# Patient Record
Sex: FEMALE | Race: WHITE | NOT HISPANIC OR LATINO | Employment: OTHER | URBAN - METROPOLITAN AREA
[De-identification: names, ages, dates, MRNs, and addresses within clinical notes are randomized per-mention and may not be internally consistent; named-entity substitution may affect disease eponyms.]

---

## 2017-01-12 DIAGNOSIS — N95.2 VAGINAL ATROPHY: ICD-10-CM

## 2017-01-12 RX ORDER — ESTRADIOL 10 UG/1
INSERT VAGINAL
Qty: 30 TABLET | Refills: 0 | Status: SHIPPED | OUTPATIENT
Start: 2017-01-12 | End: 2019-04-02 | Stop reason: SDUPTHER

## 2017-01-12 RX ORDER — ATORVASTATIN CALCIUM 10 MG/1
TABLET, FILM COATED ORAL
Qty: 90 TABLET | Refills: 0 | Status: SHIPPED | OUTPATIENT
Start: 2017-01-12 | End: 2017-05-12 | Stop reason: SDUPTHER

## 2017-01-16 RX ORDER — LEVOTHYROXINE SODIUM 50 UG/1
50 TABLET ORAL DAILY
Qty: 90 TABLET | Refills: 0 | Status: SHIPPED | OUTPATIENT
Start: 2017-01-16 | End: 2017-05-10 | Stop reason: SDUPTHER

## 2017-01-16 NOTE — TELEPHONE ENCOUNTER
----- Message from Rosalva Zee sent at 1/16/2017  2:32 PM CST -----  Contact: Josi Cervantes at [phone]  RX request - refill or new RX.  Is this a refill or new RX:  refill  RX name and strength: levothyroxine (SYNTHROID) 50 MCG tablet  Directions: TAKE 1 TABLET BY MOUTH EVERY DAY  Is this a 30 day or 90 day RX:  90 day  Pharmacy name and phone #: [facility]  [phone] (Phone)  [phone] (Fax)  Comments:

## 2017-01-17 ENCOUNTER — OFFICE VISIT (OUTPATIENT)
Dept: INTERNAL MEDICINE | Facility: CLINIC | Age: 72
End: 2017-01-17
Payer: MEDICARE

## 2017-01-17 VITALS
RESPIRATION RATE: 18 BRPM | BODY MASS INDEX: 30.52 KG/M2 | SYSTOLIC BLOOD PRESSURE: 138 MMHG | HEART RATE: 80 BPM | TEMPERATURE: 98 F | DIASTOLIC BLOOD PRESSURE: 80 MMHG | WEIGHT: 183.19 LBS | HEIGHT: 65 IN

## 2017-01-17 DIAGNOSIS — Z11.59 NEED FOR HEPATITIS C SCREENING TEST: ICD-10-CM

## 2017-01-17 DIAGNOSIS — G47.62 NOCTURNAL LEG CRAMPS: ICD-10-CM

## 2017-01-17 DIAGNOSIS — I70.0 ATHEROSCLEROSIS OF AORTA: ICD-10-CM

## 2017-01-17 DIAGNOSIS — K59.1 FUNCTIONAL DIARRHEA: ICD-10-CM

## 2017-01-17 DIAGNOSIS — Z23 NEED FOR 23-POLYVALENT PNEUMOCOCCAL POLYSACCHARIDE VACCINE: ICD-10-CM

## 2017-01-17 DIAGNOSIS — E03.9 UNSPECIFIED HYPOTHYROIDISM: ICD-10-CM

## 2017-01-17 DIAGNOSIS — D75.839 THROMBOCYTOSIS: ICD-10-CM

## 2017-01-17 DIAGNOSIS — Z12.11 COLON CANCER SCREENING: ICD-10-CM

## 2017-01-17 DIAGNOSIS — E78.5 HYPERLIPIDEMIA, UNSPECIFIED HYPERLIPIDEMIA TYPE: Primary | Chronic | ICD-10-CM

## 2017-01-17 DIAGNOSIS — M85.80 OSTEOPENIA: ICD-10-CM

## 2017-01-17 DIAGNOSIS — I73.9 PAD (PERIPHERAL ARTERY DISEASE): Chronic | ICD-10-CM

## 2017-01-17 PROCEDURE — 90732 PPSV23 VACC 2 YRS+ SUBQ/IM: CPT | Mod: PBBFAC,PO | Performed by: INTERNAL MEDICINE

## 2017-01-17 PROCEDURE — 99213 OFFICE O/P EST LOW 20 MIN: CPT | Mod: PBBFAC,PO | Performed by: INTERNAL MEDICINE

## 2017-01-17 PROCEDURE — 99999 PR PBB SHADOW E&M-EST. PATIENT-LVL III: CPT | Mod: PBBFAC,,, | Performed by: INTERNAL MEDICINE

## 2017-01-17 PROCEDURE — 99215 OFFICE O/P EST HI 40 MIN: CPT | Mod: S$PBB,,, | Performed by: INTERNAL MEDICINE

## 2017-01-17 RX ORDER — ASPIRIN 81 MG/1
81 TABLET ORAL DAILY
COMMUNITY
End: 2019-04-02 | Stop reason: ALTCHOICE

## 2017-01-17 NOTE — PROGRESS NOTES
71 y.o. femalepresents in f/u to hypothyroidism, PAD, dyslipidemia, thrombocytosis, and  Post inflammatory fibrosis  Review of risks and complications associated w/ her long term chronic problems, and Health Maintenance    Dyslipidemia tx w/ atorvastatin  Denied unusual muscle pain or chest pain  ++ night cramps,very painful, if doesn't catch early will end up walking the floor x 1 hr  LDL==>pending    Hypothyroidism, tx w/ levothyroxine 50mcg  Denied fatigue or cold intolerance  TSH==>pending  Free T4==>pending    PAD, tx w/ atorvastatin,   ++ claudication if she has a brisk walk, happens less often  w/o discoloration of LE    Thrombocytosis, recurrent, tx ASA and Fish oil   Platelet ct==> pending  Denied stroke like sx, confusion , or focal deficits    Post inflammatory fibrosis  Pt was in study w/ yearly   D/c'd tobacco > 15 yrs  Denied cough or wheezing or SOB      ROS:  No fever, or chills  No blurry vision or visual disturbance  No dysphagia or early satiety  No palpitations or tachycardia  No angina or chest pain  No cough or wheezing  No GERD or abdominal pain  No blood in stool or urine  No dysuria or hematuria  No numbness or focal deficits  No cold intolerance; + hot flashes  No increased thirst or urination  ADL's: 100% independent  Memory: Good  Mental Health:Good  AD's: + living , living will, and POA  Nutrition:Good  Gait: Normal  Safety:Intact  Urinary incontinence: N/a  Remainder of review negative exgcept as previously noted      SCREENING TESTS:   Cholesterol/lab, pending.   Colonoscopy 05/2009 with Dr. Carlin.   Recommended 7 years.   Mammogram UTD  Pap smear with GYN, Dr. Cason.   Bone density 2014    Osteopenia of the total hip and lumbar spine. There is no signficant change in BMD at the total hip or lumbar spine when compared to previous study.   FRAX calculation does not support treatment for osteoporosis     EYE exam UTD  Dental UTD.    VACCINATIONS:  Tdap, 7/2014  Pneumovax 9/08, update  1/2017  Prevnar, 2015  Zostavax 2010  FLU, yearly    MEDS: Reviewed.     PHYSICAL EXAM:  VS: As noted  GENERAL: WDWN, A&O, NAD, conversant and co-operative; pleasant as always  EYES: Conj/lids unremarkable, sclera anicteric, PERRL,  rims pink  ENT: Hearing intact. Canals w/o significantcerumen, TM's unremarkable  Nasal mucosa/turbinates/septum w/o inflammation or exudate  Oropharynx w/o lesions or exudate, no stridor, frenulum pink;   Mucus membranes moist; Sinus nontender  NECK: Supple w/o lymphadenopathy or thyromegaly  RESPIRATORY: Efforts are unlabored. Lungs CTAP  CARDIOVASCULAR: Heart RRR. No carotid bruits noted.  Diminished pedal pulses. No LE edema  GASTROINTESTINAL: BS+, soft , NT/ND, - HSM noted  MUSCULOSKELETAL: Gait normal. No CCE  NEUROLOGIC: BARROW. No tremor noted  SKIN: Warm and dry    IMPRESSION:  Hypothyroidism, stable  HLD, stable  PAD, stable  Aortic atherosclerosis, asx  Thrombocytosis, asx  Night cramps, LE recurrent  Osteopenia, on supplements  Post inflammatory fibrosis, asx  Diarrhea, chronic, no exacerbating or remitting factors noted   extensive w/u when she was young- all negative    PLAN:  Fasting lab-  Pneumovax  C-scope due  Continue present meds  Vigorous hydration  ASA 81 daily  Calcium nightly  Vit D  Exercise as tolerated  Call prn  RTC 6 mos

## 2017-01-19 ENCOUNTER — LAB VISIT (OUTPATIENT)
Dept: LAB | Facility: HOSPITAL | Age: 72
End: 2017-01-19
Attending: INTERNAL MEDICINE
Payer: MEDICARE

## 2017-01-19 DIAGNOSIS — E03.9 UNSPECIFIED HYPOTHYROIDISM: ICD-10-CM

## 2017-01-19 DIAGNOSIS — E78.5 HYPERLIPIDEMIA, UNSPECIFIED HYPERLIPIDEMIA TYPE: Chronic | ICD-10-CM

## 2017-01-19 DIAGNOSIS — D75.839 THROMBOCYTOSIS: ICD-10-CM

## 2017-01-19 DIAGNOSIS — Z11.59 NEED FOR HEPATITIS C SCREENING TEST: ICD-10-CM

## 2017-01-19 LAB
ALBUMIN SERPL BCP-MCNC: 3.9 G/DL
ALP SERPL-CCNC: 63 U/L
ALT SERPL W/O P-5'-P-CCNC: 22 U/L
ANION GAP SERPL CALC-SCNC: 7 MMOL/L
AST SERPL-CCNC: 23 U/L
BASOPHILS # BLD AUTO: 0.05 K/UL
BASOPHILS NFR BLD: 0.8 %
BILIRUB SERPL-MCNC: 0.4 MG/DL
BUN SERPL-MCNC: 18 MG/DL
CALCIUM SERPL-MCNC: 9.6 MG/DL
CHLORIDE SERPL-SCNC: 106 MMOL/L
CHOLEST/HDLC SERPL: 2.1 {RATIO}
CO2 SERPL-SCNC: 29 MMOL/L
CREAT SERPL-MCNC: 0.8 MG/DL
DIFFERENTIAL METHOD: ABNORMAL
EOSINOPHIL # BLD AUTO: 0.3 K/UL
EOSINOPHIL NFR BLD: 3.9 %
ERYTHROCYTE [DISTWIDTH] IN BLOOD BY AUTOMATED COUNT: 14.2 %
EST. GFR  (AFRICAN AMERICAN): >60 ML/MIN/1.73 M^2
EST. GFR  (NON AFRICAN AMERICAN): >60 ML/MIN/1.73 M^2
GLUCOSE SERPL-MCNC: 103 MG/DL
HCT VFR BLD AUTO: 45.1 %
HDL/CHOLESTEROL RATIO: 46.9 %
HDLC SERPL-MCNC: 162 MG/DL
HDLC SERPL-MCNC: 76 MG/DL
HGB BLD-MCNC: 14.8 G/DL
LDLC SERPL CALC-MCNC: 71.2 MG/DL
LYMPHOCYTES # BLD AUTO: 1.7 K/UL
LYMPHOCYTES NFR BLD: 26.1 %
MCH RBC QN AUTO: 31.4 PG
MCHC RBC AUTO-ENTMCNC: 32.8 %
MCV RBC AUTO: 96 FL
MONOCYTES # BLD AUTO: 0.7 K/UL
MONOCYTES NFR BLD: 10.9 %
NEUTROPHILS # BLD AUTO: 3.7 K/UL
NEUTROPHILS NFR BLD: 57.8 %
NONHDLC SERPL-MCNC: 86 MG/DL
PLATELET # BLD AUTO: 287 K/UL
PMV BLD AUTO: 10.8 FL
POTASSIUM SERPL-SCNC: 4.5 MMOL/L
PROT SERPL-MCNC: 7 G/DL
RBC # BLD AUTO: 4.72 M/UL
SODIUM SERPL-SCNC: 142 MMOL/L
T4 FREE SERPL-MCNC: 0.91 NG/DL
TRIGL SERPL-MCNC: 74 MG/DL
TSH SERPL DL<=0.005 MIU/L-ACNC: 7.31 UIU/ML
WBC # BLD AUTO: 6.43 K/UL

## 2017-01-19 PROCEDURE — 86803 HEPATITIS C AB TEST: CPT

## 2017-01-19 PROCEDURE — 84443 ASSAY THYROID STIM HORMONE: CPT

## 2017-01-19 PROCEDURE — 36415 COLL VENOUS BLD VENIPUNCTURE: CPT | Mod: PO

## 2017-01-19 PROCEDURE — 85025 COMPLETE CBC W/AUTO DIFF WBC: CPT

## 2017-01-19 PROCEDURE — 84439 ASSAY OF FREE THYROXINE: CPT

## 2017-01-19 PROCEDURE — 80053 COMPREHEN METABOLIC PANEL: CPT

## 2017-01-19 PROCEDURE — 80061 LIPID PANEL: CPT

## 2017-01-20 LAB — HCV AB SERPL QL IA: NEGATIVE

## 2017-01-23 ENCOUNTER — TELEPHONE (OUTPATIENT)
Dept: INTERNAL MEDICINE | Facility: CLINIC | Age: 72
End: 2017-01-23

## 2017-01-23 NOTE — TELEPHONE ENCOUNTER
----- Message from Suha Salguero MD sent at 1/20/2017  2:00 PM CST -----  Please note your urine had extra WBC's ( white blood cells),they may be indicative of a bladder infection or just an excessive amount due to providing a urine when you were fasting   please advise if you are having any symptoms of a bladder infection.  Suha Clifton

## 2017-01-23 NOTE — TELEPHONE ENCOUNTER
----- Message from Suha Salguero MD sent at 1/20/2017  1:56 PM CST -----  Please note that your hepatitis C was negative  Thyroid was stable, when reviewing the TSH and Free T4  Cholesterol was 162, and the good (HDL) cholesterol was higher than the LDL ( bad) cholesterol  Your chemistries were unremarkable  And you are not anemic  Suha Clifton

## 2017-05-10 RX ORDER — LEVOTHYROXINE SODIUM 50 UG/1
TABLET ORAL
Qty: 90 TABLET | Refills: 2 | Status: SHIPPED | OUTPATIENT
Start: 2017-05-10 | End: 2018-01-29 | Stop reason: SDUPTHER

## 2017-05-12 RX ORDER — ATORVASTATIN CALCIUM 10 MG/1
TABLET, FILM COATED ORAL
Qty: 90 TABLET | Refills: 0 | Status: SHIPPED | OUTPATIENT
Start: 2017-05-12 | End: 2017-08-07 | Stop reason: SDUPTHER

## 2017-06-28 DIAGNOSIS — N95.2 VAGINAL ATROPHY: ICD-10-CM

## 2017-06-29 RX ORDER — ESTRADIOL 10 UG/1
INSERT VAGINAL
Qty: 30 TABLET | Refills: 0 | Status: SHIPPED | OUTPATIENT
Start: 2017-06-29 | End: 2017-12-21

## 2017-08-07 RX ORDER — ATORVASTATIN CALCIUM 10 MG/1
TABLET, FILM COATED ORAL
Qty: 90 TABLET | Refills: 2 | Status: SHIPPED | OUTPATIENT
Start: 2017-08-07 | End: 2017-12-21 | Stop reason: SDUPTHER

## 2017-10-11 ENCOUNTER — PATIENT MESSAGE (OUTPATIENT)
Dept: OBSTETRICS AND GYNECOLOGY | Facility: CLINIC | Age: 72
End: 2017-10-11

## 2017-10-11 DIAGNOSIS — Z12.31 SCREENING MAMMOGRAM, ENCOUNTER FOR: Primary | ICD-10-CM

## 2017-10-16 ENCOUNTER — PATIENT MESSAGE (OUTPATIENT)
Dept: CARDIOLOGY | Facility: CLINIC | Age: 72
End: 2017-10-16

## 2017-10-25 ENCOUNTER — HOSPITAL ENCOUNTER (OUTPATIENT)
Dept: RADIOLOGY | Facility: HOSPITAL | Age: 72
Discharge: HOME | End: 2017-10-25
Attending: OBSTETRICS & GYNECOLOGY
Payer: MEDICARE

## 2017-10-25 VITALS — WEIGHT: 183 LBS | HEIGHT: 65 IN | BODY MASS INDEX: 30.49 KG/M2

## 2017-10-25 DIAGNOSIS — Z12.31 SCREENING MAMMOGRAM, ENCOUNTER FOR: ICD-10-CM

## 2017-10-25 PROCEDURE — 77067 SCR MAMMO BI INCL CAD: CPT | Mod: TC

## 2017-10-25 PROCEDURE — 77063 BREAST TOMOSYNTHESIS BI: CPT | Mod: 26,,, | Performed by: RADIOLOGY

## 2017-10-25 PROCEDURE — 77067 SCR MAMMO BI INCL CAD: CPT | Mod: 26,,, | Performed by: RADIOLOGY

## 2017-12-12 ENCOUNTER — OFFICE VISIT (OUTPATIENT)
Dept: URGENT CARE | Facility: CLINIC | Age: 72
End: 2017-12-12
Payer: MEDICARE

## 2017-12-12 VITALS
BODY MASS INDEX: 28.68 KG/M2 | DIASTOLIC BLOOD PRESSURE: 67 MMHG | RESPIRATION RATE: 16 BRPM | SYSTOLIC BLOOD PRESSURE: 136 MMHG | HEIGHT: 64 IN | HEART RATE: 83 BPM | WEIGHT: 168 LBS | OXYGEN SATURATION: 98 % | TEMPERATURE: 99 F

## 2017-12-12 DIAGNOSIS — J20.9 ACUTE PURULENT BRONCHITIS: ICD-10-CM

## 2017-12-12 DIAGNOSIS — J02.8 ACUTE PHARYNGITIS DUE TO OTHER SPECIFIED ORGANISMS: Primary | ICD-10-CM

## 2017-12-12 PROCEDURE — 99213 OFFICE O/P EST LOW 20 MIN: CPT | Mod: 25,S$GLB,, | Performed by: INTERNAL MEDICINE

## 2017-12-12 PROCEDURE — 96372 THER/PROPH/DIAG INJ SC/IM: CPT | Mod: S$GLB,,, | Performed by: INTERNAL MEDICINE

## 2017-12-12 RX ORDER — BETAMETHASONE SODIUM PHOSPHATE AND BETAMETHASONE ACETATE 3; 3 MG/ML; MG/ML
9 INJECTION, SUSPENSION INTRA-ARTICULAR; INTRALESIONAL; INTRAMUSCULAR; SOFT TISSUE ONCE
Status: COMPLETED | OUTPATIENT
Start: 2017-12-12 | End: 2017-12-12

## 2017-12-12 RX ORDER — AZITHROMYCIN 250 MG/1
TABLET, FILM COATED ORAL
Qty: 6 TABLET | Refills: 0 | Status: SHIPPED | OUTPATIENT
Start: 2017-12-12 | End: 2017-12-21 | Stop reason: ALTCHOICE

## 2017-12-12 RX ADMIN — BETAMETHASONE SODIUM PHOSPHATE AND BETAMETHASONE ACETATE 9 MG: 3; 3 INJECTION, SUSPENSION INTRA-ARTICULAR; INTRALESIONAL; INTRAMUSCULAR; SOFT TISSUE at 03:12

## 2017-12-12 NOTE — PROGRESS NOTES
"Subjective:       Patient ID: Eugenio Aldridge is a 72 y.o. female.    Vitals:  height is 5' 4" (1.626 m) and weight is 76.2 kg (168 lb). Her temperature is 98.7 °F (37.1 °C). Her blood pressure is 136/67 and her pulse is 83. Her respiration is 16 and oxygen saturation is 98%.     Chief Complaint: URI (pt said she has been sick for 5 days)    Pt said she her nose was bleeding for an hour before she came in.      URI    This is a new problem. The current episode started in the past 7 days. The problem has been unchanged. There has been no fever. Associated symptoms include congestion, coughing and wheezing. Pertinent negatives include no abdominal pain, chest pain, ear pain, headaches, nausea or sore throat. She has tried NSAIDs for the symptoms. The treatment provided mild relief.     Review of Systems   Constitution: Negative for chills, fever and malaise/fatigue.   HENT: Positive for congestion and hoarse voice. Negative for ear pain and sore throat.    Eyes: Negative for discharge and redness.   Cardiovascular: Negative for chest pain, dyspnea on exertion and leg swelling.   Respiratory: Positive for cough, sputum production and wheezing. Negative for shortness of breath.    Musculoskeletal: Negative for myalgias.   Gastrointestinal: Negative for abdominal pain and nausea.   Neurological: Negative for headaches.       Objective:      Physical Exam   Constitutional: She appears well-developed and well-nourished.   HENT:   Head: Normocephalic and atraumatic.   Mouth/Throat: Posterior oropharyngeal edema and posterior oropharyngeal erythema present.   Eyes: Conjunctivae and EOM are normal. Pupils are equal, round, and reactive to light.   Neck: Normal range of motion. Neck supple.   Cardiovascular: Normal rate.    Pulmonary/Chest: Effort normal.   Upper airway congestion with end exp wheeze   Lymphadenopathy:     She has cervical adenopathy.   Vitals reviewed.      Assessment:       1. Acute pharyngitis due to other " specified organisms    2. Acute purulent bronchitis        Plan:         Acute pharyngitis due to other specified organisms  -     betamethasone acetate-betamethasone sodium phosphate injection 9 mg; Inject 1.5 mLs (9 mg total) into the muscle once.  -     azithromycin (ZITHROMAX Z-CORDELL) 250 MG tablet; Take 2 tablets (500 mg) on  Day 1,  followed by 1 tablet (250 mg) once daily on Days 2 through 5.  Dispense: 6 tablet; Refill: 0    Acute purulent bronchitis  -     betamethasone acetate-betamethasone sodium phosphate injection 9 mg; Inject 1.5 mLs (9 mg total) into the muscle once.  -     azithromycin (ZITHROMAX Z-CORDELL) 250 MG tablet; Take 2 tablets (500 mg) on  Day 1,  followed by 1 tablet (250 mg) once daily on Days 2 through 5.  Dispense: 6 tablet; Refill: 0

## 2017-12-13 ENCOUNTER — LAB VISIT (OUTPATIENT)
Dept: LAB | Facility: HOSPITAL | Age: 72
End: 2017-12-13
Attending: INTERNAL MEDICINE
Payer: MEDICARE

## 2017-12-13 DIAGNOSIS — I73.9 PAD (PERIPHERAL ARTERY DISEASE): Chronic | ICD-10-CM

## 2017-12-13 DIAGNOSIS — E78.00 HYPERCHOLESTEROLEMIA: ICD-10-CM

## 2017-12-13 LAB
ALT SERPL W/O P-5'-P-CCNC: 19 U/L
ANION GAP SERPL CALC-SCNC: 8 MMOL/L
AST SERPL-CCNC: 21 U/L
BUN SERPL-MCNC: 14 MG/DL
CALCIUM SERPL-MCNC: 10.1 MG/DL
CHLORIDE SERPL-SCNC: 105 MMOL/L
CHOLEST SERPL-MCNC: 159 MG/DL
CHOLEST/HDLC SERPL: 2.3 {RATIO}
CO2 SERPL-SCNC: 30 MMOL/L
CREAT SERPL-MCNC: 0.8 MG/DL
EST. GFR  (AFRICAN AMERICAN): >60 ML/MIN/1.73 M^2
EST. GFR  (NON AFRICAN AMERICAN): >60 ML/MIN/1.73 M^2
GLUCOSE SERPL-MCNC: 143 MG/DL
HDLC SERPL-MCNC: 69 MG/DL
HDLC SERPL: 43.4 %
LDLC SERPL CALC-MCNC: 77.6 MG/DL
NONHDLC SERPL-MCNC: 90 MG/DL
POTASSIUM SERPL-SCNC: 5 MMOL/L
SODIUM SERPL-SCNC: 143 MMOL/L
TRIGL SERPL-MCNC: 62 MG/DL

## 2017-12-13 PROCEDURE — 80048 BASIC METABOLIC PNL TOTAL CA: CPT

## 2017-12-13 PROCEDURE — 80061 LIPID PANEL: CPT

## 2017-12-13 PROCEDURE — 84460 ALANINE AMINO (ALT) (SGPT): CPT

## 2017-12-13 PROCEDURE — 36415 COLL VENOUS BLD VENIPUNCTURE: CPT | Mod: PO

## 2017-12-13 PROCEDURE — 84450 TRANSFERASE (AST) (SGOT): CPT

## 2017-12-21 ENCOUNTER — OFFICE VISIT (OUTPATIENT)
Dept: CARDIOLOGY | Facility: CLINIC | Age: 72
End: 2017-12-21
Payer: MEDICARE

## 2017-12-21 VITALS
BODY MASS INDEX: 29 KG/M2 | DIASTOLIC BLOOD PRESSURE: 70 MMHG | HEART RATE: 72 BPM | SYSTOLIC BLOOD PRESSURE: 124 MMHG | HEIGHT: 65 IN | WEIGHT: 174.06 LBS

## 2017-12-21 DIAGNOSIS — I73.9 PAD (PERIPHERAL ARTERY DISEASE): Primary | ICD-10-CM

## 2017-12-21 DIAGNOSIS — E78.00 PURE HYPERCHOLESTEROLEMIA: ICD-10-CM

## 2017-12-21 PROCEDURE — 93005 ELECTROCARDIOGRAM TRACING: CPT | Mod: PBBFAC,PO | Performed by: INTERNAL MEDICINE

## 2017-12-21 PROCEDURE — 99213 OFFICE O/P EST LOW 20 MIN: CPT | Mod: S$PBB,,, | Performed by: INTERNAL MEDICINE

## 2017-12-21 PROCEDURE — 99213 OFFICE O/P EST LOW 20 MIN: CPT | Mod: PBBFAC,25,PO | Performed by: INTERNAL MEDICINE

## 2017-12-21 PROCEDURE — 99999 PR PBB SHADOW E&M-EST. PATIENT-LVL III: CPT | Mod: PBBFAC,,, | Performed by: INTERNAL MEDICINE

## 2017-12-21 PROCEDURE — 93010 ELECTROCARDIOGRAM REPORT: CPT | Mod: ,,, | Performed by: INTERNAL MEDICINE

## 2017-12-21 RX ORDER — ATORVASTATIN CALCIUM 20 MG/1
20 TABLET, FILM COATED ORAL DAILY
Qty: 90 TABLET | Refills: 3 | Status: SHIPPED | OUTPATIENT
Start: 2017-12-21 | End: 2019-03-04 | Stop reason: SDUPTHER

## 2017-12-21 NOTE — PATIENT INSTRUCTIONS
Stop fish oil and Co Q 10.  Increase Lipitor (atorvastatin) to 20 mg a day.  You can take Co Q 10 if you have muscle aches/cramping related to Lipitor.    =======================    LDL - bad type - improves with diet and medications: typically statins; most other medications that lower LDL have not been proven to prevent heart attacks.  May not improve significantly with exercise alone.  Ideally less than 100 mg/dl. If you have coronary artery disease, this should be well below 70 mg/dl.    HDL - good type - improves with exercise.  Ideally greater than 50 mg/dl.    TGs (triglycerides) - also bad - can change very quickly and considerably with certain foods. Improve with diet and exercise  In some cases a low carbohydrate diet will lower TGs better than a low fat diet.  Ideal range  mg/dl.    Sugar, fat and cholesterol in food:     A sensible diet that limits the intake of sugars, saturated (bad) fats and trans fats while increasing the intake of unsaturated (good)  fats and plant proteins is the basis of the current dietary recommendations.      We now recommend drastically reducing the intake of sugar. There is less emphasis on excluding fat.     Cholesterol in our food is no longer a significant concern, mainly because it is generally present in small amounts. However please do not confuse this with the role of cholesterol in our blood and arteries. Make no mistake, it is cholesterol that clogs up our arteries whether it comes from our food or is manufactured by our bodies.       Most foods that are high in cholesterol are also high in saturated fat. But there is way more saturated fat than cholesterol in these foods. So its the saturated fat content that matters more than cholesterol. On the other hand there are a handful of foods that are high in cholesterol but do not contain much saturated fat: eggs, shrimp, crab legs and crawfish are OK to eat.       Saturated fat is the bad fat - you should limit  your intake of this. Deep fried foods, meats and other animal fats are high in saturated fat. Cookies, donuts and most dessert and cakes are usually high in both saturated fat and sugar.       Unsaturated fat is the good fat. It contains the same number of calories as saturated fat but these fats do not get deposited in our arteries. The Mediterranean style diet encourages the intake of unsaturated fat - olive oil, avocado and unsalted nuts. So instead of baking a piece of fish, it may be better to pan-reyes it using olive oil.     You should eat a few servings of vegetables (and fruit as long as you are not diabetic) everyday. Substitute some plant proteins in place of meat: beans, lentils, quinoa and oatmeal. They are lean proteins.     Do not use stick butter or stick margarine. Butter that comes in a tub is soft butter. It consists of 1/2 butter and 1/2 vegetable oil., either canola or olive oil It is fine to use that.       Trans fats should definitely be avoided. Most foods that are labelled as containing 0 gms of trans fat can still contain several hundred milligrams of trans fat: creamer, margarine, refrigerator dough, deep fried foods, ready made frosting, potato, corn and torilla chips, cakes, cookies, pie crusts and crackers containing shortening made with hydrogenated vegetable oil.

## 2017-12-21 NOTE — PROGRESS NOTES
Subjective:   Patient ID:  Eugenio Aldridge is a 72 y.o. female who presents for follow-up of Hyperlipidemia      Problem List:  Hypercholesterolemia  PAD  Tobacco use - 1-2 ppd for 22 years, quit 2001    HPI:   Eugenio Aldridge feels generally well. She does not report angina or shortness of breath with exertion. She has been less physically active recently.   She has discomfort in the left calf on ambulation. It does not occur at rest. She does not have back pain. She had abnormal exercise ABIs in 2004, improved in 2007. CTA in 2008 revealed no significant disease except for plaque within the distal aorta and iliac arteries. She has excellent HDL levels and her LDL was not significantly elevated but I placed her on low dose atorvastatin. LDL has been in the 70s. She also takes fish oil, Co Q 10 and low dose aspirin.       BP Readings from Last 3 Encounters:   12/21/17 124/70   12/12/17 136/67   01/17/17 138/80       Review of Systems   Constitution: Negative for weakness, malaise/fatigue, weight gain and weight loss.   Cardiovascular: Positive for dyspnea on exertion. Negative for chest pain, claudication, irregular heartbeat, leg swelling, near-syncope, palpitations and syncope.   Respiratory: Negative for cough, hemoptysis, sputum production and wheezing.    Hematologic/Lymphatic: Does not bruise/bleed easily.   Musculoskeletal: Negative for back pain, joint pain, joint swelling, muscle cramps and muscle weakness.   Gastrointestinal: Negative for abdominal pain, change in bowel habit, heartburn and melena.   Genitourinary: Negative for frequency, hematuria and nocturia.   Neurological: Negative for dizziness, headaches, light-headedness, loss of balance, numbness and paresthesias.   Psychiatric/Behavioral: Negative for depression. The patient is not nervous/anxious.        Current Outpatient Prescriptions   Medication Sig    aspirin (ECOTRIN) 81 MG EC tablet Take 81 mg by mouth once daily.    atorvastatin (LIPITOR) 10 MG  "tablet TAKE 1 TABLET BY MOUTH EVERY DAY    azithromycin (ZITHROMAX Z-CORDELL) 250 MG tablet Take 2 tablets (500 mg) on  Day 1,  followed by 1 tablet (250 mg) once daily on Days 2 through 5.    calcium carbonate (CALTRATE 600) 600 mg (1,500 mg) Tab Take 600 mg by mouth once daily.     coenzyme Q10 (CO Q-10) 200 mg capsule Take 200 mg by mouth once daily.    levothyroxine (SYNTHROID) 50 MCG tablet TAKE 1 TABLET(50 MCG) BY MOUTH EVERY DAY    multivitamin (THERAGRAN) per tablet Take by mouth. 1 Tablet Oral Every day    omega-3 fatty acids (FISH OIL) 300 mg Cap Take by mouth. 1 Capsule Oral Every day    VAGIFEM 10 mcg Tab INSERT ONE TABLET IN THE VAGINA TWICE WEEKLY AS DIRECTED    VAGIFEM 10 mcg Tab INSERT ONE TABLET IN THE VAGINA TWICE WEEKLY AS DIRECTED         Social History   Substance Use Topics    Smoking status: Former Smoker     Packs/day: 1.50     Years: 22.00     Types: Cigarettes     Quit date: 9/11/2001    Smokeless tobacco: Not on file    Alcohol use 3.5 oz/week     7 Standard drinks or equivalent per week      Comment: once daily         Objective:     Physical Exam   Constitutional: She is oriented to person, place, and time. She appears well-developed and well-nourished.   /70   Pulse 72   Ht 5' 5" (1.651 m)   Wt 78.9 kg (174 lb 0.9 oz)   BMI 28.96 kg/m²      HENT:   Head: Normocephalic.   Neck: No JVD present. Carotid bruit is not present.   Cardiovascular: Normal rate, regular rhythm, S1 normal and S2 normal.  Exam reveals no gallop.    No murmur heard.  Pulses:       Dorsalis pedis pulses are 2+ on the right side, and 0 on the left side.        Posterior tibial pulses are 2+ on the right side, and 1+ on the left side.   Pulmonary/Chest: Effort normal. She has no wheezes. She has no rales. Chest wall is not dull to percussion.   Abdominal: Soft. She exhibits no abdominal bruit. There is no splenomegaly or hepatomegaly. There is no tenderness.   Musculoskeletal:        Right lower leg: " She exhibits no edema.        Left lower leg: She exhibits no edema.   Neurological: She is alert and oriented to person, place, and time. Gait normal.   Skin: Skin is warm and dry. No bruising and no rash noted. No cyanosis. Nails show no clubbing.   Psychiatric: She has a normal mood and affect. Her speech is normal and behavior is normal. Judgment normal. Cognition and memory are normal.           Lab Results   Component Value Date    CHOL 159 12/13/2017    HDL 69 12/13/2017    LDLCALC 77.6 12/13/2017    TRIG 62 12/13/2017    CHOLHDL 43.4 12/13/2017     Lab Results   Component Value Date     (H) 12/13/2017    CREATININE 0.8 12/13/2017    BUN 14 12/13/2017     12/13/2017    K 5.0 12/13/2017     12/13/2017    CO2 30 (H) 12/13/2017     Lab Results   Component Value Date    ALT 19 12/13/2017    AST 21 12/13/2017    ALKPHOS 63 01/19/2017    BILITOT 0.4 01/19/2017           Assessment and Plan:     1. PAD (peripheral artery disease)    2. Pure hypercholesterolemia             PAD (peripheral artery disease)  -     IN OFFICE EKG 12-LEAD (to [place]); Future    Pure hypercholesterolemia  -     IN OFFICE EKG 12-LEAD (to [place]); Future  -     Lipid panel; Future; Expected date: 02/21/2018  -     ALT (SGPT); Future; Expected date: 02/21/2018  -     AST (SGOT); Future; Expected date: 02/21/2018    Other orders  -     atorvastatin (LIPITOR) 20 MG tablet; Take 1 tablet (20 mg total) by mouth once daily.  Dispense: 90 tablet; Refill: 3       Recommend increasing atorvastatin to 20 mg.  Can D/C fish oil and Co Q 10.   RTC in 18 months

## 2018-01-29 RX ORDER — LEVOTHYROXINE SODIUM 50 UG/1
TABLET ORAL
Qty: 90 TABLET | Refills: 0 | Status: SHIPPED | OUTPATIENT
Start: 2018-01-29 | End: 2018-04-27 | Stop reason: SDUPTHER

## 2018-02-21 ENCOUNTER — LAB VISIT (OUTPATIENT)
Dept: LAB | Facility: HOSPITAL | Age: 73
End: 2018-02-21
Attending: INTERNAL MEDICINE
Payer: MEDICARE

## 2018-02-21 DIAGNOSIS — E78.00 PURE HYPERCHOLESTEROLEMIA: ICD-10-CM

## 2018-02-21 LAB
ALT SERPL W/O P-5'-P-CCNC: 27 U/L
AST SERPL-CCNC: 26 U/L
CHOLEST SERPL-MCNC: 145 MG/DL
CHOLEST/HDLC SERPL: 2.2 {RATIO}
HDLC SERPL-MCNC: 66 MG/DL
HDLC SERPL: 45.5 %
LDLC SERPL CALC-MCNC: 68.8 MG/DL
NONHDLC SERPL-MCNC: 79 MG/DL
TRIGL SERPL-MCNC: 51 MG/DL

## 2018-02-21 PROCEDURE — 84450 TRANSFERASE (AST) (SGOT): CPT

## 2018-02-21 PROCEDURE — 84460 ALANINE AMINO (ALT) (SGPT): CPT

## 2018-02-21 PROCEDURE — 80061 LIPID PANEL: CPT

## 2018-02-21 PROCEDURE — 36415 COLL VENOUS BLD VENIPUNCTURE: CPT | Mod: PO

## 2018-03-08 ENCOUNTER — PATIENT MESSAGE (OUTPATIENT)
Dept: INTERNAL MEDICINE | Facility: CLINIC | Age: 73
End: 2018-03-08

## 2018-03-08 DIAGNOSIS — E03.9 HYPOTHYROIDISM, ADULT: Primary | ICD-10-CM

## 2018-03-08 DIAGNOSIS — E78.00 PURE HYPERCHOLESTEROLEMIA: ICD-10-CM

## 2018-03-12 ENCOUNTER — LAB VISIT (OUTPATIENT)
Dept: LAB | Facility: HOSPITAL | Age: 73
End: 2018-03-12
Attending: INTERNAL MEDICINE
Payer: MEDICARE

## 2018-03-12 DIAGNOSIS — E78.00 PURE HYPERCHOLESTEROLEMIA: ICD-10-CM

## 2018-03-12 DIAGNOSIS — E03.9 HYPOTHYROIDISM, ADULT: ICD-10-CM

## 2018-03-12 LAB
ALBUMIN SERPL BCP-MCNC: 3.8 G/DL
ALP SERPL-CCNC: 63 U/L
ALT SERPL W/O P-5'-P-CCNC: 24 U/L
ANION GAP SERPL CALC-SCNC: 13 MMOL/L
AST SERPL-CCNC: 28 U/L
BASOPHILS # BLD AUTO: 0.07 K/UL
BASOPHILS NFR BLD: 1.3 %
BILIRUB SERPL-MCNC: 0.6 MG/DL
BUN SERPL-MCNC: 15 MG/DL
CALCIUM SERPL-MCNC: 9.6 MG/DL
CHLORIDE SERPL-SCNC: 108 MMOL/L
CHOLEST SERPL-MCNC: 127 MG/DL
CHOLEST/HDLC SERPL: 1.9 {RATIO}
CO2 SERPL-SCNC: 24 MMOL/L
CREAT SERPL-MCNC: 0.7 MG/DL
DIFFERENTIAL METHOD: ABNORMAL
EOSINOPHIL # BLD AUTO: 0.3 K/UL
EOSINOPHIL NFR BLD: 5.1 %
ERYTHROCYTE [DISTWIDTH] IN BLOOD BY AUTOMATED COUNT: 14.8 %
EST. GFR  (AFRICAN AMERICAN): >60 ML/MIN/1.73 M^2
EST. GFR  (NON AFRICAN AMERICAN): >60 ML/MIN/1.73 M^2
GLUCOSE SERPL-MCNC: 95 MG/DL
HCT VFR BLD AUTO: 42.2 %
HDLC SERPL-MCNC: 66 MG/DL
HDLC SERPL: 52 %
HGB BLD-MCNC: 13.3 G/DL
IMM GRANULOCYTES # BLD AUTO: 0.01 K/UL
IMM GRANULOCYTES NFR BLD AUTO: 0.2 %
LDLC SERPL CALC-MCNC: 49.8 MG/DL
LYMPHOCYTES # BLD AUTO: 1.4 K/UL
LYMPHOCYTES NFR BLD: 24.6 %
MCH RBC QN AUTO: 30.4 PG
MCHC RBC AUTO-ENTMCNC: 31.5 G/DL
MCV RBC AUTO: 97 FL
MONOCYTES # BLD AUTO: 0.6 K/UL
MONOCYTES NFR BLD: 10.9 %
NEUTROPHILS # BLD AUTO: 3.2 K/UL
NEUTROPHILS NFR BLD: 57.9 %
NONHDLC SERPL-MCNC: 61 MG/DL
NRBC BLD-RTO: 0 /100 WBC
PLATELET # BLD AUTO: 293 K/UL
PMV BLD AUTO: 10.3 FL
POTASSIUM SERPL-SCNC: 4.2 MMOL/L
PROT SERPL-MCNC: 6.4 G/DL
RBC # BLD AUTO: 4.37 M/UL
SODIUM SERPL-SCNC: 145 MMOL/L
T4 FREE SERPL-MCNC: 0.9 NG/DL
TRIGL SERPL-MCNC: 56 MG/DL
TSH SERPL DL<=0.005 MIU/L-ACNC: 4.32 UIU/ML
WBC # BLD AUTO: 5.49 K/UL

## 2018-03-12 PROCEDURE — 80061 LIPID PANEL: CPT

## 2018-03-12 PROCEDURE — 36415 COLL VENOUS BLD VENIPUNCTURE: CPT | Mod: PO

## 2018-03-12 PROCEDURE — 84443 ASSAY THYROID STIM HORMONE: CPT

## 2018-03-12 PROCEDURE — 85025 COMPLETE CBC W/AUTO DIFF WBC: CPT

## 2018-03-12 PROCEDURE — 84439 ASSAY OF FREE THYROXINE: CPT

## 2018-03-12 PROCEDURE — 83036 HEMOGLOBIN GLYCOSYLATED A1C: CPT

## 2018-03-12 PROCEDURE — 80053 COMPREHEN METABOLIC PANEL: CPT

## 2018-03-12 NOTE — PROGRESS NOTES
72 y.o. femalepresents in f/u to hypothyroidism, PAD, dyslipidemia, thrombocytosis, and  Post inflammatory fibrosis  Review of risks and complications associated w/ her long term chronic problems, and Health Maintenance    Dyslipidemia tx w/ atorvastatin 20mg  Denied unusual muscle pain or chest pain  ++ night cramps,very painful, occasional if doesn't catch early will end up walking the floor x 10-30'  LDL==>49.8    Hypothyroidism, tx w/ levothyroxine 50mcg  Denied fatigue or cold intolerance  Chronically to the warm side  TSH==> 4.317  Free T4==>0.90    PAD, tx w/ atorvastatin,   no claudication if she has a brisk walk,   happens less often w/o discoloration of LE    Thrombocytosis, recurrent, tx ASA and Fish oil   Platelet ct==> 293  Denied stroke like sx, confusion , or focal deficits  Donate  Platelets several times yearly 4-5  Last donation one month    Post inflammatory fibrosis  Pt was in study w/ yearly CT- completed  D/c'd tobacco > 15 yrs  Denied cough or wheezing   Occ SOB, with activity,   but no chest pain, heart palpitations or lightheadedness    Right shoulder pain, recurrent  Had in 2011, s/p inj w/ resolution  Trauma: none  Tx: avoids pressure and lifting  Most often  Bothers bothers when rolls over in bed  Sharp pain, that resolves when repositions  occ won't move w/o lifting w/ left side      ROS:  No fever, or chills  No blurry vision or visual disturbance  No dysphagia or early satiety  No palpitations or tachycardia  No angina or chest pain  No cough or wheezing  No GERD or abdominal pain  No blood in stool or urine  No dysuria or hematuria  No numbness or focal deficits  No cold intolerance; + hot flashes  No increased thirst or urination  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% independent, CPA  Memory: Good, delayed recall  Mental Health:Good  AD's: + living , living will, and POA  Nutrition: Good- on Weight Watchers , lost 20#, goal 30#  Gait: Normal  Safety: Intact  Urinary incontinence: N/a  Answers for HPI/ROS  submitted by the patient on 3/10/2018   activity change: No  unexpected weight change: No  neck pain: No  hearing loss: No  rhinorrhea: No  trouble swallowing: No  eye discharge: No  visual disturbance: Yes----MD  chest tightness: No  wheezing: No  chest pain: No  palpitations: No  blood in stool: No  constipation: No  vomiting: No  diarrhea: No  polydipsia: No  polyuria: No  difficulty urinating: No  hematuria: No  menstrual problem: No  dysuria: No  joint swelling: No  arthralgias: Yes----Right shoulder pain  headaches: No  weakness: No  confusion: No  dysphoric mood: No    Remainder of review negative exgcept as previously noted      SCREENING TESTS:   Cholesterol/lab, pending.   Colonoscopy 05/2009 with Dr. Carlin.   Mammogram UTD  Pap smear with GYN, Dr. Cason.   Bone density 2014    Osteopenia of the total hip and lumbar spine. There is no signficant change in BMD at the total hip or lumbar spine when compared to previous study.   FRAX calculation does not support treatment for osteoporosis     Eye exam, pending- + slight MD on [place]bi  Dental UTD.    VACCINATIONS:  Tdap, 7/2014  Pneumovax 9/08, update 1/2017  Prevnar, 2015  Zostavax 2010  FLU, yearly    MEDS: Reviewed.     PHYSICAL EXAM:  VS: As noted  GENERAL: WDWN, A&O, NAD, conversant and co-operative; pleasant as always  EYES: Conj/lids unremarkable, sclera anicteric, PERRL,  rims pink  ENT: Hearing intact. Canals w/o significantcerumen, TM's unremarkable  Nasal mucosa/turbinates/septum w/o inflammation or exudate  Oropharynx w/o lesions or exudate, no stridor, frenulum pink;   Mucus membranes moist; Sinus nontender  NECK: Supple w/o lymphadenopathy or thyromegaly  RESPIRATORY: Efforts are unlabored. Lungs CTAP  CARDIOVASCULAR: Heart RRR. No carotid bruits noted.  Diminished pedal pulses. No LE edema  GASTROINTESTINAL: BS+, soft , NT/ND, - HSM noted  MUSCULOSKELETAL: Gait normal. No CCE  Right shoulder decreased rom, tender to palp  NEUROLOGIC: BARROW. No  tremor noted  SKIN: Warm and dry    IMPRESSION:  Hypothyroidism, stable  HLD, stable  PAD, stable  Aortic atherosclerosis, asx  Thrombocytosis, improved  Osteopenia, on supplements  Post inflammatory fibrosis, asx  Pre-diabetes  Right shoulder pain    PLAN:  [id number]  Continue present meds  Vigorous hydration  Exercise as tolerated  Consult Ortho  Call prn  RTC 6 mos

## 2018-03-13 ENCOUNTER — TELEPHONE (OUTPATIENT)
Dept: INTERNAL MEDICINE | Facility: CLINIC | Age: 73
End: 2018-03-13

## 2018-03-13 NOTE — TELEPHONE ENCOUNTER
----- Message from Suha Salguero MD sent at 3/12/2018  9:24 PM CDT -----  Please review your lab work and we will discuss at your pending office visit.  Suha Clifton

## 2018-03-14 ENCOUNTER — OFFICE VISIT (OUTPATIENT)
Dept: INTERNAL MEDICINE | Facility: CLINIC | Age: 73
End: 2018-03-14
Payer: MEDICARE

## 2018-03-14 VITALS
TEMPERATURE: 98 F | BODY MASS INDEX: 26.77 KG/M2 | HEART RATE: 73 BPM | DIASTOLIC BLOOD PRESSURE: 76 MMHG | WEIGHT: 160.69 LBS | HEIGHT: 65 IN | RESPIRATION RATE: 14 BRPM | SYSTOLIC BLOOD PRESSURE: 132 MMHG

## 2018-03-14 DIAGNOSIS — M25.511 CHRONIC RIGHT SHOULDER PAIN: ICD-10-CM

## 2018-03-14 DIAGNOSIS — I70.0 ATHEROSCLEROSIS OF AORTA: ICD-10-CM

## 2018-03-14 DIAGNOSIS — E03.8 OTHER SPECIFIED HYPOTHYROIDISM: Primary | ICD-10-CM

## 2018-03-14 DIAGNOSIS — E78.00 PURE HYPERCHOLESTEROLEMIA: ICD-10-CM

## 2018-03-14 DIAGNOSIS — N95.2 VAGINAL ATROPHY: ICD-10-CM

## 2018-03-14 DIAGNOSIS — G89.29 CHRONIC RIGHT SHOULDER PAIN: ICD-10-CM

## 2018-03-14 DIAGNOSIS — R73.03 PRE-DIABETES: ICD-10-CM

## 2018-03-14 DIAGNOSIS — J84.10 POSTINFLAMMATORY PULMONARY FIBROSIS: ICD-10-CM

## 2018-03-14 DIAGNOSIS — D75.839 THROMBOCYTOSIS: ICD-10-CM

## 2018-03-14 DIAGNOSIS — Z78.0 POSTMENOPAUSAL ESTROGEN DEFICIENCY: ICD-10-CM

## 2018-03-14 DIAGNOSIS — I73.9 PAD (PERIPHERAL ARTERY DISEASE): Chronic | ICD-10-CM

## 2018-03-14 LAB
ESTIMATED AVG GLUCOSE: 97 MG/DL
HBA1C MFR BLD HPLC: 5 %

## 2018-03-14 PROCEDURE — 99999 PR PBB SHADOW E&M-EST. PATIENT-LVL III: CPT | Mod: PBBFAC,,, | Performed by: INTERNAL MEDICINE

## 2018-03-14 PROCEDURE — 99213 OFFICE O/P EST LOW 20 MIN: CPT | Mod: PBBFAC,PO | Performed by: INTERNAL MEDICINE

## 2018-03-14 PROCEDURE — 99215 OFFICE O/P EST HI 40 MIN: CPT | Mod: S$PBB,,, | Performed by: INTERNAL MEDICINE

## 2018-03-15 RX ORDER — ESTRADIOL 10 UG/1
INSERT VAGINAL
Qty: 30 TABLET | Refills: 0 | Status: SHIPPED | OUTPATIENT
Start: 2018-03-15 | End: 2019-04-02

## 2018-03-16 ENCOUNTER — TELEPHONE (OUTPATIENT)
Dept: INTERNAL MEDICINE | Facility: CLINIC | Age: 73
End: 2018-03-16

## 2018-03-16 NOTE — TELEPHONE ENCOUNTER
----- Message from Suha Salguero MD sent at 3/15/2018  5:06 PM CDT -----  Good news, your 3 month average blood sugar is in the normal range  Suha Clifton

## 2018-04-27 RX ORDER — ATORVASTATIN CALCIUM 10 MG/1
TABLET, FILM COATED ORAL
Qty: 90 TABLET | Refills: 0 | OUTPATIENT
Start: 2018-04-27

## 2018-04-27 RX ORDER — LEVOTHYROXINE SODIUM 50 UG/1
TABLET ORAL
Qty: 90 TABLET | Refills: 0 | Status: SHIPPED | OUTPATIENT
Start: 2018-04-27 | End: 2018-12-18 | Stop reason: SDUPTHER

## 2018-05-01 ENCOUNTER — OFFICE VISIT (OUTPATIENT)
Dept: OPTOMETRY | Facility: CLINIC | Age: 73
End: 2018-05-01
Payer: MEDICARE

## 2018-05-01 DIAGNOSIS — H52.7 REFRACTIVE ERROR: ICD-10-CM

## 2018-05-01 DIAGNOSIS — H25.13 NUCLEAR SCLEROSIS OF BOTH EYES: Primary | ICD-10-CM

## 2018-05-01 PROCEDURE — 92004 COMPRE OPH EXAM NEW PT 1/>: CPT | Mod: S$PBB,,, | Performed by: OPTOMETRIST

## 2018-05-01 PROCEDURE — 99999 PR PBB SHADOW E&M-EST. PATIENT-LVL II: CPT | Mod: PBBFAC,,, | Performed by: OPTOMETRIST

## 2018-05-01 PROCEDURE — 99212 OFFICE O/P EST SF 10 MIN: CPT | Mod: PBBFAC,PO | Performed by: OPTOMETRIST

## 2018-05-01 PROCEDURE — 92015 DETERMINE REFRACTIVE STATE: CPT | Mod: ,,, | Performed by: OPTOMETRIST

## 2018-05-01 RX ORDER — ATORVASTATIN CALCIUM 10 MG/1
TABLET, FILM COATED ORAL
Refills: 2 | COMMUNITY
Start: 2018-01-30 | End: 2019-04-02

## 2018-05-01 NOTE — PROGRESS NOTES
HPI     GRAEME 2017 elsewhere. Glasses about 1 yr. Old, hasn't noticed any vision   changes.  Feels she needs stronger glasses for reading.  Was told she has   macular degeneration at last exam.  Blur ou at near x mos, no assoc pain or red, constant, no relief over   time.    Last edited by Lux Benitez, OD on 5/1/2018  9:20 AM. (History)            Assessment /Plan     For exam results, see Encounter Report.    Nuclear sclerosis of both eyes    Refractive error      1. Educated pt on presence of cataracts and effects on vision. No surgery at this time. Recheck in one year.  2. Spec Rx given. Different lens options discussed with patient. RTC 1 year full exam.

## 2018-06-12 ENCOUNTER — HOSPITAL ENCOUNTER (OUTPATIENT)
Dept: RADIOLOGY | Facility: CLINIC | Age: 73
Discharge: HOME | End: 2018-06-12
Attending: INTERNAL MEDICINE
Payer: MEDICARE

## 2018-06-12 DIAGNOSIS — Z78.0 POSTMENOPAUSAL ESTROGEN DEFICIENCY: ICD-10-CM

## 2018-06-12 PROCEDURE — 77080 DXA BONE DENSITY AXIAL: CPT | Mod: 26,,, | Performed by: INTERNAL MEDICINE

## 2018-06-12 PROCEDURE — 77080 DXA BONE DENSITY AXIAL: CPT | Mod: TC

## 2018-06-15 ENCOUNTER — TELEPHONE (OUTPATIENT)
Dept: INTERNAL MEDICINE | Facility: CLINIC | Age: 73
End: 2018-06-15

## 2018-06-15 ENCOUNTER — PATIENT MESSAGE (OUTPATIENT)
Dept: INTERNAL MEDICINE | Facility: CLINIC | Age: 73
End: 2018-06-15

## 2018-06-15 NOTE — TELEPHONE ENCOUNTER
----- Message from Suha Salguero MD sent at 6/15/2018  2:36 PM CDT -----  Please note that your bone density revealed osteoporosis with recommendation for prescriptive medication.  The most common treatment is a generic Fosamax product ( alendronate)that would be taken once weekly  1st morning with 8-10 oz water only and instructions to remain upright for 60'  Please advise if you are agreeable to trying the medication and an order will be sent to your pharmacy.  Suha Clifton

## 2018-06-18 RX ORDER — ALENDRONATE SODIUM 70 MG/1
70 TABLET ORAL
Qty: 12 TABLET | Refills: 3 | Status: SHIPPED | OUTPATIENT
Start: 2018-06-18 | End: 2019-08-17 | Stop reason: SDUPTHER

## 2018-07-14 ENCOUNTER — PATIENT MESSAGE (OUTPATIENT)
Dept: INTERNAL MEDICINE | Facility: CLINIC | Age: 73
End: 2018-07-14

## 2018-07-14 DIAGNOSIS — R20.0 NUMBNESS OF RIGHT FOOT: ICD-10-CM

## 2018-07-14 DIAGNOSIS — G57.93 NEUROPATHY INVOLVING BOTH LOWER EXTREMITIES: Primary | ICD-10-CM

## 2018-07-17 NOTE — TELEPHONE ENCOUNTER
Consult order placed  Please have Dea call and check options  May have other Neurologist available sooner  She is a movement specialist- Parkinson's etc

## 2018-07-22 DIAGNOSIS — N95.2 VAGINAL ATROPHY: ICD-10-CM

## 2018-07-22 RX ORDER — LEVOTHYROXINE SODIUM 50 UG/1
TABLET ORAL
Qty: 90 TABLET | Refills: 0 | OUTPATIENT
Start: 2018-07-22

## 2018-07-22 RX ORDER — ESTRADIOL 10 UG/1
TABLET VAGINAL
Qty: 30 TABLET | Refills: 0 | Status: SHIPPED | OUTPATIENT
Start: 2018-07-22 | End: 2019-04-02 | Stop reason: SDUPTHER

## 2018-11-13 DIAGNOSIS — M25.511 RIGHT SHOULDER PAIN, UNSPECIFIED CHRONICITY: Primary | ICD-10-CM

## 2018-11-15 ENCOUNTER — OFFICE VISIT (OUTPATIENT)
Dept: ORTHOPEDICS | Facility: CLINIC | Age: 73
End: 2018-11-15
Payer: MEDICARE

## 2018-11-15 ENCOUNTER — HOSPITAL ENCOUNTER (OUTPATIENT)
Dept: RADIOLOGY | Facility: HOSPITAL | Age: 73
Discharge: HOME | End: 2018-11-15
Attending: NEUROMUSCULOSKELETAL MEDICINE & OMM
Payer: MEDICARE

## 2018-11-15 ENCOUNTER — PATIENT MESSAGE (OUTPATIENT)
Dept: INTERNAL MEDICINE | Facility: CLINIC | Age: 73
End: 2018-11-15

## 2018-11-15 VITALS
DIASTOLIC BLOOD PRESSURE: 70 MMHG | SYSTOLIC BLOOD PRESSURE: 120 MMHG | BODY MASS INDEX: 26.93 KG/M2 | WEIGHT: 161.63 LBS | HEIGHT: 65 IN

## 2018-11-15 DIAGNOSIS — M25.511 RIGHT SHOULDER PAIN, UNSPECIFIED CHRONICITY: ICD-10-CM

## 2018-11-15 DIAGNOSIS — M25.511 CHRONIC RIGHT SHOULDER PAIN: ICD-10-CM

## 2018-11-15 DIAGNOSIS — Z12.31 VISIT FOR SCREENING MAMMOGRAM: Primary | ICD-10-CM

## 2018-11-15 DIAGNOSIS — M75.21 BICEPS TENDONITIS ON RIGHT: ICD-10-CM

## 2018-11-15 DIAGNOSIS — M25.511 CHRONIC RIGHT SHOULDER PAIN: Primary | ICD-10-CM

## 2018-11-15 DIAGNOSIS — M67.911 ROTATOR CUFF DYSFUNCTION, RIGHT: ICD-10-CM

## 2018-11-15 DIAGNOSIS — M75.41 IMPINGEMENT SYNDROME OF RIGHT SHOULDER: ICD-10-CM

## 2018-11-15 DIAGNOSIS — G89.29 CHRONIC RIGHT SHOULDER PAIN: Primary | ICD-10-CM

## 2018-11-15 DIAGNOSIS — G89.29 CHRONIC RIGHT SHOULDER PAIN: ICD-10-CM

## 2018-11-15 PROCEDURE — 73030 X-RAY EXAM OF SHOULDER: CPT | Mod: TC,PO,RT

## 2018-11-15 PROCEDURE — 99213 OFFICE O/P EST LOW 20 MIN: CPT | Mod: PBBFAC,25,PO | Performed by: NEUROMUSCULOSKELETAL MEDICINE & OMM

## 2018-11-15 PROCEDURE — 73030 X-RAY EXAM OF SHOULDER: CPT | Mod: 26,RT,, | Performed by: RADIOLOGY

## 2018-11-15 PROCEDURE — 99204 OFFICE O/P NEW MOD 45 MIN: CPT | Mod: S$PBB,,, | Performed by: NEUROMUSCULOSKELETAL MEDICINE & OMM

## 2018-11-15 PROCEDURE — 99999 PR PBB SHADOW E&M-EST. PATIENT-LVL III: CPT | Mod: PBBFAC,,, | Performed by: NEUROMUSCULOSKELETAL MEDICINE & OMM

## 2018-11-15 RX ORDER — MELOXICAM 7.5 MG/1
TABLET ORAL
Qty: 90 TABLET | Refills: 0 | Status: SHIPPED | OUTPATIENT
Start: 2018-11-15 | End: 2018-12-15

## 2018-11-15 RX ORDER — MELOXICAM 7.5 MG/1
7.5 TABLET ORAL DAILY
Qty: 30 TABLET | Refills: 0 | Status: SHIPPED | OUTPATIENT
Start: 2018-11-15 | End: 2018-11-15 | Stop reason: SDUPTHER

## 2018-11-15 NOTE — PROGRESS NOTES
Subjective:     Becker B Nix     Chief Complaint   Patient presents with    Right Shoulder - Pain       HPI    Eugenio is a 73 y.o. right hand dominant female coming in today for right shoulder pain that began 1 1/2 year(s) ago, referred by self. Pt. describes the pain as a 6/10 (no pain at rest) achy and at timessharp pain that does not radiate. There was not a fall/injury/ or trauma associated with the onset of symptoms. The pain is better with aspirin, rest and worse with pushing, lifting. Pt. Denies any other musculoskeletal complaints at this time. She just completed PT for her right foot.     Joint instability? no  Mechanical locking/clicking? no  Affecting ADL's? Yes- can't  her purse, cleaning the litterbox is extremely painful. Pain is increased at work, especially during tax season   Affecting sleep? Yes    Occupation: CPA    Review of Systems   Constitutional: Negative for chills and fever.   HENT: Negative for hearing loss and tinnitus.    Eyes: Negative for blurred vision and photophobia.   Respiratory: Negative for cough and shortness of breath.    Cardiovascular: Negative for chest pain and leg swelling.   Gastrointestinal: Negative for abdominal pain, heartburn, nausea and vomiting.   Genitourinary: Negative for dysuria and hematuria.   Musculoskeletal: Positive for joint pain. Negative for back pain, falls, myalgias and neck pain.   Skin: Negative for rash.   Neurological: Negative for dizziness, tingling, focal weakness, weakness and headaches.   Endo/Heme/Allergies: Negative for environmental allergies. Does not bruise/bleed easily.   Psychiatric/Behavioral: Negative for depression. The patient is not nervous/anxious.        PAST MEDICAL HISTORY:   Past Medical History:   Diagnosis Date    Actinic keratosis     Aortic valve disorders     Atherosclerosis of aorta     Atherosclerosis of native arteries of the extremities with intermittent claudication     Carcinoma in situ, vulva     Left  bundle branch hemiblock     Other and unspecified hyperlipidemia     Peripheral vascular disease, unspecified     Postinflammatory pulmonary fibrosis     Senile nuclear sclerosis     Unspecified hypothyroidism      PAST SURGICAL HISTORY:   Past Surgical History:   Procedure Laterality Date    BTL      INJECTION-JOINT Left 2015    Performed [facility] at [facility] PA[facility] MGT    RHYTIDECTOMY      TONSILLECTOMY, ADENOIDECTOMY      VULVA SURGERY       FAMILY HISTORY:   Family History   Problem Relation Age of Onset    Cancer Father         bladder d. 69    Alzheimer's disease Mother 96        d. 97    Bladder Cancer Paternal Uncle     Lung cancer Paternal Uncle     Breast cancer Paternal Grandmother     Pancreatic cancer Paternal Aunt     Other Brother         d. 45 o/d    Pneumonia Brother         d. 58, smoker, obese    No Known Problems Sister     No Known Problems Sister     No Known Problems Sister     Amblyopia Neg Hx     Blindness Neg Hx     Cataracts Neg Hx     Diabetes Neg Hx     Glaucoma Neg Hx     Hypertension Neg Hx     Macular degeneration Neg Hx     Retinal detachment Neg Hx     Strabismus Neg Hx     Stroke Neg Hx     Thyroid disease Neg Hx      SOCIAL HISTORY:   Social History     Socioeconomic History    Marital status:      Spouse name: Not on file    Number of children: Not on file    Years of education: Not on file    Highest education level: Not on file   Social Needs    Financial resource strain: Not on file    Food insecurity - worry: Not on file    Food insecurity - inability: Not on file    Transportation needs - medical: Not on file    Transportation needs - non-medical: Not on file   Occupational History    Occupation: CPA   Tobacco Use    Smoking status: Former Smoker     Packs/day: 1.50     Years: 22.00     Pack years: 33.00     Types: Cigarettes     Last attempt to quit: 2001     Years since quittin.1   Substance and Sexual  "Activity    Alcohol use: Yes     Alcohol/week: 3.5 oz     Types: 7 Standard drinks or equivalent per week     Comment: once daily    Drug use: No    Sexual activity: Yes   Other Topics Concern    Are you pregnant or think you may be? Not Asked    Breast-feeding Not Asked   Social History Narrative    Significant other s/p colon cancer, now w/ bladder cancer;f/u imaging clear    + [place], 3-4 /wk    + working CPA       MEDICATIONS:   Current Outpatient Medications:     alendronate (FOSAMAX) 70 MG tablet, Take 1 tablet (70 mg total) by mouth every 7 days., Disp: 12 tablet, Rfl: 3    aspirin (ECOTRIN) 81 MG EC tablet, Take 81 mg by mouth once daily., Disp: , Rfl:     atorvastatin (LIPITOR) 10 MG tablet, TK 1 T PO QD, Disp: , Rfl: 2    atorvastatin (LIPITOR) 20 MG tablet, Take 1 tablet (20 mg total) by mouth once daily., Disp: 90 tablet, Rfl: 3    calcium carbonate (CALTRATE 600) 600 mg (1,500 mg) Tab, Take 600 mg by mouth once daily. , Disp: , Rfl:     estradiol 10 mcg Tab, INSERT ONE TABLET IN THE VAGINA TWICE WEEKLY AS DIRECTED, Disp: 30 tablet, Rfl: 0    levothyroxine (SYNTHROID) 50 MCG tablet, TAKE 1 TABLET(50 MCG) BY MOUTH EVERY DAY, Disp: 90 tablet, Rfl: 0    meloxicam (MOBIC) 7.5 MG tablet, TAKE 1 TABLET BY MOUTH EVERY DAY WITH FOOD, Disp: 90 tablet, Rfl: 0    multivitamin (THERAGRAN) per tablet, Take by mouth. 1 Tablet Oral Every day, Disp: , Rfl:     VAGIFEM 10 mcg Tab, INSERT ONE TABLET IN THE VAGINA TWICE WEEKLY AS DIRECTED, Disp: 30 tablet, Rfl: 0    VIT A/VIT C/VIT E/ZINC/COPPER (PRESERVISION AREDS ORAL), Take 1 tablet by mouth 2 (two) times daily., Disp: , Rfl:     YUVAFEM 10 mcg Tab, INSERT ONE TABLET IN THE VAGINA TWICE WEEKLY AS DIRECTED, Disp: 30 tablet, Rfl: 0  ALLERGIES:   Review of patient's allergies indicates:   Allergen Reactions    Codeine      Other reaction(s): Vomiting       Objective:     VITAL SIGNS: /70   Ht 5' 5" (1.651 m)   Wt 73.3 kg (161 lb 9.6 oz)  "  BMI 26.89 kg/m²    General    Nursing note and vitals reviewed.  Constitutional: She is oriented to person, place, and time. She appears well-developed and well-nourished.   HENT:   Head: Normocephalic and atraumatic.   no nasal discharge, no external ear redness or discharge   Eyes:   EOM is full and smooth  No eye redness or discharge   Neck: Neck supple. No tracheal deviation present.   Cardiovascular: Normal rate.    2+ Radial pulse bilaterally  2+ Dorsalis Pedis pulse bilaterally  No LE edema appreciated   Pulmonary/Chest: Effort normal. No respiratory distress.   Abdominal: She exhibits no distension.   No rigidity   Neurological: She is alert and oriented to person, place, and time. She exhibits normal muscle tone. Coordination normal.   See details below   Psychiatric: She has a normal mood and affect. Her behavior is normal.               MUSCULOSKELETAL EXAM  Shoulder: right SHOULDER  The affected shoulder is compared to the contralateral shoulder.    Observation:    CERVICAL SPINE  Normal head carriage. Normal thoracic kyphosis.      SHOULDER  No ecchymosis, edema, or erythema throughout the shoulder girdle.  No sternal, clavicular, or acromial deformities bilaterally.  No atrophy of the pectorals, deltoids, supraspinatus, infraspinatus, or biceps bilaterally.  No asymmetry of shoulders bilaterally.    ROM (* = with pain):  CERVICAL SPINE  Full AROM in flexion, extension, sidebending, and rotation.    SHOULDER  Active flexion to 180° on left and 180° on right.* (pain when lowering arm)  Active abduction to 180° on left and 180° on right. * (pain when lowering arm)  Active internal rotation to T8 on left and T11 on right.    Active external rotation to T3 on left and T2 on right.    No scapular dyskinesia or winging.    Tenderness:  No tenderness at the SC or AC joint  No tenderness over the clavicle   No tenderness over biceps tendon or bicipital groove  No tenderness over subacromial space    Strength  Testing (* = with pain):  Deltoid - 5/5 on left and 5/5 on right  Biceps - 5/5 on left and 5/5 on right*  Triceps - 5/5 on left and 5/5 on right  Wrist extension - 5/5 on left and 5/5 on right  Wrist flexion - 5/5 on left and 5/5 on right   - 5/5 on left and 5/5 on right  Finger extension - 5/5 on left and 5/5 on right  Finger abduction - 5/5 on left and 5/5 on right    Special Tests:  Empty can test - positive for pain and partital weakness  Full can test - positive for pain and partital weakness  Bear hug test - negative  Belly press test - negative  Resisted internal rotation - negative  Resisted external rotation - positive for pain and partital weakness    Neer's test - positive   Hawkin's-Haroon test - negative  Cross-arm test- negative    ORavinders test - negative    Sulcus sign - none  AP load and shift laxity - none  Anterior apprehension test - negative    Speed's test - positive for pain, 5/5 strength  Yergason's test - negative    Neurovascular Exam:  Spurlings test - negative bialterally  Lhermittes test - negative  2+ radial pulses bilaterally  Sensation intact to light touch in the distal median, radial, and ulnar nerve distributions bilaterally.  2+/4 reflexes at triceps, biceps, and brachioradialis  Capillary refill intact <2 seconds in all digits bilaterally      IMAGIN. X-ray ordered due to right shoulder pain. (AP, scapular Y, and axillary views) taken today.   2. X-ray images were reviewed personally by me and then directly with patient.  3. FINDINGS: X-ray images obtained demonstrates subacromial spurring. AC and GH joint space preserved. Bones of the shoulder are intact and no soft tissue calcification is noted.  4. IMPRESSION: No acute pathology or irregularities appreciated. Subacromial spurring present      Assessment:      Encounter Diagnoses   Name Primary?    Chronic right shoulder pain Yes    Rotator cuff dysfunction, right     Impingement syndrome of right shoulder      Biceps tendonitis on right           Plan:     1. Right biceps tendonitis and impingement with supraspinatus and infraspinatus RTC pain and partial weakness likely from partial degenerative RTC tear.   - Outpatient PT referral to [facility] made x 6 weeks to work on postural training, scapular positioning, and RTC strengthening  - Rx for Mobic 7.5 mg po qday prn for pain control as well as Ice up to 20 minutes at a time  -  X-ray images of right shoulder taken today (AP, scapular Y, and axillary views) showed no acute pathology or irregularities, subacromial spurring present. Images were personally reviewed with patient.    2. Follow-up as needed if pain persists following PT or deteriorates.     3. Patient agreeable to today's plan and all questions were answered

## 2018-11-29 ENCOUNTER — HOSPITAL ENCOUNTER (OUTPATIENT)
Dept: RADIOLOGY | Facility: HOSPITAL | Age: 73
Discharge: HOME | End: 2018-11-29
Attending: INTERNAL MEDICINE
Payer: MEDICARE

## 2018-11-29 DIAGNOSIS — Z12.31 VISIT FOR SCREENING MAMMOGRAM: ICD-10-CM

## 2018-11-29 PROCEDURE — 77063 BREAST TOMOSYNTHESIS BI: CPT | Mod: 26,,, | Performed by: RADIOLOGY

## 2018-11-29 PROCEDURE — 77067 SCR MAMMO BI INCL CAD: CPT | Mod: 26,,, | Performed by: RADIOLOGY

## 2018-11-29 PROCEDURE — 77067 SCR MAMMO BI INCL CAD: CPT | Mod: TC,PO

## 2018-11-29 PROCEDURE — 77063 BREAST TOMOSYNTHESIS BI: CPT | Mod: TC,PO

## 2018-12-18 RX ORDER — LEVOTHYROXINE SODIUM 50 UG/1
50 TABLET ORAL
Qty: 90 TABLET | Refills: 3 | Status: SHIPPED | OUTPATIENT
Start: 2018-12-18 | End: 2019-12-06 | Stop reason: SDUPTHER

## 2019-01-22 ENCOUNTER — OFFICE VISIT (OUTPATIENT)
Dept: ORTHOPEDICS | Facility: CLINIC | Age: 74
End: 2019-01-22
Payer: MEDICARE

## 2019-01-22 VITALS — WEIGHT: 164 LBS | HEIGHT: 65 IN | BODY MASS INDEX: 27.32 KG/M2

## 2019-01-22 DIAGNOSIS — G89.29 CHRONIC RIGHT SHOULDER PAIN: ICD-10-CM

## 2019-01-22 DIAGNOSIS — M67.911 ROTATOR CUFF DYSFUNCTION, RIGHT: Primary | ICD-10-CM

## 2019-01-22 DIAGNOSIS — M25.511 CHRONIC RIGHT SHOULDER PAIN: ICD-10-CM

## 2019-01-22 PROCEDURE — 99213 OFFICE O/P EST LOW 20 MIN: CPT | Mod: PBBFAC | Performed by: PHYSICIAN ASSISTANT

## 2019-01-22 PROCEDURE — 99999 PR PBB SHADOW E&M-EST. PATIENT-LVL III: CPT | Mod: PBBFAC,,, | Performed by: PHYSICIAN ASSISTANT

## 2019-01-22 PROCEDURE — 99999 PR PBB SHADOW E&M-EST. PATIENT-LVL III: ICD-10-PCS | Mod: PBBFAC,,, | Performed by: PHYSICIAN ASSISTANT

## 2019-01-22 PROCEDURE — 99212 OFFICE O/P EST SF 10 MIN: CPT | Mod: S$PBB,,, | Performed by: PHYSICIAN ASSISTANT

## 2019-01-22 PROCEDURE — 99212 PR OFFICE/OUTPT VISIT, EST, LEVL II, 10-19 MIN: ICD-10-PCS | Mod: S$PBB,,, | Performed by: PHYSICIAN ASSISTANT

## 2019-01-22 NOTE — PROGRESS NOTES
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 Pt was seeing Dr. Brittany Fontanez. She has pain with overhead motion and when lifting objects when shoulder is abducted. There was not a fall/injury/ or trauma associated with the onset of symptoms. The pain is better with aspirin, rest and worse with pushing, lifting. She did not take Mobic as prescribed because she does not like taking medication. She did complete course of PT at [facility]. She feels stronger but continues to have pain. She denies instability, mechanical symptoms. She does have night pain. Denies neck pain, numbness/tingling, clumsiness.     Review of Systems   Constitutional: Negative for chills and fever.   HENT: Negative for hearing loss and tinnitus.    Eyes: Negative for blurred vision and photophobia.   Respiratory: Negative for cough and shortness of breath.    Cardiovascular: Negative for chest pain and leg swelling.   Gastrointestinal: Negative for abdominal pain, heartburn, nausea and vomiting.   Genitourinary: Negative for dysuria and hematuria.   Musculoskeletal: Positive for joint pain. Negative for back pain, falls, myalgias and neck pain.   Skin: Negative for rash.   Neurological: Negative for dizziness, tingling, focal weakness, weakness and headaches.   Endo/Heme/Allergies: Negative for environmental allergies. Does not bruise/bleed easily.   Psychiatric/Behavioral: Negative for depression. The patient is not nervous/anxious.      Past Medical History:   Diagnosis Date    Actinic keratosis     Aortic valve disorders     Atherosclerosis of aorta     Atherosclerosis of native arteries of the extremities with intermittent claudication     Carcinoma in situ, vulva     Left bundle branch hemiblock     Other and unspecified hyperlipidemia     Peripheral vascular disease, unspecified     Postinflammatory pulmonary fibrosis  "    Senile nuclear sclerosis     Unspecified hypothyroidism      Objective:     Ht 5' 5" (1.651 m)   Wt 74.4 kg (164 lb 0.4 oz)   BMI 27.29 kg/m²     General    Nursing note and vitals reviewed.  Constitutional: She is oriented to person, place, and time. She appears well-developed and well-nourished.   Psychiatric: She has a normal mood and affect. Her behavior is normal.     MUSCULOSKELETAL EXAM  Shoulder: right SHOULDER    Observation:    CERVICAL SPINE  Normal head carriage. Normal thoracic kyphosis.    SHOULDER  No ecchymosis, edema, or erythema throughout the shoulder girdle.  No sternal, clavicular, or acromial deformities bilaterally.  No atrophy of the pectorals, deltoids, supraspinatus, infraspinatus, or biceps bilaterally.  No asymmetry of shoulders bilaterally.    ROM (* = with pain):  CERVICAL SPINE  Full AROM in flexion, extension, sidebending, and rotation.    SHOULDER  Active flexion to 180° on left and 180° on right.* (pain when lowering arm)  Active abduction to 180° on left and 180° on right. * (pain when lowering arm)  Active internal rotation to T8 on left and T11 on right.    Active external rotation to T3 on left and T2 on right.    No scapular dyskinesia or winging.    Tenderness:  No tenderness at the SC or AC joint  No tenderness over the clavicle   No tenderness over biceps tendon or bicipital groove  No tenderness over subacromial space    Strength Testing (* = with pain):  Deltoid - 5/5 on left and 5/5 on right  Biceps - 5/5 on left and 5/5 on right*  Triceps - 5/5 on left and 5/5 on right    Special Tests:  Empty can test - positive for pain and partital weakness  Full can test - positive for pain and partital weakness    Neer's test - positive   Hawkin's-Haroon test - negative  Cross-arm test- negative    IMAGING:  Right shoulder xrays reviewed revealing subacromial spurring. Bones of the shoulder are intact and no soft tissue calcification is noted.    Assessment:    Right " rotator cuff dysfunction      Plan:   Pt has had pain for nearly two years. Symptoms are affecting ADL. She has tried activity modification, physical therapy. Discussed options including steroid injection, MRI. MRI ordered and scheduled. I will call her with results and plan.

## 2019-01-23 ENCOUNTER — HOSPITAL ENCOUNTER (OUTPATIENT)
Dept: RADIOLOGY | Facility: HOSPITAL | Age: 74
Discharge: HOME | End: 2019-01-23
Attending: PHYSICIAN ASSISTANT
Payer: MEDICARE

## 2019-01-23 DIAGNOSIS — G89.29 CHRONIC RIGHT SHOULDER PAIN: ICD-10-CM

## 2019-01-23 DIAGNOSIS — M25.511 CHRONIC RIGHT SHOULDER PAIN: ICD-10-CM

## 2019-01-23 PROCEDURE — 73221 MRI SHOULDER WITHOUT CONTRAST RIGHT: ICD-10-PCS | Mod: 26,RT,, | Performed by: RADIOLOGY

## 2019-01-23 PROCEDURE — 73221 MRI JOINT UPR EXTREM W/O DYE: CPT | Mod: 26,RT,, | Performed by: RADIOLOGY

## 2019-01-23 PROCEDURE — 73221 MRI JOINT UPR EXTREM W/O DYE: CPT | Mod: TC,RT

## 2019-01-25 ENCOUNTER — TELEPHONE (OUTPATIENT)
Dept: ORTHOPEDICS | Facility: CLINIC | Age: 74
End: 2019-01-25

## 2019-01-25 NOTE — TELEPHONE ENCOUNTER
Spoke to patient about MRI results. She has done PT and continues to have pain. She would like appt with surgeon. Will schedule.

## 2019-01-25 NOTE — TELEPHONE ENCOUNTER
----- Message from Nicole Borja MA sent at 1/25/2019 11:09 AM CST -----  Contact: Self      ----- Message -----  From: Toshia Mcwilliams  Sent: 1/25/2019  10:54 AM  To: Kiera Head Staff    Patient Returning Call from [facility]    Who Left Message for Patient: Adilene Qureshi    Communication Preference: [phone]    Additional Information: Regarding MRI results

## 2019-01-25 NOTE — TELEPHONE ENCOUNTER
----- Message from Adilene Qureshi PA-C sent at 1/25/2019  1:33 PM CST -----  Contact: Self  Can you call her and  schedule her with Dr. Ochoa or Dr. Lyles for her shoulder? She may need rotator cuff surgery.     ----- Message -----  From: Nicole Borja MA  Sent: 1/25/2019  11:09 AM  To: Adilene Qureshi PA-C        ----- Message -----  From: Toshia Mcwilliams  Sent: 1/25/2019  10:54 AM  To: Kiera Head Staff    Patient Returning Call from [facility]    Who Left Message for Patient: Adilene Qureshi    Communication Preference: [phone]    Additional Information: Regarding MRI results       Appointment booked with Dr Walter lyles for Tuesday Jan 29 @ 1:30 pm as requested by Ms Kiera brown

## 2019-01-29 ENCOUNTER — TELEPHONE (OUTPATIENT)
Dept: INTERNAL MEDICINE | Facility: CLINIC | Age: 74
End: 2019-01-29

## 2019-01-29 ENCOUNTER — OFFICE VISIT (OUTPATIENT)
Dept: SPORTS MEDICINE | Facility: CLINIC | Age: 74
End: 2019-01-29
Payer: MEDICARE

## 2019-01-29 VITALS
HEIGHT: 65 IN | SYSTOLIC BLOOD PRESSURE: 150 MMHG | BODY MASS INDEX: 27.32 KG/M2 | WEIGHT: 164 LBS | DIASTOLIC BLOOD PRESSURE: 70 MMHG | HEART RATE: 81 BPM

## 2019-01-29 DIAGNOSIS — M75.121 COMPLETE TEAR OF RIGHT ROTATOR CUFF: Primary | ICD-10-CM

## 2019-01-29 PROCEDURE — 20610 LARGE JOINT ASPIRATION/INJECTION: R SUBACROMIAL BURSA: ICD-10-PCS | Mod: S$PBB,RT,, | Performed by: ORTHOPAEDIC SURGERY

## 2019-01-29 PROCEDURE — 99999 PR PBB SHADOW E&M-EST. PATIENT-LVL III: ICD-10-PCS | Mod: PBBFAC,,, | Performed by: ORTHOPAEDIC SURGERY

## 2019-01-29 PROCEDURE — 99214 PR OFFICE/OUTPT VISIT, EST, LEVL IV, 30-39 MIN: ICD-10-PCS | Mod: 25,S$PBB,, | Performed by: ORTHOPAEDIC SURGERY

## 2019-01-29 PROCEDURE — 99214 OFFICE O/P EST MOD 30 MIN: CPT | Mod: 25,S$PBB,, | Performed by: ORTHOPAEDIC SURGERY

## 2019-01-29 PROCEDURE — 20610 DRAIN/INJ JOINT/BURSA W/O US: CPT | Mod: PBBFAC,PO | Performed by: ORTHOPAEDIC SURGERY

## 2019-01-29 PROCEDURE — 99999 PR PBB SHADOW E&M-EST. PATIENT-LVL III: CPT | Mod: PBBFAC,,, | Performed by: ORTHOPAEDIC SURGERY

## 2019-01-29 PROCEDURE — 99213 OFFICE O/P EST LOW 20 MIN: CPT | Mod: PBBFAC,PO | Performed by: ORTHOPAEDIC SURGERY

## 2019-01-29 RX ORDER — TRIAMCINOLONE ACETONIDE 40 MG/ML
40 INJECTION, SUSPENSION INTRA-ARTICULAR; INTRAMUSCULAR
Status: DISCONTINUED | OUTPATIENT
Start: 2019-01-29 | End: 2019-01-29 | Stop reason: HOSPADM

## 2019-01-29 RX ADMIN — TRIAMCINOLONE ACETONIDE 40 MG: 40 INJECTION, SUSPENSION INTRA-ARTICULAR; INTRAMUSCULAR at 02:01

## 2019-01-29 NOTE — PROGRESS NOTES
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 She points to the vinicius-lateral shoulder as the location of her pain. She denies any traumatic injury. She has been in PT for the past 2 months and had no improvement in symptoms.  She states that the pain is severe and not responding to any conservative care.  She endorses some mild shoulder weakness.     She reports that the pain and weakness is worse with overhead activity. It also bothers her at night.    Is affecting ADLs.       Past Medical History:   Diagnosis Date    Actinic keratosis     Aortic valve disorders     Atherosclerosis of aorta     Atherosclerosis of native arteries of the extremities with intermittent claudication     Carcinoma in situ, vulva     Left bundle branch hemiblock     Other and unspecified hyperlipidemia     Peripheral vascular disease, unspecified     Postinflammatory pulmonary fibrosis     Senile nuclear sclerosis     Unspecified hypothyroidism        Past Surgical History:   Procedure Laterality Date    BTL      INJECTION-JOINT Left 7/16/2015    Performed by Valeriano Hayward MD at [facility] PAIN MGT    RHYTIDECTOMY      TONSILLECTOMY, ADENOIDECTOMY      VULVA SURGERY         Family History   Problem Relation Age of Onset    Cancer Father         bladder d. 69    Alzheimer's disease Mother 96        d. 97    Bladder Cancer Paternal Uncle     Lung cancer Paternal Uncle     Breast cancer Paternal Grandmother     Pancreatic cancer Paternal Aunt     Other Brother         d. 45 o/d    Pneumonia Brother         d. 58, smoker, obese    No Known Problems Sister     No Known Problems Sister     No Known Problems Sister     Amblyopia Neg Hx     Blindness Neg Hx     Cataracts Neg Hx     Diabetes Neg Hx     Glaucoma Neg Hx     Hypertension Neg Hx     Macular degeneration Neg Hx     Retinal detachment Neg Hx     Strabismus Neg Hx     Stroke Neg Hx      Thyroid disease Neg Hx          Current Outpatient Medications:     alendronate (FOSAMAX) 70 MG tablet, Take 1 tablet (70 mg total) by mouth every 7 days., Disp: 12 tablet, Rfl: 3    aspirin (ECOTRIN) 81 MG EC tablet, Take 81 mg by mouth once daily., Disp: , Rfl:     atorvastatin (LIPITOR) 10 MG tablet, TK 1 T PO QD, Disp: , Rfl: 2    atorvastatin (LIPITOR) 20 MG tablet, Take 1 tablet (20 mg total) by mouth once daily., Disp: 90 tablet, Rfl: 3    calcium carbonate (CALTRATE 600) 600 mg (1,500 mg) Tab, Take 600 mg by mouth once daily. , Disp: , Rfl:     estradiol 10 mcg Tab, INSERT ONE TABLET IN THE VAGINA TWICE WEEKLY AS DIRECTED, Disp: 30 tablet, Rfl: 0    levothyroxine (SYNTHROID) 50 MCG tablet, Take 1 tablet (50 mcg total) by mouth before breakfast., Disp: 90 tablet, Rfl: 3    multivitamin (THERAGRAN) per tablet, Take by mouth. 1 Tablet Oral Every day, Disp: , Rfl:     VAGIFEM 10 mcg Tab, INSERT ONE TABLET IN THE VAGINA TWICE WEEKLY AS DIRECTED, Disp: 30 tablet, Rfl: 0    VIT A/VIT C/VIT E/ZINC/COPPER (PRESERVISION AREDS ORAL), Take 1 tablet by mouth 2 (two) times daily., Disp: , Rfl:     YUVAFEM 10 mcg Tab, INSERT ONE TABLET IN THE VAGINA TWICE WEEKLY AS DIRECTED, Disp: 30 tablet, Rfl: 0    Review of patient's allergies indicates:   Allergen Reactions    Codeine      Other reaction(s): Vomiting          REVIEW OF SYSTEMS:  Constitution: Negative. Negative for chills, fever and night sweats.   HENT: Negative for congestion and headaches.    Eyes: Negative for blurred vision, left vision loss and right vision loss.   Cardiovascular: Negative for chest pain and syncope.   Respiratory: Negative for cough and shortness of breath.    Endocrine: Negative for polydipsia, polyphagia and polyuria.   Hematologic/Lymphatic: Negative for bleeding problem. Does not bruise/bleed easily.   Skin: Negative for dry skin, itching and rash.   Musculoskeletal: Negative for falls.  Positive for right shoulder pain and  "muscle weakness.   Gastrointestinal: Negative for abdominal pain and bowel incontinence.   Genitourinary: Negative for bladder incontinence and nocturia.   Neurological: Negative for disturbances in coordination, loss of balance and seizures.   Psychiatric/Behavioral: Negative for depression. The patient does not have insomnia.    Allergic/Immunologic: Negative for hives and persistent infections.      PHYSICAL EXAMINATION:  Vitals:  BP (!) 150/70   Pulse 81   Ht 5' 5" (1.651 m)   Wt 74.4 kg (164 lb)   BMI 27.29 kg/m²    General: The patient is alert and oriented x 3.  Mood is pleasant.  Observation of ears, eyes and nose reveal no gross abnormalities.  No labored breathing observed.  Gait is coordinated. Patient can toe walk and heel walk without difficulty.      RIGHT Shoulder / Upper Extremity Exam    OBSERVATION:     Swelling  none  Deformity  none   Discoloration  none   Scapular winging none   Scars   none  Atrophy  none    TENDERNESS / CREPITUS (T/C):          T/C      T/C   Clavicle   -/-  SUPRAspinatus    -/-     AC Jt.    +/-  INFRAspinatus  -/-    SC Jt.    -/-  Deltoid    -/-      G. Tuberosity  -/-  LH BICEP groove  +/-   Acromion:  -/-  Midline Neck   -/-     Scapular Spine -/-  Trapezium   -/-   SMA Scapula  -/-  GH jt. line - post  -/-     Scapulothoracic  -/-         ROM: (* = with pain)  Left shoulder   Right shoulder        AROM (PROM)   AROM (PROM)   FE    170° (175°)     170° (175°)      ER at 0°    60°  (65°)    60°  (65°)    ER at 90° ABD  90°  (90°)    90°  (90°)    IR at 90°  ABD   NA  (40°)     NA  (40°)      IR (spine level)   T10     T10     STRENGTH: (* = with pain) Left shoulder   Right shoulder   SCAPTION   5/5    4+/5    IR    5/5    5/5   ER    5/5    4/5   BICEPS   5/5    5/5   Deltoid    5/5    5/5     SIGNS:  Painful side       NEER   +    OLISHAS  neg    BLACK   +    SPEEDS  +     DROP ARM   -   BELLY PRESS neg   Superior escape none    LIFT-OFF  neg   X-Body ADD   "  neg    MOVING VALGUS neg        STABILITY TESTING    Left shoulder   Right shoulder    Translation     Anterior  up face     up face    Posterior  up face    up face    Sulcus   < 10mm    < 10 mm     Signs   Apprehension   neg      neg       Relocation   no change     no change      Jerk test  neg     neg    EXTREMITY NEURO-VASCULAR EXAM:    Sensation grossly intact to light touch all dermatomal regions.    DTR 2+ Biceps, Triceps, BR and Negative Glendas sign   Grossly intact motor function at Elbow, Wrist and Hand   Distal pulses radial and ulnar 2+, brisk cap refill, symmetric.      NECK:  Painless FROM and spinous processes non-tender. Negative Spurlings sign.      OTHER FINDINGS:  + scapular dyskinesia    XRAYS:  Xrays including AP, Outlet and Axillary Lateral of Left shoulder are ordered / images reviewed by me:   No fracture dislocation or other pathology   Proximal migration of humeral head: None   GH arthritis: None   AC joint arthritis   Subacromial spurs    MRI right shoulder:   Full-thickness full width tear of supraspinatus tendon with involvement of anterior infraspinatus tendon retraction to the glenoid.  Partial tear of subscapularis tendon.  Severe atrophy of teres minor muscle and mild to moderate atrophy of supraspinatus and infraspinatus muscles.    Biceps tendinosis and interstitial tear.    Degenerative labral fraying.    Acromioclavicular arthrosis.    Subacromial subdeltoid bursitis.        ASSESSMENT:   Right shoulder pain:  1. Large rotator cuff tear   2.  AC joint arthritis  3. Biceps tendinitis  4. RTC impingement    PLAN:    Steroid injection given today in clinic    Treatment options were discussed with the patient about shoulder.  I reviewed the MRI images with his and what this means for her shoulder.    We discussed both non-operative and operative options for her shoulder and the risks and benefits of each. Time was given for questions to be asked and all concerns were  answered.    She would like to go forward with surgery for her shoulder which I think is reasonable.  All specific risks and benefits were reviewed.    These risks include but are not limited to: bleeding, infection, scarring, re-tear of repair, irreparability of the tear, damage to neurovascular structures, damage to cartilage, stiffness, blood clots, pulmonary embolism, swelling, compartment syndrome, need for further surgery, and the risks of anesthesia.      She verbalized her understanding of these risks and wished to proceed with surgery.    Time was spent face-to-face with the patient during this encounter on counseling about treatment options including surgery and coordination of her care for preoperative visits, surgery and post-operative rehab.     The operative plan will be:    right   1. Arthroscopic rotator cuff repair  2. Arthroscopic subacromial decompression  3. Arthroscopic distal clavicle excision  4. Possible open biceps subpectoral tenodesis    Patient will  need medical clearance prior to the pre-operative appointment.    All questions were answered, patient will contact us for questions or concerns in the interim.    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

## 2019-01-29 NOTE — PROCEDURES
Large Joint Aspiration/Injection: R subacromial bursa  Date/Time: 1/29/2019 2:28 PM  Performed by: Walter Hernandez MD  Authorized by: Walter Hernandez MD     Consent Done?:  Yes (Verbal)  Indications:  Pain  Procedure site marked: Yes    Timeout: Prior to procedure the correct patient, procedure, and site was verified      Location:  Shoulder  Site:  R subacromial bursa  Prep: Patient was prepped and draped in usual sterile fashion    Needle size:  22 G  Approach:  Posterior  Medications:  40 mg triamcinolone acetonide 40 mg/mL  Patient tolerance:  Patient tolerated the procedure well with no immediate complications

## 2019-01-29 NOTE — TELEPHONE ENCOUNTER
Preop scheduled for 4/2/19 at 10:40 AM with Dr. Clifton.   Pt called. No answer. Left message advising of appointment.

## 2019-01-29 NOTE — TELEPHONE ENCOUNTER
----- Message from Suha Salguero MD sent at 1/29/2019  2:28 PM CST -----  Pt needs pre-op  ----- Message -----  From: Eun Callejas MA  Sent: 1/29/2019   2:21 PM  To: Suha Salguero MD, #    Dr. Jose M,    Dr. Hernandez is recommending surgery for your mutual patient.  Prior to surgery they will need to have the following completed through your office:    -EKG  -Blood work  -Letter stating medically cleared for surgery    Pre op schedule for 04/23/19  Surgery schedule for 04/24/19      Please feel free to contact myself or Dr. Hernandez with questions.    Thank you,    Eun Callejas   Medical Assistant to Dr. Walter Hernandez

## 2019-01-30 ENCOUNTER — TELEPHONE (OUTPATIENT)
Dept: SPORTS MEDICINE | Facility: CLINIC | Age: 74
End: 2019-01-30

## 2019-01-30 DIAGNOSIS — M25.511 CHRONIC RIGHT SHOULDER PAIN: Primary | ICD-10-CM

## 2019-01-30 DIAGNOSIS — M75.21 BICEPS TENDINITIS OF RIGHT UPPER EXTREMITY: ICD-10-CM

## 2019-01-30 DIAGNOSIS — M19.011 ARTHRITIS OF RIGHT ACROMIOCLAVICULAR JOINT: ICD-10-CM

## 2019-01-30 DIAGNOSIS — G89.29 CHRONIC RIGHT SHOULDER PAIN: Primary | ICD-10-CM

## 2019-01-30 DIAGNOSIS — M75.101 NONTRAUMATIC TEAR OF RIGHT ROTATOR CUFF: Primary | ICD-10-CM

## 2019-01-30 NOTE — TELEPHONE ENCOUNTER
Right shoulder 4/29/19    ----- Message from Eun Callejas MA sent at 1/30/2019  9:28 AM CST -----  Patient will do PT at     [facility]  [address]  [place] [address]    [phone] office  [phone] fax    Pre op - 04/23/19    Date of surgery - 04/24/19

## 2019-01-31 ENCOUNTER — EXTERNAL CHRONIC CARE MANAGEMENT (OUTPATIENT)
Dept: PRIMARY CARE CLINIC | Facility: CLINIC | Age: 74
End: 2019-01-31
Payer: MEDICARE

## 2019-01-31 PROCEDURE — 99490 PR CHRONIC CARE MGMT, 1ST 20 MIN: ICD-10-PCS | Mod: S$PBB,,, | Performed by: INTERNAL MEDICINE

## 2019-01-31 PROCEDURE — 99490 CHRNC CARE MGMT STAFF 1ST 20: CPT | Mod: PBBFAC,PO | Performed by: INTERNAL MEDICINE

## 2019-01-31 PROCEDURE — 99490 CHRNC CARE MGMT STAFF 1ST 20: CPT | Mod: S$PBB,,, | Performed by: INTERNAL MEDICINE

## 2019-02-20 DIAGNOSIS — N95.2 VAGINAL ATROPHY: ICD-10-CM

## 2019-02-20 RX ORDER — ESTRADIOL 10 UG/1
INSERT VAGINAL
Qty: 30 TABLET | Refills: 0 | Status: SHIPPED | OUTPATIENT
Start: 2019-02-20 | End: 2019-07-23 | Stop reason: SDUPTHER

## 2019-02-28 ENCOUNTER — EXTERNAL CHRONIC CARE MANAGEMENT (OUTPATIENT)
Dept: PRIMARY CARE CLINIC | Facility: CLINIC | Age: 74
End: 2019-02-28
Payer: MEDICARE

## 2019-02-28 PROCEDURE — 99490 PR CHRONIC CARE MGMT, 1ST 20 MIN: ICD-10-PCS | Mod: S$GLB,,, | Performed by: INTERNAL MEDICINE

## 2019-02-28 PROCEDURE — 99490 CHRNC CARE MGMT STAFF 1ST 20: CPT | Mod: S$GLB,,, | Performed by: INTERNAL MEDICINE

## 2019-03-06 RX ORDER — ATORVASTATIN CALCIUM 20 MG/1
TABLET, FILM COATED ORAL
Qty: 90 TABLET | Refills: 3 | Status: SHIPPED | OUTPATIENT
Start: 2019-03-06 | End: 2020-03-03

## 2019-03-31 ENCOUNTER — EXTERNAL CHRONIC CARE MANAGEMENT (OUTPATIENT)
Dept: PRIMARY CARE CLINIC | Facility: CLINIC | Age: 74
End: 2019-03-31
Payer: MEDICARE

## 2019-03-31 ENCOUNTER — PATIENT MESSAGE (OUTPATIENT)
Dept: SPORTS MEDICINE | Facility: CLINIC | Age: 74
End: 2019-03-31

## 2019-03-31 PROCEDURE — 99490 PR CHRONIC CARE MGMT, 1ST 20 MIN: ICD-10-PCS | Mod: S$PBB,,, | Performed by: INTERNAL MEDICINE

## 2019-03-31 PROCEDURE — 99490 CHRNC CARE MGMT STAFF 1ST 20: CPT | Mod: S$PBB,,, | Performed by: INTERNAL MEDICINE

## 2019-03-31 PROCEDURE — 99490 CHRNC CARE MGMT STAFF 1ST 20: CPT | Mod: PBBFAC,PO | Performed by: INTERNAL MEDICINE

## 2019-04-01 NOTE — PROGRESS NOTES
73 y.o. female  W/ to hypothyroidism, PAD, dyslipidemia, thrombocytosis, and  Post inflammatory fibrosis presents for preop  Surgery:rotator cuff repair  Surgeon: Dr. Hernandez  Date: 4/24/2019    Dyslipidemia tx w/ atorvastatin 20mg  Denied unusual muscle pain or chest pain  Climbs 2 flights of stairs w/o difficulty  LDL==>pending     Hypothyroidism, tx w/ levothyroxine 50mcg  Denied fatigue or cold intolerance  Chronically to the warm side  TSH==>4.317  Free T4==>0.90     PAD, tx w/ atorvastatin,   no claudication if she has a brisk walk,  No discoloration LE     Thrombocytosis, normalized, tx ASA and Fish oil   Platelet ct==>293   Denied stroke like sx, confusion , or focal deficits  Donate  Platelets several times yearly 4-5     Post inflammatory fibrosis  Pt was in study w/ yearly CT- completed  D/c'd tobacco > 15 yrs  Denied cough or wheezing   Denied SOB, chest pain, heart palpitations or lightheadedness       ROS:  No fever, chills , or night sweats  No visual disturbance or d/c  No ear or sinus pressure or pain  No sore throat or dysphagia  No cough or wheezing  No chest pain or palpitations  No GERD or abdominal pain  No diarrhea or blood in stool  No dysuria or hematuria  No cold or heat intolerance  No increased thirst or urination  No HA or focal deficits  No skin rashes or lesions  No unusual bruising or bleeding or clotting  No problem w/ anesthesia in the past  Remainder of review negative except as previously noted    PMHX: Reviewed  PSHX:Reviewed  SHX:Reviewed  FHX: Reviewed    PE:  VSS:  GEN:WDWN, A&O, NAD, conversant and co-operative  EYES: Conj/lids unremarkable, sclera anicteric, pupils reactive  ENT: Canals free of cerumen, TM's unremarkable; nasal mucosa/turbinates w/o exudate or edema; o/p w/o exudate or erythema; sinus nontender  NECK: Supple w/o lymphadenopathy or thyromegaly  RESPIRATORY: Efforts unlabored; lungs CTA  CARDIOVASCULAR: Heart RRR, no carotid bruits noted, 1+ pedal pulses,  no edema  GASTROINTESTINAL: BS+, soft, NT/ND, - HSM noted  MUSCULOSKELETAL: Gait normal, no CCE  NEUROLOGIC:BARROW. No tremor noted  SKIN: Warm and dry    IMPRESSION:  Chronic right shoulder pain  Postinflammatory pulmonary fibrosis, stable  PAD, asx @ present   HLD, asx  Thrombocytosis, CBC pending  Hypothyroidism, stable      PLAN:  EKG- NSR @ 79 BPM  CXR - no acute findings  Fasting lab -stable  Hold ASA,NSAIDS, Fish oil, Vit E and Ginkgo x 7 days  Dr. Hernandez office will be advised that the optimization has been completed and is available in EPIC  No contraindication to anesthesia and surgery noted @ this time

## 2019-04-02 ENCOUNTER — HOSPITAL ENCOUNTER (OUTPATIENT)
Dept: RADIOLOGY | Facility: HOSPITAL | Age: 74
Discharge: HOME | End: 2019-04-02
Attending: INTERNAL MEDICINE
Payer: MEDICARE

## 2019-04-02 ENCOUNTER — CLINICAL SUPPORT (OUTPATIENT)
Dept: INTERNAL MEDICINE | Facility: CLINIC | Age: 74
End: 2019-04-02
Payer: MEDICARE

## 2019-04-02 ENCOUNTER — OFFICE VISIT (OUTPATIENT)
Dept: INTERNAL MEDICINE | Facility: CLINIC | Age: 74
End: 2019-04-02
Payer: MEDICARE

## 2019-04-02 VITALS
RESPIRATION RATE: 18 BRPM | WEIGHT: 166.88 LBS | DIASTOLIC BLOOD PRESSURE: 82 MMHG | TEMPERATURE: 99 F | HEIGHT: 64 IN | OXYGEN SATURATION: 97 % | SYSTOLIC BLOOD PRESSURE: 136 MMHG | BODY MASS INDEX: 28.49 KG/M2 | HEART RATE: 80 BPM

## 2019-04-02 DIAGNOSIS — J84.10 POSTINFLAMMATORY PULMONARY FIBROSIS: ICD-10-CM

## 2019-04-02 DIAGNOSIS — M25.511 CHRONIC RIGHT SHOULDER PAIN: Primary | ICD-10-CM

## 2019-04-02 DIAGNOSIS — D75.839 THROMBOCYTOSIS: ICD-10-CM

## 2019-04-02 DIAGNOSIS — I73.9 PAD (PERIPHERAL ARTERY DISEASE): Chronic | ICD-10-CM

## 2019-04-02 DIAGNOSIS — E78.00 PURE HYPERCHOLESTEROLEMIA: ICD-10-CM

## 2019-04-02 DIAGNOSIS — E03.8 OTHER SPECIFIED HYPOTHYROIDISM: ICD-10-CM

## 2019-04-02 DIAGNOSIS — G89.29 CHRONIC RIGHT SHOULDER PAIN: Primary | ICD-10-CM

## 2019-04-02 PROCEDURE — 71046 X-RAY EXAM CHEST 2 VIEWS: CPT | Mod: 26,,, | Performed by: RADIOLOGY

## 2019-04-02 PROCEDURE — 99214 PR OFFICE/OUTPT VISIT, EST, LEVL IV, 30-39 MIN: ICD-10-PCS | Mod: S$PBB,,, | Performed by: INTERNAL MEDICINE

## 2019-04-02 PROCEDURE — 99214 OFFICE O/P EST MOD 30 MIN: CPT | Mod: S$PBB,,, | Performed by: INTERNAL MEDICINE

## 2019-04-02 PROCEDURE — 71046 X-RAY EXAM CHEST 2 VIEWS: CPT | Mod: TC,PO

## 2019-04-02 PROCEDURE — 99214 OFFICE O/P EST MOD 30 MIN: CPT | Mod: PBBFAC,25,PO | Performed by: INTERNAL MEDICINE

## 2019-04-02 PROCEDURE — 93005 ELECTROCARDIOGRAM TRACING: CPT | Mod: PBBFAC,PO | Performed by: INTERNAL MEDICINE

## 2019-04-02 PROCEDURE — 99999 PR PBB SHADOW E&M-EST. PATIENT-LVL IV: ICD-10-PCS | Mod: PBBFAC,,, | Performed by: INTERNAL MEDICINE

## 2019-04-02 PROCEDURE — 93010 EKG 12-LEAD: ICD-10-PCS | Mod: S$PBB,,, | Performed by: INTERNAL MEDICINE

## 2019-04-02 PROCEDURE — 93010 ELECTROCARDIOGRAM REPORT: CPT | Mod: S$PBB,,, | Performed by: INTERNAL MEDICINE

## 2019-04-02 PROCEDURE — 99999 PR PBB SHADOW E&M-EST. PATIENT-LVL IV: CPT | Mod: PBBFAC,,, | Performed by: INTERNAL MEDICINE

## 2019-04-02 PROCEDURE — 71046 XR CHEST PA AND LATERAL: ICD-10-PCS | Mod: 26,,, | Performed by: RADIOLOGY

## 2019-04-03 ENCOUNTER — LAB VISIT (OUTPATIENT)
Dept: LAB | Facility: HOSPITAL | Age: 74
End: 2019-04-03
Attending: INTERNAL MEDICINE
Payer: MEDICARE

## 2019-04-03 ENCOUNTER — TELEPHONE (OUTPATIENT)
Dept: INTERNAL MEDICINE | Facility: CLINIC | Age: 74
End: 2019-04-03

## 2019-04-03 DIAGNOSIS — E78.00 PURE HYPERCHOLESTEROLEMIA: ICD-10-CM

## 2019-04-03 DIAGNOSIS — E03.8 OTHER SPECIFIED HYPOTHYROIDISM: ICD-10-CM

## 2019-04-03 DIAGNOSIS — D75.839 THROMBOCYTOSIS: ICD-10-CM

## 2019-04-03 LAB
ALBUMIN SERPL BCP-MCNC: 3.9 G/DL (ref 3.5–5.2)
ALP SERPL-CCNC: 47 U/L (ref 55–135)
ALT SERPL W/O P-5'-P-CCNC: 28 U/L (ref 10–44)
ANION GAP SERPL CALC-SCNC: 7 MMOL/L (ref 8–16)
AST SERPL-CCNC: 27 U/L (ref 10–40)
BASOPHILS # BLD AUTO: 0.07 K/UL (ref 0–0.2)
BASOPHILS NFR BLD: 1.3 % (ref 0–1.9)
BILIRUB SERPL-MCNC: 0.5 MG/DL (ref 0.1–1)
BUN SERPL-MCNC: 20 MG/DL (ref 8–23)
CALCIUM SERPL-MCNC: 9.9 MG/DL (ref 8.7–10.5)
CHLORIDE SERPL-SCNC: 104 MMOL/L (ref 95–110)
CHOLEST SERPL-MCNC: 184 MG/DL (ref 120–199)
CHOLEST/HDLC SERPL: 1.8 {RATIO} (ref 2–5)
CO2 SERPL-SCNC: 30 MMOL/L (ref 23–29)
CREAT SERPL-MCNC: 0.8 MG/DL (ref 0.5–1.4)
DIFFERENTIAL METHOD: ABNORMAL
EOSINOPHIL # BLD AUTO: 0.1 K/UL (ref 0–0.5)
EOSINOPHIL NFR BLD: 2.6 % (ref 0–8)
ERYTHROCYTE [DISTWIDTH] IN BLOOD BY AUTOMATED COUNT: 14.2 % (ref 11.5–14.5)
EST. GFR  (AFRICAN AMERICAN): >60 ML/MIN/1.73 M^2
EST. GFR  (NON AFRICAN AMERICAN): >60 ML/MIN/1.73 M^2
GLUCOSE SERPL-MCNC: 103 MG/DL (ref 70–110)
HCT VFR BLD AUTO: 46.7 % (ref 37–48.5)
HDLC SERPL-MCNC: 102 MG/DL (ref 40–75)
HDLC SERPL: 55.4 % (ref 20–50)
HGB BLD-MCNC: 15.3 G/DL (ref 12–16)
IMM GRANULOCYTES # BLD AUTO: 0.02 K/UL (ref 0–0.04)
IMM GRANULOCYTES NFR BLD AUTO: 0.4 % (ref 0–0.5)
LDLC SERPL CALC-MCNC: 71.2 MG/DL (ref 63–159)
LYMPHOCYTES # BLD AUTO: 1.6 K/UL (ref 1–4.8)
LYMPHOCYTES NFR BLD: 30.1 % (ref 18–48)
MCH RBC QN AUTO: 31.7 PG (ref 27–31)
MCHC RBC AUTO-ENTMCNC: 32.8 G/DL (ref 32–36)
MCV RBC AUTO: 97 FL (ref 82–98)
MONOCYTES # BLD AUTO: 0.6 K/UL (ref 0.3–1)
MONOCYTES NFR BLD: 11.5 % (ref 4–15)
NEUTROPHILS # BLD AUTO: 2.9 K/UL (ref 1.8–7.7)
NEUTROPHILS NFR BLD: 54.1 % (ref 38–73)
NONHDLC SERPL-MCNC: 82 MG/DL
NRBC BLD-RTO: 0 /100 WBC
PLATELET # BLD AUTO: 276 K/UL (ref 150–350)
PMV BLD AUTO: 9.7 FL (ref 9.2–12.9)
POTASSIUM SERPL-SCNC: 4.7 MMOL/L (ref 3.5–5.1)
PROT SERPL-MCNC: 6.8 G/DL (ref 6–8.4)
RBC # BLD AUTO: 4.82 M/UL (ref 4–5.4)
SODIUM SERPL-SCNC: 141 MMOL/L (ref 136–145)
TRIGL SERPL-MCNC: 54 MG/DL (ref 30–150)
TSH SERPL DL<=0.005 MIU/L-ACNC: 2.98 UIU/ML (ref 0.4–4)
WBC # BLD AUTO: 5.29 K/UL (ref 3.9–12.7)

## 2019-04-03 PROCEDURE — 80061 LIPID PANEL: CPT

## 2019-04-03 PROCEDURE — 85025 COMPLETE CBC W/AUTO DIFF WBC: CPT

## 2019-04-03 PROCEDURE — 80053 COMPREHEN METABOLIC PANEL: CPT

## 2019-04-03 PROCEDURE — 36415 COLL VENOUS BLD VENIPUNCTURE: CPT | Mod: PO

## 2019-04-03 PROCEDURE — 84443 ASSAY THYROID STIM HORMONE: CPT

## 2019-04-03 NOTE — TELEPHONE ENCOUNTER
----- Message from Suha Salguero MD sent at 4/2/2019  7:45 PM CDT -----  Please note that your chest xray is unremarkable  Suha Clifton

## 2019-04-03 NOTE — TELEPHONE ENCOUNTER
----- Message from Suha Salguero MD sent at 4/3/2019  8:41 AM CDT -----  Please note that your EKG was normal  Suha Clifton

## 2019-04-10 ENCOUNTER — PATIENT MESSAGE (OUTPATIENT)
Dept: INTERNAL MEDICINE | Facility: CLINIC | Age: 74
End: 2019-04-10

## 2019-04-10 ENCOUNTER — TELEPHONE (OUTPATIENT)
Dept: SPORTS MEDICINE | Facility: CLINIC | Age: 74
End: 2019-04-10

## 2019-04-16 ENCOUNTER — HOSPITAL ENCOUNTER (OUTPATIENT)
Dept: PREADMISSION TESTING | Facility: OTHER | Age: 74
Discharge: HOME | End: 2019-04-16
Attending: ORTHOPAEDIC SURGERY
Payer: MEDICARE

## 2019-04-16 ENCOUNTER — ANESTHESIA EVENT (OUTPATIENT)
Dept: SURGERY | Facility: OTHER | Age: 74
End: 2019-04-16
Payer: MEDICARE

## 2019-04-16 ENCOUNTER — OFFICE VISIT (OUTPATIENT)
Dept: SPORTS MEDICINE | Facility: CLINIC | Age: 74
End: 2019-04-16
Payer: MEDICARE

## 2019-04-16 VITALS
OXYGEN SATURATION: 97 % | BODY MASS INDEX: 28.34 KG/M2 | SYSTOLIC BLOOD PRESSURE: 143 MMHG | HEIGHT: 64 IN | HEART RATE: 71 BPM | DIASTOLIC BLOOD PRESSURE: 67 MMHG | TEMPERATURE: 98 F | WEIGHT: 166 LBS

## 2019-04-16 VITALS
BODY MASS INDEX: 28.34 KG/M2 | HEIGHT: 64 IN | HEART RATE: 71 BPM | SYSTOLIC BLOOD PRESSURE: 142 MMHG | DIASTOLIC BLOOD PRESSURE: 77 MMHG | WEIGHT: 166 LBS

## 2019-04-16 DIAGNOSIS — M75.21 BICEPS TENDINITIS OF RIGHT UPPER EXTREMITY: ICD-10-CM

## 2019-04-16 DIAGNOSIS — M19.011 ARTHRITIS OF RIGHT ACROMIOCLAVICULAR JOINT: ICD-10-CM

## 2019-04-16 DIAGNOSIS — M75.101 NONTRAUMATIC TEAR OF RIGHT ROTATOR CUFF: Primary | ICD-10-CM

## 2019-04-16 PROCEDURE — 99999 PR PBB SHADOW E&M-EST. PATIENT-LVL III: CPT | Mod: PBBFAC,,, | Performed by: PHYSICIAN ASSISTANT

## 2019-04-16 PROCEDURE — 99499 UNLISTED E&M SERVICE: CPT | Mod: S$PBB,,, | Performed by: PHYSICIAN ASSISTANT

## 2019-04-16 PROCEDURE — 99499 NO LOS: ICD-10-PCS | Mod: S$PBB,,, | Performed by: PHYSICIAN ASSISTANT

## 2019-04-16 PROCEDURE — 99999 PR PBB SHADOW E&M-EST. PATIENT-LVL III: ICD-10-PCS | Mod: PBBFAC,,, | Performed by: PHYSICIAN ASSISTANT

## 2019-04-16 PROCEDURE — 99213 OFFICE O/P EST LOW 20 MIN: CPT | Mod: PBBFAC,PO | Performed by: PHYSICIAN ASSISTANT

## 2019-04-16 RX ORDER — LIDOCAINE HYDROCHLORIDE 10 MG/ML
0.5 INJECTION, SOLUTION EPIDURAL; INFILTRATION; INTRACAUDAL; PERINEURAL ONCE
Status: CANCELLED | OUTPATIENT
Start: 2019-04-16 | End: 2019-04-16

## 2019-04-16 RX ORDER — ACETAMINOPHEN 500 MG
1000 TABLET ORAL
Status: CANCELLED | OUTPATIENT
Start: 2019-04-16 | End: 2019-04-16

## 2019-04-16 RX ORDER — FAMOTIDINE 20 MG/1
20 TABLET, FILM COATED ORAL
Status: CANCELLED | OUTPATIENT
Start: 2019-04-16 | End: 2019-04-16

## 2019-04-16 RX ORDER — SODIUM CHLORIDE, SODIUM LACTATE, POTASSIUM CHLORIDE, CALCIUM CHLORIDE 600; 310; 30; 20 MG/100ML; MG/100ML; MG/100ML; MG/100ML
INJECTION, SOLUTION INTRAVENOUS CONTINUOUS
Status: CANCELLED | OUTPATIENT
Start: 2019-04-16

## 2019-04-16 NOTE — DISCHARGE INSTRUCTIONS
PRE-ADMIT TESTING -  [phone]    [address]  [place] [facility]          Your surgery has been scheduled at [facility]. We are pleased to have the opportunity to serve you. For Further Information please call [phone].    On the day of surgery please report to the Information Desk on the 1st floor.    · CONTACT YOUR PHYSICIAN'S OFFICE THE DAY PRIOR TO YOUR SURGERY TO OBTAIN YOUR ARRIVAL TIME.     · The evening before surgery do not eat anything after 9 p.m. ( this includes hard candy, chewing gum and mints).  You may only have GATORADE, POWERADE AND WATER  from 9 p.m. until you leave your home.   DO NOT DRINK ANY LIQUIDS ON THE WAY TO THE HOSPITAL.      SPECIAL MEDICATION INSTRUCTIONS: TAKE medications checked off by the Anesthesiologist on your Medication List.    Angiogram Patients: Take medications as instructed by your physician, including aspirin.     Surgery Patients:    If you take ASPIRIN - Your PHYSICIAN/SURGEON will need to inform you IF/OR when you need to stop taking aspirin prior to your surgery.     Do Not take any medications containing IBUPROFEN.  Do Not Wear any make-up or dark nail polish   (especially eye make-up) to surgery. If you come to surgery with makeup on you will be required to remove the makeup or nail polish.    Do not shave your surgical area at least 5 days prior to your surgery. The surgical prep will be performed at the hospital according to Infection Control regulations.    Leave all valuables at home.   Do Not wear any jewelry or watches, including any metal in body piercings. Jewelry must be removed prior to coming to the hospital.  There is a possibility that rings that are unable to be removed may be cut off if they are on the surgical extremity.    Contact Lens must be removed before surgery. Either do not wear the contact lens or bring a case and solution for storage.  Please bring a container for eyeglasses or dentures as required.  Bring  any paperwork your physician has provided, such as consent forms,  history and physicals, doctor's orders, etc.   Bring comfortable clothes that are loose fitting to wear upon discharge. Take into consideration the type of surgery being performed.  Maintain your diet as advised per your physician the day prior to surgery.      Adequate rest the night before surgery is advised.   Park in the Parking lot behind the hospital or in the [facility] across the street from the parking lot. Parking is complimentary.  If you will be discharged the same day as your procedure, please arrange for a responsible adult to drive you home or to accompany you if traveling by taxi.   YOU WILL NOT BE PERMITTED TO DRIVE OR TO LEAVE THE HOSPITAL ALONE AFTER SURGERY.   It is strongly recommended that you arrange for someone to remain with you for the first 24 hrs following your surgery.       Thank you for your cooperation.  The Staff of [facility].                Bathing Instructions with Hibiclens     Shower the evening before and morning of your procedure with Hibiclens:   Wash your face with water and your regular face wash/soap   Apply Hibiclens directly on your skin or on a wet washcloth and wash gently. When showering: Move away from the shower stream when applying Hibiclens to avoid rinsing off too soon.   Rinse thoroughly with warm water   Do not dilute Hibiclens         Dry off as usual, do not use any deodorant, powder, body lotions, perfume, after shave or cologne.

## 2019-04-16 NOTE — ANESTHESIA PREPROCEDURE EVALUATION
04/16/2019  Eugenio Aldridge is a 73 y.o., female.    Anesthesia Evaluation    I have reviewed the Patient Summary Reports.    I have reviewed the Nursing Notes.   I have reviewed the Medications.     Review of Systems  Anesthesia Hx:  History of prior surgery of interest to airway management or planning: (childhood T&A) Denies Family Hx of Anesthesia complications.   Denies Personal Hx of Anesthesia complications.   Social:  Non-Smoker    Hematology/Oncology:  Hematology Normal   Oncology Normal     EENT/Dental:EENT/Dental Normal   Cardiovascular:   Exercise tolerance: good Denies Dysrhythmias.  PVD (hx claudication, no tx, no sx's currently) hyperlipidemia ECG has been reviewed.    Pulmonary:   Denies Shortness of breath. Hx pulm fibrosis, no sx's   Renal/:  Renal/ Normal     Hepatic/GI:  Hepatic/GI Normal    Musculoskeletal:  Musculoskeletal Normal    Neurological:  Neurology Normal    Endocrine:   Hypothyroidism    Dermatological:  Skin Normal    Psych:  Psychiatric Normal           Physical Exam  General:  Well nourished    Airway/Jaw/Neck:  Airway Findings: Mouth Opening: Normal Mallampati: II  TM Distance: Normal, at least 6 cm  Jaw/Neck Findings:  Neck ROM: Normal ROM      Dental:  Dental Findings: In tact        Mental Status:  Mental Status Findings:  Cooperative, Alert and Oriented         Anesthesia Plan  Type of Anesthesia, risks & benefits discussed:  Anesthesia Type:  general  Patient's Preference:   Intra-op Monitoring Plan: standard ASA monitors  Intra-op Monitoring Plan Comments:   Post Op Pain Control Plan: multimodal analgesia and peripheral nerve block  Post Op Pain Control Plan Comments:   Induction:   IV  Beta Blocker:         Informed Consent: Patient understands risks and agrees with Anesthesia plan.  Questions answered. Anesthesia consent signed with patient.  ASA Score: 2     Day of  Surgery Review of History & Physical:    H&P update referred to the surgeon.     Anesthesia Plan Notes: Recent EKG/labs in epic reviewed, unremarkable        Ready For Surgery From Anesthesia Perspective.

## 2019-04-17 ENCOUNTER — PATIENT MESSAGE (OUTPATIENT)
Dept: SPORTS MEDICINE | Facility: CLINIC | Age: 74
End: 2019-04-17

## 2019-04-17 RX ORDER — MUPIROCIN 20 MG/G
OINTMENT TOPICAL
Status: CANCELLED | OUTPATIENT
Start: 2019-04-17

## 2019-04-17 RX ORDER — OXYCODONE AND ACETAMINOPHEN 10; 325 MG/1; MG/1
1 TABLET ORAL EVERY 6 HOURS PRN
Qty: 28 TABLET | Refills: 0 | Status: SHIPPED | OUTPATIENT
Start: 2019-04-17 | End: 2019-07-23

## 2019-04-17 RX ORDER — TRAMADOL HYDROCHLORIDE 50 MG/1
50 TABLET ORAL EVERY 6 HOURS PRN
Qty: 40 TABLET | Refills: 0 | Status: SHIPPED | OUTPATIENT
Start: 2019-04-17 | End: 2019-07-23

## 2019-04-17 RX ORDER — ASPIRIN 325 MG
325 TABLET, DELAYED RELEASE (ENTERIC COATED) ORAL 2 TIMES DAILY
Qty: 42 TABLET | Refills: 0 | Status: SHIPPED | OUTPATIENT
Start: 2019-04-17 | End: 2019-07-23

## 2019-04-17 RX ORDER — PROMETHAZINE HYDROCHLORIDE 25 MG/1
25 TABLET ORAL EVERY 6 HOURS PRN
Qty: 40 TABLET | Refills: 0 | Status: SHIPPED | OUTPATIENT
Start: 2019-04-17 | End: 2019-07-23

## 2019-04-17 NOTE — H&P (VIEW-ONLY)
"CC:  right shoulder pain     HPI:    PLAN OF ACTION: Eugenio Aldridge  is here for a completion of her perioperative paperwork. she Is scheduled to undergo right   1. Arthroscopic rotator cuff repair  2. Arthroscopic subacromial decompression  3. Arthroscopic distal clavicle excision  4. Possible open biceps subpectoral tenodesis    on 4/24/19. She does need clearance for this procedure. Per Dr. Salguero, "Dr. Hernandez office will be advised that the optimization has been completed and is available in Epic. No contraindication to anesthesia and surgery noted @ this time."    Risks, indications and benefits of the surgical procedure were discussed with the patient. All questions with regard to surgery, rehab, expected return to functional activities, activities of daily living and recreational endeavors were answered to her satisfaction.    Review of patient's allergies indicates:   Allergen Reactions    Codeine      Other reaction(s): Vomiting       Past Medical History:   Diagnosis Date    Actinic keratosis     Aortic valve disorders     Atherosclerosis of aorta     Atherosclerosis of native arteries of the extremities with intermittent claudication     Carcinoma in situ, vulva     Left bundle branch hemiblock     Other and unspecified hyperlipidemia     Peripheral vascular disease, unspecified     Postinflammatory pulmonary fibrosis     Senile nuclear sclerosis     Unspecified hypothyroidism        Past Surgical History:   Procedure Laterality Date    BTL      INJECTION-JOINT Left 7/16/2015    Performed by Valer[facility]ward MD at [facility] PA[facility]GT    RHYTIDECTOMY      TONSILLECTOMY, ADENOIDECTOMY      VULVA SURGERY         Social History     Socioeconomic History    Marital status: Significant Other     Spouse name: Not on file    Number of children: Not on file    Years of education: Not on file    Highest education level: Not on file   Occupational History    Occupation: CPA   Social Needs    " Financial resource strain: Not on file    Food insecurity:     Worry: Not on file     Inability: Not on file    Transportation needs:     Medical: Not on file     Non-medical: Not on file   Tobacco Use    Smoking status: Former Smoker     Packs/day: 1.50     Years: 22.00     Pack years: 33.00     Types: Cigarettes     Last attempt to quit: 2001     Years since quittin.6   Substance and Sexual Activity    Alcohol use: Yes     Alcohol/week: 3.5 oz     Types: 7 Standard drinks or equivalent per week     Comment: once daily    Drug use: No    Sexual activity: Yes   Lifestyle    Physical activity:     Days per week: Not on file     Minutes per session: Not on file    Stress: Not on file   Relationships    Social connections:     Talks on phone: Not on file     Gets together: Not on file     Attends Scientologist service: Not on file     Active member of club or organization: Not on file     Attends meetings of clubs or organizations: Not on file     Relationship status: Not on file   Other Topics Concern    Are you pregnant or think you may be? Not Asked    Breast-feeding Not Asked   Social History Narrative    Significant other s/p colon cancer, now w/ bladder cancer;f/u imaging clear    [place] 3-4 /wk    + working CPA       Family History   Problem Relation Age of Onset    Cancer Father         bladder d. 69    Alzheimer's disease Mother 96        d. 97    Bladder Cancer Paternal Uncle     Lung cancer Paternal Uncle     Breast cancer Paternal Grandmother     Pancreatic cancer Paternal Aunt     Other Brother         d. 45 o/d    Pneumonia Brother         d. 58, smoker, obese    No Known Problems Sister     No Known Problems Sister     No Known Problems Sister     Amblyopia Neg Hx     Blindness Neg Hx     Cataracts Neg Hx     Diabetes Neg Hx     Glaucoma Neg Hx     Hypertension Neg Hx     Macular degeneration Neg Hx     Retinal detachment Neg Hx     Strabismus Neg Hx      Stroke Neg Hx     Thyroid disease Neg Hx          Current Outpatient Medications:     alendronate (FOSAMAX) 70 MG tablet, Take 1 tablet (70 mg total) by mouth every 7 days., Disp: 12 tablet, Rfl: 3    atorvastatin (LIPITOR) 20 MG tablet, TAKE 1 TABLET(20 MG) BY MOUTH EVERY DAY, Disp: 90 tablet, Rfl: 3    calcium carbonate (CALTRATE 600) 600 mg (1,500 mg) Tab, Take 600 mg by mouth once daily. , Disp: , Rfl:     estradiol (VAGIFEM) 10 mcg Tab, INSERT ONE TABLET IN THE VAGINA TWICE WEEKLY AS DIRECTED, Disp: 30 tablet, Rfl: 0    levothyroxine (SYNTHROID) 50 MCG tablet, Take 1 tablet (50 mcg total) by mouth before breakfast., Disp: 90 tablet, Rfl: 3    multivitamin (THERAGRAN) per tablet, Take by mouth. 1 Tablet Oral Every day, Disp: , Rfl:     diphenhydramine-acetaminophen (TYLENOL PM)  mg Tab, Take 1 tablet by mouth., Disp: , Rfl:     Please see patient's chart for applicable emotional/behavioral/social status.    REVIEW OF SYSTEMS:  Constitution: Negative. Negative for chills, fever and night sweats.   HENT: Negative for congestion and headaches.    Eyes: Negative for blurred vision, left vision loss and right vision loss.   Cardiovascular: Negative for chest pain and syncope.   Respiratory: Negative for cough and shortness of breath.    Endocrine: Negative for polydipsia, polyphagia and polyuria.   Hematologic/Lymphatic: Negative for bleeding problem. Does not bruise/bleed easily.   Skin: Negative for dry skin, itching and rash.   Musculoskeletal: Negative for falls. Positive for right shoulder pain and muscle weakness.   Gastrointestinal: Negative for abdominal pain and bowel incontinence.   Genitourinary: Negative for bladder incontinence and nocturia.   Neurological: Negative for disturbances in coordination, loss of balance and seizures.   Psychiatric/Behavioral: Negative for depression. The patient does not have insomnia.    Allergic/Immunologic: Negative for hives and persistent infections.      Once no other questions were asked, a brief history and physical exam was then performed.    PHYSICAL EXAM:  GEN: A&Ox3, WD WN NAD  HEENT: WNL  CHEST: CTAB, no W/R/R  HEART: RRR, no M/R/G  ABD: Soft, NT ND, BS x4 QUADS  MS; See Epic  NEURO: CN II-XII intact     The surgical consent was then reviewed with the patient, who agreed with all the contents of the consent form and it was signed. she was then given the [facility] surgery packet to bring with her to [facility] for the anesthesia portion of her perioperative paperwork.     PHYSICAL THERAPY:  She was also instructed regarding physical therapy and will begin on  POD#3. She was given a copy of the original prescription to schedule. Another copy of this prescription was also faxed to [facility].    POST OP CARE:instructions were reviewed including care of the wound and dressing after surgery and when she can shower.     PAIN MANAGEMENT: Eugenio Aldridge was also given her pain management regime, which includes the TENS unit given to her by Cameron Rhodes along with the education required for its use. She was also instructed regarding the Polar ice unit that will be in place after surgery and her postoperative pain medications.     MEDICATION:  Percocet 10/325mg 1 po q 4-6 hours prn pain  Ultram 50 mg one p.o. q.4-6 hours p.r.n. breakthrough pain,   Phenergan 25 mg one p.o. q.4-6 hours p.r.n. nausea and vomiting.  Colace 100mg BID PRN constipation  EC ASA 325mg BID x 3 weeks  Prilosec OTC    Patient was educated on the signs and symptoms of DVTs as well as the risk of their occurrence.     IMPRESSION: surgical candidate for right   1. Arthroscopic rotator cuff repair  2. Arthroscopic subacromial decompression  3. Arthroscopic distal clavicle excision  4. Possible open biceps subpectoral tenodesis    CONCLUSION: Pre-operative information reviewed with patient and they have voiced their understanding and signed consent. Proceed with surgery as planned.    As there were no other  questions to be asked, she was given my business card along with Walter Hernandez MD business card if she has any questions or concerns prior to surgery or in the postop period.

## 2019-04-17 NOTE — H&P
"CC:  right shoulder pain     HPI:    PLAN OF ACTION: Eugenio Aldridge  is here for a completion of her perioperative paperwork. she Is scheduled to undergo right   1. Arthroscopic rotator cuff repair  2. Arthroscopic subacromial decompression  3. Arthroscopic distal clavicle excision  4. Possible open biceps subpectoral tenodesis    on 4/24/19. She does need clearance for this procedure. Per Dr. Salguero, "Dr. Hernandez office will be advised that the optimization has been completed and is available in Epic. No contraindication to anesthesia and surgery noted @ this time."    Risks, indications and benefits of the surgical procedure were discussed with the patient. All questions with regard to surgery, rehab, expected return to functional activities, activities of daily living and recreational endeavors were answered to her satisfaction.    Review of patient's allergies indicates:   Allergen Reactions    Codeine      Other reaction(s): Vomiting       Past Medical History:   Diagnosis Date    Actinic keratosis     Aortic valve disorders     Atherosclerosis of aorta     Atherosclerosis of native arteries of the extremities with intermittent claudication     Carcinoma in situ, vulva     Left bundle branch hemiblock     Other and unspecified hyperlipidemia     Peripheral vascular disease, unspecified     Postinflammatory pulmonary fibrosis     Senile nuclear sclerosis     Unspecified hypothyroidism        Past Surgical History:   Procedure Laterality Date    BTL      INJECTION-JOINT Left 7/16/2015    Performed by [facility] at [facility] PAIN MGT    RHYTIDECTOMY      TONSILLECTOMY, ADENOIDECTOMY      VULVA SURGERY         Social History     Socioeconomic History    Marital status: Significant Other     Spouse name: Not on file    Number of children: Not on file    Years of education: Not on file    Highest education level: Not on file   Occupational History    Occupation: CPA   Social Needs    " Financial resource strain: Not on file    Food insecurity:     Worry: Not on file     Inability: Not on file    Transportation needs:     Medical: Not on file     Non-medical: Not on file   Tobacco Use    Smoking status: Former Smoker     Packs/day: 1.50     Years: 22.00     Pack years: 33.00     Types: Cigarettes     Last attempt to quit: 2001     Years since quittin.6   Substance and Sexual Activity    Alcohol use: Yes     Alcohol/week: 3.5 oz     Types: 7 Standard drinks or equivalent per week     Comment: once daily    Drug use: No    Sexual activity: Yes   Lifestyle    Physical activity:     Days per week: Not on file     Minutes per session: Not on file    Stress: Not on file   Relationships    Social connections:     Talks on phone: Not on file     Gets together: Not on file     Attends Christian service: Not on file     Active member of club or organization: Not on file     Attends meetings of clubs or organizations: Not on file     Relationship status: Not on file   Other Topics Concern    Are you pregnant or think you may be? Not Asked    Breast-feeding Not Asked   Social History Narrative    Significant other s/p colon cancer, now w/ bladder cancer;f/u imaging clear    [place]wk    + working CPA       Family History   Problem Relation Age of Onset    Cancer Father         bladder d. 69    Alzheimer's disease Mother 96        d. 97    Bladder Cancer Paternal Uncle     Lung cancer Paternal Uncle     Breast cancer Paternal Grandmother     Pancreatic cancer Paternal Aunt     Other Brother         d. 45 o/d    Pneumonia Brother         d. 58, smoker, obese    No Known Problems Sister     No Known Problems Sister     No Known Problems Sister     Amblyopia Neg Hx     Blindness Neg Hx     Cataracts Neg Hx     Diabetes Neg Hx     Glaucoma Neg Hx     Hypertension Neg Hx     Macular degeneration Neg Hx     Retinal detachment Neg Hx     Strabismus Neg Hx      Stroke Neg Hx     Thyroid disease Neg Hx          Current Outpatient Medications:     alendronate (FOSAMAX) 70 MG tablet, Take 1 tablet (70 mg total) by mouth every 7 days., Disp: 12 tablet, Rfl: 3    atorvastatin (LIPITOR) 20 MG tablet, TAKE 1 TABLET(20 MG) BY MOUTH EVERY DAY, Disp: 90 tablet, Rfl: 3    calcium carbonate (CALTRATE 600) 600 mg (1,500 mg) Tab, Take 600 mg by mouth once daily. , Disp: , Rfl:     estradiol (VAGIFEM) 10 mcg Tab, INSERT ONE TABLET IN THE VAGINA TWICE WEEKLY AS DIRECTED, Disp: 30 tablet, Rfl: 0    levothyroxine (SYNTHROID) 50 MCG tablet, Take 1 tablet (50 mcg total) by mouth before breakfast., Disp: 90 tablet, Rfl: 3    multivitamin (THERAGRAN) per tablet, Take by mouth. 1 Tablet Oral Every day, Disp: , Rfl:     diphenhydramine-acetaminophen (TYLENOL PM)  mg Tab, Take 1 tablet by mouth., Disp: , Rfl:     Please see patient's chart for applicable emotional/behavioral/social status.    REVIEW OF SYSTEMS:  Constitution: Negative. Negative for chills, fever and night sweats.   HENT: Negative for congestion and headaches.    Eyes: Negative for blurred vision, left vision loss and right vision loss.   Cardiovascular: Negative for chest pain and syncope.   Respiratory: Negative for cough and shortness of breath.    Endocrine: Negative for polydipsia, polyphagia and polyuria.   Hematologic/Lymphatic: Negative for bleeding problem. Does not bruise/bleed easily.   Skin: Negative for dry skin, itching and rash.   Musculoskeletal: Negative for falls. Positive for right shoulder pain and muscle weakness.   Gastrointestinal: Negative for abdominal pain and bowel incontinence.   Genitourinary: Negative for bladder incontinence and nocturia.   Neurological: Negative for disturbances in coordination, loss of balance and seizures.   Psychiatric/Behavioral: Negative for depression. The patient does not have insomnia.    Allergic/Immunologic: Negative for hives and persistent infections.      Once no other questions were asked, a brief history and physical exam was then performed.    PHYSICAL EXAM:  GEN: A&Ox3, WD WN NAD  HEENT: WNL  CHEST: CTAB, no W/R/R  HEART: RRR, no M/R/G  ABD: Soft, NT ND, BS x4 QUADS  MS; See Epic  NEURO: CN II-XII intact     The surgical consent was then reviewed with the patient, who agreed with all the contents of the consent form and it was signed. she was then given the [facility] surgery packet to bring with her to [facility] for the anesthesia portion of her perioperative paperwork.     PHYSICAL THERAPY:  She was also instructed regarding physical therapy and will begin on  POD#3. She was given a copy of the original prescription to schedule. Another copy of this prescription was also faxed to [facility].    POST OP CARE:instructions were reviewed including care of the wound and dressing after surgery and when she can shower.     PAIN MANAGEMENT: Eugenio Aldridge was also given her pain management regime, which includes the TENS unit given to her by Cameron Rhodes along with the education required for its use. She was also instructed regarding the Polar ice unit that will be in place after surgery and her postoperative pain medications.     MEDICATION:  Percocet 10/325mg 1 po q 4-6 hours prn pain  Ultram 50 mg one p.o. q.4-6 hours p.r.n. breakthrough pain,   Phenergan 25 mg one p.o. q.4-6 hours p.r.n. nausea and vomiting.  Colace 100mg BID PRN constipation  EC ASA 325mg BID x 3 weeks  Prilosec OTC    Patient was educated on the signs and symptoms of DVTs as well as the risk of their occurrence.     IMPRESSION: surgical candidate for right   1. Arthroscopic rotator cuff repair  2. Arthroscopic subacromial decompression  3. Arthroscopic distal clavicle excision  4. Possible open biceps subpectoral tenodesis    CONCLUSION: Pre-operative information reviewed with patient and they have voiced their understanding and signed consent. Proceed with surgery as planned.    As there were no other  questions to be asked, she was given my business card along with Walter Hernandez MD business card if she has any questions or concerns prior to surgery or in the postop period.

## 2019-04-22 ENCOUNTER — PATIENT MESSAGE (OUTPATIENT)
Dept: SPORTS MEDICINE | Facility: CLINIC | Age: 74
End: 2019-04-22

## 2019-04-23 ENCOUNTER — TELEPHONE (OUTPATIENT)
Dept: SPORTS MEDICINE | Facility: CLINIC | Age: 74
End: 2019-04-23

## 2019-04-24 ENCOUNTER — ANESTHESIA (OUTPATIENT)
Dept: SURGERY | Facility: OTHER | Age: 74
End: 2019-04-24
Payer: MEDICARE

## 2019-04-24 ENCOUNTER — HOSPITAL ENCOUNTER (OUTPATIENT)
Facility: OTHER | Age: 74
Discharge: HOME | End: 2019-04-25
Attending: ORTHOPAEDIC SURGERY | Admitting: ORTHOPAEDIC SURGERY
Payer: MEDICARE

## 2019-04-24 DIAGNOSIS — M75.21 BICEPS TENDINITIS OF RIGHT UPPER EXTREMITY: ICD-10-CM

## 2019-04-24 DIAGNOSIS — M75.101 NONTRAUMATIC TEAR OF RIGHT ROTATOR CUFF: ICD-10-CM

## 2019-04-24 DIAGNOSIS — M19.011 ARTHRITIS OF RIGHT ACROMIOCLAVICULAR JOINT: ICD-10-CM

## 2019-04-24 PROCEDURE — 25000003 PHARM REV CODE 250: Performed by: PHYSICIAN ASSISTANT

## 2019-04-24 PROCEDURE — 64415 NJX AA&/STRD BRCH PLXS IMG: CPT | Performed by: ANESTHESIOLOGY

## 2019-04-24 PROCEDURE — 29824 PR SHLDR ARTHROSCOP,SURG,DIS CLAVICULECTOMY: ICD-10-PCS | Mod: 51,RT,GC, | Performed by: ORTHOPAEDIC SURGERY

## 2019-04-24 PROCEDURE — 94799 UNLISTED PULMONARY SVC/PX: CPT

## 2019-04-24 PROCEDURE — C1769 GUIDE WIRE: HCPCS | Performed by: ORTHOPAEDIC SURGERY

## 2019-04-24 PROCEDURE — 25000003 PHARM REV CODE 250: Performed by: NURSE ANESTHETIST, CERTIFIED REGISTERED

## 2019-04-24 PROCEDURE — 37000009 HC ANESTHESIA EA ADD 15 MINS: Performed by: ORTHOPAEDIC SURGERY

## 2019-04-24 PROCEDURE — 63600175 PHARM REV CODE 636 W HCPCS: Performed by: NURSE ANESTHETIST, CERTIFIED REGISTERED

## 2019-04-24 PROCEDURE — 29824 SHO ARTHRS SRG DSTL CLAVICLC: CPT | Mod: 51,RT,GC, | Performed by: ORTHOPAEDIC SURGERY

## 2019-04-24 PROCEDURE — 25000003 PHARM REV CODE 250: Performed by: ANESTHESIOLOGY

## 2019-04-24 PROCEDURE — 94761 N-INVAS EAR/PLS OXIMETRY MLT: CPT

## 2019-04-24 PROCEDURE — 29826 SHO ARTHRS SRG DECOMPRESSION: CPT | Mod: RT,GC,, | Performed by: ORTHOPAEDIC SURGERY

## 2019-04-24 PROCEDURE — 63600175 PHARM REV CODE 636 W HCPCS: Performed by: PHYSICIAN ASSISTANT

## 2019-04-24 PROCEDURE — C1713 ANCHOR/SCREW BN/BN,TIS/BN: HCPCS | Performed by: ORTHOPAEDIC SURGERY

## 2019-04-24 PROCEDURE — 63600175 PHARM REV CODE 636 W HCPCS: Performed by: ANESTHESIOLOGY

## 2019-04-24 PROCEDURE — 71000015 HC POSTOP RECOV 1ST HR: Performed by: ORTHOPAEDIC SURGERY

## 2019-04-24 PROCEDURE — 25000003 PHARM REV CODE 250: Performed by: STUDENT IN AN ORGANIZED HEALTH CARE EDUCATION/TRAINING PROGRAM

## 2019-04-24 PROCEDURE — 76942 ECHO GUIDE FOR BIOPSY: CPT | Performed by: ANESTHESIOLOGY

## 2019-04-24 PROCEDURE — 29827 SHO ARTHRS SRG RT8TR CUF RPR: CPT | Mod: 22,RT,GC, | Performed by: ORTHOPAEDIC SURGERY

## 2019-04-24 PROCEDURE — C1889 IMPLANT/INSERT DEVICE, NOC: HCPCS | Performed by: ORTHOPAEDIC SURGERY

## 2019-04-24 PROCEDURE — 37000008 HC ANESTHESIA 1ST 15 MINUTES: Performed by: ORTHOPAEDIC SURGERY

## 2019-04-24 PROCEDURE — 63600175 PHARM REV CODE 636 W HCPCS: Performed by: ORTHOPAEDIC SURGERY

## 2019-04-24 PROCEDURE — 29827 PR SHLDR ARTHROSCOP,SURG,W/ROTAT CUFF REPR: ICD-10-PCS | Mod: 22,RT,GC, | Performed by: ORTHOPAEDIC SURGERY

## 2019-04-24 PROCEDURE — 27000221 HC OXYGEN, UP TO 24 HOURS

## 2019-04-24 PROCEDURE — 71000033 HC RECOVERY, INTIAL HOUR: Performed by: ORTHOPAEDIC SURGERY

## 2019-04-24 PROCEDURE — 27201423 OPTIME MED/SURG SUP & DEVICES STERILE SUPPLY: Performed by: ORTHOPAEDIC SURGERY

## 2019-04-24 PROCEDURE — 36000711: Performed by: ORTHOPAEDIC SURGERY

## 2019-04-24 PROCEDURE — 29826 PR SHLDR ARTHROSCOP,PART ACROMIOPLAS: ICD-10-PCS | Mod: RT,GC,, | Performed by: ORTHOPAEDIC SURGERY

## 2019-04-24 PROCEDURE — 99900035 HC TECH TIME PER 15 MIN (STAT)

## 2019-04-24 PROCEDURE — 36000710: Performed by: ORTHOPAEDIC SURGERY

## 2019-04-24 DEVICE — ANCHOR BONE ARTHSCP DEL SYS: Type: IMPLANTABLE DEVICE | Site: SHOULDER | Status: FUNCTIONAL

## 2019-04-24 DEVICE — IMPLANT ARTHSCP BIOINDUCTV LG: Type: IMPLANTABLE DEVICE | Site: SHOULDER | Status: FUNCTIONAL

## 2019-04-24 DEVICE — ANCHOR TENDON 8: Type: IMPLANTABLE DEVICE | Site: SHOULDER | Status: FUNCTIONAL

## 2019-04-24 RX ORDER — BISACODYL 10 MG
10 SUPPOSITORY, RECTAL RECTAL DAILY PRN
Status: DISCONTINUED | OUTPATIENT
Start: 2019-04-24 | End: 2019-04-25 | Stop reason: HOSPADM

## 2019-04-24 RX ORDER — ONDANSETRON 2 MG/ML
4 INJECTION INTRAMUSCULAR; INTRAVENOUS DAILY PRN
Status: DISCONTINUED | OUTPATIENT
Start: 2019-04-24 | End: 2019-04-24

## 2019-04-24 RX ORDER — MEPERIDINE HYDROCHLORIDE 25 MG/ML
12.5 INJECTION INTRAMUSCULAR; INTRAVENOUS; SUBCUTANEOUS ONCE AS NEEDED
Status: DISCONTINUED | OUTPATIENT
Start: 2019-04-24 | End: 2019-04-24

## 2019-04-24 RX ORDER — NEOSTIGMINE METHYLSULFATE 1 MG/ML
INJECTION, SOLUTION INTRAVENOUS
Status: DISCONTINUED | OUTPATIENT
Start: 2019-04-24 | End: 2019-04-24

## 2019-04-24 RX ORDER — LIDOCAINE HYDROCHLORIDE 10 MG/ML
0.5 INJECTION, SOLUTION EPIDURAL; INFILTRATION; INTRACAUDAL; PERINEURAL ONCE
Status: DISCONTINUED | OUTPATIENT
Start: 2019-04-24 | End: 2019-04-24

## 2019-04-24 RX ORDER — DEXAMETHASONE SODIUM PHOSPHATE 4 MG/ML
INJECTION, SOLUTION INTRA-ARTICULAR; INTRALESIONAL; INTRAMUSCULAR; INTRAVENOUS; SOFT TISSUE
Status: DISCONTINUED | OUTPATIENT
Start: 2019-04-24 | End: 2019-04-24

## 2019-04-24 RX ORDER — MUPIROCIN 20 MG/G
OINTMENT TOPICAL 2 TIMES DAILY
Status: DISCONTINUED | OUTPATIENT
Start: 2019-04-24 | End: 2019-04-25 | Stop reason: HOSPADM

## 2019-04-24 RX ORDER — ROPIVACAINE HYDROCHLORIDE 5 MG/ML
INJECTION, SOLUTION EPIDURAL; INFILTRATION; PERINEURAL
Status: COMPLETED | OUTPATIENT
Start: 2019-04-24 | End: 2019-04-24

## 2019-04-24 RX ORDER — SODIUM CHLORIDE, SODIUM LACTATE, POTASSIUM CHLORIDE, CALCIUM CHLORIDE 600; 310; 30; 20 MG/100ML; MG/100ML; MG/100ML; MG/100ML
INJECTION, SOLUTION INTRAVENOUS CONTINUOUS
Status: DISCONTINUED | OUTPATIENT
Start: 2019-04-24 | End: 2019-04-24

## 2019-04-24 RX ORDER — LEVOTHYROXINE SODIUM 25 UG/1
50 TABLET ORAL
Status: DISCONTINUED | OUTPATIENT
Start: 2019-04-25 | End: 2019-04-25 | Stop reason: HOSPADM

## 2019-04-24 RX ORDER — FENTANYL CITRATE 50 UG/ML
INJECTION, SOLUTION INTRAMUSCULAR; INTRAVENOUS
Status: DISCONTINUED | OUTPATIENT
Start: 2019-04-24 | End: 2019-04-24

## 2019-04-24 RX ORDER — ATROPINE SULFATE 0.4 MG/ML
INJECTION, SOLUTION ENDOTRACHEAL; INTRAMEDULLARY; INTRAMUSCULAR; INTRAVENOUS; SUBCUTANEOUS
Status: DISCONTINUED | OUTPATIENT
Start: 2019-04-24 | End: 2019-04-24

## 2019-04-24 RX ORDER — ONDANSETRON HYDROCHLORIDE 2 MG/ML
INJECTION, SOLUTION INTRAMUSCULAR; INTRAVENOUS
Status: DISCONTINUED | OUTPATIENT
Start: 2019-04-24 | End: 2019-04-24

## 2019-04-24 RX ORDER — CEFAZOLIN SODIUM 1 G/3ML
2 INJECTION, POWDER, FOR SOLUTION INTRAMUSCULAR; INTRAVENOUS
Status: DISCONTINUED | OUTPATIENT
Start: 2019-04-24 | End: 2019-04-24

## 2019-04-24 RX ORDER — OXYCODONE HYDROCHLORIDE 5 MG/1
5 TABLET ORAL
Status: DISCONTINUED | OUTPATIENT
Start: 2019-04-24 | End: 2019-04-24

## 2019-04-24 RX ORDER — CELECOXIB 200 MG/1
400 CAPSULE ORAL ONCE
Status: COMPLETED | OUTPATIENT
Start: 2019-04-24 | End: 2019-04-24

## 2019-04-24 RX ORDER — ONDANSETRON 2 MG/ML
INJECTION INTRAMUSCULAR; INTRAVENOUS
Status: DISCONTINUED | OUTPATIENT
Start: 2019-04-24 | End: 2019-04-24

## 2019-04-24 RX ORDER — MIDAZOLAM HYDROCHLORIDE 1 MG/ML
INJECTION INTRAMUSCULAR; INTRAVENOUS
Status: DISCONTINUED | OUTPATIENT
Start: 2019-04-24 | End: 2019-04-24

## 2019-04-24 RX ORDER — SODIUM CHLORIDE 0.9 % (FLUSH) 0.9 %
3 SYRINGE (ML) INJECTION
Status: DISCONTINUED | OUTPATIENT
Start: 2019-04-24 | End: 2019-04-25 | Stop reason: HOSPADM

## 2019-04-24 RX ORDER — LIDOCAINE HYDROCHLORIDE 10 MG/ML
1 INJECTION, SOLUTION EPIDURAL; INFILTRATION; INTRACAUDAL; PERINEURAL ONCE
Status: DISCONTINUED | OUTPATIENT
Start: 2019-04-24 | End: 2019-04-25 | Stop reason: HOSPADM

## 2019-04-24 RX ORDER — DIPHENHYDRAMINE HYDROCHLORIDE 50 MG/ML
25 INJECTION INTRAMUSCULAR; INTRAVENOUS EVERY 4 HOURS PRN
Status: DISCONTINUED | OUTPATIENT
Start: 2019-04-24 | End: 2019-04-25 | Stop reason: HOSPADM

## 2019-04-24 RX ORDER — ACETAMINOPHEN 500 MG
1000 TABLET ORAL
Status: COMPLETED | OUTPATIENT
Start: 2019-04-24 | End: 2019-04-24

## 2019-04-24 RX ORDER — PHENYLEPHRINE HYDROCHLORIDE 10 MG/ML
INJECTION INTRAVENOUS
Status: DISCONTINUED | OUTPATIENT
Start: 2019-04-24 | End: 2019-04-24

## 2019-04-24 RX ORDER — OXYCODONE HYDROCHLORIDE 5 MG/1
10 TABLET ORAL
Status: DISCONTINUED | OUTPATIENT
Start: 2019-04-24 | End: 2019-04-25 | Stop reason: HOSPADM

## 2019-04-24 RX ORDER — POLYETHYLENE GLYCOL 3350 17 G/17G
17 POWDER, FOR SOLUTION ORAL DAILY
Status: DISCONTINUED | OUTPATIENT
Start: 2019-04-24 | End: 2019-04-25 | Stop reason: HOSPADM

## 2019-04-24 RX ORDER — PROPOFOL 10 MG/ML
VIAL (ML) INTRAVENOUS
Status: DISCONTINUED | OUTPATIENT
Start: 2019-04-24 | End: 2019-04-24

## 2019-04-24 RX ORDER — OXYCODONE HYDROCHLORIDE 5 MG/1
5 TABLET ORAL
Status: DISCONTINUED | OUTPATIENT
Start: 2019-04-24 | End: 2019-04-25 | Stop reason: HOSPADM

## 2019-04-24 RX ORDER — ACETAMINOPHEN 500 MG
1000 TABLET ORAL EVERY 8 HOURS
Status: DISCONTINUED | OUTPATIENT
Start: 2019-04-24 | End: 2019-04-25 | Stop reason: HOSPADM

## 2019-04-24 RX ORDER — ALBUTEROL SULFATE 90 UG/1
2 AEROSOL, METERED RESPIRATORY (INHALATION) EVERY 8 HOURS
Status: DISCONTINUED | OUTPATIENT
Start: 2019-04-24 | End: 2019-04-24

## 2019-04-24 RX ORDER — RAMELTEON 8 MG/1
8 TABLET ORAL NIGHTLY PRN
Status: DISCONTINUED | OUTPATIENT
Start: 2019-04-24 | End: 2019-04-25 | Stop reason: HOSPADM

## 2019-04-24 RX ORDER — FAMOTIDINE 20 MG/1
20 TABLET, FILM COATED ORAL
Status: COMPLETED | OUTPATIENT
Start: 2019-04-24 | End: 2019-04-24

## 2019-04-24 RX ORDER — ALBUTEROL SULFATE 90 UG/1
2 AEROSOL, METERED RESPIRATORY (INHALATION) EVERY 4 HOURS PRN
Status: DISCONTINUED | OUTPATIENT
Start: 2019-04-24 | End: 2019-04-25 | Stop reason: HOSPADM

## 2019-04-24 RX ORDER — ROCURONIUM BROMIDE 10 MG/ML
INJECTION, SOLUTION INTRAVENOUS
Status: DISCONTINUED | OUTPATIENT
Start: 2019-04-24 | End: 2019-04-24

## 2019-04-24 RX ORDER — LIDOCAINE HCL/PF 100 MG/5ML
SYRINGE (ML) INTRAVENOUS
Status: DISCONTINUED | OUTPATIENT
Start: 2019-04-24 | End: 2019-04-24

## 2019-04-24 RX ORDER — PREGABALIN 75 MG/1
150 CAPSULE ORAL NIGHTLY
Status: DISCONTINUED | OUTPATIENT
Start: 2019-04-24 | End: 2019-04-25 | Stop reason: HOSPADM

## 2019-04-24 RX ORDER — HYDROMORPHONE HYDROCHLORIDE 2 MG/ML
0.2 INJECTION, SOLUTION INTRAMUSCULAR; INTRAVENOUS; SUBCUTANEOUS
Status: DISCONTINUED | OUTPATIENT
Start: 2019-04-24 | End: 2019-04-25 | Stop reason: HOSPADM

## 2019-04-24 RX ORDER — EPINEPHRINE 1 MG/ML
INJECTION, SOLUTION INTRACARDIAC; INTRAMUSCULAR; INTRAVENOUS; SUBCUTANEOUS
Status: DISCONTINUED | OUTPATIENT
Start: 2019-04-24 | End: 2019-04-24 | Stop reason: HOSPADM

## 2019-04-24 RX ORDER — SODIUM CHLORIDE 0.9 % (FLUSH) 0.9 %
10 SYRINGE (ML) INJECTION
Status: DISCONTINUED | OUTPATIENT
Start: 2019-04-24 | End: 2019-04-25 | Stop reason: HOSPADM

## 2019-04-24 RX ORDER — ATORVASTATIN CALCIUM 20 MG/1
20 TABLET, FILM COATED ORAL DAILY
Status: DISCONTINUED | OUTPATIENT
Start: 2019-04-25 | End: 2019-04-25 | Stop reason: HOSPADM

## 2019-04-24 RX ORDER — FENTANYL CITRATE 50 UG/ML
100 INJECTION, SOLUTION INTRAMUSCULAR; INTRAVENOUS EVERY 5 MIN PRN
Status: DISCONTINUED | OUTPATIENT
Start: 2019-04-24 | End: 2019-04-24

## 2019-04-24 RX ORDER — OXYCODONE HYDROCHLORIDE 5 MG/1
15 TABLET ORAL
Status: DISCONTINUED | OUTPATIENT
Start: 2019-04-24 | End: 2019-04-25 | Stop reason: HOSPADM

## 2019-04-24 RX ORDER — ONDANSETRON 2 MG/ML
4 INJECTION INTRAMUSCULAR; INTRAVENOUS EVERY 12 HOURS PRN
Status: DISCONTINUED | OUTPATIENT
Start: 2019-04-24 | End: 2019-04-25 | Stop reason: HOSPADM

## 2019-04-24 RX ORDER — HYDROMORPHONE HYDROCHLORIDE 2 MG/ML
0.4 INJECTION, SOLUTION INTRAMUSCULAR; INTRAVENOUS; SUBCUTANEOUS EVERY 5 MIN PRN
Status: DISCONTINUED | OUTPATIENT
Start: 2019-04-24 | End: 2019-04-24

## 2019-04-24 RX ORDER — ASPIRIN 81 MG/1
81 TABLET ORAL 2 TIMES DAILY
Status: DISCONTINUED | OUTPATIENT
Start: 2019-04-24 | End: 2019-04-25 | Stop reason: HOSPADM

## 2019-04-24 RX ORDER — MUPIROCIN 20 MG/G
OINTMENT TOPICAL
Status: DISCONTINUED | OUTPATIENT
Start: 2019-04-24 | End: 2019-04-24

## 2019-04-24 RX ORDER — DOCUSATE SODIUM 100 MG/1
100 CAPSULE, LIQUID FILLED ORAL 3 TIMES DAILY
Status: DISCONTINUED | OUTPATIENT
Start: 2019-04-24 | End: 2019-04-25 | Stop reason: HOSPADM

## 2019-04-24 RX ORDER — MIDAZOLAM HYDROCHLORIDE 1 MG/ML
5 INJECTION INTRAMUSCULAR; INTRAVENOUS ONCE
Status: DISCONTINUED | OUTPATIENT
Start: 2019-04-24 | End: 2019-04-24

## 2019-04-24 RX ORDER — GLYCOPYRROLATE 0.2 MG/ML
INJECTION INTRAMUSCULAR; INTRAVENOUS
Status: DISCONTINUED | OUTPATIENT
Start: 2019-04-24 | End: 2019-04-24

## 2019-04-24 RX ADMIN — ACETAMINOPHEN 1000 MG: 500 TABLET ORAL at 09:04

## 2019-04-24 RX ADMIN — VASOPRESSIN 1 UNITS: 20 INJECTION, SOLUTION INTRAMUSCULAR; SUBCUTANEOUS at 10:04

## 2019-04-24 RX ADMIN — GLYCOPYRROLATE 0.8 MG: 0.2 INJECTION, SOLUTION INTRAMUSCULAR; INTRAVENOUS at 11:04

## 2019-04-24 RX ADMIN — VASOPRESSIN 2 UNITS: 20 INJECTION, SOLUTION INTRAMUSCULAR; SUBCUTANEOUS at 08:04

## 2019-04-24 RX ADMIN — PHENYLEPHRINE HYDROCHLORIDE 200 MCG: 10 INJECTION INTRAVENOUS at 08:04

## 2019-04-24 RX ADMIN — ROPIVACAINE HYDROCHLORIDE 20 ML: 5 INJECTION, SOLUTION EPIDURAL; INFILTRATION; PERINEURAL at 07:04

## 2019-04-24 RX ADMIN — FENTANYL CITRATE 100 MCG: 50 INJECTION, SOLUTION INTRAMUSCULAR; INTRAVENOUS at 08:04

## 2019-04-24 RX ADMIN — MUPIROCIN: 20 OINTMENT TOPICAL at 09:04

## 2019-04-24 RX ADMIN — OXYCODONE HYDROCHLORIDE 10 MG: 5 TABLET ORAL at 05:04

## 2019-04-24 RX ADMIN — PREGABALIN 150 MG: 75 CAPSULE ORAL at 09:04

## 2019-04-24 RX ADMIN — DOCUSATE SODIUM 100 MG: 100 CAPSULE, LIQUID FILLED ORAL at 09:04

## 2019-04-24 RX ADMIN — OXYCODONE HYDROCHLORIDE 5 MG: 5 TABLET ORAL at 01:04

## 2019-04-24 RX ADMIN — CARBOXYMETHYLCELLULOSE SODIUM 2 DROP: 2.5 SOLUTION/ DROPS OPHTHALMIC at 08:04

## 2019-04-24 RX ADMIN — PROPOFOL 150 MG: 10 INJECTION, EMULSION INTRAVENOUS at 08:04

## 2019-04-24 RX ADMIN — VASOPRESSIN 2 UNITS: 20 INJECTION, SOLUTION INTRAMUSCULAR; SUBCUTANEOUS at 09:04

## 2019-04-24 RX ADMIN — DOCUSATE SODIUM 100 MG: 100 CAPSULE, LIQUID FILLED ORAL at 05:04

## 2019-04-24 RX ADMIN — SODIUM CHLORIDE, SODIUM LACTATE, POTASSIUM CHLORIDE, AND CALCIUM CHLORIDE: 600; 310; 30; 20 INJECTION, SOLUTION INTRAVENOUS at 11:04

## 2019-04-24 RX ADMIN — ONDANSETRON 4 MG: 2 INJECTION, SOLUTION INTRAMUSCULAR; INTRAVENOUS at 10:04

## 2019-04-24 RX ADMIN — DEXAMETHASONE SODIUM PHOSPHATE 8 MG: 4 INJECTION, SOLUTION INTRAMUSCULAR; INTRAVENOUS at 08:04

## 2019-04-24 RX ADMIN — CELECOXIB 400 MG: 200 CAPSULE ORAL at 05:04

## 2019-04-24 RX ADMIN — MIDAZOLAM HYDROCHLORIDE 2 MG: 1 INJECTION, SOLUTION INTRAMUSCULAR; INTRAVENOUS at 07:04

## 2019-04-24 RX ADMIN — FAMOTIDINE 20 MG: 20 TABLET, FILM COATED ORAL at 05:04

## 2019-04-24 RX ADMIN — ATROPINE SULFATE 0.5 MG: 0.4 INJECTION, SOLUTION INTRAMUSCULAR; INTRAVENOUS; SUBCUTANEOUS at 08:04

## 2019-04-24 RX ADMIN — ACETAMINOPHEN 1000 MG: 500 TABLET, FILM COATED ORAL at 05:04

## 2019-04-24 RX ADMIN — FENTANYL CITRATE 100 MCG: 50 INJECTION, SOLUTION INTRAMUSCULAR; INTRAVENOUS at 07:04

## 2019-04-24 RX ADMIN — CALCIUM CHLORIDE 500 MG: 100 INJECTION, SOLUTION INTRAVENOUS at 08:04

## 2019-04-24 RX ADMIN — GLYCOPYRROLATE 0.2 MG: 0.2 INJECTION, SOLUTION INTRAMUSCULAR; INTRAVENOUS at 08:04

## 2019-04-24 RX ADMIN — MUPIROCIN: 20 OINTMENT TOPICAL at 05:04

## 2019-04-24 RX ADMIN — PROPOFOL 60 MG: 10 INJECTION, EMULSION INTRAVENOUS at 11:04

## 2019-04-24 RX ADMIN — ASPIRIN 81 MG: 81 TABLET, COATED ORAL at 09:04

## 2019-04-24 RX ADMIN — PROPOFOL 40 MG: 10 INJECTION, EMULSION INTRAVENOUS at 11:04

## 2019-04-24 RX ADMIN — SODIUM CHLORIDE, SODIUM LACTATE, POTASSIUM CHLORIDE, AND CALCIUM CHLORIDE: 600; 310; 30; 20 INJECTION, SOLUTION INTRAVENOUS at 07:04

## 2019-04-24 RX ADMIN — PHENYLEPHRINE HYDROCHLORIDE 0.2 MCG: 10 INJECTION INTRAVENOUS at 08:04

## 2019-04-24 RX ADMIN — LIDOCAINE HYDROCHLORIDE 50 MG: 20 INJECTION, SOLUTION INTRAVENOUS at 08:04

## 2019-04-24 RX ADMIN — ROCURONIUM BROMIDE 50 MG: 10 INJECTION, SOLUTION INTRAVENOUS at 08:04

## 2019-04-24 RX ADMIN — CEFAZOLIN 2 G: 330 INJECTION, POWDER, FOR SOLUTION INTRAMUSCULAR; INTRAVENOUS at 07:04

## 2019-04-24 RX ADMIN — NEOSTIGMINE METHYLSULFATE 5 MG: 1 INJECTION INTRAVENOUS at 11:04

## 2019-04-24 NOTE — OR NURSING
Upon arrival to Room 4623 from PACU, noted two IV sites that appeared to be infiltrated. Prior IV site to L hand wrapped in Coban; hand noted to be purple/cyanotic and swollen. States no pain or numbness/tingling to that L hand. Also noted IVF's infusing to remaining IV site appears to be infusing in surround tissue (area swollen and blanched). Immediately D/C'd IV access; tip intact, tolerated well. Applied gauze and Coban lightly to sites. Dr. Marroquin called in here to evaluate patient. States to apply warm compresses and elevate the LUE. Applied warm towels and pillows under LUE. Also informed us to consult Hand Specialist. Noemy Mckinney, SANDOR placed order for Dr. Krishnamurthy to come evaluate patient. O2 sats dropped down to high 80's; applied O2 @ 2 L per NC.

## 2019-04-24 NOTE — OR NURSING
Dr. Krishnamurthy here to evaluate LUE. States to keep applying warm compresses and continue to elevate LUE. Marked LUE area to inner forearm to monitor swelling and blanched area of forearm at infiltration site.

## 2019-04-24 NOTE — BRIEF OP NOTE
Operative Note       Surgery Date: 4/24/2019     Surgeon(s) and Role:     * Walter Hernandez MD - Primary     * Segundo Calhoun MD - resident    Pre-op Diagnosis:  Nontraumatic tear of right rotator cuff [M75.101]  Biceps tendinitis of right upper extremity [M75.21]  Arthritis of right acromioclavicular joint [M19.011]    Post-op Diagnosis:  As above    Procedure(s) (LRB):  REPAIR, ROTATOR CUFF, ARTHROSCOPIC (Right)  FIXATION, TENDON (Right)    Anesthesia: General    Findings/Key Components:  Right shoulder arthroscopy with subacromial decompression, rotator cuff repair, distal clavicle excision and rotation medical patch placement. Please see Dr. Hernandez's op note for full details.    Core Measure Documentation:  Were antibiotics extended? No  Was the patient administered a VTE Prophylaxis? No. Short procedure; low risk  Estimated Blood Loss: minimal           Specimens (From admission, onward)    None        Implants:   Implant Name Type Inv. Item Serial No.  Lot No. LRB No. Used   Healix advance BR 3 suture anchor w/Dynacord     [id number] Right 1       Complications: none           Disposition: PACU - hemodynamically stable.           Condition: Stable

## 2019-04-24 NOTE — TRANSFER OF CARE
"Anesthesia Transfer of Care Note    Patient: Becker B Nix    Procedure(s) Performed: Procedure(s) (LRB):  REPAIR, ROTATOR CUFF, ARTHROSCOPIC WITH REGENERATIN IMPLANT (Right)  DECOMPRESSION, SUBACROMIAL SPACE WITH DISTAL CLAVICLE EXCISION (DCE) (Right)  ARTHROSCOPY, SHOULDER WITH LABRAL DEBRIDEMENT (Right)    Patient location: PACU    Anesthesia Type: general    Transport from OR: Transported from OR on 2-3 L/min O2 by NC with adequate spontaneous ventilation    Post pain: adequate analgesia    Post assessment: no apparent anesthetic complications    Post vital signs: stable    Level of consciousness: awake    Nausea/Vomiting: no nausea/vomiting    Complications: none    Transfer of care protocol was followed      Last vitals:   Visit Vitals  BP (!) 146/69 (BP Location: Left arm, Patient Position: Lying)   Pulse 70   Temp 36.4 °C (97.6 °F) (Oral)   Resp 16   Ht 5' 4" (1.626 m)   Wt 75.3 kg (166 lb)   SpO2 95%   Breastfeeding? No   BMI 28.49 kg/m²     "

## 2019-04-24 NOTE — OR NURSING
Spoke with Dr. Hernandez about patient's LUE and IV infiltrations. States to speak with Resident Lincoln.

## 2019-04-24 NOTE — DISCHARGE INSTRUCTIONS
[address], [place]                                    [phone]             Postoperative Instructions for Shoulder Surgery               Your Surgery Included:   Open              Arthroscopic [] Instability Repair     [] Diagnostic   [] Rotator Cuff Repair     [] Lysis of Adhesions / Manipulation [] Distal Clavicle Resection    [] Debridement [] Biceps Tenodesis       [] Labrum  [] Rotator Cuff   [] Cartilage   [] Contracture Release    [] SLAP Repair   [] Fracture Fixation    [] Instability Repair  [] AC Joint Reconstruction      [x] Rotator Cuff Repair [] Joint Replacement     [x] Subacromial Decompression / Bursectomy   [] Hemiarthroplasty  [] Total Shoulder    [x] Rotation medical patch    [] Reverse Total Shoulder       [x] Distal Clavicle Resection          [] Amniox Arthrocentesis    [] Contracture Release                 Call our office ([phone]) immediately if you experience any of the following:      Excessive bleeding or pus like drainage at the incision site      Uncontrollable pain not relieved by pain medication      Excessive swelling or redness at the incision site      Fever above 101.5 degrees not controlled with Tylenol or Motrin      Shortness of Breath      Any foul odor or blistering from the surgery site   FOR EMERGENCIES: If any unusual problems or difficulties occur, call our office at [phone], or page the  at [phone] who will direct your call appropriately   1.   Pain Management: A cold therapy cuff, pain medications, local injections, and in some cases, regional anesthesia injections are used to manage your post-operative pain. The decision to use each of these options is based on their risks and benefits.    Medications: You were given one or more of the following medication prescriptions before leaving the hospital. Have the prescriptions filled at a pharmacy on your way home and follow the instructions on the  bottles. If you need a refill, please call your pharmacy.     Narcotic Medication (usually Vicodin ES, Lortab, Percocet or Nucynta): Begin taking the medication before your shoulder starts to hurt. Some patients do not like to take any medication, but if you wait until your pain is severe before taking, you will be very uncomfortable for several hours waiting for the narcotic to work. Always take with food.    Nausea / Vomiting: For this issue, we prescribe Phenergan, use this medication as directed.    Cold Therapy: You may have been sent home with a [email or web address] cold therapy unit and wrap for your shoulder. Fill with ice and water to the indicated fill line and use throughout the day for the first two days and then as needed to help relieve pain and control swelling.     Regional Anesthesia Injections (Blocks): You may have been given a regional nerve block either before or after surgery. This may make your entire shoulder numb for 24-36 hours.                    2.   Diet: Eat a bland diet for the first day after surgery. Progress your diet as tolerated. Constipation may occur with Narcotic usage, contact our office if you are experiencing constipation.   3.   Activity: After you arrive at home, spend most of the first 24 hours resting in bed, on the couch, or in a reclining chair. After the first 24 hours at home, slowly increase your activity level based on your symptoms.   4.   Dressing Change: Remove the dressing on the 3rd day. It is normal for some blood to be seen on the dressings. It is also normal for you to see apparent bruising on the skin around your shoulder when you remove the dressing. If present, leave the steri-strip tape across the incisions. If you are concerned by the drainage or the appearance of your shoulder, please call one of the numbers listed below.   5.   Showering: You may shower on the 3rd day after surgery with waterproof bandages over small incisions. If you have an incision with  Prineo (clear mesh), it does not need to be covered. Do not submerge in any water until after your postoperative appointment in clinic.   6.   Shoulder Sling (with/without Pillow attachment): You may have been sent home with a sling / pillow attachment holding your arm away from your body. You may remove the sling when changing clothes or bathing. Make sure to wear the sling while sleeping unless instructed otherwise. You may remove at rest or for exercises.           [x] You need to wear the sling with pillow for 24 hours a day for 6 weeks.   7.  Shoulder Exercises: Begin these exercises the first day after surgery in order to help you regain your motion and strength. You may do the following marked exercises for 2-5 mins five times a day:   [x] Shoulder shrugs - Shrug your shoulders up and down.   [] Pendulums - Bend forward allowing your arm to hang down in front of you. Gently swing your arm side-to-side and front to back.                                                                                                                               [] Passive abduction - Have a family member gently lift your arm away from your body bringing your elbow up to the level of your shoulder.                                                                                                                   [] Shoulder rotation - With your arm at your side, have a family member gently rotate your arm internally and externally.                                                                                                                  [x] Scapular retractions - (Squeeze shoulder blades together): Squeeze shoulder blades together while slightly pulling them down (do not shrug your shoulders upward); You can perform 10-15 reps, several times throughout the day, when seated at your desk, driving in the car, etc.                                                                                                                      [] Pulley exercises - Put a towel or long sleeve shirt over the top of a door. Stand facing the door. Use your good arm to gently pull your operative arm up in front of you.   [x] Elbow motion - Straighten and bend your elbow.                                                                                                               [x] Ball squeezes - use ball attached to sling/pillow or soft (nerf) ball for  strengthening                                                                                                                 8.  Physical Therapy: Physical therapy is an essential component to your recovery from surgery. Your physical therapy will start in 3 days.   FIRST POSTOPERATIVE VISIT: As scheduled.                   Anesthesia: After Your Surgery  Youve just had surgery. During surgery, you received medication called anesthesia to keep you comfortable and pain-free. After surgery, you may experience some pain or nausea. This is common. Here are some tips for feeling better and recovering after surgery.    Going home  Your doctor or nurse will show you how to take care of yourself when you go home. He or she will also answer your questions. Have an adult family member or friend drive you home. For the first 24 hours after your surgery:    · Do not drive or use heavy equipment.  · Do not make important decisions or sign legal documents.  · Avoid alcohol.  · Have someone stay with you, if needed. He or she can watch for problems and help keep you safe.    Be sure to keep all follow-up appointments with your doctor. And rest after your procedure for as long as your doctor tells you to.    Coping with pain  If you have pain after surgery, pain medication will help you feel better. Take it as directed, before pain becomes severe. Also, ask your doctor or pharmacist about other ways to control pain, such as with heat, ice, and relaxation. And follow any other instructions your surgeon or nurse gives  you.    URINARY RETENTION  Should you experience a decrease in your urine output or are unable to urinate following surgery, this can be due to the medications given during surgery.  We recommend you going to the nearest Emergency Department.    Tips for taking pain medication  To get the best relief possible, remember these points:    · Pain medications can upset your stomach. Taking them with a little food may help.  · Most pain relievers taken by mouth need at least 20 to 30 minutes to take effect.  · Taking medication on a schedule can help you remember to take it. Try to time your medication so that you can take it before beginning an activity, such as dressing, walking, or sitting down for dinner.  · Constipation is a common side effect of pain medications. Contact your doctor before taking any medications like laxatives or stool softeners to help relieve constipation. Also ask about any dietary restrictions, because drinking lots of fluids and eating foods like fruits and vegetables that are high in fiber can also help. Remember, dont take laxatives unless your surgeon has prescribed them.  · Mixing alcohol and pain medication can cause dizziness and slow your breathing. It can even be fatal. Dont drink alcohol while taking pain medication.  · Pain medication can slow your reflexes. Dont drive or operate machinery while taking pain medication.    If your health care provider tells you to take acetaminophen to help relieve your pain, ask him or her how much you are supposed to take each day. (Acetaminophen is the generic name for Tylenol and other brand-name pain relievers.) Acetaminophen or other pain relievers may interact with your prescription medicines or other over-the-counter (OTC) drugs. Some prescription medications contain acetaminophen along with other active ingredients. Using both prescription and OTC acetaminophen for pain can cause you to overdose. The FDA recommends that you read the labels  on your OTC medications carefully. This will help you to clearly understand the list of active ingredients, dosing instructions, and any warnings. It may also help you avoid taking too much acetaminophen. If you have questions or don't understand the information, ask your pharmacist or health care provider to explain it to you before you take the OTC medication.    Managing nausea  Some people have an upset stomach after surgery. This is often due to anesthesia, pain, pain medications, or the stress of surgery. The following tips will help you manage nausea and get good nutrition as you recover. If you were on a special diet before surgery, ask your doctor if you should follow it during recovery. These tips may help:    · Dont push yourself to eat. Your body will tell you when to eat and how much.  · Start off with clear liquids and soup. They are easier to digest.  · Progress to semi-solid foods (mashed potatoes, applesauce, and gelatin) as you feel ready.  · Slowly move to solid foods. Dont eat fatty, rich, or spicy foods at first.  · Dont force yourself to have three large meals a day. Instead, eat smaller amounts more often.  · Take pain medications with a small amount of solid food, such as crackers or toast to avoid nausea.      Call your surgeon if    · You feel too sleepy, dizzy, or groggy (medication may be too strong).  · You have side effects like nausea, vomiting, or skin changes (rash, itching, or hives).     © 2134-3267 [employer]. [address] [place] [address], [place] [address]. All rights reserved. This information is not intended as a substitute for professional medical care. Always follow your healthcare professional's instructions.    PLEASE FOLLOW ANY OTHER INSTRUCTIONS PROVIDED TO YOU BY DR. SIERRA!    Select on Hyperlink below to print updated instructions from [email or web address]  [email or web address]      Select on Hyperlink below to print updated instructions from [email or web address]  Shoulder Sling  Shot 2 Breg Brace

## 2019-04-24 NOTE — ANESTHESIA POSTPROCEDURE EVALUATION
Anesthesia Post Evaluation    Patient: Becker B Nix    Procedure(s) Performed: Procedure(s) (LRB):  REPAIR, ROTATOR CUFF, ARTHROSCOPIC WITH REGENERATIN IMPLANT (Right)  DECOMPRESSION, SUBACROMIAL SPACE WITH DISTAL CLAVICLE EXCISION (DCE) (Right)  ARTHROSCOPY, SHOULDER WITH LABRAL DEBRIDEMENT (Right)    Final Anesthesia Type: general  Patient location during evaluation: PACU  Patient participation: Yes- Able to Participate  Level of consciousness: awake and alert  Post-procedure vital signs: reviewed and stable  Pain management: adequate  Airway patency: patent  PONV status at discharge: No PONV  Anesthetic complications: yes  Perioperative Events: unplanned hospital admission    Cardiovascular status: blood pressure returned to baseline  Respiratory status: unassisted and room air  Hydration status: euvolemic  Follow-up not needed.  Comments: RN on phase 2 recovery noted IV infiltration in L UE.  Obvious infiltration of L forearm.  Large area 15 X 5 cm of blanching without capillary refill.  Cool to touch.  IV removed.  Hand service consulted.          Vitals Value Taken Time   /56 4/24/2019 12:32 PM   Temp 36.4 °C (97.5 °F) 4/24/2019 12:29 PM   Pulse 92 4/24/2019 12:33 PM   Resp 20 4/24/2019 12:33 PM   SpO2 91 % 4/24/2019 12:33 PM   Vitals shown include unvalidated device data.      Event Time     Out of Recovery 12:34:00          Pain/Octaviano Score: Pain Rating Prior to Med Admin: 0 (4/24/2019  5:51 AM)  Octaviano Score: 10 (4/24/2019 12:31 PM)

## 2019-04-24 NOTE — ANESTHESIA PROCEDURE NOTES
Peripheral Block    Patient location during procedure: holding area   Block not for primary anesthetic.  Reason for block: at surgeon's request and post-op pain management   Post-op Pain Location: shoulder pain  Timeout: 4/24/2019 7:34 AM   End time: 4/24/2019 7:39 AM  Staffing  Anesthesiologist: Daniel Sheets MD  Preanesthetic Checklist  Completed: patient identified, site marked, surgical consent, pre-op evaluation, timeout performed, IV checked, risks and benefits discussed and monitors and equipment checked  Peripheral Block  Patient position: left lateral decubitus  Prep: ChloraPrep  Patient monitoring: heart rate, cardiac monitor, continuous pulse ox and frequent blood pressure checks  Block type: interscalene  Laterality: right  Injection technique: single shot  Needle  Needle type: Stimuplex   Needle gauge: 22 G  Needle length: 3.5 in  Needle localization: nerve stimulator and ultrasound guidance   -ultrasound image captured on disc.  Assessment  Injection assessment: local visualized surrounding nerve, negative parasthesia and negative aspiration  Paresthesia pain: none  Heart rate change: no  Slow fractionated injection: yes

## 2019-04-24 NOTE — INTERVAL H&P NOTE
The patient has been examined and the H&P has been reviewed:    I concur with the findings and no changes have occurred since H&P was written.    Anesthesia/Surgery risks, benefits and alternative options discussed and understood by patient/family.          Active Hospital Problems    Diagnosis  POA    Nontraumatic tear of right rotator cuff [M75.101]  Yes      Resolved Hospital Problems   No resolved problems to display.

## 2019-04-24 NOTE — OR NURSING
Reviewed 's shoulder arthroscopy discharge instructions and demonstrated postoperative equipment (polar ice and BREG arm sling with pillow), with verbalized understanding per patient and spouse.                                  .

## 2019-04-24 NOTE — PLAN OF CARE
Problem: Adult Inpatient Plan of Care  Goal: Plan of Care Review  Outcome: Ongoing (interventions implemented as appropriate)  Pt on 2L NC. Sats 98%. No distress noted. IS done. PRN tx not needed. Will continue to monitor.

## 2019-04-25 VITALS
SYSTOLIC BLOOD PRESSURE: 125 MMHG | HEART RATE: 72 BPM | DIASTOLIC BLOOD PRESSURE: 60 MMHG | RESPIRATION RATE: 16 BRPM | OXYGEN SATURATION: 94 % | TEMPERATURE: 98 F | WEIGHT: 166 LBS | HEIGHT: 64 IN | BODY MASS INDEX: 28.34 KG/M2

## 2019-04-25 PROCEDURE — 97166 OT EVAL MOD COMPLEX 45 MIN: CPT

## 2019-04-25 PROCEDURE — 97535 SELF CARE MNGMENT TRAINING: CPT

## 2019-04-25 PROCEDURE — 25000003 PHARM REV CODE 250: Performed by: STUDENT IN AN ORGANIZED HEALTH CARE EDUCATION/TRAINING PROGRAM

## 2019-04-25 RX ADMIN — ACETAMINOPHEN 1000 MG: 500 TABLET ORAL at 05:04

## 2019-04-25 RX ADMIN — ATORVASTATIN CALCIUM 20 MG: 20 TABLET, FILM COATED ORAL at 09:04

## 2019-04-25 RX ADMIN — DOCUSATE SODIUM 100 MG: 100 CAPSULE, LIQUID FILLED ORAL at 09:04

## 2019-04-25 RX ADMIN — OXYCODONE HYDROCHLORIDE 10 MG: 5 TABLET ORAL at 02:04

## 2019-04-25 RX ADMIN — POLYETHYLENE GLYCOL 3350 17 G: 17 POWDER, FOR SOLUTION ORAL at 09:04

## 2019-04-25 RX ADMIN — OXYCODONE HYDROCHLORIDE 10 MG: 5 TABLET ORAL at 09:04

## 2019-04-25 RX ADMIN — LEVOTHYROXINE SODIUM 50 MCG: 25 TABLET ORAL at 05:04

## 2019-04-25 RX ADMIN — ASPIRIN 81 MG: 81 TABLET, COATED ORAL at 09:04

## 2019-04-25 NOTE — PLAN OF CARE
04/25/19 1043   Final Note   Assessment Type Final Discharge Note   Anticipated Discharge Disposition Home  (outpatient PT/OT)   What phone number can be called within the next 1-3 days to see how you are doing after discharge? [phone]   Discharge plans and expectations educations in teach back method with documentation complete? Yes   Right Care Referral Info   Post Acute Recommendation No Care

## 2019-04-25 NOTE — OP NOTE
[facility]  Surgery Department  Operative Note    SUMMARY     Date of Procedure: 4/24/2019     Procedure: Procedure(s) (LRB):  REPAIR, ROTATOR CUFF, ARTHROSCOPIC WITH REGENERATIN IMPLANT (Right)  DECOMPRESSION, SUBACROMIAL SPACE WITH DISTAL CLAVICLE EXCISION (DCE) (Right)  ARTHROSCOPY, SHOULDER WITH LABRAL DEBRIDEMENT (Right)     Surgeon(s) and Role:     * Walter Hernandez MD - Primary    Assisting Surgeon:   MD Venessa Laughlin PA-C    Pre-Operative Diagnosis: Nontraumatic tear of right rotator cuff [M75.101]  Biceps tendinitis of right upper extremity [M75.21]  Arthritis of right acromioclavicular joint [M19.011]    Post-Operative Diagnosis: Post-Op Diagnosis Codes:     * Nontraumatic tear of right rotator cuff [M75.101]     * Biceps tendinitis of right upper extremity [M75.21]     * Arthritis of right acromioclavicular joint [M19.011]    Anesthesia: General    Technical Procedures Used:     DATE OF SURGERY:   4/24/19       PREOPERATIVE DIAGNOSES:   1. Right shoulder rotator cuff tear (massive / complex) - supraspinatus / infraspinatus  2. Right shoulder labral tear / biceps tendinopathy  3. Right shoulder AC joint arthritis     POSTOPERATIVE DIAGNOSES:   1. Right shoulder rotator cuff tear (massive / complex) - supraspinatus / infraspinatus  2. Right shoulder labral tear / biceps rupture  3. Right shoulder AC joint arthritis     PROCEDURE:   1. Right shoulder arthroscopic rotator cuff repair (massive / complex) - supraspinatus / infraspinatus.  2. Placement of a right shoulder collagen scaffold allograft augmentation on posterosuperior rotator cuff repair  3. Right shoulder arthroscopic debridement labrum  / biceps stump  4. Right shoulder arthroscopic subacromial decompression.   5. Right shoulder arthroscopic distal clavicle excision     SURGEON: Walter Hernandez M.D.      **Of note, because of the complex nature of the rotator cuff repair, including repair of the  supra/infraspinatus tears, in addition to use of a collagen scaffold, additional time and resources were used for the case.     COMPLICATIONS: None.      POSITION: Beach chair.      ANESTHESIA: General endotracheal plus right upper extremity interscalene block.      EXAMINATION UNDER ANESTHESIA: Right shoulder forward flexion 180 degrees,   abduction 120 degrees, full internal and external rotation   1+ anterior drawer, 1+ sulcus sign, 1+ posterior drawer.      ARTHROSCOPIC FINDINGS:   1. Full thickness, posterior U-shaped massive size supraspinatus / upper infraspinatus rotator cuff tear.   2. Small anterior acromial spur.   3. Arthritic distal clavicle  4. Intact glenohumeral cartilage surface  5. Biceps: rupture with residual stump  6. Subscapularis: intact  7. Labrum:  degenerative SLAP1     GRAFT USED:  Rotation Medical Bovine Achilles allograft, Large with fixation      INDICATIONS FOR PROCEDURE: The patient is a 73 y.o. female  who has pain in her right shoulder. MRI confirms a tear of her rotator cuff including his supra/infraspinatus.  After risks and benefits of surgery were discussed, she elects to proceed with operative  intervention. All risks and benefits have been discussed including the risks of stiffness for recurrent instability, irreparability of the tear, damage to neurovascular structures, damage to cartilage and the risk of anesthesia including stroke and heart attack. The patient seemed to understand and wished to proceed.      DESCRIPTION OF PROCEDURE: The patient was brought in the room. After undergoing general endotracheal anesthesia and right upper extremity interscalene block, she was placed in a well-padded modified beachchair position. Perioperative antibiotics were given.  Her right upper extremity was prepped and draped in usual sterile fashion including the use of a sterile Spider arm sanchez.      After time-out was performed, the standard posterior portal and anterior superior  portal were created. Diagnostic arthroscopy performed. The glenohumeral joint was entered. There was no chondromalacia noted in the glenoid or humeral head. There was mild fraying at the superior labrum. The anterior inferior labrum and posterior labrum were intact without evidence of tearing. The subscapularis was intact.       There was a massive size full-thickness posterior U-shaped tear of the supraspinatus tendon.      DEBRIDEMENT: Oscillating shaver, straight and curved biters were used to debride a small flap of tissue off the superior labrum. The biceps had ruptured and the residual stump was debrided down.     SUBACROMIAL DECOMPRESSION: The scope was taken out and redirected in the subacromial space. After excellent visualization was achieved, a lateral portal was created. The bursal tissue was cleaned off. The full-thickness posterior U-shaped massive size rotator cuff tear was identified. The area was cleaned off for later repair. The undersurface of the acromion was cleaned off of soft tissues including releasing the CA ligament. A 5-mm miriam was introduced through the posterior portal and decompression was completed using cutting block technique down to a type 1 configuration.      DISTAL CLAVICLE EXCISION:  The soft tissues were cleaned off of the undersurface of the AC joint with Mitek VAPR device. The arthritic aspect of the distal clavicle was visualized and excellent hemostasis was achieved.  A 5-mm miriam was used to resect between 8-9 mm of bone from the distal aspect of the clavicle.  Careful attention was paid to resect adequate bone from the posterior and superior aspect of the AC joint and not to disrupt the superior aspect of the AC joint capsule.    ROTATOR CUFF REPAIR: The footprint of rotator cuff was cleaned off with Mitek VAPR device and oscillating shaver / miriam. This was judged to be amenable for repair with convergence sutures and suture anchors.       **Margin convergence sutures were  placed starting posterior-medially and finishing vinicius-laterally.  A total of 4 margin convergence sutures were placed which converged our cuff tissue well and balanced our repair from posterior to anterior.  Our rotator cuff tissue covered the humeral head and was brought to cover the footprint.  The tissue was mobile enough for suture anchor repair on the to cover the supraspinatus and infraspinatus footprint.      One 5.5 mm anchor was placed at the central aspect of the footprint of the supraspinatus. All 6 suture limbs from each anchor were brought through in a large horizontal mattress convergence type sutures configuration using a suture penetrator and Esspressew. These were tied arthroscopically down from lateral to medial. Excellent footprint coverage was achieved after all arthroscopic knots were tied down. Internal and external rotation confirmed excellent footprint compression with no evidence of gap formation.      PLACEMENT OF COLLAGEN SCAFFOLD ALLOGRAFT:  The Large Rotation Medical Collagen scaffold was prepared in the usual fashion.  The graft was inserted through the lateral portal and laid down directly onto the superior surface of the U-shaped portion of the cuff.  The graft covered the repair site well and was tacked down with 5 tendon staples along the graft medially, anterior and posteriorly. Two bone staples were placed without difficulty laterally.  Excellent coverage was achieved which rotated with the cuff repair following final implantation.    Portal sites were closed with 4-0 Monocryl, covered with Mastisol, Steri-Strips, Xeroform, 4 x 4 fluffs, ABD pads and tape. The patient was placed in a sling and pillow for protection, was extubated in the room, transferred to recovery room in stable condition accompanied by his physician.     There were no complications in the case. I was present, scrubbed and did perform all critical portions of the case.      Postop plan for the patient is to  follow the massive size rotator cuff repair protocol.            Walter Hernandez M.D.       Description of the Findings of the Procedure: above    Significant Surgical Tasks Conducted by the Assistant(s), if Applicable: none    Complications: No    Estimated Blood Loss (EBL): * No values recorded between 4/24/2019  9:24 AM and 4/24/2019 11:44 AM *           Implants:   Implant Name Type Inv. Item Serial No.  Lot No. LRB No. Used   HEALIX ADVANCE BR3 SUTURE ANCOR W/DYNACORD      [id number] Right 1   IMPLANT ARTHSCP BIOINDUCTV LG - [id number]  IMPLANT ARTHSCP BIOINDUCTV LG  [employer] [id number] Right 1   ANCHOR TENDON 8 - [id number]  ANCHOR TENDON 8  [employer] [id number] Right 1   ANCHOR BONE ARTHSCP DEL SYS - [id number]  ANCHOR BONE ARTHSCP DEL SYS  [employer]  Right 1       Specimens:   Specimen (12h ago, onward)    None                  Condition: Good    Disposition: PACU - hemodynamically stable.    Attestation: I was present and scrubbed for the entire procedure.    Discharge Note    SUMMARY     Admit Date: 4/24/2019    Discharge Date and Time: 4/25/2019 11:26 AM    Hospital Course (synopsis of major diagnoses, care, treatment, and services provided during the course of the hospital stay): outpatient surgery     Final Diagnosis: Post-Op Diagnosis Codes:     * Nontraumatic tear of right rotator cuff [M75.101]     * Biceps tendinitis of right upper extremity [M75.21]     * Arthritis of right acromioclavicular joint [M19.011]    Disposition: Home or Self Care    Follow Up/Patient Instructions:     Medications:  Reconciled Home Medications:      Medication List      CONTINUE taking these medications    alendronate 70 MG tablet  Commonly known as:  FOSAMAX  Take 1 tablet (70 mg total) by mouth every 7 days.     aspirin 325 MG EC tablet  Commonly known as:  ECOTRIN  Take 1 tablet (325 mg total) by mouth 2 (two) times daily. Take an antacid with medication. for 42 doses      atorvastatin 20 MG tablet  Commonly known as:  LIPITOR  TAKE 1 TABLET(20 MG) BY MOUTH EVERY DAY     CALTRATE 600 600 mg calcium (1,500 mg) Tab  Generic drug:  calcium carbonate  Take 600 mg by mouth once daily.     estradiol 10 mcg Tab  Commonly known as:  VAGIFEM  INSERT ONE TABLET IN THE VAGINA TWICE WEEKLY AS DIRECTED     levothyroxine 50 MCG tablet  Commonly known as:  SYNTHROID  Take 1 tablet (50 mcg total) by mouth before breakfast.     multivitamin per tablet  Commonly known as:  THERAGRAN  Take by mouth. 1 Tablet Oral Every day     oxyCODONE-acetaminophen  mg per tablet  Commonly known as:  PERCOCET  Take 1 tablet by mouth every 6 (six) hours as needed. Take stool softener with this medication     promethazine 25 MG tablet  Commonly known as:  PHENERGAN  Take 1 tablet (25 mg total) by mouth every 6 (six) hours as needed for Nausea.     traMADol 50 mg tablet  Commonly known as:  ULTRAM  Take 1 tablet (50 mg total) by mouth every 6 (six) hours as needed for Pain.        STOP taking these medications    diphenhydramine-acetaminophen  mg Tab  Commonly known as:  TYLENOL PM          Discharge Procedure Orders   Lifting restrictions     Keep surgical extremity elevated     Ice to affected area     Notify your health care provider if you experience any of the following:  temperature >100.4     Notify your health care provider if you experience any of the following:  persistent nausea and vomiting or diarrhea     Notify your health care provider if you experience any of the following:  severe uncontrolled pain     Notify your health care provider if you experience any of the following:  redness, tenderness, or signs of infection (pain, swelling, redness, odor or green/yellow discharge around incision site)      Remove dressing in 72 hours     Weight bearing restrictions (specify):     Shower on day dressing removed (No bath)

## 2019-04-25 NOTE — PLAN OF CARE
Problem: Occupational Therapy Goal  Goal: Occupational Therapy Goal  Goals to be met by:  5/2/2019    Patient will increase functional independence with ADLs by performing:    UE Dressing with Moderate Assistance.  Upper extremity exercise program independently.     Outcome: Ongoing (interventions implemented as appropriate)  Initial OT eval/treat complete.  Has shower stool, foot stool with attached handrail, and grab bars all placed in prep for s/p RCR.  Recommend post acute OP therapy.  Spouse will be present at home to assist as needed.  To benefit from continued acute care OT services to increase independence in self-care/functional transfers.  OT to follow.

## 2019-04-25 NOTE — PLAN OF CARE
SW met with pt at bedside to complete discharge assessment, verified PCP and uses [facility] on [address].  Pt's significant other present and will provide transportation home.  No needs identified at this time.     04/25/19 1016   Discharge Assessment   Assessment Type Discharge Planning Assessment   Confirmed/corrected address and phone number on facesheet? Yes   Assessment information obtained from? Patient   Communicated expected length of stay with patient/caregiver no   Prior to hospitilization cognitive status: Alert/Oriented   Prior to hospitalization functional status: Independent   Current cognitive status: Alert/Oriented   Current Functional Status: Needs Assistance   Lives With significant other   Able to Return to Prior Arrangements yes   Is patient able to care for self after discharge? Unable to determine at this time (comments)   Who are your caregiver(s) and their phone number(s)?   (Orris, significant other)   Readmission Within the Last 30 Days no previous admission in last 30 days   Patient currently being followed by outpatient case management? No   Patient currently receives any other outside agency services? No   Equipment Currently Used at Home none   Do you have any problems affording any of your prescribed medications? No   Is the patient taking medications as prescribed? yes   Does the patient have transportation home? Yes   Transportation Anticipated family or friend will provide   Does the patient receive services at the [facility]? No   Discharge Plan A Home   DME Needed Upon Discharge  none   Patient/Family in Agreement with Plan yes

## 2019-04-25 NOTE — PT/OT/SLP DISCHARGE
Occupational Therapy Discharge Summary    Eugenio Aldridge  MRN: [id number]   Principal Problem: <principal problem not specified>      Patient Discharged from acute Occupational Therapy on 4/25/19.  Please refer to prior OT note dated 4/25/19 for functional status.    Assessment:      Patient appropriate for care in another setting.    Objective:     GOALS:   Multidisciplinary Problems     Occupational Therapy Goals        Problem: Occupational Therapy Goal    Goal Priority Disciplines Outcome Interventions   Occupational Therapy Goal     OT, PT/OT Ongoing (interventions implemented as appropriate)    Description:  Goals to be met by:  5/2/2019    Patient will increase functional independence with ADLs by performing:    UE Dressing with Moderate Assistance.  Upper extremity exercise program independently.                       Reasons for Discontinuation of Therapy Services  Transfer to alternate level of care.      Plan:     Patient Discharged to: Outpatient Therapy Services    Mery Macias OT  4/25/2019

## 2019-04-25 NOTE — DISCHARGE SUMMARY
[facility]  Orthopedics  Discharge Summary      Patient Name: Eugenio Aldridge  MRN: [id number]  Admission Date: 4/24/2019  Hospital Length of Stay: 0 days  Discharge Date and Time: 4/25/2019 11:26 AM  Attending Physician: Walter Hernandez MD  Discharging Provider: Segundo Lincoln MD  Primary Care Provider: Bertha Salguero MD    HPI: Presented for elective surgery.    Procedure(s) (LRB):  REPAIR, ROTATOR CUFF, ARTHROSCOPIC WITH REGENERATIN IMPLANT (Right)  DECOMPRESSION, SUBACROMIAL SPACE WITH DISTAL CLAVICLE EXCISION (DCE) (Right)  ARTHROSCOPY, SHOULDER WITH LABRAL DEBRIDEMENT (Right)      Hospital Course: Complicated by IV infiltration x3 in OR.  Kept overnight for evaluation.  Infiltration sites improved.  DC home POD1.  ASA for DVT PPx.  NWB RUE.  F/u 2 weeks for wound check.          Significant Diagnostic Studies: No pertinent studies.    Pending Diagnostic Studies:     None        Final Active Diagnoses:    Diagnosis Date Noted POA    Nontraumatic tear of right rotator cuff [M75.101] 04/24/2019 Yes      Problems Resolved During this Admission:      Discharged Condition: stable    Disposition: Home or Self Care    Follow Up:    Patient Instructions:      Lifting restrictions     Keep surgical extremity elevated     Ice to affected area     Notify your health care provider if you experience any of the following:  temperature >100.4     Notify your health care provider if you experience any of the following:  persistent nausea and vomiting or diarrhea     Notify your health care provider if you experience any of the following:  severe uncontrolled pain     Notify your health care provider if you experience any of the following:  redness, tenderness, or signs of infection (pain, swelling, redness, odor or green/yellow discharge around incision site)      Remove dressing in 72 hours     Weight bearing restrictions (specify):     Shower on day dressing removed (No bath)      Medications:  Reconciled Home Medications:      Medication List      CONTINUE taking these medications    alendronate 70 MG tablet  Commonly known as:  FOSAMAX  Take 1 tablet (70 mg total) by mouth every 7 days.     aspirin 325 MG EC tablet  Commonly known as:  ECOTRIN  Take 1 tablet (325 mg total) by mouth 2 (two) times daily. Take an antacid with medication. for 42 doses     atorvastatin 20 MG tablet  Commonly known as:  LIPITOR  TAKE 1 TABLET(20 MG) BY MOUTH EVERY DAY     CALTRATE 600 600 mg calcium (1,500 mg) Tab  Generic drug:  calcium carbonate  Take 600 mg by mouth once daily.     estradiol 10 mcg Tab  Commonly known as:  VAGIFEM  INSERT ONE TABLET IN THE VAGINA TWICE WEEKLY AS DIRECTED     levothyroxine 50 MCG tablet  Commonly known as:  SYNTHROID  Take 1 tablet (50 mcg total) by mouth before breakfast.     multivitamin per tablet  Commonly known as:  THERAGRAN  Take by mouth. 1 Tablet Oral Every day     oxyCODONE-acetaminophen  mg per tablet  Commonly known as:  PERCOCET  Take 1 tablet by mouth every 6 (six) hours as needed. Take stool softener with this medication     promethazine 25 MG tablet  Commonly known as:  PHENERGAN  Take 1 tablet (25 mg total) by mouth every 6 (six) hours as needed for Nausea.     traMADol 50 mg tablet  Commonly known as:  ULTRAM  Take 1 tablet (50 mg total) by mouth every 6 (six) hours as needed for Pain.        STOP taking these medications    diphenhydramine-acetaminophen  mg Tab  Commonly known as:  TYLENOL PM            Segundo Lincoln MD  Orthopedics  [facility]

## 2019-04-25 NOTE — PLAN OF CARE
Problem: Adult Inpatient Plan of Care  Goal: Plan of Care Review  Outcome: Ongoing (interventions implemented as appropriate)  Patient is s/p Right Shoulder Arthroplasty POD#1. AAOx4, on RA, VSS, afebrile. Pain managed effectively with prn OxyIR 10mg PO x 1, continuous Polar care cooling application, and scheduled Tylenol 1g PO. Patient ambulates to the bathroom with 1-person assistance. Swelling and bruising in left forearm is slowly improving.

## 2019-04-25 NOTE — NURSING
Seen by ortho and cleared for discharge to home. LLE with resolving edema and discoloration, seen by wound care this AM. OT called to bedside for discharge teaching.

## 2019-04-25 NOTE — PLAN OF CARE
Problem: Adult Inpatient Plan of Care  Goal: Plan of Care Review  Outcome: Ongoing (interventions implemented as appropriate)  Patient on 2 lpm nasal cannula, SpO2 98%. IS done. No respiratory distress noted, prn tx not required.

## 2019-04-25 NOTE — PT/OT/SLP EVAL
Occupational Therapy   Evaluation    Name: Eugenio Aldridge  MRN: [id number]  Admitting Diagnosis:  <principal problem not specified> 1 Day Post-Op    Recommendations:     Discharge Recommendations: outpatient OT  Discharge Equipment Recommendations:  other (see comments)(No needs anticipated)  Barriers to discharge:  None    Assessment:   Initial OT eval/treat complete.  Pt. Supine>sit with HOB elevated with MOD I.  Required total A for donning/doffing shoulder abduction brace and MAX A for donning/doffing button front shirt twice as option for draping over operative arm with brace and threading operative arm in were practiced this day.  Spouse/Pt. With good follow through of steps for sequencing UB dress task.  Spouse/Pt. Educated on donning/doffing abduction brace with verbal instruction as well as demos; Pt./spouse able to teach back with Pt. Giving spouse some instruction for placement and therapist also giving instruction and verbal cues for abduction pillow placement, waist/shoulder strap placement, and thumb strap and elbow strap placement.  Also advised spouse to take pictures of how ABD brace should be placed in which he followed through.  Pt. Able to stand with supervision and also able to don shoes independently seated EOB.  Has shower stool, foot stool with attached handrail for stepping up into high bed, and grab bars all placed in prep for s/p RCR; no DME needs anticipated.  Recommend post acute OP therapy.  Spouse will be present at home to assist as needed.  To benefit from continued acute care OT services to increase independence in self-care/functional transfers.  OT to follow.     Eugenio Aldridge is a 73 y.o. female with a medical diagnosis of <principal problem not specified>.  She presents with below deficits decreasing independence in self-care/functional transfers. Performance deficits affecting function: weakness, impaired self care skills, decreased upper extremity function, pain, decreased ROM,  "orthopedic precautions, impaired skin.      Rehab Prognosis: Good; patient would benefit from acute skilled OT services to address these deficits and reach maximum level of function.       Plan:     Patient to be seen daily to address the above listed problems via self-care/home management, therapeutic activities, therapeutic exercises  · Plan of Care Expires: 05/02/19  · Plan of Care Reviewed with: patient, spouse    Subjective     Chief Complaint: No c/o pain initially though after dressing task reports 2/10 pain; states, "I can handle this pain; pain like what I had last night was unbearable."  Patient/Family Comments/goals:  Pt. States, "I read all about what to do after a shoulder surgery, but when it was time to get dressed I didn't know where to start.  We would have never figured this thing out."  Spouse states, "I think we got it covered.  I don't have anymore questions."    Occupational Profile:  Lives with spouse in [facility] with 2 ISIAH and no handrails; walk-in shower with grab bars and access to shower stool.  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  Enjoys playing with her cats.  Equipment Used at Home:  none, other (see comments)(has shower chair, grab bars, foot stool with attached grab bar not in use PTA, but put in place in preparation for s/p R-shoulder RCR)  Assistance upon Discharge: Spouse will assist.     Pain/Comfort:  · Pain Rating 1: 2/10  · Location - Side 1: Right  · Location - Orientation 1: generalized  · Location 1: shoulder  · Pain Addressed 1: Distraction, Reposition  · Pain Rating Post-Intervention 1: 2/10(after ADL activity)    Patients cultural, spiritual, [employer] conflicts given the current situation: no    Objective:     Communicated with: Nursing prior to session.  Patient found HOB elevated with (shoulder abduction brace) upon OT entry to room.    General Precautions: Standard, fall   Orthopedic " Precautions:RUE non weight bearing   Braces: Shoulder abduction brace(can be removed for ADL tasks )     Occupational Performance:    Bed Mobility:    · Patient completed Supine to Sit with modified independence and with HOB elevated     Functional Mobility/Transfers:  · Patient completed Sit <> Stand Transfer with supervision  with  no assistive device   · Functional Mobility: Observed taking a few steps to and from bed without AD with supervision.     Activities of Daily Living:  · Upper Body Dressing: maximal assistance for both methods donning button front shirt by draping over surgery and then also by threading surgical side into sleeve; required assist with draping and threading at RUE, draping across back, and with buttons; able to thread LUE   · Lower Body Dressing:  Pt. Self reporting that she donned her elastic waist pants this day; observed donning slip on shoes independently seated EOB    Cognitive/Visual Perceptual:  Cognitive/Psychosocial Skills:  -       Oriented to: Person, Place, Time and Situation   -       Follows Commands/attention:Follows one-step commands and Follows two-step commands  -       Communication: clear/fluent  -       Memory: No Deficits noted  -       Safety awareness/insight to disability: intact   -       Mood/Affect/Coping skills/emotional control: Appropriate to situation  Visual/Perceptual:  -grossly intact    Physical Exam:  Postural examination/scapula alignment: -       No postural abnormalities identified  Skin integrity: dressing to RUE shoulder  Edema:  mild to moderate edema to LUE forearm  Sensation: -       Intact  light/touch and denies paraesthesias to BUE  Dominant hand: -       Right  Upper Extremity Range of Motion:  -       Right Upper Extremity: finger/thumb opposition, wrist flex/ext, and elbow flex/ext WFL  -       Left Upper Extremity: WFL  Upper Extremity Strength: -       Right Upper Extremity: at least 3/5 strength noted wrist/elbow; no shoulder movement  tested at this time and no resistance added during MMT   -       Left Upper Extremity: WFL   Strength: -       Right Upper Extremity: WFL  -       Left Upper Extremity: WFL  Fine Motor Coordination:    -       Intact  Left hand thumb/finger opposition skills and Right hand thumb/finger opposition skills    AMPAC 6 Click ADL:  AMPAC Total Score: 21    Treatment & Education:  Educated on bed mobility safety, UB dressing ADL tasks, discussed washing axillary region, donning/doffing abduction brace, NWB to RUE, movement restrictions, and there-ex including ball squeezes, finger/thumb opposition, and elbow flex/ext.  Education:    Patient left seated EOB  with all lines intact, call button in reach, nursing notified and spouse present    GOALS:   Multidisciplinary Problems     Occupational Therapy Goals        Problem: Occupational Therapy Goal    Goal Priority Disciplines Outcome Interventions   Occupational Therapy Goal     OT, PT/OT Ongoing (interventions implemented as appropriate)    Description:  Goals to be met by:  5/2/2019    Patient will increase functional independence with ADLs by performing:    UE Dressing with Moderate Assistance.  Upper extremity exercise program independently.                       History:     Past Medical History:   Diagnosis Date    Actinic keratosis     Aortic valve disorders     Atherosclerosis of aorta     Atherosclerosis of native arteries of the extremities with intermittent claudication     Carcinoma in situ, vulva     Left bundle branch hemiblock     Other and unspecified hyperlipidemia     Peripheral vascular disease, unspecified     Postinflammatory pulmonary fibrosis     Senile nuclear sclerosis     Unspecified hypothyroidism        Past Surgical History:   Procedure Laterality Date    ARTHROSCOPY, SHOULDER WITH LABRAL DEBRIDEMENT Right 4/24/2019    [facility]    [facility]      DECOMPRESSION, SUBACROMIAL SPACE WITH DISTAL  CLAVICLE EXCISION (DCE) Right 4/24/2019    Per[facility] by [facility] at [facility] OR    INJECTION-JOINT Left 7/16/2015    Performed by Valeriano Hayward MD at [facility] PAIN MGT    REPAIR, ROTATOR CUFF, ARTHROSCOPIC WITH REGENERATIN IMPLANT Right 4/24/2019    Performed by [facility] at [facility] OR    RHYTIDECTOMY      TONSILLECTOMY, ADENOIDECTOMY      VULVA SURGERY         Time Tracking:     OT Date of Treatment: 04/25/19  OT Start Time: 1018  OT Stop Time: 1059  OT Total Time (min): 41 min    Billable Minutes:Evaluation 10  Self Care/Home Management 31    Mery Macias, OT  4/25/2019

## 2019-04-26 ENCOUNTER — NURSE TRIAGE (OUTPATIENT)
Dept: ADMINISTRATIVE | Facility: CLINIC | Age: 74
End: 2019-04-26

## 2019-04-26 DIAGNOSIS — Z98.890 S/P RIGHT ROTATOR CUFF REPAIR: Primary | ICD-10-CM

## 2019-04-26 RX ORDER — ONDANSETRON 8 MG/1
8 TABLET, ORALLY DISINTEGRATING ORAL EVERY 6 HOURS PRN
Qty: 30 TABLET | Refills: 0 | Status: SHIPPED | OUTPATIENT
Start: 2019-04-26 | End: 2019-07-23

## 2019-04-26 NOTE — TELEPHONE ENCOUNTER
Reason for Disposition   [1] Caller requesting NON-URGENT health information AND [2] PCP's office is the best resource    Protocols used: INFORMATION ONLY CALL-A-    Pt states she had shoulder surgery Wednesday, discharged from hospital Thursday. States since she has been home she cannot eat anything. Stats she gags when she puts anything in her mouth. Pt is able to drink fluids with no problem. Did take zofran at 6 am but is still not able to tolerate crackers. Please call to advise.

## 2019-04-27 ENCOUNTER — NURSE TRIAGE (OUTPATIENT)
Dept: ADMINISTRATIVE | Facility: CLINIC | Age: 74
End: 2019-04-27

## 2019-04-27 NOTE — TELEPHONE ENCOUNTER
"    Answer Assessment - Initial Assessment Questions  1. MECHANISM: "How did the injury happen?"      iv  2. ONSET: "When did the injury happen?" (Minutes or hours ago)       2 days ago  3. LOCATION: "Where is the injury located?"       Left arm  4. APPEARANCE of INJURY: "What does the injury look like?"       Dark purple   5. SEVERITY: "Can you use the arm normally?"       yes  6. SWELLING or BRUISING: "is there any swelling or bruising?" If so, ask: "How large is it? (e.g., inches, centimeters)       10 inches   7. PAIN: "Is there pain?" If so, ask: "How bad is the pain?"    (Scale 1-10; or mild, moderate, severe)      0  8. TETANUS: For any breaks in the skin, ask: "When was the last tetanus booster?"      na  9. OTHER SYMPTOMS: "Do you have any other symptoms?"  (e.g., numbness in hand)      no  10. PREGNANCY: "Is there any chance you are pregnant?" "When was your last menstrual period?"        no    Protocols used:  ARM INJURY-A-    Pt had procedure done on right arm 2 days ago, she had IV blow in her left arm and the kept her overnight to observe her, they discharged her yesterday and hand was fine, she awoke this morning and noticed dark purple on left hand and forearm, denies pain, swelling, or any other symptoms in that arm, can move it normally. She also complains of constipation of 3 days, advised her that it is normal to have altered bowl movements as she is taking 2 pain pills at this, advised her to keep taking stool softeners, advised her to see after hours care if she feels the need to have hand and arm assessed, caller agrees, advised to call back with any worsening symptoms or any concerns, callers agrees.    "

## 2019-04-30 ENCOUNTER — EXTERNAL CHRONIC CARE MANAGEMENT (OUTPATIENT)
Dept: PRIMARY CARE CLINIC | Facility: CLINIC | Age: 74
End: 2019-04-30
Payer: MEDICARE

## 2019-04-30 PROCEDURE — 99490 CHRNC CARE MGMT STAFF 1ST 20: CPT | Mod: PBBFAC,PO | Performed by: INTERNAL MEDICINE

## 2019-04-30 PROCEDURE — 99490 PR CHRONIC CARE MGMT, 1ST 20 MIN: ICD-10-PCS | Mod: S$PBB,,, | Performed by: INTERNAL MEDICINE

## 2019-04-30 PROCEDURE — 99490 CHRNC CARE MGMT STAFF 1ST 20: CPT | Mod: S$PBB,,, | Performed by: INTERNAL MEDICINE

## 2019-05-09 ENCOUNTER — OFFICE VISIT (OUTPATIENT)
Dept: SPORTS MEDICINE | Facility: CLINIC | Age: 74
End: 2019-05-09
Payer: MEDICARE

## 2019-05-09 VITALS
DIASTOLIC BLOOD PRESSURE: 67 MMHG | HEART RATE: 78 BPM | WEIGHT: 166 LBS | SYSTOLIC BLOOD PRESSURE: 126 MMHG | HEIGHT: 64 IN | BODY MASS INDEX: 28.34 KG/M2

## 2019-05-09 DIAGNOSIS — Z98.890 S/P RIGHT ROTATOR CUFF REPAIR: Primary | ICD-10-CM

## 2019-05-09 PROCEDURE — 99999 PR PBB SHADOW E&M-EST. PATIENT-LVL III: CPT | Mod: PBBFAC,,, | Performed by: PHYSICIAN ASSISTANT

## 2019-05-09 PROCEDURE — 99213 OFFICE O/P EST LOW 20 MIN: CPT | Mod: PBBFAC,PO | Performed by: PHYSICIAN ASSISTANT

## 2019-05-09 PROCEDURE — 99999 PR PBB SHADOW E&M-EST. PATIENT-LVL III: ICD-10-PCS | Mod: PBBFAC,,, | Performed by: PHYSICIAN ASSISTANT

## 2019-05-09 PROCEDURE — 99024 POSTOP FOLLOW-UP VISIT: CPT | Mod: POP,,, | Performed by: PHYSICIAN ASSISTANT

## 2019-05-09 PROCEDURE — 99024 PR POST-OP FOLLOW-UP VISIT: ICD-10-PCS | Mod: POP,,, | Performed by: PHYSICIAN ASSISTANT

## 2019-05-14 ENCOUNTER — PATIENT MESSAGE (OUTPATIENT)
Dept: SPORTS MEDICINE | Facility: CLINIC | Age: 74
End: 2019-05-14

## 2019-05-31 ENCOUNTER — EXTERNAL CHRONIC CARE MANAGEMENT (OUTPATIENT)
Dept: PRIMARY CARE CLINIC | Facility: CLINIC | Age: 74
End: 2019-05-31
Payer: MEDICARE

## 2019-05-31 PROCEDURE — 99490 PR CHRONIC CARE MGMT, 1ST 20 MIN: ICD-10-PCS | Mod: S$PBB,,, | Performed by: INTERNAL MEDICINE

## 2019-05-31 PROCEDURE — 99490 CHRNC CARE MGMT STAFF 1ST 20: CPT | Mod: S$PBB,,, | Performed by: INTERNAL MEDICINE

## 2019-05-31 PROCEDURE — 99490 CHRNC CARE MGMT STAFF 1ST 20: CPT | Mod: PBBFAC,PO | Performed by: INTERNAL MEDICINE

## 2019-06-06 ENCOUNTER — OFFICE VISIT (OUTPATIENT)
Dept: SPORTS MEDICINE | Facility: CLINIC | Age: 74
End: 2019-06-06
Payer: MEDICARE

## 2019-06-06 VITALS
DIASTOLIC BLOOD PRESSURE: 79 MMHG | HEART RATE: 84 BPM | SYSTOLIC BLOOD PRESSURE: 138 MMHG | WEIGHT: 166 LBS | BODY MASS INDEX: 28.34 KG/M2 | HEIGHT: 64 IN

## 2019-06-06 DIAGNOSIS — Z98.890 POST-OPERATIVE STATE: Primary | ICD-10-CM

## 2019-06-06 DIAGNOSIS — Z98.890 S/P ROTATOR CUFF REPAIR: ICD-10-CM

## 2019-06-06 PROCEDURE — 99213 OFFICE O/P EST LOW 20 MIN: CPT | Mod: PBBFAC,PO | Performed by: ORTHOPAEDIC SURGERY

## 2019-06-06 PROCEDURE — 99024 POSTOP FOLLOW-UP VISIT: CPT | Mod: POP,,, | Performed by: ORTHOPAEDIC SURGERY

## 2019-06-06 PROCEDURE — 99999 PR PBB SHADOW E&M-EST. PATIENT-LVL III: ICD-10-PCS | Mod: PBBFAC,,, | Performed by: ORTHOPAEDIC SURGERY

## 2019-06-06 PROCEDURE — 99999 PR PBB SHADOW E&M-EST. PATIENT-LVL III: CPT | Mod: PBBFAC,,, | Performed by: ORTHOPAEDIC SURGERY

## 2019-06-06 PROCEDURE — 99024 PR POST-OP FOLLOW-UP VISIT: ICD-10-PCS | Mod: POP,,, | Performed by: ORTHOPAEDIC SURGERY

## 2019-06-06 NOTE — PROGRESS NOTES
"CC: Right shoulder post op 6 weeks    DATE OF SURGERY:   4/24/19       PREOPERATIVE DIAGNOSES:   1. Right shoulder rotator cuff tear (massive / complex) - supraspinatus / infraspinatus  2. Right shoulder labral tear / biceps tendinopathy  3. Right shoulder AC joint arthritis     POSTOPERATIVE DIAGNOSES:   1. Right shoulder rotator cuff tear (massive / complex) - supraspinatus / infraspinatus  2. Right shoulder labral tear / biceps rupture  3. Right shoulder AC joint arthritis     PROCEDURE:   1. Right shoulder arthroscopic rotator cuff repair (massive / complex) - supraspinatus / infraspinatus.  2. Placement of a right shoulder collagen scaffold allograft augmentation on posterosuperior rotator cuff repair  3. Right shoulder arthroscopic debridement labrum  / biceps stump  4. Right shoulder arthroscopic subacromial decompression.   5. Right shoulder arthroscopic distal clavicle excision     SURGEON: Walter Hernandez M.D.     Pain well tolerated on pain medication  Sling in place  No issues reported  Physical therapy at [facility] 3 x week    Review of Systems   Constitution: Negative. Negative for chills, fever and night sweats.    Cardiovascular: Negative for chest pain and syncope.   Respiratory: Negative for cough and shortness of breath.   Gastrointestinal: Negative for abdominal pain and bowel incontinence.     PE:  Vitals:    06/06/19 0945   Weight: 75.3 kg (166 lb)   Height: 5' 4" (1.626 m)   PainSc:   3     Right shoulder  Incision healed  No sign of infection  Swelling resolved  Compartments soft  Neurovascular status intact in extremity    PROM  Forward elevation 90 degrees  External rotation 60 degrees    Assessment:  Appropriate rotator cuff surgery recovery at 6 weeks    Plan:  1. Continue PT.  Provided patient copy of post operative protocol to bring to therapist.  Recommend to shift down to 2 x week.  2. DC pillow today. Wean out of sling in 2 weeks  5. Return to clinic in 6 weeks for 3 months post op " follow up

## 2019-06-07 ENCOUNTER — TELEPHONE (OUTPATIENT)
Dept: INTERNAL MEDICINE | Facility: CLINIC | Age: 74
End: 2019-06-07

## 2019-06-07 NOTE — TELEPHONE ENCOUNTER
Appointment For: Eugenio Aldridge ([id number])   Visit Type: MYCHART ANNUAL CHECKUP/PHYS (2385)      7/23/2019   11:20 AM  40 mins.  Suha Salguero MD [facility] INTERNAL MEDICINE      Patient Comments:   Routine checkup

## 2019-06-10 DIAGNOSIS — N95.2 VAGINAL ATROPHY: ICD-10-CM

## 2019-06-10 RX ORDER — ESTRADIOL 10 UG/1
INSERT VAGINAL
Qty: 30 TABLET | Refills: 0 | Status: SHIPPED | OUTPATIENT
Start: 2019-06-10 | End: 2020-01-15

## 2019-06-11 ENCOUNTER — OFFICE VISIT (OUTPATIENT)
Dept: OPTOMETRY | Facility: CLINIC | Age: 74
End: 2019-06-11
Payer: MEDICARE

## 2019-06-11 DIAGNOSIS — H35.363 DEGENERATIVE RETINAL DRUSEN OF BOTH EYES: ICD-10-CM

## 2019-06-11 DIAGNOSIS — H25.13 NUCLEAR SCLEROSIS OF BOTH EYES: Primary | ICD-10-CM

## 2019-06-11 DIAGNOSIS — H52.7 REFRACTIVE ERROR: ICD-10-CM

## 2019-06-11 PROCEDURE — 92015 PR REFRACTION: ICD-10-PCS | Mod: ,,, | Performed by: OPTOMETRIST

## 2019-06-11 PROCEDURE — 92015 DETERMINE REFRACTIVE STATE: CPT | Mod: ,,, | Performed by: OPTOMETRIST

## 2019-06-11 PROCEDURE — 99999 PR PBB SHADOW E&M-EST. PATIENT-LVL II: CPT | Mod: PBBFAC,,, | Performed by: OPTOMETRIST

## 2019-06-11 PROCEDURE — 99212 OFFICE O/P EST SF 10 MIN: CPT | Mod: PBBFAC,PO | Performed by: OPTOMETRIST

## 2019-06-11 PROCEDURE — 99999 PR PBB SHADOW E&M-EST. PATIENT-LVL II: ICD-10-PCS | Mod: PBBFAC,,, | Performed by: OPTOMETRIST

## 2019-06-11 PROCEDURE — 92014 COMPRE OPH EXAM EST PT 1/>: CPT | Mod: S$PBB,,, | Performed by: OPTOMETRIST

## 2019-06-11 PROCEDURE — 92014 PR EYE EXAM, EST PATIENT,COMPREHESV: ICD-10-PCS | Mod: S$PBB,,, | Performed by: OPTOMETRIST

## 2019-06-11 NOTE — PROGRESS NOTES
HPI     Blur ou at dist, x mos, no assoc pain or red, no relief over time,   constant  No itchy eyes  Pt wants to defer dilated exam    Last edited by Lux Benitez, OD on 6/11/2019  9:02 AM. (History)            Assessment /Plan     For exam results, see Encounter Report.    Nuclear sclerosis of both eyes    Degenerative retinal drusen of both eyes    Refractive error      1. Educated pt on presence of cataracts and effects on vision. No surgery at this time. Recheck at [facility] exam, pt chose to defer.   2. Monitor condition. Patient to report any changes. RTC for [facility], pt chose to defer.   3. New Spec Rx given. Different lens options discussed with patient.

## 2019-06-30 ENCOUNTER — EXTERNAL CHRONIC CARE MANAGEMENT (OUTPATIENT)
Dept: PRIMARY CARE CLINIC | Facility: CLINIC | Age: 74
End: 2019-06-30
Payer: MEDICARE

## 2019-06-30 PROCEDURE — 99490 CHRNC CARE MGMT STAFF 1ST 20: CPT | Mod: S$PBB,,, | Performed by: INTERNAL MEDICINE

## 2019-06-30 PROCEDURE — 99490 PR CHRONIC CARE MGMT, 1ST 20 MIN: ICD-10-PCS | Mod: S$PBB,,, | Performed by: INTERNAL MEDICINE

## 2019-06-30 PROCEDURE — 99490 CHRNC CARE MGMT STAFF 1ST 20: CPT | Mod: PBBFAC,PO | Performed by: INTERNAL MEDICINE

## 2019-07-01 ENCOUNTER — OFFICE VISIT (OUTPATIENT)
Dept: OPTOMETRY | Facility: CLINIC | Age: 74
End: 2019-07-01
Payer: MEDICARE

## 2019-07-01 DIAGNOSIS — H35.363 DEGENERATIVE RETINAL DRUSEN OF BOTH EYES: ICD-10-CM

## 2019-07-01 DIAGNOSIS — H25.13 NUCLEAR SCLEROSIS OF BOTH EYES: Primary | ICD-10-CM

## 2019-07-01 PROCEDURE — 99212 OFFICE O/P EST SF 10 MIN: CPT | Mod: PBBFAC,PO | Performed by: OPTOMETRIST

## 2019-07-01 PROCEDURE — 99499 NO LOS: ICD-10-PCS | Mod: S$PBB,,, | Performed by: OPTOMETRIST

## 2019-07-01 PROCEDURE — 99999 PR PBB SHADOW E&M-EST. PATIENT-LVL II: CPT | Mod: PBBFAC,,, | Performed by: OPTOMETRIST

## 2019-07-01 PROCEDURE — 99499 UNLISTED E&M SERVICE: CPT | Mod: S$PBB,,, | Performed by: OPTOMETRIST

## 2019-07-01 PROCEDURE — 99999 PR PBB SHADOW E&M-EST. PATIENT-LVL II: ICD-10-PCS | Mod: PBBFAC,,, | Performed by: OPTOMETRIST

## 2019-07-01 NOTE — PROGRESS NOTES
HPI     Here for completion of dilation. Not performed at [place] on 06/11/2019. No   changes since [place].     Last edited by Hannah Mcallister, OD on 7/1/2019  2:23 PM. (History)            Assessment /Plan     For exam results, see Encounter Report.    Nuclear sclerosis of both eyes    Degenerative retinal drusen of both eyes      1. Educated pt on presence of cataracts and effects on vision. No surgery at this time. Recheck in one year.  2. Monitor condition. Patient to report any changes. RTC 1 year recheck.

## 2019-07-10 ENCOUNTER — TELEPHONE (OUTPATIENT)
Dept: SPORTS MEDICINE | Facility: CLINIC | Age: 74
End: 2019-07-10

## 2019-07-21 NOTE — PROGRESS NOTES
73 y.o. femalepresents in f/u to hypothyroidism, PAD, dyslipidemia, thrombocytosis, and  Post inflammatory fibrosis  Review of risks and complications associated w/ her long term chronic problems, and Health Maintenance    Dyslipidemia tx w/ atorvastatin  Denied unusual muscle pain or chest pain  ++ night cramps,very painful  LDL==>71.2    Pre-Diabetes, A1C 5.7 ( peak-2014)  FBS==>103    Hypothyroidism, tx w/ levothyroxine 50mcg  Denied fatigue or cold intolerance  TSH==>2.98`    PAD, tx w/ atorvastatin,   ++ claudication if she has a brisk walk, happens less often  w/o discoloration of LE    Thrombocytosis, recsolved, tx ASA and Fish oil   Platelet ct==>normalized @ 276K   Denied stroke like sx, confusion , or focal deficits    Post inflammatory fibrosis  Pt was in study w/ yearly   D/c'd tobacco > 15 yrs  Denied cough or wheezing or SOB    Right shoulder surgery  PT ongoing      ROS:  No fever, or chills  No blurry vision or visual disturbance  No dysphagia or early satiety  No palpitations or tachycardia  No angina or chest pain  No cough or wheezing  No GERD or abdominal pain  No blood in stool or urine  No dysuria or hematuria  No numbness or focal deficits  No cold intolerance; + hot flashes  No increased thirst or urination  Answers for HPI/ROS submitted by the patient on 7/20/2019   activity change: No  unexpected weight change: No  neck pain: No  hearing loss: No  rhinorrhea: No  trouble swallowing: No  eye discharge: No  visual disturbance: No  chest tightness: No  wheezing: No  chest pain: No  palpitations: No  blood in stool: No  constipation: No  vomiting: No  diarrhea: No  polydipsia: No  polyuria: No  difficulty urinating: No  hematuria: No  menstrual problem: No  dysuria: No  joint swelling: No  arthralgias: No- shoulder s/p surgery, still w/ pain, in PT for 8-9 mos  headaches: No  weakness: No  confusion: No  dysphoric mood: No    ADL's: 100% independent, longer times s/p shoulder surgery  Memory:  Good  Mental Health:Good  AD's: + living , living will, and M/POA  Nutrition:Good  Gait: Normal  Safety:Intact  Urinary incontinence: N/a  Remainder of review negative exgcept as previously noted      SCREENING TESTS:   Cholesterol/lab, pending.   Colonoscopy 05/2009 with Dr. Carlin.   Recommended 7 years.   Mammogram UTD  Pap smear with GYN, Dr. Cason.   Bone density 2018-- RX FOSAMAX                  - pt doesn't like having weekly dosing  Impression     OSTEOPOROSIS WITH A SIGNIFICANT DECREASE OF 12.2% IN THE HIP BMD COMPARED WITH THE PRIOR STUDY.  RECOMMENDATIONS of [facility]:    1.  Calcium 1200 mg daily and vitamin D 800-1000 units daily, adequate exercise.    2.  Recommended therapy Consider bisphosphonates        EYE exam UTD  Dental UTD.    VACCINATIONS:  Tdap, 7/2014  Pneumovax 9/08, update 1/2017  Prevnar, 2015  Zostavax 2010  FLU, yearly    MEDS: Reviewed.     PHYSICAL EXAM:  VS: As noted  GENERAL: WDWN, A&O, NAD, conversant and co-operative; pleasant as always  EYES: Conj/lids unremarkable, sclera anicteric, PERRL,  rims pink  ENT: Hearing intact. Canals w/o significantcerumen, TM's unremarkable  Nasal mucosa/turbinates/septum w/o inflammation or exudate  Oropharynx w/o lesions or exudate, no stridor, frenulum pink;   Mucus membranes moist; Sinus nontender  NECK: Supple w/o lymphadenopathy or thyromegaly  RESPIRATORY: Efforts are unlabored. Lungs CTAP  CARDIOVASCULAR: Heart RRR. No carotid bruits noted.  Diminished pedal pulses. No LE edema  GASTROINTESTINAL: BS+, soft , NT/ND, - HSM noted  MUSCULOSKELETAL: Gait normal. No CCE  Right shoulder decreased ROM  NEUROLOGIC: BARROW. No tremor noted  SKIN: Warm and dry    IMPRESSION:  Hypothyroidism, stable  HLD, stable  PAD, stable  Aortic atherosclerosis, asx  ELEVATED BP W/O DX HTN, pt to monitor and advise if remains elevated  Thrombocytosis, resolved  Night cramps, LE recurrent  Osteopenia, on supplements  Post  inflammatory fibrosis, asx  Shoulder pain, s/p surgery 4/2019  Pre-Diabetes    PLAN:  [employer]  LAb- add A1C to Cards lab   Boniva 150 in place of alendronate if cost effective  Continue present meds  Vigorous hydration  ASA 81 daily  Calcium nightly  Vit D  Exercise as tolerated  Call prn  RTC 6 mos   40' o/v > 50% spent in MR rev, recs, and MDM

## 2019-07-23 ENCOUNTER — DOCUMENTATION ONLY (OUTPATIENT)
Dept: INTERNAL MEDICINE | Facility: CLINIC | Age: 74
End: 2019-07-23

## 2019-07-23 ENCOUNTER — OFFICE VISIT (OUTPATIENT)
Dept: INTERNAL MEDICINE | Facility: CLINIC | Age: 74
End: 2019-07-23
Payer: MEDICARE

## 2019-07-23 ENCOUNTER — OFFICE VISIT (OUTPATIENT)
Dept: SPORTS MEDICINE | Facility: CLINIC | Age: 74
End: 2019-07-23
Payer: MEDICARE

## 2019-07-23 VITALS
HEIGHT: 65 IN | TEMPERATURE: 98 F | BODY MASS INDEX: 27.07 KG/M2 | HEART RATE: 78 BPM | SYSTOLIC BLOOD PRESSURE: 144 MMHG | DIASTOLIC BLOOD PRESSURE: 76 MMHG | RESPIRATION RATE: 24 BRPM | WEIGHT: 162.5 LBS

## 2019-07-23 VITALS
BODY MASS INDEX: 26.99 KG/M2 | HEIGHT: 65 IN | HEART RATE: 81 BPM | DIASTOLIC BLOOD PRESSURE: 75 MMHG | WEIGHT: 162 LBS | SYSTOLIC BLOOD PRESSURE: 146 MMHG

## 2019-07-23 DIAGNOSIS — Z98.890 S/P ROTATOR CUFF REPAIR: ICD-10-CM

## 2019-07-23 DIAGNOSIS — E03.8 OTHER SPECIFIED HYPOTHYROIDISM: Primary | ICD-10-CM

## 2019-07-23 DIAGNOSIS — I70.0 ATHEROSCLEROSIS OF AORTA: ICD-10-CM

## 2019-07-23 DIAGNOSIS — I73.9 PAD (PERIPHERAL ARTERY DISEASE): Chronic | ICD-10-CM

## 2019-07-23 DIAGNOSIS — E78.00 PURE HYPERCHOLESTEROLEMIA: ICD-10-CM

## 2019-07-23 DIAGNOSIS — M81.8 OTHER OSTEOPOROSIS WITHOUT CURRENT PATHOLOGICAL FRACTURE: ICD-10-CM

## 2019-07-23 DIAGNOSIS — Z12.11 COLON CANCER SCREENING: ICD-10-CM

## 2019-07-23 DIAGNOSIS — R73.03 PRE-DIABETES: ICD-10-CM

## 2019-07-23 DIAGNOSIS — Z98.890 POST-OPERATIVE STATE: Primary | ICD-10-CM

## 2019-07-23 PROCEDURE — 99999 PR PBB SHADOW E&M-EST. PATIENT-LVL III: ICD-10-PCS | Mod: PBBFAC,,, | Performed by: INTERNAL MEDICINE

## 2019-07-23 PROCEDURE — 99999 PR PBB SHADOW E&M-EST. PATIENT-LVL III: ICD-10-PCS | Mod: PBBFAC,,, | Performed by: ORTHOPAEDIC SURGERY

## 2019-07-23 PROCEDURE — 99215 OFFICE O/P EST HI 40 MIN: CPT | Mod: S$PBB,,, | Performed by: INTERNAL MEDICINE

## 2019-07-23 PROCEDURE — 99213 OFFICE O/P EST LOW 20 MIN: CPT | Mod: PBBFAC,PO | Performed by: INTERNAL MEDICINE

## 2019-07-23 PROCEDURE — 99215 PR OFFICE/OUTPT VISIT, EST, LEVL V, 40-54 MIN: ICD-10-PCS | Mod: S$PBB,,, | Performed by: INTERNAL MEDICINE

## 2019-07-23 PROCEDURE — 99999 PR PBB SHADOW E&M-EST. PATIENT-LVL III: CPT | Mod: PBBFAC,,, | Performed by: INTERNAL MEDICINE

## 2019-07-23 PROCEDURE — 99024 PR POST-OP FOLLOW-UP VISIT: ICD-10-PCS | Mod: POP,,, | Performed by: ORTHOPAEDIC SURGERY

## 2019-07-23 PROCEDURE — 99999 PR PBB SHADOW E&M-EST. PATIENT-LVL III: CPT | Mod: PBBFAC,,, | Performed by: ORTHOPAEDIC SURGERY

## 2019-07-23 PROCEDURE — 99213 OFFICE O/P EST LOW 20 MIN: CPT | Mod: PBBFAC,27,PO | Performed by: ORTHOPAEDIC SURGERY

## 2019-07-23 PROCEDURE — 99024 POSTOP FOLLOW-UP VISIT: CPT | Mod: POP,,, | Performed by: ORTHOPAEDIC SURGERY

## 2019-07-23 RX ORDER — IBANDRONATE SODIUM 150 MG/1
150 TABLET, FILM COATED ORAL
Qty: 12 TABLET | Refills: 0 | Status: SHIPPED | OUTPATIENT
Start: 2019-07-23 | End: 2020-05-01

## 2019-08-18 RX ORDER — ALENDRONATE SODIUM 70 MG/1
TABLET ORAL
Qty: 12 TABLET | Refills: 2 | Status: SHIPPED | OUTPATIENT
Start: 2019-08-18 | End: 2019-09-05 | Stop reason: ALTCHOICE

## 2019-08-19 ENCOUNTER — PATIENT MESSAGE (OUTPATIENT)
Dept: INTERNAL MEDICINE | Facility: CLINIC | Age: 74
End: 2019-08-19

## 2019-08-26 ENCOUNTER — PATIENT OUTREACH (OUTPATIENT)
Dept: ADMINISTRATIVE | Facility: HOSPITAL | Age: 74
End: 2019-08-26

## 2019-08-26 ENCOUNTER — PATIENT MESSAGE (OUTPATIENT)
Dept: ADMINISTRATIVE | Facility: HOSPITAL | Age: 74
End: 2019-08-26

## 2019-09-03 DIAGNOSIS — E78.00 PURE HYPERCHOLESTEROLEMIA: Primary | ICD-10-CM

## 2019-09-04 ENCOUNTER — LAB VISIT (OUTPATIENT)
Dept: LAB | Facility: HOSPITAL | Age: 74
End: 2019-09-04
Attending: INTERNAL MEDICINE
Payer: MEDICARE

## 2019-09-04 DIAGNOSIS — E78.00 PURE HYPERCHOLESTEROLEMIA: ICD-10-CM

## 2019-09-04 LAB
ALT SERPL W/O P-5'-P-CCNC: 23 U/L (ref 10–44)
ANION GAP SERPL CALC-SCNC: 8 MMOL/L (ref 8–16)
AST SERPL-CCNC: 22 U/L (ref 10–40)
BUN SERPL-MCNC: 19 MG/DL (ref 8–23)
CALCIUM SERPL-MCNC: 9.8 MG/DL (ref 8.7–10.5)
CHLORIDE SERPL-SCNC: 107 MMOL/L (ref 95–110)
CHOLEST SERPL-MCNC: 161 MG/DL (ref 120–199)
CHOLEST/HDLC SERPL: 1.9 {RATIO} (ref 2–5)
CO2 SERPL-SCNC: 29 MMOL/L (ref 23–29)
CREAT SERPL-MCNC: 0.8 MG/DL (ref 0.5–1.4)
EST. GFR  (AFRICAN AMERICAN): >60 ML/MIN/1.73 M^2
EST. GFR  (NON AFRICAN AMERICAN): >60 ML/MIN/1.73 M^2
GLUCOSE SERPL-MCNC: 93 MG/DL (ref 70–110)
HDLC SERPL-MCNC: 83 MG/DL (ref 40–75)
HDLC SERPL: 51.6 % (ref 20–50)
LDLC SERPL CALC-MCNC: 66.2 MG/DL (ref 63–159)
NONHDLC SERPL-MCNC: 78 MG/DL
POTASSIUM SERPL-SCNC: 4.3 MMOL/L (ref 3.5–5.1)
SODIUM SERPL-SCNC: 144 MMOL/L (ref 136–145)
TRIGL SERPL-MCNC: 59 MG/DL (ref 30–150)

## 2019-09-04 PROCEDURE — 80061 LIPID PANEL: CPT

## 2019-09-04 PROCEDURE — 84460 ALANINE AMINO (ALT) (SGPT): CPT

## 2019-09-04 PROCEDURE — 36415 COLL VENOUS BLD VENIPUNCTURE: CPT | Mod: PO

## 2019-09-04 PROCEDURE — 84450 TRANSFERASE (AST) (SGOT): CPT

## 2019-09-04 PROCEDURE — 80048 BASIC METABOLIC PNL TOTAL CA: CPT

## 2019-09-05 ENCOUNTER — OFFICE VISIT (OUTPATIENT)
Dept: CARDIOLOGY | Facility: CLINIC | Age: 74
End: 2019-09-05
Payer: MEDICARE

## 2019-09-05 ENCOUNTER — TELEPHONE (OUTPATIENT)
Dept: INTERNAL MEDICINE | Facility: CLINIC | Age: 74
End: 2019-09-05

## 2019-09-05 VITALS
BODY MASS INDEX: 27.4 KG/M2 | WEIGHT: 164.44 LBS | DIASTOLIC BLOOD PRESSURE: 84 MMHG | SYSTOLIC BLOOD PRESSURE: 128 MMHG | HEART RATE: 80 BPM | HEIGHT: 65 IN

## 2019-09-05 DIAGNOSIS — E66.3 OVERWEIGHT (BMI 25.0-29.9): ICD-10-CM

## 2019-09-05 DIAGNOSIS — I70.0 ATHEROSCLEROSIS OF AORTA: ICD-10-CM

## 2019-09-05 DIAGNOSIS — I73.9 PAD (PERIPHERAL ARTERY DISEASE): Primary | Chronic | ICD-10-CM

## 2019-09-05 DIAGNOSIS — E78.00 PURE HYPERCHOLESTEROLEMIA: ICD-10-CM

## 2019-09-05 PROCEDURE — 99999 PR PBB SHADOW E&M-EST. PATIENT-LVL III: CPT | Mod: PBBFAC,,, | Performed by: NURSE PRACTITIONER

## 2019-09-05 PROCEDURE — 99214 PR OFFICE/OUTPT VISIT, EST, LEVL IV, 30-39 MIN: ICD-10-PCS | Mod: S$PBB,,, | Performed by: NURSE PRACTITIONER

## 2019-09-05 PROCEDURE — 99213 OFFICE O/P EST LOW 20 MIN: CPT | Mod: PBBFAC,PO | Performed by: NURSE PRACTITIONER

## 2019-09-05 PROCEDURE — 99214 OFFICE O/P EST MOD 30 MIN: CPT | Mod: S$PBB,,, | Performed by: NURSE PRACTITIONER

## 2019-09-05 PROCEDURE — 99999 PR PBB SHADOW E&M-EST. PATIENT-LVL III: ICD-10-PCS | Mod: PBBFAC,,, | Performed by: NURSE PRACTITIONER

## 2019-09-05 RX ORDER — ASPIRIN 81 MG/1
81 TABLET ORAL DAILY
Start: 2019-09-05 | End: 2021-10-05

## 2019-09-05 NOTE — TELEPHONE ENCOUNTER
----- Message from Suha Salguero MD sent at 9/5/2019  2:23 PM CDT -----  Good news, your stool was negative for blood.  Please do not hesitate to call/email if you have any questions or concerns  Suha Clifton

## 2019-09-05 NOTE — PROGRESS NOTES
Ms. Aldridge is a patient of Dr. Hammer and was last seen in [facility] Visit 12/21/17.      Subjective:   Patient ID:  Eugenio Aldridge is a 73 y.o. female who presents for follow-up of Hyperlipidemia    Problem List:  Hypercholesterolemia  PAD  Hypothyroidism  Tobacco use - 1-2 ppd for 22 years, quit 2001    HPI:   Eugenio Aldridge is in clinic today for routine follow up.  Patient denies chest pain with exertion or at rest, palpitations, SOB, VOGEL, dizziness, syncope, edema, orthopnea, PND, or claudication.  She is doing physical therapy twice a week and doing the exercises at home 2 other days. She is treated with moderate intensity statin.  Patient is taking atorvastatin 20 mg and LDL is 66.      Review of Systems   Constitution: Negative for decreased appetite, diaphoresis, malaise/fatigue, weight gain and weight loss.   Eyes: Negative for visual disturbance.   Cardiovascular: Negative for chest pain, claudication, dyspnea on exertion, irregular heartbeat, leg swelling, near-syncope, orthopnea, palpitations, paroxysmal nocturnal dyspnea and syncope.        Denies chest pressure   Respiratory: Negative for cough, hemoptysis, shortness of breath, sleep disturbances due to breathing and snoring.    Endocrine: Negative for cold intolerance and heat intolerance.   Hematologic/Lymphatic: Negative for bleeding problem. Does not bruise/bleed easily.   Musculoskeletal: Negative for myalgias.   Gastrointestinal: Negative for bloating, abdominal pain, anorexia, change in bowel habit, constipation, diarrhea, nausea and vomiting.   Neurological: Negative for difficulty with concentration, disturbances in coordination, excessive daytime sleepiness, dizziness, headaches, light-headedness, loss of balance, numbness and weakness.   Psychiatric/Behavioral: The patient does not have insomnia.        Allergies and current medications updated and reviewed:  Review of patient's allergies indicates:   Allergen Reactions    Codeine      Other  "reaction(s): Vomiting     Current Outpatient Medications   Medication Sig    atorvastatin (LIPITOR) 20 MG tablet TAKE 1 TABLET(20 MG) BY MOUTH EVERY DAY    calcium carbonate (CALTRATE 600) 600 mg (1,500 mg) Tab Take 600 mg by mouth once daily.     cyanocobalamin, vitamin B-12, (VITAMIN B-12 ORAL) Take 1 tablet by mouth once daily.    estradiol (VAGIFEM) 10 mcg Tab INSERT ONE TABLET IN THE VAGINA TWICE WEEKLY AS DIRECTED    ibandronate (BONIVA) 150 mg tablet Take 1 tablet (150 mg total) by mouth every 30 days.    levothyroxine (SYNTHROID) 50 MCG tablet Take 1 tablet (50 mcg total) by mouth before breakfast.    multivitamin (THERAGRAN) per tablet Take by mouth. 1 Tablet Oral Every day     No current facility-administered medications for this visit.        Objective:        /84   Pulse 80   Ht 5' 5" (1.651 m)   Wt 74.6 kg (164 lb 7.4 oz)   BMI 27.37 kg/m²       Physical Exam   Constitutional: She is oriented to person, place, and time. Vital signs are normal. She appears well-developed and well-nourished. She is active. No distress.   HENT:   Head: Normocephalic and atraumatic.   Eyes: Conjunctivae and lids are normal. No scleral icterus.   Neck: Neck supple. Normal carotid pulses, no hepatojugular reflux and no JVD present. Carotid bruit is not present.   Cardiovascular: Normal rate, regular rhythm, S1 normal, S2 normal and intact distal pulses. PMI is not displaced. Exam reveals no gallop and no friction rub.   No murmur heard.  Pulses:       Carotid pulses are 2+ on the right side, and 2+ on the left side.       Radial pulses are 2+ on the right side, and 2+ on the left side.        Dorsalis pedis pulses are 2+ on the right side, and 2+ on the left side.        Posterior tibial pulses are 1+ on the right side, and 1+ on the left side.   Pulmonary/Chest: Effort normal and breath sounds normal. No respiratory distress. She has no decreased breath sounds. She has no wheezes. She has no rhonchi. She has " no rales. She exhibits no tenderness.   Abdominal: Soft. Normal appearance and bowel sounds are normal. She exhibits no distension, no fluid wave, no abdominal bruit, no ascites and no pulsatile midline mass. There is no hepatosplenomegaly. There is no tenderness.   Musculoskeletal: She exhibits no edema.   Neurological: She is alert and oriented to person, place, and time. Gait normal.   Skin: Skin is warm, dry and intact. No rash noted. She is not diaphoretic. Nails show no clubbing.   Psychiatric: She has a normal mood and affect. Her speech is normal and behavior is normal. Judgment and thought content normal. Cognition and memory are normal.   Nursing note and vitals reviewed.      Chemistry        Component Value Date/Time     09/04/2019 0752    K 4.3 09/04/2019 0752     09/04/2019 0752    CO2 29 09/04/2019 0752    BUN 19 09/04/2019 0752    CREATININE 0.8 09/04/2019 0752    GLU 93 09/04/2019 0752        Component Value Date/Time    CALCIUM 9.8 09/04/2019 0752    ALKPHOS 47 (L) 04/03/2019 0718    AST 22 09/04/2019 0752    ALT 23 09/04/2019 0752    BILITOT 0.5 04/03/2019 0718    ESTGFRAFRICA >60.0 09/04/2019 0752    EGFRNONAA >60.0 09/04/2019 0752        Lab Results   Component Value Date    HGBA1C 5.0 03/12/2018       Recent Labs   Lab 04/03/19  0718 09/04/19  0752   WBC 5.29  --    Hemoglobin 15.3  --    Hematocrit 46.7  --    Mean Corpuscular Volume 97  --    Platelets 276  --    TSH 2.981  --    Cholesterol 184 161    H 83 H   LDL Cholesterol 71.2 66.2   Triglycerides 54 59   Hdl/Cholesterol Ratio 55.4 H 51.6 H              Test(s) Reviewed  I have reviewed the following in detail:  [] Stress test   [] Angiography   [x] Echocardiogram   [x] Labs   [] Other:         Assessment/Plan:   1. PAD (peripheral artery disease)  Asymptomatic.  ASA stopped sometime after she saw Dr. Hammer at the end of 2017.  Suspect it was stopped for her rotator cuff surgery in April.  Instructed to re-start ASA  and discussed the role of ASA in atherosclerosis.     - aspirin (ECOTRIN) 81 MG EC tablet; Take 1 tablet (81 mg total) by mouth once daily.    2. Atherosclerosis of aorta    - aspirin (ECOTRIN) 81 MG EC tablet; Take 1 tablet (81 mg total) by mouth once daily.    3. Pure hypercholesterolemia  LDL at goal <70. No changes      4. Overweight (BMI 25.0-29.9)  BMI 27.4 Encouraged increased CV exercise to 30 minutes a day for 5 days a week.       Patient was discussed with but not examined by Dr. Hammer    Follow up in about 18 months (around 3/5/2021).

## 2019-09-16 ENCOUNTER — TELEPHONE (OUTPATIENT)
Dept: SPORTS MEDICINE | Facility: CLINIC | Age: 74
End: 2019-09-16

## 2019-09-16 NOTE — TELEPHONE ENCOUNTER
Returning call to patient's physical therapist, Jo Ann at [facility].  She reports:     Patient unable to do ER unable to do against gravity.  Issues with fine motor skills (buttoning a button).  Having changes changes in sensations.  She reports patient complained of nerve type pain couple months ago, but that has since resolved.  Physical therapist would like her to follow up in clinic for evaluation.  Possible EMG/NCS discussed vs neck involvement.

## 2019-09-16 NOTE — TELEPHONE ENCOUNTER
----- Message from Joanie Berry sent at 9/16/2019  3:07 PM CDT -----  Contact: Jo Ann from [employer]   Jo Ann called in regards to Patient is having issues recovering from surgery , she has weakens in hands and a nerve , would like to request the patient to be seen . Would like a call back concerning patient conditions           Jo Ann Physical Therapist - [phone]

## 2019-09-18 ENCOUNTER — TELEPHONE (OUTPATIENT)
Dept: SPORTS MEDICINE | Facility: CLINIC | Age: 74
End: 2019-09-18

## 2019-09-18 ENCOUNTER — PATIENT MESSAGE (OUTPATIENT)
Dept: SPORTS MEDICINE | Facility: CLINIC | Age: 74
End: 2019-09-18

## 2019-09-18 NOTE — TELEPHONE ENCOUNTER
----- Message from Abby Cortes MA sent at 9/18/2019  8:06 AM CDT -----  Contact: Self/[phone]  PT states she would like to know why she is Scheduled for 2 appts.  She is wanting to know which does she need to come to.

## 2019-09-18 NOTE — TELEPHONE ENCOUNTER
NICOLEM informing the patient that her physical therapist called and would like for her to come in a see Dr. Hernandez.

## 2019-09-19 ENCOUNTER — OFFICE VISIT (OUTPATIENT)
Dept: SPORTS MEDICINE | Facility: CLINIC | Age: 74
End: 2019-09-19
Payer: MEDICARE

## 2019-09-19 VITALS
HEIGHT: 65 IN | WEIGHT: 164 LBS | HEART RATE: 80 BPM | BODY MASS INDEX: 27.32 KG/M2 | SYSTOLIC BLOOD PRESSURE: 136 MMHG | DIASTOLIC BLOOD PRESSURE: 71 MMHG

## 2019-09-19 DIAGNOSIS — Z98.890 S/P ROTATOR CUFF REPAIR: Primary | ICD-10-CM

## 2019-09-19 PROCEDURE — 99999 PR PBB SHADOW E&M-EST. PATIENT-LVL III: ICD-10-PCS | Mod: PBBFAC,,, | Performed by: ORTHOPAEDIC SURGERY

## 2019-09-19 PROCEDURE — 99214 OFFICE O/P EST MOD 30 MIN: CPT | Mod: S$PBB,,, | Performed by: ORTHOPAEDIC SURGERY

## 2019-09-19 PROCEDURE — 99999 PR PBB SHADOW E&M-EST. PATIENT-LVL III: CPT | Mod: PBBFAC,,, | Performed by: ORTHOPAEDIC SURGERY

## 2019-09-19 PROCEDURE — 99214 PR OFFICE/OUTPT VISIT, EST, LEVL IV, 30-39 MIN: ICD-10-PCS | Mod: S$PBB,,, | Performed by: ORTHOPAEDIC SURGERY

## 2019-09-19 PROCEDURE — 99213 OFFICE O/P EST LOW 20 MIN: CPT | Mod: PBBFAC,PO | Performed by: ORTHOPAEDIC SURGERY

## 2019-09-19 NOTE — PROGRESS NOTES
"CC: Right shoulder post op 5  months    Eugenio Aldridge returns to clinic for follow up evaluation.  She continues physical therapy 2 x week at [facility].  No issues to report.  Progressing but slowly.  Spirits good.    DATE OF SURGERY:   4/24/19       PREOPERATIVE DIAGNOSES:   1. Right shoulder rotator cuff tear (massive / complex) - supraspinatus / infraspinatus  2. Right shoulder labral tear / biceps tendinopathy  3. Right shoulder AC joint arthritis     POSTOPERATIVE DIAGNOSES:   1. Right shoulder rotator cuff tear (massive / complex) - supraspinatus / infraspinatus  2. Right shoulder labral tear / biceps rupture  3. Right shoulder AC joint arthritis     PROCEDURE:   1. Right shoulder arthroscopic rotator cuff repair (massive / complex) - supraspinatus / infraspinatus.  2. Placement of a right shoulder collagen scaffold allograft augmentation on posterosuperior rotator cuff repair  3. Right shoulder arthroscopic debridement labrum  / biceps stump  4. Right shoulder arthroscopic subacromial decompression.   5. Right shoulder arthroscopic distal clavicle excision     SURGEON: Walter Hernandez M.D.     Review of Systems   Constitution: Negative. Negative for chills, fever and night sweats.    Cardiovascular: Negative for chest pain and syncope.   Respiratory: Negative for cough and shortness of breath.   Gastrointestinal: Negative for abdominal pain and bowel incontinence.     PE:  Vitals:    09/19/19 0954   BP: 136/71   Pulse: 80   Weight: 74.4 kg (164 lb)   Height: 5' 5" (1.651 m)   PainSc: 0-No pain     Right shoulder  Incision healed  No sign of infection  Swelling resolved  Compartments soft  Neurovascular status intact in extremity    AROM (PROM)  Forward elevation 100 degrees  External rotation -10 degrees  IR L4    Assessment:  Appropriate rotator cuff surgery recovery at 5 months    Plan:  1. Continue PT   2. Follow up 3 months      "

## 2019-09-21 ENCOUNTER — TELEPHONE (OUTPATIENT)
Dept: SPORTS MEDICINE | Facility: CLINIC | Age: 74
End: 2019-09-21

## 2019-09-21 ENCOUNTER — PATIENT MESSAGE (OUTPATIENT)
Dept: INTERNAL MEDICINE | Facility: CLINIC | Age: 74
End: 2019-09-21

## 2019-09-21 ENCOUNTER — PATIENT MESSAGE (OUTPATIENT)
Dept: SPORTS MEDICINE | Facility: CLINIC | Age: 74
End: 2019-09-21

## 2019-09-21 DIAGNOSIS — R20.0 NUMBNESS AND TINGLING IN LEFT ARM: ICD-10-CM

## 2019-09-21 DIAGNOSIS — R20.2 NUMBNESS AND TINGLING IN LEFT ARM: ICD-10-CM

## 2019-09-21 DIAGNOSIS — R20.0 NUMBNESS AND TINGLING OF RIGHT ARM: Primary | ICD-10-CM

## 2019-09-21 DIAGNOSIS — R20.2 NUMBNESS AND TINGLING OF RIGHT ARM: Primary | ICD-10-CM

## 2019-09-24 ENCOUNTER — PROCEDURE VISIT (OUTPATIENT)
Dept: NEUROLOGY | Facility: CLINIC | Age: 74
End: 2019-09-24
Payer: MEDICARE

## 2019-09-24 DIAGNOSIS — R20.0 NUMBNESS AND TINGLING IN LEFT ARM: ICD-10-CM

## 2019-09-24 DIAGNOSIS — R20.0 NUMBNESS AND TINGLING OF RIGHT ARM: ICD-10-CM

## 2019-09-24 DIAGNOSIS — R20.2 NUMBNESS AND TINGLING OF RIGHT ARM: ICD-10-CM

## 2019-09-24 DIAGNOSIS — R20.2 NUMBNESS AND TINGLING IN LEFT ARM: ICD-10-CM

## 2019-09-24 PROCEDURE — 95913 NRV CNDJ TEST 13/> STUDIES: CPT | Mod: 26,S$PBB,, | Performed by: NEUROMUSCULOSKELETAL MEDICINE & OMM

## 2019-09-24 PROCEDURE — 95886 PR EMG COMPLETE, W/ NERVE CONDUCTION STUDIES, 5+ MUSCLES: ICD-10-PCS | Mod: 26,S$PBB,, | Performed by: NEUROMUSCULOSKELETAL MEDICINE & OMM

## 2019-09-24 PROCEDURE — 95913 PR NERVE CONDUCTION STUDY; 13 OR MORE STUDIES: ICD-10-PCS | Mod: 26,S$PBB,, | Performed by: NEUROMUSCULOSKELETAL MEDICINE & OMM

## 2019-09-24 PROCEDURE — 95886 MUSC TEST DONE W/N TEST COMP: CPT | Mod: 26,S$PBB,, | Performed by: NEUROMUSCULOSKELETAL MEDICINE & OMM

## 2019-09-24 PROCEDURE — 95913 NRV CNDJ TEST 13/> STUDIES: CPT | Mod: PBBFAC,PO | Performed by: NEUROMUSCULOSKELETAL MEDICINE & OMM

## 2019-09-24 PROCEDURE — 95886 MUSC TEST DONE W/N TEST COMP: CPT | Mod: PBBFAC,PO | Performed by: NEUROMUSCULOSKELETAL MEDICINE & OMM

## 2019-10-02 ENCOUNTER — PATIENT MESSAGE (OUTPATIENT)
Dept: SPORTS MEDICINE | Facility: CLINIC | Age: 74
End: 2019-10-02

## 2019-10-30 ENCOUNTER — PATIENT MESSAGE (OUTPATIENT)
Dept: SPORTS MEDICINE | Facility: CLINIC | Age: 74
End: 2019-10-30

## 2019-10-31 ENCOUNTER — OFFICE VISIT (OUTPATIENT)
Dept: SPORTS MEDICINE | Facility: CLINIC | Age: 74
End: 2019-10-31
Payer: MEDICARE

## 2019-10-31 VITALS
WEIGHT: 164 LBS | DIASTOLIC BLOOD PRESSURE: 71 MMHG | HEIGHT: 65 IN | BODY MASS INDEX: 27.32 KG/M2 | HEART RATE: 88 BPM | SYSTOLIC BLOOD PRESSURE: 129 MMHG

## 2019-10-31 DIAGNOSIS — M25.511 CHRONIC RIGHT SHOULDER PAIN: ICD-10-CM

## 2019-10-31 DIAGNOSIS — Z98.890 S/P ROTATOR CUFF REPAIR: Primary | ICD-10-CM

## 2019-10-31 DIAGNOSIS — G89.29 CHRONIC RIGHT SHOULDER PAIN: ICD-10-CM

## 2019-10-31 PROCEDURE — 99999 PR PBB SHADOW E&M-EST. PATIENT-LVL III: CPT | Mod: PBBFAC,,, | Performed by: ORTHOPAEDIC SURGERY

## 2019-10-31 PROCEDURE — 99999 PR PBB SHADOW E&M-EST. PATIENT-LVL III: ICD-10-PCS | Mod: PBBFAC,,, | Performed by: ORTHOPAEDIC SURGERY

## 2019-10-31 PROCEDURE — 99214 OFFICE O/P EST MOD 30 MIN: CPT | Mod: S$PBB,,, | Performed by: ORTHOPAEDIC SURGERY

## 2019-10-31 PROCEDURE — 99214 PR OFFICE/OUTPT VISIT, EST, LEVL IV, 30-39 MIN: ICD-10-PCS | Mod: S$PBB,,, | Performed by: ORTHOPAEDIC SURGERY

## 2019-10-31 PROCEDURE — 99213 OFFICE O/P EST LOW 20 MIN: CPT | Mod: PBBFAC | Performed by: ORTHOPAEDIC SURGERY

## 2019-10-31 NOTE — PROGRESS NOTES
"CC: Right shoulder post op 6  months    Eugenio Aldridge returns to clinic for follow up evaluation.  She continues physical therapy 2 x week at [facility].  Her therapist wanted her to follow up with concerns of weakness.    DATE OF SURGERY:   4/24/19       PREOPERATIVE DIAGNOSES:   1. Right shoulder rotator cuff tear (massive / complex) - supraspinatus / infraspinatus  2. Right shoulder labral tear / biceps tendinopathy  3. Right shoulder AC joint arthritis     POSTOPERATIVE DIAGNOSES:   1. Right shoulder rotator cuff tear (massive / complex) - supraspinatus / infraspinatus  2. Right shoulder labral tear / biceps rupture  3. Right shoulder AC joint arthritis     PROCEDURE:   1. Right shoulder arthroscopic rotator cuff repair (massive / complex) - supraspinatus / infraspinatus.  2. Placement of a right shoulder collagen scaffold allograft augmentation on posterosuperior rotator cuff repair  3. Right shoulder arthroscopic debridement labrum  / biceps stump  4. Right shoulder arthroscopic subacromial decompression.   5. Right shoulder arthroscopic distal clavicle excision     SURGEON: Walter Hernandez M.D.     Review of Systems   Constitution: Negative. Negative for chills, fever and night sweats.    Cardiovascular: Negative for chest pain and syncope.   Respiratory: Negative for cough and shortness of breath.   Gastrointestinal: Negative for abdominal pain and bowel incontinence.     PE:  Vitals:    10/31/19 1314   BP: 129/71   Pulse: 88   Weight: 74.4 kg (164 lb)   Height: 5' 5" (1.651 m)   PainSc:   5     Right shoulder  Incision healed  No sign of infection  Swelling resolved  Compartments soft  Neurovascular status intact in extremity    AROM (PROM)  Forward elevation 100 degrees  External rotation -10 degrees  IR L4    Assessment:  Appropriate rotator cuff surgery recovery at 6 months    Plan:  1. Continue physical therapy.  Will call if wanting to switch to [facility]  2. Follow up in December at previously " scheduled visit

## 2019-12-06 RX ORDER — LEVOTHYROXINE SODIUM 50 UG/1
TABLET ORAL
Qty: 90 TABLET | Refills: 3 | Status: SHIPPED | OUTPATIENT
Start: 2019-12-06 | End: 2020-11-05

## 2019-12-19 ENCOUNTER — OFFICE VISIT (OUTPATIENT)
Dept: SPORTS MEDICINE | Facility: CLINIC | Age: 74
End: 2019-12-19
Payer: MEDICARE

## 2019-12-19 VITALS — HEART RATE: 75 BPM | DIASTOLIC BLOOD PRESSURE: 59 MMHG | SYSTOLIC BLOOD PRESSURE: 108 MMHG

## 2019-12-19 DIAGNOSIS — G89.29 CHRONIC RIGHT SHOULDER PAIN: Primary | ICD-10-CM

## 2019-12-19 DIAGNOSIS — M25.511 CHRONIC RIGHT SHOULDER PAIN: Primary | ICD-10-CM

## 2019-12-19 PROCEDURE — 1159F MED LIST DOCD IN RCRD: CPT | Mod: ,,, | Performed by: ORTHOPAEDIC SURGERY

## 2019-12-19 PROCEDURE — 1159F PR MEDICATION LIST DOCUMENTED IN MEDICAL RECORD: ICD-10-PCS | Mod: ,,, | Performed by: ORTHOPAEDIC SURGERY

## 2019-12-19 PROCEDURE — 99999 PR PBB SHADOW E&M-EST. PATIENT-LVL II: ICD-10-PCS | Mod: PBBFAC,,, | Performed by: ORTHOPAEDIC SURGERY

## 2019-12-19 PROCEDURE — 99212 OFFICE O/P EST SF 10 MIN: CPT | Mod: PBBFAC | Performed by: ORTHOPAEDIC SURGERY

## 2019-12-19 PROCEDURE — 99214 OFFICE O/P EST MOD 30 MIN: CPT | Mod: S$PBB,,, | Performed by: ORTHOPAEDIC SURGERY

## 2019-12-19 PROCEDURE — 99214 PR OFFICE/OUTPT VISIT, EST, LEVL IV, 30-39 MIN: ICD-10-PCS | Mod: S$PBB,,, | Performed by: ORTHOPAEDIC SURGERY

## 2019-12-19 PROCEDURE — 99999 PR PBB SHADOW E&M-EST. PATIENT-LVL II: CPT | Mod: PBBFAC,,, | Performed by: ORTHOPAEDIC SURGERY

## 2019-12-19 NOTE — PROGRESS NOTES
CC: Right shoulder post op 6  months    Eugenio Aldridge returns to clinic for follow up evaluation.  She is currently in the Wellness program doing her exercise on a daily basis, because her insurance will no longer provide physical therapy.  She reports that she has improved compared to her prior visit with regards to range of motion and pain. Reports that she is still unable to bring her arm up and behind her head for, which is required to blow drying her hair and she is aggravated because of this.  She also reports that she is unable to bring her arm up is for she would like to behind her back.  No other concerns or complaints.    DATE OF SURGERY:   4/24/19       PREOPERATIVE DIAGNOSES:   1. Right shoulder rotator cuff tear (massive / complex) - supraspinatus / infraspinatus  2. Right shoulder labral tear / biceps tendinopathy  3. Right shoulder AC joint arthritis     POSTOPERATIVE DIAGNOSES:   1. Right shoulder rotator cuff tear (massive / complex) - supraspinatus / infraspinatus  2. Right shoulder labral tear / biceps rupture  3. Right shoulder AC joint arthritis     PROCEDURE:   1. Right shoulder arthroscopic rotator cuff repair (massive / complex) - supraspinatus / infraspinatus.  2. Placement of a right shoulder collagen scaffold allograft augmentation on posterosuperior rotator cuff repair  3. Right shoulder arthroscopic debridement labrum  / biceps stump  4. Right shoulder arthroscopic subacromial decompression.   5. Right shoulder arthroscopic distal clavicle excision     SURGEON: Walter Hernandez M.D.     Review of Systems   Constitution: Negative. Negative for chills, fever and night sweats.    Cardiovascular: Negative for chest pain and syncope.   Respiratory: Negative for cough and shortness of breath.   Gastrointestinal: Negative for abdominal pain and bowel incontinence.     PE:  Vitals:    12/19/19 1303   BP: (!) 108/59   Pulse: 75     Right shoulder  Incision healed    AROM (PROM)  Forward elevation  130 degrees, with scapular dyskenesia   External rotation -10 degrees  IR L4    Assessment:  Appropriate rotator cuff surgery recovery at 8 months    Plan:  1. Continue daily exercises  2. F/U 2 mo for interval exam.

## 2020-01-07 ENCOUNTER — TELEPHONE (OUTPATIENT)
Dept: SPORTS MEDICINE | Facility: CLINIC | Age: 75
End: 2020-01-07

## 2020-01-07 DIAGNOSIS — Z98.890 S/P ROTATOR CUFF REPAIR: Primary | ICD-10-CM

## 2020-01-07 NOTE — TELEPHONE ENCOUNTER
----- Message from Sabrina Gonzales sent at 1/7/2020  1:24 PM CST -----  Contact: Bassam - [facility]  Bassam needs the dr to write a new order for pt. She discontinued her pt half way through last year due to her insurance. So she can continue it from post surgery.

## 2020-01-08 ENCOUNTER — PATIENT MESSAGE (OUTPATIENT)
Dept: INTERNAL MEDICINE | Facility: CLINIC | Age: 75
End: 2020-01-08

## 2020-01-08 DIAGNOSIS — Z12.31 VISIT FOR SCREENING MAMMOGRAM: Primary | ICD-10-CM

## 2020-01-13 ENCOUNTER — HOSPITAL ENCOUNTER (OUTPATIENT)
Dept: RADIOLOGY | Facility: HOSPITAL | Age: 75
Discharge: HOME | End: 2020-01-13
Attending: INTERNAL MEDICINE
Payer: MEDICARE

## 2020-01-13 DIAGNOSIS — Z12.31 VISIT FOR SCREENING MAMMOGRAM: ICD-10-CM

## 2020-01-13 PROCEDURE — 77067 SCR MAMMO BI INCL CAD: CPT | Mod: 26,,, | Performed by: RADIOLOGY

## 2020-01-13 PROCEDURE — 77067 SCR MAMMO BI INCL CAD: CPT | Mod: TC,PO

## 2020-01-13 PROCEDURE — 77067 MAMMO DIGITAL SCREENING BILAT WITH TOMOSYNTHESIS_CAD: ICD-10-PCS | Mod: 26,,, | Performed by: RADIOLOGY

## 2020-01-13 PROCEDURE — 77063 BREAST TOMOSYNTHESIS BI: CPT | Mod: 26,,, | Performed by: RADIOLOGY

## 2020-01-13 PROCEDURE — 77063 MAMMO DIGITAL SCREENING BILAT WITH TOMOSYNTHESIS_CAD: ICD-10-PCS | Mod: 26,,, | Performed by: RADIOLOGY

## 2020-01-15 DIAGNOSIS — N95.2 VAGINAL ATROPHY: ICD-10-CM

## 2020-01-15 RX ORDER — ESTRADIOL 10 UG/1
INSERT VAGINAL
Qty: 30 TABLET | Refills: 0 | Status: SHIPPED | OUTPATIENT
Start: 2020-01-15 | End: 2020-06-29

## 2020-01-20 ENCOUNTER — PATIENT MESSAGE (OUTPATIENT)
Dept: SPORTS MEDICINE | Facility: CLINIC | Age: 75
End: 2020-01-20

## 2020-01-20 ENCOUNTER — TELEPHONE (OUTPATIENT)
Dept: INTERNAL MEDICINE | Facility: CLINIC | Age: 75
End: 2020-01-20

## 2020-01-20 NOTE — TELEPHONE ENCOUNTER
----- Message from Suha Salguero MD sent at 1/19/2020 12:43 PM CST -----  Please note that your mammogram was read as follows  Impression:  There is no mammographic evidence of malignancy.  Suha Clifton

## 2020-03-03 RX ORDER — ATORVASTATIN CALCIUM 20 MG/1
TABLET, FILM COATED ORAL
Qty: 90 TABLET | Refills: 3 | Status: SHIPPED | OUTPATIENT
Start: 2020-03-03 | End: 2020-10-08

## 2020-03-26 ENCOUNTER — OFFICE VISIT (OUTPATIENT)
Dept: URGENT CARE | Facility: CLINIC | Age: 75
End: 2020-03-26
Payer: MEDICARE

## 2020-03-26 VITALS
HEART RATE: 108 BPM | SYSTOLIC BLOOD PRESSURE: 145 MMHG | WEIGHT: 165 LBS | BODY MASS INDEX: 27.49 KG/M2 | DIASTOLIC BLOOD PRESSURE: 74 MMHG | HEIGHT: 65 IN | TEMPERATURE: 98 F

## 2020-03-26 DIAGNOSIS — L03.114 CELLULITIS OF LEFT UPPER ARM: Primary | ICD-10-CM

## 2020-03-26 DIAGNOSIS — S40.022A CONTUSION OF LEFT UPPER ARM, INITIAL ENCOUNTER: ICD-10-CM

## 2020-03-26 PROCEDURE — 99214 OFFICE O/P EST MOD 30 MIN: CPT | Mod: S$GLB,,, | Performed by: INTERNAL MEDICINE

## 2020-03-26 PROCEDURE — 99214 PR OFFICE/OUTPT VISIT, EST, LEVL IV, 30-39 MIN: ICD-10-PCS | Mod: S$GLB,,, | Performed by: INTERNAL MEDICINE

## 2020-03-26 RX ORDER — SULFAMETHOXAZOLE AND TRIMETHOPRIM 800; 160 MG/1; MG/1
1 TABLET ORAL 2 TIMES DAILY
Qty: 14 TABLET | Refills: 0 | Status: SHIPPED | OUTPATIENT
Start: 2020-03-26 | End: 2020-10-08

## 2020-03-26 NOTE — PROGRESS NOTES
"Subjective:       Patient ID: Eugenio Aldridge is a 74 y.o. female.    Vitals:  height is 5' 5" (1.651 m) and weight is 74.8 kg (165 lb). Her oral temperature is 98.4 °F (36.9 °C). Her blood pressure is 145/74 (abnormal) and her pulse is 108.     Chief Complaint: Edema    Patient states she went to give platelets yesterday they could not get her vein so they sent her home. She said that now her arm is black and blue and swelling. It is also painful.    Edema   This is a new problem. The current episode started yesterday. Pertinent negatives include no abdominal pain, anorexia, arthralgias, change in bowel habit, chest pain, chills, congestion, coughing, diaphoresis, fatigue, fever, headaches, joint swelling, myalgias, nausea, neck pain, numbness, rash, sore throat, swollen glands, urinary symptoms, vertigo, visual change, vomiting or weakness.       Constitution: Negative for chills, sweating, fatigue and fever.   HENT: Negative for congestion and sore throat.    Neck: Negative for neck pain and painful lymph nodes.   Cardiovascular: Negative for chest pain and leg swelling.   Eyes: Negative for double vision and blurred vision.   Respiratory: Negative for cough and shortness of breath.    Gastrointestinal: Negative for abdominal pain, nausea, vomiting and diarrhea.   Genitourinary: Negative for dysuria, frequency, urgency and history of kidney stones.   Musculoskeletal: Negative for joint pain, joint swelling, muscle cramps and muscle ache.   Skin: Negative for color change, pale, rash and bruising.   Allergic/Immunologic: Negative for seasonal allergies.   Neurological: Negative for dizziness, history of vertigo, light-headedness, passing out, headaches and numbness.   Hematologic/Lymphatic: Negative for swollen lymph nodes.   Psychiatric/Behavioral: Negative for nervous/anxious, sleep disturbance and depression. The patient is not nervous/anxious.        Objective:      Physical Exam   Constitutional: She appears " well-developed and well-nourished.   Skin:   10 cm area of ecchymosis proximal to phlebotomy site L upper with 6-7 cm area erythema ,warnth and tenderness around this area   Nursing note and vitals reviewed.        Assessment:       1. Cellulitis of left upper arm    2. Contusion of left upper arm, initial encounter        Plan:         Cellulitis of left upper arm  -     sulfamethoxazole-trimethoprim 800-160mg (BACTRIM DS) 800-160 mg Tab; Take 1 tablet by mouth 2 (two) times daily.  Dispense: 14 tablet; Refill: 0    Contusion of left upper arm, initial encounter

## 2020-04-03 ENCOUNTER — PATIENT MESSAGE (OUTPATIENT)
Dept: INTERNAL MEDICINE | Facility: CLINIC | Age: 75
End: 2020-04-03

## 2020-04-03 DIAGNOSIS — E03.8 OTHER SPECIFIED HYPOTHYROIDISM: ICD-10-CM

## 2020-04-03 DIAGNOSIS — E78.00 PURE HYPERCHOLESTEROLEMIA: Primary | ICD-10-CM

## 2020-04-03 DIAGNOSIS — R73.03 PRE-DIABETES: ICD-10-CM

## 2020-04-13 ENCOUNTER — PATIENT MESSAGE (OUTPATIENT)
Dept: SPORTS MEDICINE | Facility: CLINIC | Age: 75
End: 2020-04-13

## 2020-04-16 ENCOUNTER — PATIENT MESSAGE (OUTPATIENT)
Dept: SPORTS MEDICINE | Facility: CLINIC | Age: 75
End: 2020-04-16

## 2020-04-20 ENCOUNTER — OFFICE VISIT (OUTPATIENT)
Dept: SPORTS MEDICINE | Facility: CLINIC | Age: 75
End: 2020-04-20
Payer: MEDICARE

## 2020-04-20 DIAGNOSIS — G89.29 CHRONIC RIGHT SHOULDER PAIN: Primary | ICD-10-CM

## 2020-04-20 DIAGNOSIS — M25.511 CHRONIC RIGHT SHOULDER PAIN: Primary | ICD-10-CM

## 2020-04-20 PROCEDURE — 99211 OFF/OP EST MAY X REQ PHY/QHP: CPT | Mod: 95,,, | Performed by: ORTHOPAEDIC SURGERY

## 2020-04-20 PROCEDURE — 99211 PR OFFICE/OUTPT VISIT, EST, LEVL I: ICD-10-PCS | Mod: 95,,, | Performed by: ORTHOPAEDIC SURGERY

## 2020-04-20 NOTE — PROGRESS NOTES
Telemedicine/Virtual Visit Documentation:     The patient location is: home    The chief complaint leading to consultation is: see HPI    VISIT TYPE X   Virtual visit with synchronous audio and video x   Telephone E/M service      Total time spent with patient: see X chago on chart below.   More than half of the time was spent counseling or coordinating care including prognosis, differential diagnosis, risks and benefits of treatment, instructions, compliance risk reductions     EST MINUTES X   [id number] 5 x   \[id number] 10    14995 15    [id number] 25    62015 40    NEW     27449 10    [id number] 20    [id number] 30    29945 45    24261 60    PHONE      5-10    [id number] 11-20    43749 21-30          CC: Right shoulder post op 12  months    Eugenio Aldridge returns to clinic for follow up evaluation.  She is currently in the Wellness program doing her exercise on a daily basis, because her insurance will no longer provide physical therapy.  She reports that she has improved compared to her prior visit with regards to range of motion and pain. Reports that she is still unable to bring her arm up and behind her head for, which is required to blow drying her hair and she is aggravated because of this.  She also reports that she is unable to bring her arm up is for she would like to behind her back.  No other concerns or complaints.    Interval history 4/20/2020:  Patient presents for follow up today. She continues to have some issues functionally with the arm as described above. No new traumas or falls. Otherwise well. Working with PT.    DATE OF SURGERY:   4/24/19       PREOPERATIVE DIAGNOSES:   1. Right shoulder rotator cuff tear (massive / complex) - supraspinatus / infraspinatus  2. Right shoulder labral tear / biceps tendinopathy  3. Right shoulder AC joint arthritis     POSTOPERATIVE DIAGNOSES:   1. Right shoulder rotator cuff tear (massive / complex) - supraspinatus / infraspinatus  2. Right shoulder labral tear / biceps rupture  3. Right  shoulder AC joint arthritis     PROCEDURE:   1. Right shoulder arthroscopic rotator cuff repair (massive / complex) - supraspinatus / infraspinatus.  2. Placement of a right shoulder collagen scaffold allograft augmentation on posterosuperior rotator cuff repair  3. Right shoulder arthroscopic debridement labrum  / biceps stump  4. Right shoulder arthroscopic subacromial decompression.   5. Right shoulder arthroscopic distal clavicle excision     SURGEON: Walter Hernandez M.D.     Review of Systems   Constitution: Negative. Negative for chills, fever and night sweats.    Cardiovascular: Negative for chest pain and syncope.   Respiratory: Negative for cough and shortness of breath.   Gastrointestinal: Negative for abdominal pain and bowel incontinence.     PE:  There were no vitals filed for this visit.  Right shoulder  Incision healed    AROM (PROM)  Forward elevation 130 degrees, with scapular dyskenesia   External rotation -10 degrees  IR L4    Assessment:   rotator cuff surgery recovery at 12 months    Plan:  1. Continue daily exercises  2. F/U 3 mo for interval exam.  3. PT referral placed. Also discussed potential rTSA should her issues continue and she desire further operative measures.

## 2020-05-01 RX ORDER — ALENDRONATE SODIUM 70 MG/1
TABLET ORAL
Qty: 12 TABLET | Refills: 3 | Status: SHIPPED | OUTPATIENT
Start: 2020-05-01 | End: 2020-08-20 | Stop reason: ALTCHOICE

## 2020-06-02 ENCOUNTER — OFFICE VISIT (OUTPATIENT)
Dept: OPTOMETRY | Facility: CLINIC | Age: 75
End: 2020-06-02
Payer: MEDICARE

## 2020-06-02 DIAGNOSIS — H25.13 NUCLEAR SCLEROSIS OF BOTH EYES: Primary | ICD-10-CM

## 2020-06-02 DIAGNOSIS — H52.7 REFRACTIVE ERROR: ICD-10-CM

## 2020-06-02 PROCEDURE — 92015 PR REFRACTION: ICD-10-PCS | Mod: ICN,,, | Performed by: OPTOMETRIST

## 2020-06-02 PROCEDURE — 99212 OFFICE O/P EST SF 10 MIN: CPT | Mod: PBBFAC,PO | Performed by: OPTOMETRIST

## 2020-06-02 PROCEDURE — 99999 PR PBB SHADOW E&M-EST. PATIENT-LVL II: CPT | Mod: PBBFAC,,, | Performed by: OPTOMETRIST

## 2020-06-02 PROCEDURE — 92012 INTRM OPH EXAM EST PATIENT: CPT | Mod: S$PBB,ICN,, | Performed by: OPTOMETRIST

## 2020-06-02 PROCEDURE — 92012 PR EYE EXAM, EST PATIENT,INTERMED: ICD-10-PCS | Mod: S$PBB,ICN,, | Performed by: OPTOMETRIST

## 2020-06-02 PROCEDURE — 92015 DETERMINE REFRACTIVE STATE: CPT | Mod: ICN,,, | Performed by: OPTOMETRIST

## 2020-06-02 PROCEDURE — 99999 PR PBB SHADOW E&M-EST. PATIENT-LVL II: ICD-10-PCS | Mod: PBBFAC,,, | Performed by: OPTOMETRIST

## 2020-06-02 NOTE — PROGRESS NOTES
HPI     DLS 07/01/2019  Patient here for problems with near vision. Distance vision seems to be   okay.  Blur ou at dist, x mos, no assoc pain or red, no relief over time,   constant  Patient deny HA+  FLASHES-  FLOATERS-  DIPLOPLIA-  PHOTOPHOBIA-    Last edited by Lux Benitez, OD on 6/2/2020  9:11 AM. (History)            Assessment /Plan     For exam results, see Encounter Report.    Nuclear sclerosis of both eyes    Refractive error      1. Educated pt on presence of cataracts and effects on vision. No surgery at this time. Recheck in one year.  2. New Spec Rx given. Different lens options discussed with patient. RTC 1 year full exam.

## 2020-07-17 DIAGNOSIS — Z71.89 COMPLEX CARE COORDINATION: ICD-10-CM

## 2020-08-03 ENCOUNTER — CLINICAL SUPPORT (OUTPATIENT)
Dept: AUDIOLOGY | Facility: CLINIC | Age: 75
End: 2020-08-03
Payer: MEDICARE

## 2020-08-03 DIAGNOSIS — H90.3 SENSORINEURAL HEARING LOSS (SNHL) OF BOTH EARS: Primary | ICD-10-CM

## 2020-08-03 PROCEDURE — 92557 COMPREHENSIVE HEARING TEST: CPT | Mod: PBBFAC | Performed by: AUDIOLOGIST

## 2020-08-03 PROCEDURE — 99211 OFF/OP EST MAY X REQ PHY/QHP: CPT | Mod: PBBFAC,25 | Performed by: PHYSICIAN ASSISTANT

## 2020-08-03 PROCEDURE — 99999 PR PBB SHADOW E&M-EST. PATIENT-LVL I: CPT | Mod: PBBFAC,,, | Performed by: PHYSICIAN ASSISTANT

## 2020-08-03 PROCEDURE — 99999 PR PBB SHADOW E&M-EST. PATIENT-LVL I: ICD-10-PCS | Mod: PBBFAC,,, | Performed by: PHYSICIAN ASSISTANT

## 2020-08-03 PROCEDURE — 92567 TYMPANOMETRY: CPT | Mod: PBBFAC | Performed by: AUDIOLOGIST

## 2020-08-03 NOTE — PROGRESS NOTES
Eugenio Aldridge was seen today in the clinic for an audiologic evaluation.  Patients main complaint was difficulty understanding her .  Mrs. Aldridge reported her  complains she turns the tv volume up too loud.    Tympanometry revealed Type A in the right ear and Type A in the left ear.  Audiogram results revealed a mild to moderate SNHL in the right ear and a moderate SNHL in the left ear.  Significant difference in hearing thresholds at 500 Hz only.  Speech reception thresholds were noted at 35 dB in the right ear and 30 dB in the left ear.  Speech discrimination scores were 100% in the right ear and 100% in the left ear.    Discussed hearing aids briefly with patient after the exam.    Recommendations:  1. Otologic evaluation  2. Annual audiogram  3. Noise protection when in noise  4. Hearing aid consultation upon medical clearance

## 2020-08-05 ENCOUNTER — OFFICE VISIT (OUTPATIENT)
Dept: OTOLARYNGOLOGY | Facility: CLINIC | Age: 75
End: 2020-08-05
Payer: MEDICARE

## 2020-08-05 DIAGNOSIS — H61.23 BILATERAL IMPACTED CERUMEN: ICD-10-CM

## 2020-08-05 DIAGNOSIS — H90.3 SENSORINEURAL HEARING LOSS (SNHL) OF BOTH EARS: Primary | ICD-10-CM

## 2020-08-05 PROCEDURE — 69210 REMOVE IMPACTED EAR WAX UNI: CPT | Mod: S$PBB,ICN,, | Performed by: NURSE PRACTITIONER

## 2020-08-05 PROCEDURE — 69210 REMOVE IMPACTED EAR WAX UNI: CPT | Mod: 50,PBBFAC | Performed by: NURSE PRACTITIONER

## 2020-08-05 PROCEDURE — 99213 PR OFFICE/OUTPT VISIT, EST, LEVL III, 20-29 MIN: ICD-10-PCS | Mod: 25,S$PBB,ICN, | Performed by: NURSE PRACTITIONER

## 2020-08-05 PROCEDURE — 69210 EAR CERUMEN REMOVAL: ICD-10-PCS | Mod: S$PBB,ICN,, | Performed by: NURSE PRACTITIONER

## 2020-08-05 PROCEDURE — 99999 PR PBB SHADOW E&M-EST. PATIENT-LVL II: ICD-10-PCS | Mod: PBBFAC,,, | Performed by: NURSE PRACTITIONER

## 2020-08-05 PROCEDURE — 99999 PR PBB SHADOW E&M-EST. PATIENT-LVL II: CPT | Mod: PBBFAC,,, | Performed by: NURSE PRACTITIONER

## 2020-08-05 PROCEDURE — 99212 OFFICE O/P EST SF 10 MIN: CPT | Mod: PBBFAC,25 | Performed by: NURSE PRACTITIONER

## 2020-08-05 PROCEDURE — 99213 OFFICE O/P EST LOW 20 MIN: CPT | Mod: 25,S$PBB,ICN, | Performed by: NURSE PRACTITIONER

## 2020-08-05 NOTE — PROCEDURES
Ear Cerumen Removal    Date/Time: 8/5/2020 1:00 PM  Performed by: Ravinder Reed NP  Authorized by: Ravinder Reed NP     Consent Done?:  Yes (Verbal)  Location details:  Both ears  Procedure type: curette    Cerumen  Removal Results:  Cerumen completely removed  Patient tolerance:  Patient tolerated the procedure well with no immediate complications     Procedure Note:    The patient was brought to the minor procedure room and placed under the operating microscope of the left ear canal which was cleaned of ceruminous debris. Using a combination of suction, curettes and cup forceps the patient's cerumen impaction was removed. The tympanic membrane was evaluated and was unremarkable. The patient tolerated the procedure well. There were no complications.  Procedure Note:    Patient was brought to the minor procedure room and using the operating microscope of the right ear canal which was cleaned of ceruminous debris. There was a significant cerumen impaction.  Using a combination of suction, curettes and cup forceps the patient's cerumen impaction was removed. Tympanic membrane intact. Pt tolerated well. There were no complications.

## 2020-08-05 NOTE — PROGRESS NOTES
Subjective:      Eugenio Aldridge is a 74 y.o. female who was self-referred for hearing loss.    Ms. Aldridge reports decreased hearing in both ears for the past several years. She states she has to frequency ask her  to repeat what he is saying. She denies tinnitus, ear pain, ear drainage or dizziness.  There is not a family history of hearing loss at a young age.  There is not a prior history of ear surgery.  There is not a prior history of ear infections .  She admits to a history of significant noise exposure, she was an  for many years.  She does not wear hearing aids currently.  She has not had a hearing test recently.        Past Medical History  She has a past medical history of Actinic keratosis, Aortic valve disorders, Atherosclerosis of aorta, Atherosclerosis of native arteries of the extremities with intermittent claudication, Carcinoma in situ, vulva, Left bundle branch hemiblock, Other and unspecified hyperlipidemia, Postinflammatory pulmonary fibrosis, and Senile nuclear sclerosis.    Past Surgical History  She has a past surgical history that includes Vulva surgery; Rhytidectomy; BTL; TONSILLECTOMY, ADENOIDECTOMY; Arthroscopic repair of rotator cuff of shoulder (Right, 4/24/2019); Decompression of subacromial space (Right, 4/24/2019); and Shoulder arthroscopy (Right, 4/24/2019).    Family History  Her family history includes Alzheimer's disease (age of onset: 96) in her mother; Bladder Cancer in her paternal uncle; Breast cancer in her paternal grandmother; Cancer in her father; Lung cancer in her paternal uncle; No Known Problems in her sister, sister, and sister; Other in her brother; Pancreatic cancer in her paternal aunt; Pneumonia in her brother.    Social History  She reports that she quit smoking about 18 years ago. Her smoking use included cigarettes. She has a 33.00 pack-year smoking history. She does not have any smokeless tobacco history on file. She reports current  alcohol use of about 5.8 standard drinks of alcohol per week. She reports that she does not use drugs.    Allergies  She is allergic to codeine.    Medications  She has a current medication list which includes the following prescription(s): alendronate, aspirin, atorvastatin, calcium carbonate, cyanocobalamin (vitamin b-12), estradiol, levothyroxine, multivitamin, and sulfamethoxazole-trimethoprim 800-160mg.    Review of Systems   Constitutional: Negative for chills, fever and unexpected weight change.   HENT: Positive for hearing loss. Negative for congestion, ear discharge, ear pain, facial swelling, postnasal drip, sinus pressure, sore throat, tinnitus and trouble swallowing.    Eyes: Negative for pain and visual disturbance.   Respiratory: Positive for shortness of breath. Negative for apnea.    Cardiovascular: Negative for chest pain and palpitations.   Gastrointestinal: Negative for abdominal pain and nausea.   Endocrine: Negative for cold intolerance and heat intolerance.   Musculoskeletal: Negative for joint swelling and neck stiffness.   Skin: Negative for color change and rash.   Neurological: Negative for dizziness, facial asymmetry and headaches.   Hematological: Negative for adenopathy. Bruises/bleeds easily.   Psychiatric/Behavioral: Negative for agitation. The patient is not nervous/anxious.           Objective:     There were no vitals taken for this visit.     Constitutional:   Vital signs are normal. She appears well-developed and well-nourished.     Head:  Normocephalic and atraumatic.     Ears:    Right Ear: No lacerations. No drainage, swelling or tenderness. No foreign bodies. No mastoid tenderness. Tympanic membrane is not injected, not scarred, not perforated, not erythematous, not retracted and not bulging. Tympanic membrane mobility is normal. No middle ear effusion. No hemotympanum. Decreased hearing is noted.   Left Ear: No lacerations. No drainage, swelling or tenderness. No foreign  bodies. No mastoid tenderness. Tympanic membrane is not injected, not scarred, not perforated, not erythematous, not retracted and not bulging. Tympanic membrane mobility is normal.  No middle ear effusion. No hemotympanum. Decreased hearing is noted.     Nose:  Nose normal including turbinates, nasal mucosa, sinuses and nasal septum.     Neck:  Neck normal without thyromegaly masses, asymmetry, normal tracheal structure, crepitus, and tenderness and no adenopathy.     Psychiatric:   She has a normal mood and affect.       Procedure    None      Data Reviewed    WBC (K/uL)   Date Value   04/03/2019 5.29     Platelets (K/uL)   Date Value   04/03/2019 276      Creatinine (mg/dL)   Date Value   09/04/2019 0.8     TSH (uIU/mL)   Date Value   04/03/2019 2.981     Glucose (mg/dL)   Date Value   09/04/2019 93     Hemoglobin A1C (%)   Date Value   03/12/2018 5.0       I independently reviewed the tracings of the complete audiometric evaluation performed today.  I reviewed the audiogram with the patient as well.  Pertinent findings include bilateral mild to moderate SNHL. Right SRT 35 with 100% discrimination at 65db. Left SRT 30 with 100% discrimination at 65db. Type A tympanogram in both ears..         Assessment:     1. Sensorineural hearing loss (SNHL) of both ears    2. Bilateral impacted cerumen         Plan:     I had a long discussion with the patient regarding her condition and the further workup and management options.  Cerumen impaction removed under microscope.    Audiogram Reviewed: Bilateral SNHL.  Hearing conservation strongly recommended.  Trial of amplification bilaterally also recommended. Meenu Watts's card provided to patient.    Re-check of hearing in 1 year or sooner if subjective change noted.      Follow up in about 1 year (around 8/5/2021).

## 2020-08-20 ENCOUNTER — CLINICAL SUPPORT (OUTPATIENT)
Dept: AUDIOLOGY | Facility: CLINIC | Age: 75
End: 2020-08-20
Payer: MEDICARE

## 2020-08-20 DIAGNOSIS — H90.3 SENSORINEURAL HEARING LOSS (SNHL) OF BOTH EARS: Primary | ICD-10-CM

## 2020-08-20 PROCEDURE — 99499 UNLISTED E&M SERVICE: CPT | Mod: S$PBB,,, | Performed by: AUDIOLOGIST

## 2020-08-20 PROCEDURE — 99499 NO LOS: ICD-10-PCS | Mod: S$PBB,,, | Performed by: AUDIOLOGIST

## 2020-08-20 RX ORDER — IBANDRONATE SODIUM 150 MG/1
TABLET, FILM COATED ORAL
Qty: 12 TABLET | Refills: 3 | Status: SHIPPED | OUTPATIENT
Start: 2020-08-20 | End: 2021-03-29

## 2020-08-20 NOTE — PROGRESS NOTES
Eugenio Aldridge arrived today for a hearing aid consultation. Eugenio Aldridge has a history of a bilateral sensorineural hearing loss, and reported difficulty hearing and understanding conversations. Mrs. Aldridge is interested in a hearing aid that is discrete and will last her several years.     Pricing and styles were discussed and two hearing aids were recommended based on the patient's hearing evaluation and her lifestyle.  [facility]'s hearing aid policy, trial period, and payments were discussed at length and patient agreed on terms.    Patient agreed upon:    : [employer]  Model:  Correx 330 YellowPepper Rechargeable   size: Medium   length: 2 L/R  Color: larson brown    Appointment was made for patient to return on 8/31/2020 to be fit with her new hearing aids.

## 2020-08-28 ENCOUNTER — CLINICAL SUPPORT (OUTPATIENT)
Dept: AUDIOLOGY | Facility: CLINIC | Age: 75
End: 2020-08-28
Payer: MEDICARE

## 2020-08-28 DIAGNOSIS — H90.3 SENSORINEURAL HEARING LOSS (SNHL) OF BOTH EARS: Primary | ICD-10-CM

## 2020-08-28 PROCEDURE — 99499 UNLISTED E&M SERVICE: CPT | Mod: S$GLB,,, | Performed by: AUDIOLOGIST

## 2020-08-28 PROCEDURE — 99499 NO LOS: ICD-10-PCS | Mod: S$GLB,,, | Performed by: AUDIOLOGIST

## 2020-08-28 NOTE — PROGRESS NOTES
HEARING AID EVALUATION  Patient:  Eugenio Aldridge     Patient was seen today for a hearing aid evaluation.  She was fit with the following devices:       Widex Moment mRIC R D Gld [id number] LI Rechargeable   Rt SN [id number]   Lt SN [id number]   : M2, V2   Dome: Large Open Dome  L/D and Repair 9/20/23      SN [id number]   L/D Exp 8/20/20   Repair 9/20/23      Fit good and patient reported good subjective benefit.  Charging, proper insertion and removal was demonstrated and practiced.  Proper care and maintenance was discussed. Hearing aids were paired to patients iPhone and she demonstrated good usage. Purchase agreement was reviewed and signed.  Patient acknowledged understanding of the 30 day trial period, $250 restocking fee, and the fact that we do not file insurance on behalf of the patient. Pt should follow up in two weeks.

## 2020-09-08 ENCOUNTER — PATIENT OUTREACH (OUTPATIENT)
Dept: ADMINISTRATIVE | Facility: HOSPITAL | Age: 75
End: 2020-09-08

## 2020-09-08 DIAGNOSIS — Z12.11 COLON CANCER SCREENING: Primary | ICD-10-CM

## 2020-09-09 ENCOUNTER — DOCUMENTATION ONLY (OUTPATIENT)
Dept: ADMINISTRATIVE | Facility: HOSPITAL | Age: 75
End: 2020-09-09

## 2020-09-10 ENCOUNTER — CLINICAL SUPPORT (OUTPATIENT)
Dept: AUDIOLOGY | Facility: CLINIC | Age: 75
End: 2020-09-10
Payer: MEDICARE

## 2020-09-10 DIAGNOSIS — H90.3 SENSORINEURAL HEARING LOSS (SNHL) OF BOTH EARS: Primary | ICD-10-CM

## 2020-09-10 PROCEDURE — 99499 UNLISTED E&M SERVICE: CPT | Mod: S$PBB,,, | Performed by: AUDIOLOGIST

## 2020-09-10 PROCEDURE — 99499 NO LOS: ICD-10-PCS | Mod: S$PBB,,, | Performed by: AUDIOLOGIST

## 2020-09-10 NOTE — PROGRESS NOTES
HEARING AID FOLLOW-UP:  Mrs. Aldridge arrived for her 2 week follow-up following her initial fit.  Mrs. Aldridge reported that she is doing very well with her hearing aid after initially struggling with insertion.  Patient reported that while she had not noticed a big difference in her hearing her  pointed out that she is not asking him to repeat himself anymore.  Mrs. Aldridge did not need any additional programming adjustments today but was still connected to programming software to reveal data logging information.  Data logging revealed excellent wear time and Mrs. Aldridge was encouraged to keep this up.    Mrs. Aldridge was counseled and educated on wax guards and domes and when to change them.  She was also given a pack of large open domes for backup.  Patient decided that she does not want to return prior to the end of her trial period and is satisfied with her aids that she will keep them.  Patient would like to return in 3 months.    RECOMMENDATIONS:  1. Return in 3 months for a progress check or sooner if concerns arise.  2. Continue to utilize hearing aids for all waking hours  3. Annual audiogram  4. Utilize hearing protection in noise

## 2020-09-16 ENCOUNTER — LAB VISIT (OUTPATIENT)
Dept: LAB | Facility: HOSPITAL | Age: 75
End: 2020-09-16
Attending: INTERNAL MEDICINE
Payer: MEDICARE

## 2020-09-16 DIAGNOSIS — Z12.11 COLON CANCER SCREENING: ICD-10-CM

## 2020-09-16 PROCEDURE — 82274 ASSAY TEST FOR BLOOD FECAL: CPT

## 2020-09-17 ENCOUNTER — TELEPHONE (OUTPATIENT)
Dept: INTERNAL MEDICINE | Facility: CLINIC | Age: 75
End: 2020-09-17

## 2020-09-17 LAB — HEMOCCULT STL QL IA: NEGATIVE

## 2020-09-17 NOTE — TELEPHONE ENCOUNTER
----- Message from Suha Salguero MD sent at 9/17/2020 10:42 AM CDT -----  Please note that your FitKit that tested your stool for blood was negative.  Suha Clifton

## 2020-09-22 ENCOUNTER — LAB VISIT (OUTPATIENT)
Dept: LAB | Facility: HOSPITAL | Age: 75
End: 2020-09-22
Attending: INTERNAL MEDICINE
Payer: MEDICARE

## 2020-09-22 DIAGNOSIS — E03.8 OTHER SPECIFIED HYPOTHYROIDISM: ICD-10-CM

## 2020-09-22 DIAGNOSIS — E78.00 PURE HYPERCHOLESTEROLEMIA: ICD-10-CM

## 2020-09-22 DIAGNOSIS — R73.03 PRE-DIABETES: ICD-10-CM

## 2020-09-22 LAB
ALBUMIN SERPL BCP-MCNC: 3.7 G/DL (ref 3.5–5.2)
ALP SERPL-CCNC: 58 U/L (ref 55–135)
ALT SERPL W/O P-5'-P-CCNC: 22 U/L (ref 10–44)
ANION GAP SERPL CALC-SCNC: 11 MMOL/L (ref 8–16)
AST SERPL-CCNC: 25 U/L (ref 10–40)
BASOPHILS # BLD AUTO: 0.05 K/UL (ref 0–0.2)
BASOPHILS NFR BLD: 1 % (ref 0–1.9)
BILIRUB SERPL-MCNC: 0.4 MG/DL (ref 0.1–1)
BUN SERPL-MCNC: 15 MG/DL (ref 8–23)
CALCIUM SERPL-MCNC: 9.5 MG/DL (ref 8.7–10.5)
CHLORIDE SERPL-SCNC: 108 MMOL/L (ref 95–110)
CHOLEST SERPL-MCNC: 167 MG/DL (ref 120–199)
CHOLEST/HDLC SERPL: 2 {RATIO} (ref 2–5)
CO2 SERPL-SCNC: 27 MMOL/L (ref 23–29)
CREAT SERPL-MCNC: 0.7 MG/DL (ref 0.5–1.4)
DIFFERENTIAL METHOD: ABNORMAL
EOSINOPHIL # BLD AUTO: 0.2 K/UL (ref 0–0.5)
EOSINOPHIL NFR BLD: 3.9 % (ref 0–8)
ERYTHROCYTE [DISTWIDTH] IN BLOOD BY AUTOMATED COUNT: 14.8 % (ref 11.5–14.5)
EST. GFR  (AFRICAN AMERICAN): >60 ML/MIN/1.73 M^2
EST. GFR  (NON AFRICAN AMERICAN): >60 ML/MIN/1.73 M^2
ESTIMATED AVG GLUCOSE: 108 MG/DL (ref 68–131)
GLUCOSE SERPL-MCNC: 88 MG/DL (ref 70–110)
HBA1C MFR BLD HPLC: 5.4 % (ref 4–5.6)
HCT VFR BLD AUTO: 46.7 % (ref 37–48.5)
HDLC SERPL-MCNC: 83 MG/DL (ref 40–75)
HDLC SERPL: 49.7 % (ref 20–50)
HGB BLD-MCNC: 14.2 G/DL (ref 12–16)
IMM GRANULOCYTES # BLD AUTO: 0.02 K/UL (ref 0–0.04)
IMM GRANULOCYTES NFR BLD AUTO: 0.4 % (ref 0–0.5)
LDLC SERPL CALC-MCNC: 73 MG/DL (ref 63–159)
LYMPHOCYTES # BLD AUTO: 1.2 K/UL (ref 1–4.8)
LYMPHOCYTES NFR BLD: 23.9 % (ref 18–48)
MCH RBC QN AUTO: 30.6 PG (ref 27–31)
MCHC RBC AUTO-ENTMCNC: 30.4 G/DL (ref 32–36)
MCV RBC AUTO: 101 FL (ref 82–98)
MONOCYTES # BLD AUTO: 0.5 K/UL (ref 0.3–1)
MONOCYTES NFR BLD: 10.5 % (ref 4–15)
NEUTROPHILS # BLD AUTO: 3 K/UL (ref 1.8–7.7)
NEUTROPHILS NFR BLD: 60.3 % (ref 38–73)
NONHDLC SERPL-MCNC: 84 MG/DL
NRBC BLD-RTO: 0 /100 WBC
PLATELET # BLD AUTO: 277 K/UL (ref 150–350)
PMV BLD AUTO: 10.1 FL (ref 9.2–12.9)
POTASSIUM SERPL-SCNC: 4.2 MMOL/L (ref 3.5–5.1)
PROT SERPL-MCNC: 6.5 G/DL (ref 6–8.4)
RBC # BLD AUTO: 4.64 M/UL (ref 4–5.4)
SODIUM SERPL-SCNC: 146 MMOL/L (ref 136–145)
T4 FREE SERPL-MCNC: 0.99 NG/DL (ref 0.71–1.51)
TRIGL SERPL-MCNC: 55 MG/DL (ref 30–150)
TSH SERPL DL<=0.005 MIU/L-ACNC: 4 UIU/ML (ref 0.4–4)
WBC # BLD AUTO: 4.93 K/UL (ref 3.9–12.7)

## 2020-09-22 PROCEDURE — 36415 COLL VENOUS BLD VENIPUNCTURE: CPT | Mod: PO

## 2020-09-22 PROCEDURE — 84439 ASSAY OF FREE THYROXINE: CPT

## 2020-09-22 PROCEDURE — 84443 ASSAY THYROID STIM HORMONE: CPT

## 2020-09-22 PROCEDURE — 85025 COMPLETE CBC W/AUTO DIFF WBC: CPT

## 2020-09-22 PROCEDURE — 80061 LIPID PANEL: CPT

## 2020-09-22 PROCEDURE — 80053 COMPREHEN METABOLIC PANEL: CPT

## 2020-09-22 PROCEDURE — 83036 HEMOGLOBIN GLYCOSYLATED A1C: CPT

## 2020-09-23 ENCOUNTER — TELEPHONE (OUTPATIENT)
Dept: INTERNAL MEDICINE | Facility: CLINIC | Age: 75
End: 2020-09-23

## 2020-09-23 NOTE — TELEPHONE ENCOUNTER
----- Message from Suha Salguero MD sent at 9/22/2020  5:06 PM CDT -----  Please review your lab work and we will discuss at your pending office visit.  Suha Clifton

## 2020-09-25 ENCOUNTER — TELEPHONE (OUTPATIENT)
Dept: AUDIOLOGY | Facility: CLINIC | Age: 75
End: 2020-09-25

## 2020-09-25 NOTE — TELEPHONE ENCOUNTER
Patient called asking for procedure code for her hearing aid fitting appointment on 8/20/2020.  Patient was told the code is [id number] BTEs.    Patient will call back if she has any additional questions.

## 2020-09-28 NOTE — PROGRESS NOTES
75 y.o. femalepresents in f/u to hypothyroidism, PAD, dyslipidemia, thrombocytosis, and  Post inflammatory fibrosis  Review of risks and complications associated w/ her long term chronic problems, and Health Maintenance    Dyslipidemia tx w/ atorvastatin  Denied unusual muscle pain or chest pain  ++ night cramps,very painful  LDL==>73    Pre-Diabetes, A1C ==>5.4  FBS==>    Hypothyroidism, tx w/ levothyroxine 50mcg  Denied fatigue or cold intolerance  TSH==>4.003  Free T4==> 99    PAD, tx w/ atorvastatin,   No reported claudication, walking [place] daily    Thrombocytosis, recsolved, tx ASA and Fish oil   Platelet ct==>normalized @ 277K   Denied stroke like sx, confusion , or focal deficits    Post inflammatory fibrosis  Pt was in study w/ yearly   D/c'd tobacco > 15 yrs  SOB w/ activities  Asx at rest  Denied angina or syncope associated   Denied heart palpitations    Right shoulder surgery  PT ongoing      ROS:  No fever, or chills  No blurry vision or visual disturbance  No dysphagia or early satiety  No palpitations or tachycardia  No angina or chest pain  No cough or wheezing  No GERD or abdominal pain  No blood in stool or urine  No dysuria or hematuria  No numbness or focal deficits  No cold intolerance; + hot flashes  No increased thirst or urination  Answers for HPI/ROS submitted by the patient on 9/28/2020   activity change: Yes- decreased, but back [place]  unexpected weight change: No  neck pain: No  hearing loss: Yes- new hearing aids  rhinorrhea: Yes- =+ worse in AM, lasts one hour after brushing teeth in morning  trouble swallowing: No  eye discharge: Yes- grainy like sand B/L in AM; no vision disturbance  Recent eye exam no change in glasses , TX eye flush  visual disturbance: No  chest tightness: No  wheezing: No  chest pain: No  palpitations: No  blood in stool: No  constipation: No  vomiting: No  diarrhea: No  polydipsia: No  polyuria: No  difficulty urinating: No  hematuria: No  menstrual  problem: No  dysuria: No  joint swelling: No  arthralgias: No  headaches: No  weakness: No  confusion: No  dysphoric mood: No      ADL's: 100% independent, s/p shoulder surgery unable to lift the arm  And still in PT 1 year, 7 mos  Memory: Good  Mental Health:Good  AD's: + living , living will, and M/POA  Nutrition:Good  Gait: Normal, no falls 2020- careful   Safety:Intact  Urinary incontinence: N/a  Remainder of review negative exgcept as previously noted      SCREENING TESTS:   Cholesterol/lab, pending.   Colonoscopy 05/2009 with Dr. Carlin.   Recommended 7 years.   Mammogram UTD  Pap smear with GYN, Dr. Cason.   Bone density 2018-- RX FOSAMAX                  - pt doesn't like having weekly dosing  Has 2 prescriptions, to call w/ the one she is taking to clarify the frequency, as the Boniva would be monthly that pt would prefer  Impression     OSTEOPOROSIS WITH A SIGNIFICANT DECREASE OF 12.2% IN THE HIP BMD COMPARED WITH THE PRIOR STUDY.  RECOMMENDATIONS of [facility]:    1.  Calcium 1200 mg daily and vitamin D 800-1000 units daily, adequate exercise.    2.  Recommended therapy Consider bisphosphonates        EYE exam UTD  Dental UTD.    VACCINATIONS:  Tdap, 7/2014  Pneumovax 9/08, update 1/2017  Prevnar, 2015  Zostavax 2010  FLU, yearly    MEDS: Reviewed.     PHYSICAL EXAM:  VS: As noted  GENERAL: WDWN, A&O, NAD, conversant and co-operative; pleasant as always  EYES: Conj/lids unremarkable, sclera anicteric, PERRL,  rims pink  NECK: Supple w/o lymphadenopathy or thyromegaly  RESPIRATORY: Efforts are unlabored. Lungs CTAP  CARDIOVASCULAR: Heart RRR. No carotid bruits noted.  Diminished pedal pulses. No LE edema  GASTROINTESTINAL: BS+, soft , NT/ND, - HSM noted  MUSCULOSKELETAL: Gait normal. No CCE  Right shoulder decreased ROM  NEUROLOGIC: BARROW. No tremor noted  SKIN: Warm and dry    IMPRESSION:  Post inflammatory fibrosis, ? SOB related  SOB w/o angina  Hypothyroidism,  stable  HLD, stable  Aortic atherosclerosis, asx  PAD, stable  Pre-DM  Osteoporosis, on supplements    Shoulder pain, s/p surgery 4/2019, still in PT      PLAN:  CXR  EKG  Continue present meds  Vigorous hydration  ASA 81 daily  Calcium nightly  Vit D  Exercise as tolerated  Call prn  RTC 6 mos

## 2020-09-30 ENCOUNTER — HOSPITAL ENCOUNTER (OUTPATIENT)
Dept: RADIOLOGY | Facility: HOSPITAL | Age: 75
Discharge: HOME | End: 2020-09-30
Attending: INTERNAL MEDICINE
Payer: MEDICARE

## 2020-09-30 ENCOUNTER — OFFICE VISIT (OUTPATIENT)
Dept: INTERNAL MEDICINE | Facility: CLINIC | Age: 75
End: 2020-09-30
Payer: MEDICARE

## 2020-09-30 VITALS
OXYGEN SATURATION: 98 % | WEIGHT: 165.81 LBS | RESPIRATION RATE: 16 BRPM | TEMPERATURE: 97 F | DIASTOLIC BLOOD PRESSURE: 72 MMHG | BODY MASS INDEX: 32.55 KG/M2 | HEIGHT: 60 IN | SYSTOLIC BLOOD PRESSURE: 134 MMHG | HEART RATE: 85 BPM

## 2020-09-30 DIAGNOSIS — Z12.11 COLON CANCER SCREENING: ICD-10-CM

## 2020-09-30 DIAGNOSIS — Z78.0 POSTMENOPAUSAL ESTROGEN DEFICIENCY: ICD-10-CM

## 2020-09-30 DIAGNOSIS — R06.02 SOB (SHORTNESS OF BREATH): ICD-10-CM

## 2020-09-30 DIAGNOSIS — E78.00 PURE HYPERCHOLESTEROLEMIA: ICD-10-CM

## 2020-09-30 DIAGNOSIS — I73.9 PAD (PERIPHERAL ARTERY DISEASE): ICD-10-CM

## 2020-09-30 DIAGNOSIS — R73.03 PRE-DIABETES: ICD-10-CM

## 2020-09-30 DIAGNOSIS — E03.8 OTHER SPECIFIED HYPOTHYROIDISM: ICD-10-CM

## 2020-09-30 DIAGNOSIS — J84.10 POSTINFLAMMATORY PULMONARY FIBROSIS: Primary | ICD-10-CM

## 2020-09-30 DIAGNOSIS — I70.0 ATHEROSCLEROSIS OF AORTA: ICD-10-CM

## 2020-09-30 PROCEDURE — 99215 OFFICE O/P EST HI 40 MIN: CPT | Mod: PBBFAC,25,PO | Performed by: INTERNAL MEDICINE

## 2020-09-30 PROCEDURE — 99999 PR PBB SHADOW E&M-EST. PATIENT-LVL V: ICD-10-PCS | Mod: PBBFAC,,, | Performed by: INTERNAL MEDICINE

## 2020-09-30 PROCEDURE — 93010 EKG 12-LEAD: ICD-10-PCS | Mod: S$PBB,,, | Performed by: INTERNAL MEDICINE

## 2020-09-30 PROCEDURE — 93010 ELECTROCARDIOGRAM REPORT: CPT | Mod: S$PBB,,, | Performed by: INTERNAL MEDICINE

## 2020-09-30 PROCEDURE — 99214 PR OFFICE/OUTPT VISIT, EST, LEVL IV, 30-39 MIN: ICD-10-PCS | Mod: S$PBB,,, | Performed by: INTERNAL MEDICINE

## 2020-09-30 PROCEDURE — 93005 ELECTROCARDIOGRAM TRACING: CPT | Mod: PBBFAC,PO | Performed by: INTERNAL MEDICINE

## 2020-09-30 PROCEDURE — 71046 X-RAY EXAM CHEST 2 VIEWS: CPT | Mod: 26,,, | Performed by: RADIOLOGY

## 2020-09-30 PROCEDURE — 99999 PR PBB SHADOW E&M-EST. PATIENT-LVL V: CPT | Mod: PBBFAC,,, | Performed by: INTERNAL MEDICINE

## 2020-09-30 PROCEDURE — 71046 XR CHEST PA AND LATERAL: ICD-10-PCS | Mod: 26,,, | Performed by: RADIOLOGY

## 2020-09-30 PROCEDURE — 71046 X-RAY EXAM CHEST 2 VIEWS: CPT | Mod: TC,PO

## 2020-09-30 PROCEDURE — 99214 OFFICE O/P EST MOD 30 MIN: CPT | Mod: S$PBB,,, | Performed by: INTERNAL MEDICINE

## 2020-09-30 RX ORDER — AZITHROMYCIN 250 MG/1
TABLET, FILM COATED ORAL
COMMUNITY
End: 2020-10-08

## 2020-09-30 RX ORDER — MELOXICAM 7.5 MG/1
TABLET ORAL
COMMUNITY
End: 2020-10-08

## 2020-09-30 RX ORDER — ALENDRONATE SODIUM 70 MG/1
TABLET ORAL
COMMUNITY
End: 2020-12-16

## 2020-09-30 RX ORDER — INFLUENZA A VIRUS A/MICHIGAN/45/2015 X-275 (H1N1) ANTIGEN (FORMALDEHYDE INACTIVATED), INFLUENZA A VIRUS A/SINGAPORE/INFIMH-16-0019/2016 IVR-186 (H3N2) ANTIGEN (FORMALDEHYDE INACTIVATED), INFLUENZA B VIRUS B/PHUKET/3073/2013 ANTIGEN (FORMALDEHYDE INACTIVATED), AND INFLUENZA B VIRUS B/MARYLAND/15/2016 BX-69A ANTIGEN (FORMALDEHYDE INACTIVATED) 60; 60; 60; 60 UG/.7ML; UG/.7ML; UG/.7ML; UG/.7ML
INJECTION, SUSPENSION INTRAMUSCULAR
COMMUNITY
Start: 2020-08-18 | End: 2020-10-08

## 2020-10-01 ENCOUNTER — TELEPHONE (OUTPATIENT)
Dept: INTERNAL MEDICINE | Facility: CLINIC | Age: 75
End: 2020-10-01

## 2020-10-01 DIAGNOSIS — R06.02 SOB (SHORTNESS OF BREATH): Primary | ICD-10-CM

## 2020-10-01 NOTE — TELEPHONE ENCOUNTER
Please advise pt that her CXR and EKG were unremarkable and do not reveal the etiology  Of her shortness of breath  She has seen Cards in the past Dr. Hammer/NP, and re-establishing w/ them would be rec and they can help  Determine if further testing is needed.    Consult order placed

## 2020-10-06 ENCOUNTER — PES CALL (OUTPATIENT)
Dept: ADMINISTRATIVE | Facility: CLINIC | Age: 75
End: 2020-10-06

## 2020-10-08 ENCOUNTER — OFFICE VISIT (OUTPATIENT)
Dept: CARDIOLOGY | Facility: CLINIC | Age: 75
End: 2020-10-08
Payer: MEDICARE

## 2020-10-08 VITALS
WEIGHT: 164.81 LBS | DIASTOLIC BLOOD PRESSURE: 67 MMHG | SYSTOLIC BLOOD PRESSURE: 123 MMHG | HEIGHT: 63 IN | OXYGEN SATURATION: 97 % | BODY MASS INDEX: 29.2 KG/M2 | HEART RATE: 80 BPM

## 2020-10-08 DIAGNOSIS — I73.9 PAD (PERIPHERAL ARTERY DISEASE): ICD-10-CM

## 2020-10-08 DIAGNOSIS — E78.00 PURE HYPERCHOLESTEROLEMIA: ICD-10-CM

## 2020-10-08 DIAGNOSIS — R06.02 SOB (SHORTNESS OF BREATH): Primary | ICD-10-CM

## 2020-10-08 DIAGNOSIS — I70.0 ATHEROSCLEROSIS OF AORTA: ICD-10-CM

## 2020-10-08 PROCEDURE — 99215 OFFICE O/P EST HI 40 MIN: CPT | Mod: PBBFAC,PO | Performed by: INTERNAL MEDICINE

## 2020-10-08 PROCEDURE — 99999 PR PBB SHADOW E&M-EST. PATIENT-LVL V: ICD-10-PCS | Mod: PBBFAC,,, | Performed by: INTERNAL MEDICINE

## 2020-10-08 PROCEDURE — 99214 OFFICE O/P EST MOD 30 MIN: CPT | Mod: S$PBB,,, | Performed by: INTERNAL MEDICINE

## 2020-10-08 PROCEDURE — 99999 PR PBB SHADOW E&M-EST. PATIENT-LVL V: CPT | Mod: PBBFAC,,, | Performed by: INTERNAL MEDICINE

## 2020-10-08 PROCEDURE — 99214 PR OFFICE/OUTPT VISIT, EST, LEVL IV, 30-39 MIN: ICD-10-PCS | Mod: S$PBB,,, | Performed by: INTERNAL MEDICINE

## 2020-10-08 RX ORDER — ATORVASTATIN CALCIUM 40 MG/1
40 TABLET, FILM COATED ORAL DAILY
Qty: 90 TABLET | Refills: 3 | Status: SHIPPED | OUTPATIENT
Start: 2020-10-08 | End: 2021-05-04 | Stop reason: SDUPTHER

## 2020-10-08 NOTE — PATIENT INSTRUCTIONS
Understanding Peripheral Arterial Disease    Peripheral arteries deliver oxygen-rich blood to the tissues outside the heart. As you age, your arteries become stiffer and thicker. In addition, risk factors, such as smoking and high cholesterol, can damage the artery lining. This allows a buildup of fat and other materials (plaque) to form within the artery walls. The buildup of plaque narrows the space inside the artery and sometimes blocks blood flow. Peripheral arterial disease (PAD) happens when blood flow through the arteries is reduced because of plaque buildup. It often happens in the legs and feet, but can also happen elsewhere in the body. If this buildup happens in the a large artery in the neck (carotid artery), it can be a major contributor to stroke.  A healthy artery  An artery is a muscular tube that carries oxgen rich blood and nutrients from the heart to the rest of the body. It has a smooth lining and flexible walls that allow blood to pass freely. When active, muscles need more oxygen. This increases blood flow. Healthy arteries can adapt to meet this need.  A damaged artery    PAD begins when the lining of an artery is damaged. This is often because of risk factors, such as smoking, older age, or diabetes. Plaque then starts to form within the artery wall. At this stage, blood flows normally, so youre not likely to have symptoms.  A narrowed artery    If plaque continues to build up, the space inside the artery narrows. The artery walls become less able to expand. The artery still provides enough blood and oxygen to your muscles during rest. But when youre active, the increased demand for blood cant be met. As a result, your leg may cramp or ache when you walk.  A blocked artery    An artery can become blocked by plaque or by a blood clot lodged in a narrowed section. When this happens, oxygen cant reach the muscle below the blockage. Then you may feel pain when lying down or when you are not  active (rest pain). This type of pain is especially common at night when youre lying flat. In time, the affected tissue can die. This can lead to the loss of a toe or foot.  Date Last Reviewed: 5/1/2016  © 8137-2671 [employer]. [address] [place] [address]. All rights reserved. This information is not intended as a substitute for professional medical care. Always follow your healthcare professional's instructions.

## 2020-10-08 NOTE — LETTER
October 8, 2020      Suha Salguero MD  [address] [facility]  [place] [address]           [place] [facility]  [address] [facility]  [place] [address]  Phone: [phone]          Patient: Eugenio Aldridge   MR Number: [id number]   YOB: 1945   Date of Visit: 10/8/2020       Dear Dr. Suha Salguero:    Thank you for referring Eugenio Aldridge to me for evaluation. Attached you will find relevant portions of my assessment and plan of care.    If you have questions, please do not hesitate to call me. I look forward to following Eugenio Aldridge along with you.    Sincerely,    Chante Hammer MD    Enclosure  CC:  No Recipients    If you would like to receive this communication electronically, please contact [email or web address] or [phone] to request more information on [email or web address] Link access.    For providers and/or their staff who would like to refer a patient to [facility], please contact us through our one-stop-shop provider referral line, [facility] , at [phone].    If you feel you have received this communication in error or would no longer like to receive these types of communications, please e-mail [email or web address]

## 2020-10-08 NOTE — PROGRESS NOTES
Subjective:     Problem List:    Hypercholesterolemia  PAD  Tobacco use - 1-2 ppd for 22 years, quit 2001    HPI:   Eugenio Aldridge is a 75 y.o. female who presents for follow-up of Shortness of Breath  She reports shortness of breath with certain activities: laundry, getting food/snack from the kitchen, upper body exercises in physical therapy for the past few months  She has not noted a significant change in breathing [place]  She does not report exertional chest discomfort.    She has discomfort in the left leg on ambulation.  It occurs in the thigh. CT evaluation a few years ago revealed atherosclerosis in the distal abdominal aorta and iliac arteries.  LDL(c) is in the 70s on 20 mg of atorvastatin. Hepatic transaminases are within normal limits.       Review of Systems   Constitution: Negative for fever, malaise/fatigue, weight gain and weight loss.   HENT: Positive for hearing loss. Negative for nosebleeds.    Eyes: Negative for vision loss in left eye and vision loss in right eye.   Cardiovascular: Positive for dyspnea on exertion and palpitations. Negative for chest pain, claudication, irregular heartbeat, leg swelling and syncope.   Respiratory: Negative for cough, hemoptysis, sputum production and wheezing.    Hematologic/Lymphatic: Negative for bleeding problem. Does not bruise/bleed easily.   Musculoskeletal: Negative for arthritis, back pain, falls, joint pain, muscle cramps, muscle weakness and neck pain.   Gastrointestinal: Negative for abdominal pain, constipation, diarrhea, heartburn, hematochezia and melena.   Genitourinary: Negative for frequency, hematuria and nocturia.   Neurological: Negative for excessive daytime sleepiness, dizziness, focal weakness, headaches, light-headedness, loss of balance, numbness, paresthesias, seizures and weakness.   Psychiatric/Behavioral: Negative for depression. The patient is not nervous/anxious.        Review of patient's allergies  "indicates:   Allergen Reactions    Codeine      Other reaction(s): Vomiting        Current Outpatient Medications   Medication Sig    alendronate (FOSAMAX) 70 MG tablet every 7 days.     aspirin (ECOTRIN) 81 MG EC tablet Take 1 tablet (81 mg total) by mouth once daily.    atorvastatin (LIPITOR) 20 MG tablet TAKE 1 TABLET(20 MG) BY MOUTH EVERY DAY    calcium carbonate (CALTRATE 600) 600 mg (1,500 mg) Tab Take 600 mg by mouth once daily.     cyanocobalamin, vitamin B-12, (VITAMIN B-12 ORAL) Take 1 tablet by mouth once daily.    estradioL (VAGIFEM) 10 mcg Tab INSERT 1 TABLET IN THE VAGINA TWICE WEEKLY AS DIRECTED    ibandronate (BONIVA) 150 mg tablet TAKE 1 TABLET BY MOUTH EVERY 30 DAYS    levothyroxine (SYNTHROID) 50 MCG tablet TAKE 1 TABLET(50 MCG) BY MOUTH BEFORE BREAKFAST    multivitamin (THERAGRAN) per tablet Take by mouth. 1 Tablet Oral Every day     No current facility-administered medications for this visit.        Social history:  Eugenio Aldridge  reports that she smoked 1-2 ppd for 22 years, quit in 2001. She does not have any smokeless tobacco history on file. She reports current alcohol use of about 5.8 standard drinks of alcohol per week. She reports that she does not use drugs.      Objective:   /67   Pulse 80   Ht 5' 3" (1.6 m)   Wt 74.7 kg (164 lb 12.7 oz)   SpO2 97%   BMI 29.19 kg/m²      Physical Exam   Constitutional: She is oriented to person, place, and time. She appears well-developed and well-nourished.        HENT:   Head: Normocephalic.   Neck: No JVD present. Carotid bruit is not present.   Cardiovascular: Normal rate, regular rhythm, S1 normal and S2 normal. Exam reveals no gallop.   No murmur heard.  Pulses:       Radial pulses are 2+ on the right side and 2+ on the left side.        Dorsalis pedis pulses are 2+ on the right side and 1+ on the left side.        Posterior tibial pulses are 2+ on the right side and 1+ on the left side.   Pulmonary/Chest: Effort normal. She has " no wheezes. She has no rales. Chest wall is not dull to percussion.   Abdominal: Soft. She exhibits no abdominal bruit. There is no splenomegaly or hepatomegaly. There is no abdominal tenderness.   Musculoskeletal:      Right lower leg: No edema.      Left lower leg: No edema.   Neurological: She is alert and oriented to person, place, and time. Gait normal.   Skin: Skin is warm and dry. No bruising and no rash noted. No cyanosis. Nails show no clubbing.   Psychiatric: She has a normal mood and affect. Her speech is normal and behavior is normal. Judgment normal. Cognition and memory are normal.           Lab Results   Component Value Date    CHOL 167 09/22/2020    HDL 83 (H) 09/22/2020    LDLCALC 73.0 09/22/2020    TRIG 55 09/22/2020    CHOLHDL 49.7 09/22/2020     Lab Results   Component Value Date    GLU 88 09/22/2020    CREATININE 0.7 09/22/2020    BUN 15 09/22/2020     (H) 09/22/2020    K 4.2 09/22/2020     09/22/2020    CO2 27 09/22/2020     Lab Results   Component Value Date    ALT 22 09/22/2020    AST 25 09/22/2020    ALKPHOS 58 09/22/2020    BILITOT 0.4 09/22/2020           Assessment and Plan:       ICD-10-CM ICD-9-CM   1. SOB (shortness of breath)  R06.02 786.05   2. PAD (peripheral artery disease)  I73.9 443.9   3. Pure hypercholesterolemia  E78.00 272.0   4. Atherosclerosis of aorta  I70.0 440.0        She does not report angina.  However she multiple coronary risk factors and risk equivalents:  Hypercholesterolemia, a prior history of tobacco use and peripheral arterial disease.  Therefore a stress test is indicated.  Increase atorvastatin to 40 mg a day.  Continue aspirin 81 mg a day.    Orders placed during this encounter:     SOB (shortness of breath)  -     Ambulatory referral/consult to Cardiology    PAD (peripheral artery disease)    Pure hypercholesterolemia  -     atorvastatin (LIPITOR) 40 MG tablet; Take 1 tablet (40 mg total) by mouth once daily.  Dispense: 90 tablet; Refill:  3    Atherosclerosis of aorta         No follow-ups on file.

## 2020-10-12 ENCOUNTER — APPOINTMENT (OUTPATIENT)
Dept: RADIOLOGY | Facility: CLINIC | Age: 75
End: 2020-10-12
Attending: INTERNAL MEDICINE
Payer: MEDICARE

## 2020-10-12 DIAGNOSIS — Z78.0 POSTMENOPAUSAL ESTROGEN DEFICIENCY: ICD-10-CM

## 2020-10-12 PROCEDURE — 77080 DEXA BONE DENSITY SPINE HIP: ICD-10-PCS | Mod: 26,,, | Performed by: INTERNAL MEDICINE

## 2020-10-12 PROCEDURE — 77080 DXA BONE DENSITY AXIAL: CPT | Mod: 26,,, | Performed by: INTERNAL MEDICINE

## 2020-10-12 PROCEDURE — 77080 DXA BONE DENSITY AXIAL: CPT | Mod: TC,PO

## 2020-10-15 ENCOUNTER — TELEPHONE (OUTPATIENT)
Dept: INTERNAL MEDICINE | Facility: CLINIC | Age: 75
End: 2020-10-15

## 2020-10-15 NOTE — TELEPHONE ENCOUNTER
I spoke to pt and notified her of the following message from Dr. Clifton Please note that your bone density has remained stable.   Please continue your Boniva.   Please do not hesitate to call/email if you have any questions or concerns       Pt verbalized understanding

## 2020-10-15 NOTE — TELEPHONE ENCOUNTER
----- Message from Suha Salguero MD sent at 10/15/2020  3:31 PM CDT -----  Please note that your bone density has remained stable.  Please continue your Boniva.  Please do not hesitate to call/email if you have any questions or concerns  Suha Clifton

## 2020-10-19 ENCOUNTER — OFFICE VISIT (OUTPATIENT)
Dept: INTERNAL MEDICINE | Facility: CLINIC | Age: 75
End: 2020-10-19
Payer: MEDICARE

## 2020-10-19 VITALS
SYSTOLIC BLOOD PRESSURE: 120 MMHG | HEIGHT: 65 IN | DIASTOLIC BLOOD PRESSURE: 60 MMHG | HEART RATE: 86 BPM | OXYGEN SATURATION: 97 % | BODY MASS INDEX: 26.99 KG/M2 | RESPIRATION RATE: 15 BRPM | WEIGHT: 162 LBS

## 2020-10-19 DIAGNOSIS — H25.10 NUCLEAR SCLEROSIS, UNSPECIFIED LATERALITY: ICD-10-CM

## 2020-10-19 DIAGNOSIS — E66.3 OVERWEIGHT (BMI 25.0-29.9): ICD-10-CM

## 2020-10-19 DIAGNOSIS — H90.3 SENSORINEURAL HEARING LOSS (SNHL) OF BOTH EARS: ICD-10-CM

## 2020-10-19 DIAGNOSIS — J84.10 POSTINFLAMMATORY PULMONARY FIBROSIS: ICD-10-CM

## 2020-10-19 DIAGNOSIS — E78.00 PURE HYPERCHOLESTEROLEMIA: ICD-10-CM

## 2020-10-19 DIAGNOSIS — M25.619 LIMITED RANGE OF MOTION (ROM) OF SHOULDER: ICD-10-CM

## 2020-10-19 DIAGNOSIS — I73.9 PAD (PERIPHERAL ARTERY DISEASE): Chronic | ICD-10-CM

## 2020-10-19 DIAGNOSIS — M81.8 OTHER OSTEOPOROSIS WITHOUT CURRENT PATHOLOGICAL FRACTURE: ICD-10-CM

## 2020-10-19 DIAGNOSIS — M54.50 LEFT-SIDED LOW BACK PAIN WITHOUT SCIATICA, UNSPECIFIED CHRONICITY: ICD-10-CM

## 2020-10-19 DIAGNOSIS — I70.0 ATHEROSCLEROSIS OF AORTA: ICD-10-CM

## 2020-10-19 DIAGNOSIS — M54.12 CERVICAL RADICULOPATHY: ICD-10-CM

## 2020-10-19 DIAGNOSIS — Z00.00 ENCOUNTER FOR PREVENTIVE HEALTH EXAMINATION: ICD-10-CM

## 2020-10-19 DIAGNOSIS — G47.62 NOCTURNAL LEG CRAMPS: ICD-10-CM

## 2020-10-19 DIAGNOSIS — Z01.419 ENCOUNTER FOR WELL WOMAN EXAM WITH ROUTINE GYNECOLOGICAL EXAM: ICD-10-CM

## 2020-10-19 DIAGNOSIS — M75.101 NONTRAUMATIC TEAR OF RIGHT ROTATOR CUFF, UNSPECIFIED TEAR EXTENT: ICD-10-CM

## 2020-10-19 DIAGNOSIS — Z79.890 HORMONE REPLACEMENT THERAPY (HRT): ICD-10-CM

## 2020-10-19 DIAGNOSIS — Z12.83 SCREENING EXAM FOR SKIN CANCER: ICD-10-CM

## 2020-10-19 DIAGNOSIS — K59.1 FUNCTIONAL DIARRHEA: ICD-10-CM

## 2020-10-19 DIAGNOSIS — E03.8 OTHER SPECIFIED HYPOTHYROIDISM: Primary | ICD-10-CM

## 2020-10-19 PROCEDURE — G0439 PR MEDICARE ANNUAL WELLNESS SUBSEQUENT VISIT: ICD-10-PCS | Mod: S$GLB,,, | Performed by: NURSE PRACTITIONER

## 2020-10-19 PROCEDURE — G0439 PPPS, SUBSEQ VISIT: HCPCS | Mod: S$GLB,,, | Performed by: NURSE PRACTITIONER

## 2020-10-19 PROCEDURE — 99999 PR PBB SHADOW E&M-EST. PATIENT-LVL V: CPT | Mod: PBBFAC,,, | Performed by: NURSE PRACTITIONER

## 2020-10-19 PROCEDURE — 99215 OFFICE O/P EST HI 40 MIN: CPT | Mod: PBBFAC,PO | Performed by: NURSE PRACTITIONER

## 2020-10-19 PROCEDURE — 99999 PR PBB SHADOW E&M-EST. PATIENT-LVL V: ICD-10-PCS | Mod: PBBFAC,,, | Performed by: NURSE PRACTITIONER

## 2020-10-19 RX ORDER — ALENDRONATE SODIUM 70 MG/1
70 TABLET ORAL
COMMUNITY
End: 2021-04-26 | Stop reason: SDUPTHER

## 2020-10-19 RX ORDER — FERROUS SULFATE, DRIED 160(50) MG
1 TABLET, EXTENDED RELEASE ORAL DAILY
COMMUNITY

## 2020-10-19 NOTE — PATIENT INSTRUCTIONS
Counseling and Referral of Other Preventative  (Italic type indicates deductible and co-insurance are waived)    Patient Name: Eugenio Aldridge  Today's Date: 10/19/2020    Health Maintenance       Date Due Completion Date    Colorectal Cancer Screening Order & number given  To call   9/16/2020    High Dose Statin 10/19/2021   10/19/2020    Mammogram 01/13/2021 1/13/2020    DEXA SCAN 10/12/2022   10/12/2020    TETANUS VACCINE 07/11/2024 7/11/2014    Lipid Panel    Physical therapy- current    GYN- referral    Derm exam- check skin cancer screening    Dental exam- current     09/22/2021 9/22/2020        Orders Placed This Encounter   Procedures    Ambulatory referral/consult to Gynecology     The following information is provided to all patients.  This information is to help you find resources for any of the problems found today that may be affecting your health:                Living healthy guide: www.[facility].[place].[facility]      Understanding Diabetes: www.diabetes.org      Eating healthy: www.cdc.gov/healthyweight      [facility] home safety checklist: www.cdc.gov/steadi/patient.html      Agency on Aging: www.goea.[place].[facility]      Alcoholics anonymous (AA): www.aa.org      Physical Activity: www.nereida.nih.gov/kd5gncz      Tobacco use: www.quitwithusla.org     Preventing Falls: Making Changes in Your Living Space  Is your living space filled with hazards that could cause you to fall? Changes can make you safer. They could even save your life. Take a careful look around your home. Change what you can on your own. Hire someone or ask friends or family to help with harder tasks.      Be sure to add a nonslip mat to the inside of your shower or bathtub. Always keep a nightlight on. Keep a clear path from your bed to the bathroom. Move items from higher shelves to lower ones.   Remove hazards  · Remove things that can trip you, like throw rugs, boxes, piles of paper, or cords.  · Nail down rugs or carpeting if you don't want  to remove them.  · Don't store items on stairs.  · Keep walkways clear.  · Clean up spills right away.  · Replace glass tables with wooden ones. They're safer if you fall.  Add safety devices  · Add handrails to both sides of stairs.  · Buy a raised toilet seat.  · Add grab bars near the toilet and in the shower.  · Get grabbers to help you reach things and avoid climbing.  Improve lighting  · Add nightlights to halls, bedrooms, and bathrooms.   · Put light switches at the top and bottom of stairs.  · Be sure each room and flight of stairs has proper lighting.  · Use shades or curtains to cut glare from windows.  · Put flashlights in each room. Replace burned-out bulbs.  · Get glowing light switches for room entrances.  Take other precautions  · Use nonskid floor wax.  · Buy a nonslip mat and a liquid soap dispenser for the shower.  · Tack down carpets or use slip-resistant backing.  · Put most-used items within easy reach.  · Add bright paint or tape on the top front edge of steps.  · Save big jobs, such as moving furniture or other heavy objects, for family or friends.  · Get professional help installing grab bars. They can be unsafe if not installed the right way.  Fix riskier rooms first  Don't tackle everything at once. Focus on one room at a time. The bathroom is a common spot for falls, so you may start there. Or start with a room you spend lots of time in, such as your bedroom. Make only a few changes at once. This will give you time to adjust to them.     Outside your home  You might arrange for these changes yourself, or you might need to talk to your  or homeowners' association about them.  · Have loose boards on porches or damaged stairs repaired.  · Have rough edges, holes, or large cracks in sidewalks or driveways repaired.  · Have hazards that could trip you, such as hoses or david, removed.  · Use high-wattage light bulbs (100 or greater) near outside doors and stairs.  · Get  handrails added to outside stairs. Have them extend beyond the bottom step.  · Get help in winter weather with ice or snow removal.   Date Last Reviewed: 6/12/2015  © 6758-2349 [employer]. [address] [place] [address], [place] [address]. All rights reserved. This information is not intended as a substitute for professional medical care. Always follow your healthcare professional's instructions.

## 2020-10-19 NOTE — PROGRESS NOTES
"Eugenio Aldridge presented for a  Medicare AWV and comprehensive Health Risk Assessment today. The following components were reviewed and updated:    · Medical history  · Family History  · Social history  · Allergies and Current Medications  · Health Risk Assessment  · Health Maintenance  · Care Team     ** See Completed Assessments for Annual Wellness Visit within the encounter summary.**       The following assessments were completed:  · Living Situation  · CAGE  · Depression Screening  · Timed Get Up and Go  · Whisper Test  · Cognitive Function Screening  · Nutrition Screening  · ADL Screening  · PAQ Screening    Vitals:    10/19/20 1539   BP: 120/60   Pulse: 86   Resp: 15   SpO2: 97%   Weight: 73.5 kg (162 lb)   Height: 5' 5" (1.651 m)     Body mass index is 26.96 kg/m².  Physical Exam  Constitutional:       Appearance: She is well-developed.      Comments: Younger in appearance than age   HENT:      Head: Normocephalic.      Comments: Wears mask     Right Ear: External ear normal.      Left Ear: External ear normal.      Ears:      Comments: Hear aides     Mouth/Throat:      Pharynx: No oropharyngeal exudate.   Eyes:      General: No scleral icterus.  Cardiovascular:      Rate and Rhythm: Normal rate and regular rhythm.      Heart sounds: Murmur present.   Pulmonary:      Effort: Pulmonary effort is normal. No respiratory distress.      Breath sounds: Normal breath sounds.   Abdominal:      General: There is no distension.   Musculoskeletal: Normal range of motion.   Skin:     General: Skin is warm and dry.      Findings: No rash.   Neurological:      General: No focal deficit present.      Mental Status: She is alert and oriented to person, place, and time.      Cranial Nerves: No cranial nerve deficit.      Motor: No abnormal muscle tone.      Coordination: Coordination normal.      Deep Tendon Reflexes: Reflexes are normal and symmetric. Reflexes normal.   Psychiatric:         Mood and Affect: Mood normal.         " Behavior: Behavior normal.         Thought Content: Thought content normal.         Judgment: Judgment normal.           Lab Results   Component Value Date    HGBA1C 5.4 09/22/2020    CHOL 167 09/22/2020    HDL 83 (H) 09/22/2020    LDLCALC 73.0 09/22/2020    TRIG 55 09/22/2020    CHOLHDL 49.7 09/22/2020      Results for orders placed in visit on 10/12/20   DXA Bone Density Spine And Hip    Narrative EXAMINATION:  DEXA BONE DENSITY SPINE HIP    CLINICAL HISTORY:  Asymptomatic menopausal state.  74 y/o female with no history of fractures.  She had menopausal symptom at 51 y/o.  Pt's Mother had hip fracture.  She is taking Calcium, Vit D, Estrogen and Fosamax.  She exercises daily and does not smoke.    TECHNIQUE:  DXA specification: [employer] [id number]    Bone Mineral Density scanning was performed over the hip and lumbar spine.    Review of the images confirms satisfactory positioning and technique.    COMPARISON:  Comparison study done on 06/12/2018.    Lumbar spine:  BMD 0.873 g/cm2 and T-score -1.6.    Total Hip:        BMD 0.674 g/cm2 and T-score -2.2.    FINDINGS:  Lumbar spine (L1-L4):              BMD is 0.870 g/cm2, T-score is -1.6, and Z-score is 0.7.    Total hip:                               BMD is 0.681 g/cm2, T-score is -2.1, and Z-score is -0.4.    Femoral neck:                         BMD is 0.613 g/cm2, T-score is -2.1, and Z-score is -0.1.    FRAX:    22% risk of a major osteoporotic fracture in the next 10 years.    12% risk of hip fracture in the next 10 years.      Impression 1. Osteoporosis on treatment with Fosamax  2. Compared with previous DXA, BMD at the lumbar spine has remained stable, and the BMD at the total hip has remained stable.    RECOMMENDATIONS:  RECOMMENDATIONS of [facility]:    1. Recommend daily calcium intake 8925-9967 mg, dietary sources preferred; Vitamin D 6264-8135 IU daily.  2. Adequate exercise and fall  precautions.  3. Recommend continued treatment with Fosamax.  If the patient has been treated with Fosamax for a period of at least 5 years and has not fractured, can consider drug holiday.  4. Repeat BMD in 2-3 years    EXPLANATION OF RESULTS:  The T-score compares these results to the average bone density of a 20-29 year-old of the same gender. The Z-score compares this result to the average bone density to people of the same age, gender, and race. The amounts indicate the number of standard deviations above or below the mean.    * Osteoporosis is generally defined as having a T-score between less than or equal to -2.5.    * Osteopenia is generally defined as having a T-score between -1.0 and -2.5.    * The normal range is generally defined as having a T-score greater than or equal to -1.0.    * Calculated FRAX scores for fracture risk prediction may not be accurate in the setting of certain clinical factors such as pharmacologic therapy for osteoporosis, prior fragility fractures, high dose glucocorticoid use.      Electronically signed by: Kerwin Marks  Date:    10/14/2020  Time:    13:20     Results for orders placed during the hospital encounter of 08/15/14   Mammo Digital Screening Bilat with CAD    Narrative The present examination has been compared to prior imaging studies.    BILATERAL MAMMOGRAM FINDINGS:  The breasts are almost entirely fat.    No significant abnormalities are seen.    These images  were processed to produce digital images analyzed for potential  abnormalities.      Impression There is no mammographic evidence of malignancy.    Mammogram in 1 year is recommended.    ACR BI-RADS Category 1: Negative      LOLI LAYTON M.D.  08/15/2014                 Diagnoses and health risks identified today and associated recommendations/orders:    1. Other specified hypothyroidism  Stable- followed by PCP    2. Pure hypercholesterolemia  Stable- followed by PCP, cards    3. Nuclear sclerosis,  unspecified laterality  Stable- followed by PCP, ophth    4. Postinflammatory pulmonary fibrosis  Stable- followed by PCP    5. Cervical radiculopathy  Stable- followed by PCP    6. Atherosclerosis of aorta  Stable- followed by PCP, cards    7. Nocturnal leg cramps  Stable- followed by PCP    8. Other osteoporosis without current pathological fracture  Stable- followed by PCP    9. Left-sided low back pain without sciatica, unspecified chronicity  Stable- followed by PCP    10. Nontraumatic tear of right rotator cuff, unspecified tear extent  Stable- followed by sports med    11. PAD (peripheral artery disease)  Stable- followed by PCP    12. Functional diarrhea  Stable- followed by PCP    13. Sensorineural hearing loss (SNHL) of both ears  Stable- followed by PCP, audiology    14. Encounter for well woman exam with routine gynecological exam  Stable- followed by PCP  - Ambulatory referral/consult to Gynecology; Future    15. Encounter for preventive health examination  Here for Health Risk Assessment/Annual Wellness Visit.  Health maintenance reviewed and updated. Follow up in one year.       16. Screening exam for skin cancer  Stable- followed by PCP  - Ambulatory referral/consult to Dermatology; Future    17. Limited range of motion (ROM) of shoulder-right  Stable- followed by sports med, oupt PT    18. Hormone replacement therapy (HRT)  Stable- followed by PCP    19. Overweight (BMI 25.0-29.9)  Chronic . Followed by PCP.   Centers for Disease Control and Prevention (CDC)  weight recommendations for current BMI & ideal BMI range discussed with patient.  Recommended  Low fat diet, continue regular exercise x 1 hour as tolerated every day.       Provided Becker with a 5-10 year written screening schedule and personal prevention plan. Recommendations were developed using the USPSTF age appropriate recommendations. Education, counseling, and referrals were provided as needed. After Visit Summary printed and given to  patient which includes a list of additional screenings\tests needed. Refer to after visit summary  for individualized health maintainence chart other identifying  risk factor mitigation interventions & specific health maintenance recommendations . Limited use of right shoulder-ROM limited & activity limited w arm. Suggested her to get reevaluated with sports med. She will consider. She will call GI to schedule colonoscopy- number given. Brain health & healthy diet discussed. Fall prevention & covid 19 prevention.She is not sure if boniva or fosamax is her rx she takes once a week. She will check meds at home. Current on dental exams- on HRT- gyn referral placed.     Follow up in about 1 year (around 10/19/2021) for HRA.    Afua Kenyon NP I offered to discuss end of life issues, including information on how to make advance directives that the patient could use to name someone who would make medical decisions on their behalf if they became too ill to make themselves.    ___Patient declined  _X_Patient is interested, I provided paper work and offered to discuss.

## 2020-10-20 ENCOUNTER — HOSPITAL ENCOUNTER (OUTPATIENT)
Dept: CARDIOLOGY | Facility: HOSPITAL | Age: 75
Discharge: HOME | End: 2020-10-20
Attending: INTERNAL MEDICINE
Payer: MEDICARE

## 2020-10-20 VITALS — HEIGHT: 65 IN | BODY MASS INDEX: 27.32 KG/M2 | WEIGHT: 164 LBS

## 2020-10-20 DIAGNOSIS — R06.02 SOB (SHORTNESS OF BREATH): ICD-10-CM

## 2020-10-20 LAB
ASCENDING AORTA: 3.35 CM
AV INDEX (PROSTH): 0.66
AV MEAN GRADIENT: 6 MMHG
AV PEAK GRADIENT: 10 MMHG
AV VALVE AREA: 2.25 CM2
AV VELOCITY RATIO: 0.64
BSA FOR ECHO PROCEDURE: 1.85 M2
CV ECHO LV RWT: 0.35 CM
CV STRESS BASE HR: 81 BPM
DIASTOLIC BLOOD PRESSURE: 84 MMHG
DOP CALC AO PEAK VEL: 1.56 M/S
DOP CALC AO VTI: 36.32 CM
DOP CALC LVOT AREA: 3.4 CM2
DOP CALC LVOT DIAMETER: 2.08 CM
DOP CALC LVOT PEAK VEL: 1 M/S
DOP CALC LVOT STROKE VOLUME: 81.82 CM3
DOP CALCLVOT PEAK VEL VTI: 24.09 CM
E WAVE DECELERATION TIME: 222 MSEC
E/A RATIO: 0.86
E/E' RATIO: 10.93 M/S
ECHO LV POSTERIOR WALL: 0.78 CM (ref 0.6–1.1)
FRACTIONAL SHORTENING: 40 % (ref 28–44)
INTERVENTRICULAR SEPTUM: 0.77 CM (ref 0.6–1.1)
IVRT: 99.9 MSEC
LA MAJOR: 4.71 CM
LA MINOR: 4.7 CM
LA WIDTH: 3.82 CM
LEFT ATRIUM SIZE: 3.19 CM
LEFT ATRIUM VOLUME INDEX: 26.8 ML/M2
LEFT ATRIUM VOLUME: 48.73 CM3
LEFT INTERNAL DIMENSION IN SYSTOLE: 2.72 CM (ref 2.1–4)
LEFT VENTRICLE DIASTOLIC VOLUME INDEX: 51.13 ML/M2
LEFT VENTRICLE DIASTOLIC VOLUME: 92.96 ML
LEFT VENTRICLE MASS INDEX: 60 G/M2
LEFT VENTRICLE SYSTOLIC VOLUME INDEX: 15.2 ML/M2
LEFT VENTRICLE SYSTOLIC VOLUME: 27.57 ML
LEFT VENTRICULAR INTERNAL DIMENSION IN DIASTOLE: 4.51 CM (ref 3.5–6)
LEFT VENTRICULAR MASS: 109.43 G
LV LATERAL E/E' RATIO: 10.25 M/S
LV SEPTAL E/E' RATIO: 11.71 M/S
MV PEAK A VEL: 0.95 M/S
MV PEAK E VEL: 0.82 M/S
MV STENOSIS PRESSURE HALF TIME: 64.38 MS
MV VALVE AREA P 1/2 METHOD: 3.42 CM2
OHS CV CPX 1 MINUTE RECOVERY HEART RATE: 104 BPM
OHS CV CPX 85 PERCENT MAX PREDICTED HEART RATE MALE: 120
OHS CV CPX ESTIMATED METS: 6
OHS CV CPX MAX PREDICTED HEART RATE: 141
OHS CV CPX PATIENT IS FEMALE: 1
OHS CV CPX PATIENT IS MALE: 0
OHS CV CPX PEAK DIASTOLIC BLOOD PRESSURE: 94 MMHG
OHS CV CPX PEAK HEAR RATE: 117 BPM
OHS CV CPX PEAK RATE PRESSURE PRODUCT: NORMAL
OHS CV CPX PEAK SYSTOLIC BLOOD PRESSURE: 210 MMHG
OHS CV CPX PERCENT MAX PREDICTED HEART RATE ACHIEVED: 83
OHS CV CPX RATE PRESSURE PRODUCT PRESENTING: NORMAL
PULM VEIN S/D RATIO: 1.38
PV PEAK D VEL: 0.52 M/S
PV PEAK S VEL: 0.72 M/S
RA MAJOR: 4.96 CM
RA PRESSURE: 3 MMHG
RA WIDTH: 3.02 CM
RIGHT VENTRICULAR END-DIASTOLIC DIMENSION: 3.4 CM
SINUS: 3.43 CM
STJ: 2.92 CM
STRESS ECHO POST EXERCISE DUR MIN: 3 MINUTES
STRESS ECHO POST EXERCISE DUR SEC: 42 SECONDS
SYSTOLIC BLOOD PRESSURE: 176 MMHG
TDI LATERAL: 0.08 M/S
TDI SEPTAL: 0.07 M/S
TDI: 0.08 M/S
TRICUSPID ANNULAR PLANE SYSTOLIC EXCURSION: 2.52 CM

## 2020-10-20 PROCEDURE — 93351 STRESS TTE COMPLETE: CPT

## 2020-10-20 PROCEDURE — 93351 STRESS ECHO (CUPID ONLY): ICD-10-PCS | Mod: 26,,, | Performed by: INTERNAL MEDICINE

## 2020-10-20 PROCEDURE — 93351 STRESS TTE COMPLETE: CPT | Mod: 26,,, | Performed by: INTERNAL MEDICINE

## 2020-11-05 RX ORDER — LEVOTHYROXINE SODIUM 50 UG/1
TABLET ORAL
Qty: 90 TABLET | Refills: 3 | Status: SHIPPED | OUTPATIENT
Start: 2020-11-05 | End: 2021-12-23

## 2020-11-09 ENCOUNTER — OFFICE VISIT (OUTPATIENT)
Dept: DERMATOLOGY | Facility: CLINIC | Age: 75
End: 2020-11-09
Payer: MEDICARE

## 2020-11-09 VITALS — BODY MASS INDEX: 27.29 KG/M2 | WEIGHT: 164 LBS

## 2020-11-09 DIAGNOSIS — Z12.83 SKIN EXAM, SCREENING FOR CANCER: ICD-10-CM

## 2020-11-09 DIAGNOSIS — Z12.83 SCREENING EXAM FOR SKIN CANCER: ICD-10-CM

## 2020-11-09 DIAGNOSIS — L82.1 SEBORRHEIC KERATOSES: ICD-10-CM

## 2020-11-09 DIAGNOSIS — L81.4 LENTIGINES: Primary | ICD-10-CM

## 2020-11-09 PROCEDURE — 99999 PR PBB SHADOW E&M-EST. PATIENT-LVL IV: ICD-10-PCS | Mod: PBBFAC,,, | Performed by: DERMATOLOGY

## 2020-11-09 PROCEDURE — 99202 OFFICE O/P NEW SF 15 MIN: CPT | Mod: S$PBB,,, | Performed by: DERMATOLOGY

## 2020-11-09 PROCEDURE — 99214 OFFICE O/P EST MOD 30 MIN: CPT | Mod: PBBFAC,PO | Performed by: DERMATOLOGY

## 2020-11-09 PROCEDURE — 99202 PR OFFICE/OUTPT VISIT, NEW, LEVL II, 15-29 MIN: ICD-10-PCS | Mod: S$PBB,,, | Performed by: DERMATOLOGY

## 2020-11-09 PROCEDURE — 99999 PR PBB SHADOW E&M-EST. PATIENT-LVL IV: CPT | Mod: PBBFAC,,, | Performed by: DERMATOLOGY

## 2020-11-09 RX ORDER — HYDROQUINONE 40 MG/G
CREAM TOPICAL
Qty: 28.35 G | Refills: 3 | Status: SHIPPED | OUTPATIENT
Start: 2020-11-09 | End: 2021-02-22

## 2020-11-09 NOTE — LETTER
November 9, 2020      Afua Kenyon, FARIHA  [address]  [place] [address]           [facility] [facility] - Derm 5th Fl  [address]  [place] [address]  Phone: [phone]  Fax: [phone]          Patient: Eugenio Aldridge   MR Number: [id number]   YOB: 1945   Date of Visit: 11/9/2020       Dear Afua Kenyon:    Thank you for referring Eugenio Aldridge to me for evaluation. Attached you will find relevant portions of my assessment and plan of care.    If you have questions, please do not hesitate to call me. I look forward to following Eugenio Aldridge along with you.    Sincerely,    Piper Swain MD    Enclosure  CC:  No Recipients    If you would like to receive this communication electronically, please contact [email or web address] or [phone] to request more information on [employer] Link access.    For providers and/or their staff who would like to refer a patient to [facility], please contact us through our one-stop-shop provider referral line, [facility], at [phone].    If you feel you have received this communication in error or would no longer like to receive these types of communications, please e-mail [email or web address]

## 2020-11-09 NOTE — PROGRESS NOTES
Subjective:       Patient ID:  Eugenio Aldridge is a 75 y.o. female who presents for   Chief Complaint   Patient presents with    Skin Check     UBSE     Would like skin check no lesions of concern.       Review of Systems   Constitutional: Negative for fever, chills, weight loss, weight gain, fatigue, night sweats and malaise.   Skin: Negative for daily sunscreen use, activity-related sunscreen use and wears hat.   Hematologic/Lymphatic: Does not bruise/bleed easily.        Objective:    Physical Exam   Constitutional: She appears well-developed and well-nourished. No distress.   Neurological: She is alert and oriented to person, place, and time. She is not disoriented.   Psychiatric: She has a normal mood and affect.   Skin:   Areas Examined (abnormalities noted in diagram):   Head / Face Inspection Performed  Neck Inspection Performed  Chest / Axilla Inspection Performed  Back Inspection Performed  RUE Inspected  LUE Inspection Performed                   Diagram Legend     Erythematous scaling macule/papule c/w actinic keratosis       Vascular papule c/w angioma      Pigmented verrucoid papule/plaque c/w seborrheic keratosis      Yellow umbilicated papule c/w sebaceous hyperplasia      Irregularly shaped tan macule c/w lentigo     1-2 mm smooth white papules consistent with Milia      Movable subcutaneous cyst with punctum c/w epidermal inclusion cyst      Subcutaneous movable cyst c/w pilar cyst      Firm pink to brown papule c/w dermatofibroma      Pedunculated fleshy papule(s) c/w skin tag(s)      Evenly pigmented macule c/w junctional nevus     Mildly variegated pigmented, slightly irregular-bordered macule c/w mildly atypical nevus      Flesh colored to evenly pigmented papule c/w intradermal nevus       Pink pearly papule/plaque c/w basal cell carcinoma      Erythematous hyperkeratotic cursted plaque c/w SCC      Surgical scar with no sign of skin cancer recurrence      Open and closed comedones       Inflammatory papules and pustules      Verrucoid papule consistent consistent with wart     Erythematous eczematous patches and plaques     Dystrophic onycholytic nail with subungual debris c/w onychomycosis     Umbilicated papule    Erythematous-base heme-crusted tan verrucoid plaque consistent with inflamed seborrheic keratosis     Erythematous Silvery Scaling Plaque c/w Psoriasis     See annotation      Assessment / Plan:        Lentigines  -     hydroquinone 4 % Crea; Use hs for dark spots  Dispense: 28.35 g; Refill: 3  Retinol hs face    Screening exam for skin cancer  -     Ambulatory referral/consult to Dermatology    Seborrheic keratoses  reassurance  Brochure provided      Skin exam, screening for cancer    Upper body skin examination performed today including at least 6 points as noted in physical examination. No lesions suspicious for malignancy noted.               Follow up in about 1 year (around 11/9/2021).

## 2020-12-08 ENCOUNTER — PATIENT MESSAGE (OUTPATIENT)
Dept: INTERNAL MEDICINE | Facility: CLINIC | Age: 75
End: 2020-12-08

## 2020-12-08 DIAGNOSIS — I73.9 CLAUDICATION OF BOTH LOWER EXTREMITIES: Primary | ICD-10-CM

## 2020-12-14 ENCOUNTER — HOSPITAL ENCOUNTER (OUTPATIENT)
Dept: CARDIOLOGY | Facility: HOSPITAL | Age: 75
Discharge: HOME | End: 2020-12-14
Attending: INTERNAL MEDICINE
Payer: MEDICARE

## 2020-12-14 DIAGNOSIS — I73.9 CLAUDICATION OF BOTH LOWER EXTREMITIES: ICD-10-CM

## 2020-12-14 LAB
LEFT ABI: 0.8
LEFT ARM BP: 147 MMHG
LEFT DORSALIS PEDIS: 130 MMHG
LEFT POSTERIOR TIBIAL: 126 MMHG
RIGHT ABI: 1.03
RIGHT ARM BP: 162 MMHG
RIGHT DORSALIS PEDIS: 150 MMHG
RIGHT POSTERIOR TIBIAL: 167 MMHG

## 2020-12-14 PROCEDURE — 93922 UPR/L XTREMITY ART 2 LEVELS: CPT | Mod: 26,,, | Performed by: INTERNAL MEDICINE

## 2020-12-14 PROCEDURE — 93922 UPR/L XTREMITY ART 2 LEVELS: CPT

## 2020-12-14 PROCEDURE — 93922 ANKLE BRACHIAL INDICES (ABI): ICD-10-PCS | Mod: 26,,, | Performed by: INTERNAL MEDICINE

## 2020-12-15 ENCOUNTER — TELEPHONE (OUTPATIENT)
Dept: INTERNAL MEDICINE | Facility: CLINIC | Age: 75
End: 2020-12-15

## 2020-12-15 DIAGNOSIS — I73.9 PAD (PERIPHERAL ARTERY DISEASE): Primary | ICD-10-CM

## 2020-12-15 NOTE — TELEPHONE ENCOUNTER
Spoke to pt. Pt informed of results.  Pt ok with proceeding with referral.  Referral sent to referral coordinator for scheduling.

## 2020-12-16 ENCOUNTER — OFFICE VISIT (OUTPATIENT)
Dept: CARDIOLOGY | Facility: CLINIC | Age: 75
End: 2020-12-16
Payer: MEDICARE

## 2020-12-16 VITALS
WEIGHT: 167.13 LBS | DIASTOLIC BLOOD PRESSURE: 90 MMHG | BODY MASS INDEX: 28.53 KG/M2 | HEART RATE: 88 BPM | SYSTOLIC BLOOD PRESSURE: 152 MMHG | HEIGHT: 64 IN

## 2020-12-16 DIAGNOSIS — E66.3 OVERWEIGHT (BMI 25.0-29.9): ICD-10-CM

## 2020-12-16 DIAGNOSIS — E03.8 OTHER SPECIFIED HYPOTHYROIDISM: ICD-10-CM

## 2020-12-16 DIAGNOSIS — I73.9 PERIPHERAL VASCULAR DISEASE, UNSPECIFIED: ICD-10-CM

## 2020-12-16 DIAGNOSIS — H25.13 NUCLEAR SCLEROSIS OF BOTH EYES: ICD-10-CM

## 2020-12-16 DIAGNOSIS — H90.3 SENSORINEURAL HEARING LOSS (SNHL) OF BOTH EARS: ICD-10-CM

## 2020-12-16 DIAGNOSIS — E78.00 PURE HYPERCHOLESTEROLEMIA: ICD-10-CM

## 2020-12-16 DIAGNOSIS — M54.12 CERVICAL RADICULOPATHY: ICD-10-CM

## 2020-12-16 DIAGNOSIS — G47.62 NOCTURNAL LEG CRAMPS: ICD-10-CM

## 2020-12-16 DIAGNOSIS — I70.0 ATHEROSCLEROSIS OF AORTA: ICD-10-CM

## 2020-12-16 DIAGNOSIS — J84.10 POSTINFLAMMATORY PULMONARY FIBROSIS: ICD-10-CM

## 2020-12-16 DIAGNOSIS — M81.8 OTHER OSTEOPOROSIS WITHOUT CURRENT PATHOLOGICAL FRACTURE: ICD-10-CM

## 2020-12-16 DIAGNOSIS — I73.9 CLAUDICATION OF LEFT LOWER EXTREMITY: ICD-10-CM

## 2020-12-16 DIAGNOSIS — I73.9 PAD (PERIPHERAL ARTERY DISEASE): Primary | ICD-10-CM

## 2020-12-16 PROCEDURE — 99205 OFFICE O/P NEW HI 60 MIN: CPT | Mod: S$PBB,,, | Performed by: INTERNAL MEDICINE

## 2020-12-16 PROCEDURE — 99215 OFFICE O/P EST HI 40 MIN: CPT | Mod: PBBFAC,PO | Performed by: INTERNAL MEDICINE

## 2020-12-16 PROCEDURE — 99999 PR PBB SHADOW E&M-EST. PATIENT-LVL V: CPT | Mod: PBBFAC,,, | Performed by: INTERNAL MEDICINE

## 2020-12-16 PROCEDURE — 99999 PR PBB SHADOW E&M-EST. PATIENT-LVL V: ICD-10-PCS | Mod: PBBFAC,,, | Performed by: INTERNAL MEDICINE

## 2020-12-16 PROCEDURE — 99205 PR OFFICE/OUTPT VISIT, NEW, LEVL V, 60-74 MIN: ICD-10-PCS | Mod: S$PBB,,, | Performed by: INTERNAL MEDICINE

## 2020-12-16 RX ORDER — CILOSTAZOL 100 MG/1
100 TABLET ORAL 2 TIMES DAILY
Qty: 180 TABLET | Refills: 3 | Status: SHIPPED | OUTPATIENT
Start: 2020-12-16 | End: 2021-10-05 | Stop reason: SINTOL

## 2020-12-16 NOTE — LETTER
December 16, 2020      Suha Salguero MD  [address]  [place] [address]           [place] [facility] - Vascular 8th Fl  [address].  [place] [address]  Phone: [phone]          Patient: Eugenio Aldridge   MR Number: [id number]   YOB: 1945   Date of Visit: 12/16/2020       Dear Dr. Suha Salguero:    Thank you for referring Eugenio Aldridge to me for evaluation. Attached you will find relevant portions of my assessment and plan of care.    If you have questions, please do not hesitate to call me. I look forward to following Eugenio Aldridge along with you.    Sincerely,    Olivier Myers MD PhD    Enclosure  CC:  No Recipients    If you would like to receive this communication electronically, please contact [email or web address] or [phone] to request more information on [employer] Link access.    For providers and/or their staff who would like to refer a patient to [facility], please contact us through our one-stop-shop provider referral line, [facility], at [phone].    If you feel you have received this communication in error or would no longer like to receive these types of communications, please e-mail [email or web address]

## 2020-12-16 NOTE — PROGRESS NOTES
[facility]         Chief Complaint   Patient presents with    Left leg pain with walking         Subjective   Patient ID: Mrs. Eugenio Aldridge is a 75 years old female with peripheral arterial disease, osteoporosis, and hypothyroidism, who presents for an initial appointment.      - Patient kindly referred to [facility] by Dr. Suha Benoit for peripheral arterial disease.     HPI  Mrs. Aldridge reports having progressive worsening of left thigh pain with walking for the past 2-3 months.  Patient reports claudication symptoms with walking less than a block distance. Pain relieves with rest.  Patient reports no rest pain, tissue loss or leg swelling. She was noted to have abnormal exercise ABIs in 2004 and diagnosed with peripheral arterial disease.  AYDEN's improved in 2007.  CTA in 2008 revealed no significant disease except for plaque within the distal aorta and iliac arteries.  Patient reports smoking history for 20 years, a pack a day, and she quit 30 years ago. Patient has been taking asa 81 mg and atorvastatin for management of PAD. Labs reviewed (09/22/20) chol 167, TGL 55, HDL 83, LDL 73.       Past Medical History:   Diagnosis Date    Actinic keratosis     Aortic valve disorders     Atherosclerosis of aorta     Atherosclerosis of native arteries of the extremities with intermittent claudication     Carcinoma in situ, vulva     Left bundle branch hemiblock     Other and unspecified hyperlipidemia     Postinflammatory pulmonary fibrosis     Senile nuclear sclerosis        Past Surgical History:   Procedure Laterality Date    ARTHROSCOPIC REPAIR OF ROTATOR CUFF OF SHOULDER Right 4/24/2019    Procedure: REPAIR, ROTATOR CUFF, ARTHROSCOPIC WITH REGENERATIN IMPLANT;  Surgeon: Walter Hernandez MD;  Location: [facility];  Service: Orthopedics;  Laterality: Right;  regional w/o catheter     BTL      DECOMPRESSION OF SUBACROMIAL SPACE Right 4/24/2019    Procedure:  DECOMPRESSION, SUBACROMIAL SPACE WITH DISTAL CLAVICLE EXCISION (DCE);  Surgeon: Walter Hernandez MD;  Location: [facility] OR;  Service: Orthopedics;  Laterality: Right;    RHYTIDECTOMY      SHOULDER ARTHROSCOPY Right 2019    Procedure: ARTHROSCOPY, SHOULDER WITH LABRAL DEBRIDEMENT;  Surgeon: Walter Hernandez MD;  Location: [facility];  Service: Orthopedics;  Laterality: Right;    TONSILLECTOMY, ADENOIDECTOMY      VULVA SURGERY         Social History     Tobacco Use    Smoking status: Former Smoker     Packs/day: 1.50     Years: 22.00     Pack years: 33.00     Types: Cigarettes     Quit date: 2001     Years since quittin.2    Smokeless tobacco: Never Used   Substance Use Topics    Alcohol use: Yes     Alcohol/week: 5.8 standard drinks     Types: 7 Standard drinks or equivalent per week     Frequency: 4 or more times a week     Drinks per session: 1 or 2     Binge frequency: Never     Comment: once daily       Family History   Problem Relation Age of Onset    Cancer Father         bladder d. 69    Alzheimer's disease Mother 96        d. 97    Bladder Cancer Paternal Uncle     Lung cancer Paternal Uncle     Breast cancer Paternal Grandmother     Pancreatic cancer Paternal Aunt     Other Brother         d. 45 o/d    Pneumonia Brother         d. 58, smoker, obese    No Known Problems Sister     No Known Problems Sister     No Known Problems Sister        Review of patient's allergies indicates:   Allergen Reactions    Codeine      Other reaction(s): Vomiting       Current Outpatient Medications on File Prior to Visit   Medication Sig Dispense Refill    alendronate (FOSAMAX) 70 MG tablet Take 70 mg by mouth every 7 days.      aspirin (ECOTRIN) 81 MG EC tablet Take 1 tablet (81 mg total) by mouth once daily.      atorvastatin (LIPITOR) 40 MG tablet Take 1 tablet (40 mg total) by mouth once daily. 90 tablet 3    calcium carbonate (CALTRATE 600) 600 mg (1,500 mg) Tab Take 600 mg by mouth  "once daily.       calcium-vitamin D3 (OS-MARYANA 500 + D3) 500 mg(1,250mg) -200 unit per tablet Take 1 tablet by mouth once daily.       cyanocobalamin, vitamin B-12, (VITAMIN B-12 ORAL) Take 1 tablet by mouth once daily.      hydroquinone 4 % Crea Use hs for dark spots 28.35 g 3    ibandronate (BONIVA) 150 mg tablet TAKE 1 TABLET BY MOUTH EVERY 30 DAYS 12 tablet 3    levothyroxine (SYNTHROID) 50 MCG tablet TAKE 1 TABLET(50 MCG) BY MOUTH BEFORE BREAKFAST 90 tablet 3    multivitamin (THERAGRAN) per tablet Take by mouth. 1 Tablet Oral Every day      [DISCONTINUED] alendronate (FOSAMAX) 70 MG tablet every 7 days.       [DISCONTINUED] estradioL (VAGIFEM) 10 mcg Tab INSERT 1 TABLET IN THE VAGINA TWICE WEEKLY AS DIRECTED 30 tablet 0     No current facility-administered medications on file prior to visit.        Review of Systems   Constitution: Negative for chills, fever and malaise/fatigue.   HENT: Negative for congestion and hoarse voice.    Eyes: Negative for visual disturbance.   Cardiovascular: Positive for left thigh pain with walking. Negative for dyspnea on exertion, orthopnea and paroxysmal nocturnal dyspnea.   Respiratory: Negative for cough, shortness of breath and sputum production.    Endocrine: Negative for cold intolerance, heat intolerance, polydipsia and polyuria.   Hematologic/Lymphatic: Negative for adenopathy and bleeding problem. Does not bruise/bleed easily.   Skin: Negative for color change, flushing and itching.   Musculoskeletal: Positive for myalgias (left lower extremity). Negative for back pain, joint pain, joint swelling and muscle cramps.   Gastrointestinal: Negative for abdominal pain, constipation, diarrhea and nausea.   Genitourinary: Negative for pelvic pain.   Neurological: Negative for focal weakness, light-headedness, loss of balance, paresthesias, seizures and weakness      Objective  BP (!) 152/90   Pulse 88   Ht 5' 4" (1.626 m)   Wt 75.8 kg (167 lb 1.7 oz)   BMI 28.68 kg/m² "     Physical Exam  Constitutional: No acute distress, conversant  HEENT: Sclera anicteric, Pupils equal, round and reactive to light, extraocular motions intact, Oropharynx clear  Neck: No JVD, no carotid bruits  Cardiovascular: regular rate and rhythm, no murmur, rubs or gallops, normal S1/S2  Pulmonary: Clear to auscultation bilaterally  Abdominal: Abdomen soft, nontender, nondistended, positive bowel sounds  Extremities:  no significant edema or TTP.  Pulses:  Carotid pulses are 2+ on the right side, and 2+ on the left side.  Radial pulses are 2+ on the right side, and 2+ on the left side.   Femoral pulses are 2+ on the right side, and 2+ on the left side.  Popliteal pulses are 2+ on the right side, and 2+ on the left side.   Dorsalis pedis pulses are 2+ on the right side, and 2+ on the left side.   Posterior tibial pulses are 2+ on the right side, and 2+ on the left side.    Skin: No ecchymosis, erythema, or ulcers  Psych: Alert and oriented x 3, appropriate affect  Neuro: CNII-XII intact, no focal deficits    CMP  Sodium   Date Value Ref Range Status   09/22/2020 146 (H) 136 - 145 mmol/L Final     Potassium   Date Value Ref Range Status   09/22/2020 4.2 3.5 - 5.1 mmol/L Final     Chloride   Date Value Ref Range Status   09/22/2020 108 95 - 110 mmol/L Final     CO2   Date Value Ref Range Status   09/22/2020 27 23 - 29 mmol/L Final     Glucose   Date Value Ref Range Status   09/22/2020 88 70 - 110 mg/dL Final     BUN   Date Value Ref Range Status   09/22/2020 15 8 - 23 mg/dL Final     Creatinine   Date Value Ref Range Status   09/22/2020 0.7 0.5 - 1.4 mg/dL Final     Calcium   Date Value Ref Range Status   09/22/2020 9.5 8.7 - 10.5 mg/dL Final     Total Protein   Date Value Ref Range Status   09/22/2020 6.5 6.0 - 8.4 g/dL Final     Albumin   Date Value Ref Range Status   09/22/2020 3.7 3.5 - 5.2 g/dL Final     Total Bilirubin   Date Value Ref Range Status   09/22/2020 0.4 0.1 - 1.0 mg/dL Final     Comment:      For infants and newborns, interpretation of results should be based  on gestational age, weight and in agreement with clinical  observations.  Premature Infant recommended reference ranges:  Up to 24 hours.............<8.0 mg/dL  Up to 48 hours............<12.0 mg/dL  3-5 days..................<15.0 mg/dL  6-29 days.................<15.0 mg/dL       Alkaline Phosphatase   Date Value Ref Range Status   09/22/2020 58 55 - 135 U/L Final     AST   Date Value Ref Range Status   09/22/2020 25 10 - 40 U/L Final     ALT   Date Value Ref Range Status   09/22/2020 22 10 - 44 U/L Final     Anion Gap   Date Value Ref Range Status   09/22/2020 11 8 - 16 mmol/L Final     eGFR if    Date Value Ref Range Status   09/22/2020 >60.0 >60 mL/min/1.73 m^2 Final     eGFR if non    Date Value Ref Range Status   09/22/2020 >60.0 >60 mL/min/1.73 m^2 Final     Comment:     Calculation used to obtain the estimated glomerular filtration  rate (eGFR) is the CKD-EPI equation.          Lab Results   Component Value Date    WBC 4.93 09/22/2020    HGB 14.2 09/22/2020    HCT 46.7 09/22/2020     (H) 09/22/2020     09/22/2020     Assessment and Plan    1. Peripheral arterial disease - Noman IIb symptoms. Labs reviewed (09/22/20) chol 167, TGL 55, HDL 83, LDL 73.  Check CTA abd/pelvis to evaluate the degree of peripheral arterial disease.  Continue asa 81 mg daily and atorvastatin 40 mg daily, and start cilostazol 100 mg BID for medical management of PAD.  LDL goal < 70 mg/dL.  Start graduated walking program with goal 30 minutes 5 days a week.     2. Overweight - BMI today 28.68 Kg/M2. Encourage dietary modification and moderate/aerobic exercise.     Follow up in 2 months.     Total duration of face to face visit time 45 minutes.  Total time spent counseling greater than fifty percent of total visit time.  Counseling included discussion regarding imaging findings, diagnosis, possibilities, treatment  options, risks and benefits.  The patient had many questions regarding the options and long-term effects.    Patient seen and discussed with Dr. Myers. Further recommendations per the attending addendum.        Jackson Bell MD  Vascular Medicine Fellow PGY IV  Department of Vascular Medicine  [facility]

## 2020-12-16 NOTE — PATIENT INSTRUCTIONS
Peripheral arterial disease    Plan  -- Continue asa 81 mg daily and atorvastatin 40 mg daily   -- Start Cilostazol 100 mg twice a day   -- Start graduated walking program with goal of 30 minutes a day 5 days a week  -- Follow up in 2 months

## 2021-01-05 ENCOUNTER — PATIENT MESSAGE (OUTPATIENT)
Dept: INTERNAL MEDICINE | Facility: CLINIC | Age: 76
End: 2021-01-05

## 2021-01-05 DIAGNOSIS — Z12.31 VISIT FOR SCREENING MAMMOGRAM: Primary | ICD-10-CM

## 2021-01-06 ENCOUNTER — IMMUNIZATION (OUTPATIENT)
Dept: INTERNAL MEDICINE | Facility: CLINIC | Age: 76
End: 2021-01-06
Payer: MEDICARE

## 2021-01-06 DIAGNOSIS — Z23 NEED FOR VACCINATION: ICD-10-CM

## 2021-01-06 PROCEDURE — 91300 COVID-19, MRNA, LNP-S, PF, 30 MCG/0.3 ML DOSE VACCINE: CPT | Mod: PBBFAC | Performed by: FAMILY MEDICINE

## 2021-01-14 ENCOUNTER — HOSPITAL ENCOUNTER (OUTPATIENT)
Dept: RADIOLOGY | Facility: HOSPITAL | Age: 76
Discharge: HOME | End: 2021-01-14
Attending: INTERNAL MEDICINE
Payer: MEDICARE

## 2021-01-14 DIAGNOSIS — Z12.31 VISIT FOR SCREENING MAMMOGRAM: ICD-10-CM

## 2021-01-14 PROCEDURE — 77063 BREAST TOMOSYNTHESIS BI: CPT | Mod: 26,,, | Performed by: RADIOLOGY

## 2021-01-14 PROCEDURE — 77063 MAMMO DIGITAL SCREENING BILAT WITH TOMO: ICD-10-PCS | Mod: 26,,, | Performed by: RADIOLOGY

## 2021-01-14 PROCEDURE — 77067 SCR MAMMO BI INCL CAD: CPT | Mod: TC,PO

## 2021-01-14 PROCEDURE — 77067 MAMMO DIGITAL SCREENING BILAT WITH TOMO: ICD-10-PCS | Mod: 26,,, | Performed by: RADIOLOGY

## 2021-01-14 PROCEDURE — 77067 SCR MAMMO BI INCL CAD: CPT | Mod: 26,,, | Performed by: RADIOLOGY

## 2021-01-27 ENCOUNTER — IMMUNIZATION (OUTPATIENT)
Dept: INTERNAL MEDICINE | Facility: CLINIC | Age: 76
End: 2021-01-27
Payer: MEDICARE

## 2021-01-27 DIAGNOSIS — Z23 NEED FOR VACCINATION: Primary | ICD-10-CM

## 2021-01-27 PROCEDURE — 0002A COVID-19, MRNA, LNP-S, PF, 30 MCG/0.3 ML DOSE VACCINE: CPT | Mod: PBBFAC

## 2021-01-27 PROCEDURE — 91300 COVID-19, MRNA, LNP-S, PF, 30 MCG/0.3 ML DOSE VACCINE: CPT | Mod: PBBFAC

## 2021-02-11 ENCOUNTER — LAB VISIT (OUTPATIENT)
Dept: LAB | Facility: HOSPITAL | Age: 76
End: 2021-02-11
Attending: STUDENT IN AN ORGANIZED HEALTH CARE EDUCATION/TRAINING PROGRAM
Payer: MEDICARE

## 2021-02-11 DIAGNOSIS — I73.9 PAD (PERIPHERAL ARTERY DISEASE): ICD-10-CM

## 2021-02-11 LAB
ALBUMIN SERPL BCP-MCNC: 4 G/DL (ref 3.5–5.2)
ALP SERPL-CCNC: 64 U/L (ref 55–135)
ALT SERPL W/O P-5'-P-CCNC: 18 U/L (ref 10–44)
ANION GAP SERPL CALC-SCNC: 10 MMOL/L (ref 8–16)
AST SERPL-CCNC: 22 U/L (ref 10–40)
BILIRUB SERPL-MCNC: 0.4 MG/DL (ref 0.1–1)
BUN SERPL-MCNC: 15 MG/DL (ref 8–23)
CALCIUM SERPL-MCNC: 9.3 MG/DL (ref 8.7–10.5)
CHLORIDE SERPL-SCNC: 106 MMOL/L (ref 95–110)
CO2 SERPL-SCNC: 26 MMOL/L (ref 23–29)
CREAT SERPL-MCNC: 0.9 MG/DL (ref 0.5–1.4)
EST. GFR  (AFRICAN AMERICAN): >60 ML/MIN/1.73 M^2
EST. GFR  (NON AFRICAN AMERICAN): >60 ML/MIN/1.73 M^2
GLUCOSE SERPL-MCNC: 86 MG/DL (ref 70–110)
POTASSIUM SERPL-SCNC: 4.2 MMOL/L (ref 3.5–5.1)
PROT SERPL-MCNC: 6.8 G/DL (ref 6–8.4)
SODIUM SERPL-SCNC: 142 MMOL/L (ref 136–145)

## 2021-02-11 PROCEDURE — 80053 COMPREHEN METABOLIC PANEL: CPT

## 2021-02-11 PROCEDURE — 36415 COLL VENOUS BLD VENIPUNCTURE: CPT | Mod: PO

## 2021-02-17 ENCOUNTER — HOSPITAL ENCOUNTER (OUTPATIENT)
Dept: RADIOLOGY | Facility: HOSPITAL | Age: 76
Discharge: HOME | End: 2021-02-17
Attending: STUDENT IN AN ORGANIZED HEALTH CARE EDUCATION/TRAINING PROGRAM
Payer: MEDICARE

## 2021-02-17 DIAGNOSIS — I73.9 PERIPHERAL VASCULAR DISEASE, UNSPECIFIED: ICD-10-CM

## 2021-02-17 PROCEDURE — 25500020 PHARM REV CODE 255: Performed by: STUDENT IN AN ORGANIZED HEALTH CARE EDUCATION/TRAINING PROGRAM

## 2021-02-17 PROCEDURE — 75635 CT ANGIO ABDOMINAL ARTERIES: CPT | Mod: 26,,, | Performed by: RADIOLOGY

## 2021-02-17 PROCEDURE — 75635 CT ANGIO ABDOMINAL ARTERIES: CPT | Mod: TC

## 2021-02-17 PROCEDURE — 75635 CTA RUNOFF ABD PEL BILAT LOWER EXT: ICD-10-PCS | Mod: 26,,, | Performed by: RADIOLOGY

## 2021-02-17 RX ADMIN — IOHEXOL 125 ML: 350 INJECTION, SOLUTION INTRAVENOUS at 09:02

## 2021-02-22 ENCOUNTER — OFFICE VISIT (OUTPATIENT)
Dept: CARDIOLOGY | Facility: CLINIC | Age: 76
End: 2021-02-22
Payer: MEDICARE

## 2021-02-22 VITALS
HEIGHT: 65 IN | BODY MASS INDEX: 27.63 KG/M2 | SYSTOLIC BLOOD PRESSURE: 132 MMHG | WEIGHT: 165.81 LBS | OXYGEN SATURATION: 96 % | DIASTOLIC BLOOD PRESSURE: 76 MMHG | HEART RATE: 86 BPM

## 2021-02-22 DIAGNOSIS — M54.50 CHRONIC LEFT-SIDED LOW BACK PAIN WITHOUT SCIATICA: ICD-10-CM

## 2021-02-22 DIAGNOSIS — I70.0 ATHEROSCLEROSIS OF AORTA: ICD-10-CM

## 2021-02-22 DIAGNOSIS — G89.29 CHRONIC LEFT-SIDED LOW BACK PAIN WITHOUT SCIATICA: ICD-10-CM

## 2021-02-22 DIAGNOSIS — I73.9 CLAUDICATION OF LEFT LOWER EXTREMITY: ICD-10-CM

## 2021-02-22 DIAGNOSIS — I73.9 PAD (PERIPHERAL ARTERY DISEASE): Primary | Chronic | ICD-10-CM

## 2021-02-22 DIAGNOSIS — M79.652 LEFT THIGH PAIN: ICD-10-CM

## 2021-02-22 PROCEDURE — 99214 OFFICE O/P EST MOD 30 MIN: CPT | Mod: PBBFAC,PO | Performed by: INTERNAL MEDICINE

## 2021-02-22 PROCEDURE — 99215 OFFICE O/P EST HI 40 MIN: CPT | Mod: S$PBB,,, | Performed by: INTERNAL MEDICINE

## 2021-02-22 PROCEDURE — 99999 PR PBB SHADOW E&M-EST. PATIENT-LVL IV: ICD-10-PCS | Mod: PBBFAC,,, | Performed by: INTERNAL MEDICINE

## 2021-02-22 PROCEDURE — 99215 PR OFFICE/OUTPT VISIT, EST, LEVL V, 40-54 MIN: ICD-10-PCS | Mod: S$PBB,,, | Performed by: INTERNAL MEDICINE

## 2021-02-22 PROCEDURE — 99999 PR PBB SHADOW E&M-EST. PATIENT-LVL IV: CPT | Mod: PBBFAC,,, | Performed by: INTERNAL MEDICINE

## 2021-03-18 ENCOUNTER — PATIENT MESSAGE (OUTPATIENT)
Dept: RESEARCH | Facility: HOSPITAL | Age: 76
End: 2021-03-18

## 2021-03-26 ENCOUNTER — PATIENT MESSAGE (OUTPATIENT)
Dept: RESEARCH | Facility: HOSPITAL | Age: 76
End: 2021-03-26

## 2021-03-29 ENCOUNTER — TELEPHONE (OUTPATIENT)
Dept: INTERNAL MEDICINE | Facility: CLINIC | Age: 76
End: 2021-03-29

## 2021-03-29 ENCOUNTER — HOSPITAL ENCOUNTER (OUTPATIENT)
Dept: CARDIOLOGY | Facility: HOSPITAL | Age: 76
Discharge: HOME | End: 2021-03-29
Attending: INTERNAL MEDICINE
Payer: MEDICARE

## 2021-03-29 ENCOUNTER — OFFICE VISIT (OUTPATIENT)
Dept: INTERNAL MEDICINE | Facility: CLINIC | Age: 76
End: 2021-03-29
Payer: MEDICARE

## 2021-03-29 VITALS
HEIGHT: 65 IN | RESPIRATION RATE: 16 BRPM | HEART RATE: 80 BPM | OXYGEN SATURATION: 96 % | SYSTOLIC BLOOD PRESSURE: 120 MMHG | WEIGHT: 163.56 LBS | BODY MASS INDEX: 27.25 KG/M2 | TEMPERATURE: 97 F | DIASTOLIC BLOOD PRESSURE: 52 MMHG

## 2021-03-29 DIAGNOSIS — R41.3 MEMORY DISTURBANCE: ICD-10-CM

## 2021-03-29 DIAGNOSIS — I73.9 PAD (PERIPHERAL ARTERY DISEASE): ICD-10-CM

## 2021-03-29 DIAGNOSIS — I73.9 CLAUDICATION OF BOTH LOWER EXTREMITIES: ICD-10-CM

## 2021-03-29 DIAGNOSIS — E03.9 HYPOTHYROIDISM, UNSPECIFIED TYPE: Primary | ICD-10-CM

## 2021-03-29 DIAGNOSIS — J84.10 POSTINFLAMMATORY PULMONARY FIBROSIS: ICD-10-CM

## 2021-03-29 DIAGNOSIS — E78.00 PURE HYPERCHOLESTEROLEMIA: ICD-10-CM

## 2021-03-29 DIAGNOSIS — I70.0 ATHEROSCLEROSIS OF AORTA: ICD-10-CM

## 2021-03-29 DIAGNOSIS — R73.03 PRE-DIABETES: ICD-10-CM

## 2021-03-29 LAB
IMMEDIATE ARM BP: 165 MMHG
IMMEDIATE LEFT ABI: 0.5
IMMEDIATE LEFT TIBIAL: 83 MMHG
IMMEDIATE RIGHT ABI: 0.92
IMMEDIATE RIGHT TIBIAL: 152 MMHG
LEFT ABI: 0.84
LEFT ARM BP: 147 MMHG
LEFT CALF BP: 124 MMHG
LEFT DORSALIS PEDIS: 95 MMHG
LEFT LOWER LEG BP: 131 MMHG
LEFT POSTERIOR TIBIAL: 135 MMHG
LEFT TBI: 0.55
LEFT TOE PRESSURE: 88 MMHG
LEFT UPPER LEG BP: 130 MMHG
RIGHT ABI: 1.11
RIGHT ARM BP: 160 MMHG
RIGHT CALF BP: 158 MMHG
RIGHT DORSALIS PEDIS: 148 MMHG
RIGHT LOWER LEG BP: 203 MMHG
RIGHT POSTERIOR TIBIAL: 177 MMHG
RIGHT TBI: 0.71
RIGHT TOE PRESSURE: 114 MMHG
RIGHT UPPER LEG BP: 214 MMHG
TOE RAISES: 100

## 2021-03-29 PROCEDURE — 93924 LWR XTR VASC STDY BILAT: CPT | Mod: 26,,, | Performed by: INTERNAL MEDICINE

## 2021-03-29 PROCEDURE — 99999 PR PBB SHADOW E&M-EST. PATIENT-LVL V: CPT | Mod: PBBFAC,,, | Performed by: INTERNAL MEDICINE

## 2021-03-29 PROCEDURE — 99214 OFFICE O/P EST MOD 30 MIN: CPT | Mod: S$PBB,,, | Performed by: INTERNAL MEDICINE

## 2021-03-29 PROCEDURE — 99215 OFFICE O/P EST HI 40 MIN: CPT | Mod: PBBFAC,PO | Performed by: INTERNAL MEDICINE

## 2021-03-29 PROCEDURE — 99999 PR PBB SHADOW E&M-EST. PATIENT-LVL V: ICD-10-PCS | Mod: PBBFAC,,, | Performed by: INTERNAL MEDICINE

## 2021-03-29 PROCEDURE — 93924 LWR XTR VASC STDY BILAT: CPT | Mod: 50

## 2021-03-29 PROCEDURE — 99214 PR OFFICE/OUTPT VISIT, EST, LEVL IV, 30-39 MIN: ICD-10-PCS | Mod: S$PBB,,, | Performed by: INTERNAL MEDICINE

## 2021-03-29 PROCEDURE — 93924 CV SEGMENTAL PRESSURES LOW EXT W STRESS (CUPID ONLY): ICD-10-PCS | Mod: 26,,, | Performed by: INTERNAL MEDICINE

## 2021-03-30 ENCOUNTER — LAB VISIT (OUTPATIENT)
Dept: LAB | Facility: HOSPITAL | Age: 76
End: 2021-03-30
Attending: INTERNAL MEDICINE
Payer: MEDICARE

## 2021-03-30 DIAGNOSIS — E78.00 PURE HYPERCHOLESTEROLEMIA: ICD-10-CM

## 2021-03-30 DIAGNOSIS — E03.9 HYPOTHYROIDISM, UNSPECIFIED TYPE: ICD-10-CM

## 2021-03-30 DIAGNOSIS — R73.03 PRE-DIABETES: ICD-10-CM

## 2021-03-30 DIAGNOSIS — J84.10 POSTINFLAMMATORY PULMONARY FIBROSIS: ICD-10-CM

## 2021-03-30 LAB
ALBUMIN SERPL BCP-MCNC: 3.8 G/DL (ref 3.5–5.2)
ALP SERPL-CCNC: 58 U/L (ref 55–135)
ALT SERPL W/O P-5'-P-CCNC: 22 U/L (ref 10–44)
ANION GAP SERPL CALC-SCNC: 10 MMOL/L (ref 8–16)
AST SERPL-CCNC: 27 U/L (ref 10–40)
BASOPHILS # BLD AUTO: 0.08 K/UL (ref 0–0.2)
BASOPHILS NFR BLD: 1.5 % (ref 0–1.9)
BILIRUB SERPL-MCNC: 0.6 MG/DL (ref 0.1–1)
BUN SERPL-MCNC: 15 MG/DL (ref 8–23)
CALCIUM SERPL-MCNC: 8.9 MG/DL (ref 8.7–10.5)
CHLORIDE SERPL-SCNC: 107 MMOL/L (ref 95–110)
CHOLEST SERPL-MCNC: 148 MG/DL (ref 120–199)
CHOLEST/HDLC SERPL: 1.7 {RATIO} (ref 2–5)
CO2 SERPL-SCNC: 27 MMOL/L (ref 23–29)
CREAT SERPL-MCNC: 0.8 MG/DL (ref 0.5–1.4)
DIFFERENTIAL METHOD: ABNORMAL
EOSINOPHIL # BLD AUTO: 0.2 K/UL (ref 0–0.5)
EOSINOPHIL NFR BLD: 4.4 % (ref 0–8)
ERYTHROCYTE [DISTWIDTH] IN BLOOD BY AUTOMATED COUNT: 14.5 % (ref 11.5–14.5)
EST. GFR  (AFRICAN AMERICAN): >60 ML/MIN/1.73 M^2
EST. GFR  (NON AFRICAN AMERICAN): >60 ML/MIN/1.73 M^2
ESTIMATED AVG GLUCOSE: 105 MG/DL (ref 68–131)
GLUCOSE SERPL-MCNC: 101 MG/DL (ref 70–110)
HBA1C MFR BLD: 5.3 % (ref 4–5.6)
HCT VFR BLD AUTO: 43 % (ref 37–48.5)
HDLC SERPL-MCNC: 85 MG/DL (ref 40–75)
HDLC SERPL: 57.4 % (ref 20–50)
HGB BLD-MCNC: 14 G/DL (ref 12–16)
IMM GRANULOCYTES # BLD AUTO: 0.02 K/UL (ref 0–0.04)
IMM GRANULOCYTES NFR BLD AUTO: 0.4 % (ref 0–0.5)
LDLC SERPL CALC-MCNC: 54.6 MG/DL (ref 63–159)
LYMPHOCYTES # BLD AUTO: 1.2 K/UL (ref 1–4.8)
LYMPHOCYTES NFR BLD: 22.6 % (ref 18–48)
MCH RBC QN AUTO: 31.9 PG (ref 27–31)
MCHC RBC AUTO-ENTMCNC: 32.6 G/DL (ref 32–36)
MCV RBC AUTO: 98 FL (ref 82–98)
MONOCYTES # BLD AUTO: 0.6 K/UL (ref 0.3–1)
MONOCYTES NFR BLD: 10.7 % (ref 4–15)
NEUTROPHILS # BLD AUTO: 3.1 K/UL (ref 1.8–7.7)
NEUTROPHILS NFR BLD: 60.4 % (ref 38–73)
NONHDLC SERPL-MCNC: 63 MG/DL
NRBC BLD-RTO: 0 /100 WBC
PLATELET # BLD AUTO: 317 K/UL (ref 150–450)
PMV BLD AUTO: 9.8 FL (ref 9.2–12.9)
POTASSIUM SERPL-SCNC: 4 MMOL/L (ref 3.5–5.1)
PROT SERPL-MCNC: 6.6 G/DL (ref 6–8.4)
RBC # BLD AUTO: 4.39 M/UL (ref 4–5.4)
SODIUM SERPL-SCNC: 144 MMOL/L (ref 136–145)
T4 FREE SERPL-MCNC: 0.88 NG/DL (ref 0.71–1.51)
TRIGL SERPL-MCNC: 42 MG/DL (ref 30–150)
TSH SERPL DL<=0.005 MIU/L-ACNC: 5.22 UIU/ML (ref 0.4–4)
WBC # BLD AUTO: 5.21 K/UL (ref 3.9–12.7)

## 2021-03-30 PROCEDURE — 36415 COLL VENOUS BLD VENIPUNCTURE: CPT | Mod: PO | Performed by: INTERNAL MEDICINE

## 2021-03-30 PROCEDURE — 84439 ASSAY OF FREE THYROXINE: CPT | Performed by: INTERNAL MEDICINE

## 2021-03-30 PROCEDURE — 84443 ASSAY THYROID STIM HORMONE: CPT | Performed by: INTERNAL MEDICINE

## 2021-03-30 PROCEDURE — 83036 HEMOGLOBIN GLYCOSYLATED A1C: CPT | Performed by: INTERNAL MEDICINE

## 2021-03-30 PROCEDURE — 80061 LIPID PANEL: CPT | Performed by: INTERNAL MEDICINE

## 2021-03-30 PROCEDURE — 85025 COMPLETE CBC W/AUTO DIFF WBC: CPT | Performed by: INTERNAL MEDICINE

## 2021-03-30 PROCEDURE — 80053 COMPREHEN METABOLIC PANEL: CPT | Performed by: INTERNAL MEDICINE

## 2021-03-31 ENCOUNTER — TELEPHONE (OUTPATIENT)
Dept: INTERNAL MEDICINE | Facility: CLINIC | Age: 76
End: 2021-03-31

## 2021-03-31 ENCOUNTER — HOSPITAL ENCOUNTER (OUTPATIENT)
Dept: RADIOLOGY | Facility: HOSPITAL | Age: 76
Discharge: HOME | End: 2021-03-31
Attending: INTERNAL MEDICINE
Payer: MEDICARE

## 2021-03-31 DIAGNOSIS — M79.652 LEFT THIGH PAIN: ICD-10-CM

## 2021-03-31 PROCEDURE — 72148 MRI LUMBAR SPINE WITHOUT CONTRAST: ICD-10-PCS | Mod: 26,,, | Performed by: RADIOLOGY

## 2021-03-31 PROCEDURE — 72148 MRI LUMBAR SPINE W/O DYE: CPT | Mod: 26,,, | Performed by: RADIOLOGY

## 2021-03-31 PROCEDURE — 72148 MRI LUMBAR SPINE W/O DYE: CPT | Mod: TC

## 2021-04-05 ENCOUNTER — OFFICE VISIT (OUTPATIENT)
Dept: CARDIOLOGY | Facility: CLINIC | Age: 76
End: 2021-04-05
Payer: MEDICARE

## 2021-04-05 VITALS
HEART RATE: 97 BPM | DIASTOLIC BLOOD PRESSURE: 84 MMHG | HEIGHT: 64 IN | OXYGEN SATURATION: 95 % | BODY MASS INDEX: 28.07 KG/M2 | WEIGHT: 164.44 LBS | SYSTOLIC BLOOD PRESSURE: 132 MMHG

## 2021-04-05 DIAGNOSIS — M54.50 CHRONIC LEFT-SIDED LOW BACK PAIN WITHOUT SCIATICA: ICD-10-CM

## 2021-04-05 DIAGNOSIS — I73.9 CLAUDICATION OF LEFT LOWER EXTREMITY: ICD-10-CM

## 2021-04-05 DIAGNOSIS — I70.0 ATHEROSCLEROSIS OF AORTA: ICD-10-CM

## 2021-04-05 DIAGNOSIS — I73.9 PAD (PERIPHERAL ARTERY DISEASE): Primary | Chronic | ICD-10-CM

## 2021-04-05 DIAGNOSIS — M51.9 LUMBAR DISC DISEASE: ICD-10-CM

## 2021-04-05 DIAGNOSIS — E78.00 PURE HYPERCHOLESTEROLEMIA: ICD-10-CM

## 2021-04-05 DIAGNOSIS — M79.652 LEFT THIGH PAIN: ICD-10-CM

## 2021-04-05 DIAGNOSIS — G89.29 CHRONIC LEFT-SIDED LOW BACK PAIN WITHOUT SCIATICA: ICD-10-CM

## 2021-04-05 PROCEDURE — 99214 OFFICE O/P EST MOD 30 MIN: CPT | Mod: PBBFAC,PO | Performed by: INTERNAL MEDICINE

## 2021-04-05 PROCEDURE — 99215 OFFICE O/P EST HI 40 MIN: CPT | Mod: S$PBB,,, | Performed by: INTERNAL MEDICINE

## 2021-04-05 PROCEDURE — 99999 PR PBB SHADOW E&M-EST. PATIENT-LVL IV: CPT | Mod: PBBFAC,,, | Performed by: INTERNAL MEDICINE

## 2021-04-05 PROCEDURE — 99215 PR OFFICE/OUTPT VISIT, EST, LEVL V, 40-54 MIN: ICD-10-PCS | Mod: S$PBB,,, | Performed by: INTERNAL MEDICINE

## 2021-04-05 PROCEDURE — 99999 PR PBB SHADOW E&M-EST. PATIENT-LVL IV: ICD-10-PCS | Mod: PBBFAC,,, | Performed by: INTERNAL MEDICINE

## 2021-04-06 ENCOUNTER — OFFICE VISIT (OUTPATIENT)
Dept: CARDIOLOGY | Facility: CLINIC | Age: 76
End: 2021-04-06
Payer: MEDICARE

## 2021-04-06 VITALS
WEIGHT: 164.13 LBS | HEART RATE: 101 BPM | BODY MASS INDEX: 28.02 KG/M2 | HEIGHT: 64 IN | SYSTOLIC BLOOD PRESSURE: 115 MMHG | DIASTOLIC BLOOD PRESSURE: 55 MMHG

## 2021-04-06 DIAGNOSIS — R94.39 EQUIVOCAL STRESS TEST: ICD-10-CM

## 2021-04-06 DIAGNOSIS — E78.00 PURE HYPERCHOLESTEROLEMIA: ICD-10-CM

## 2021-04-06 DIAGNOSIS — I73.9 CLAUDICATION OF LEFT LOWER EXTREMITY: ICD-10-CM

## 2021-04-06 DIAGNOSIS — I73.9 PAD (PERIPHERAL ARTERY DISEASE): Primary | ICD-10-CM

## 2021-04-06 PROCEDURE — 99214 OFFICE O/P EST MOD 30 MIN: CPT | Mod: S$PBB,,, | Performed by: INTERNAL MEDICINE

## 2021-04-06 PROCEDURE — 99999 PR PBB SHADOW E&M-EST. PATIENT-LVL III: CPT | Mod: PBBFAC,,, | Performed by: INTERNAL MEDICINE

## 2021-04-06 PROCEDURE — 99214 PR OFFICE/OUTPT VISIT, EST, LEVL IV, 30-39 MIN: ICD-10-PCS | Mod: S$PBB,,, | Performed by: INTERNAL MEDICINE

## 2021-04-06 PROCEDURE — 99999 PR PBB SHADOW E&M-EST. PATIENT-LVL III: ICD-10-PCS | Mod: PBBFAC,,, | Performed by: INTERNAL MEDICINE

## 2021-04-06 PROCEDURE — 99213 OFFICE O/P EST LOW 20 MIN: CPT | Mod: PBBFAC,PO | Performed by: INTERNAL MEDICINE

## 2021-04-26 RX ORDER — ALENDRONATE SODIUM 70 MG/1
70 TABLET ORAL
Qty: 12 TABLET | Refills: 3 | Status: SHIPPED | OUTPATIENT
Start: 2021-04-26 | End: 2021-10-05

## 2021-05-04 DIAGNOSIS — E78.00 PURE HYPERCHOLESTEROLEMIA: ICD-10-CM

## 2021-05-04 RX ORDER — ATORVASTATIN CALCIUM 40 MG/1
40 TABLET, FILM COATED ORAL DAILY
Qty: 90 TABLET | Refills: 3 | Status: SHIPPED | OUTPATIENT
Start: 2021-05-04 | End: 2021-10-22

## 2021-05-07 ENCOUNTER — OFFICE VISIT (OUTPATIENT)
Dept: NEUROLOGY | Facility: CLINIC | Age: 76
End: 2021-05-07
Payer: MEDICARE

## 2021-05-07 VITALS
DIASTOLIC BLOOD PRESSURE: 80 MMHG | BODY MASS INDEX: 28.17 KG/M2 | HEIGHT: 64 IN | SYSTOLIC BLOOD PRESSURE: 151 MMHG | HEART RATE: 87 BPM

## 2021-05-07 DIAGNOSIS — R41.3 MEMORY DISTURBANCE: ICD-10-CM

## 2021-05-07 PROCEDURE — 99204 PR OFFICE/OUTPT VISIT, NEW, LEVL IV, 45-59 MIN: ICD-10-PCS | Mod: S$PBB,,, | Performed by: PSYCHIATRY & NEUROLOGY

## 2021-05-07 PROCEDURE — 99213 OFFICE O/P EST LOW 20 MIN: CPT | Mod: PBBFAC | Performed by: PSYCHIATRY & NEUROLOGY

## 2021-05-07 PROCEDURE — 99999 PR PBB SHADOW E&M-EST. PATIENT-LVL III: ICD-10-PCS | Mod: PBBFAC,,, | Performed by: PSYCHIATRY & NEUROLOGY

## 2021-05-07 PROCEDURE — 99204 OFFICE O/P NEW MOD 45 MIN: CPT | Mod: S$PBB,,, | Performed by: PSYCHIATRY & NEUROLOGY

## 2021-05-07 PROCEDURE — 99999 PR PBB SHADOW E&M-EST. PATIENT-LVL III: CPT | Mod: PBBFAC,,, | Performed by: PSYCHIATRY & NEUROLOGY

## 2021-07-07 ENCOUNTER — OFFICE VISIT (OUTPATIENT)
Dept: CARDIOLOGY | Facility: CLINIC | Age: 76
End: 2021-07-07
Payer: MEDICARE

## 2021-07-07 VITALS
SYSTOLIC BLOOD PRESSURE: 142 MMHG | WEIGHT: 167.75 LBS | DIASTOLIC BLOOD PRESSURE: 76 MMHG | HEIGHT: 64 IN | OXYGEN SATURATION: 98 % | BODY MASS INDEX: 28.64 KG/M2 | HEART RATE: 76 BPM

## 2021-07-07 DIAGNOSIS — M54.50 ACUTE LEFT-SIDED LOW BACK PAIN WITHOUT SCIATICA: ICD-10-CM

## 2021-07-07 DIAGNOSIS — I73.9 CLAUDICATION OF LEFT LOWER EXTREMITY: ICD-10-CM

## 2021-07-07 DIAGNOSIS — E66.3 OVERWEIGHT (BMI 25.0-29.9): ICD-10-CM

## 2021-07-07 DIAGNOSIS — E78.00 PURE HYPERCHOLESTEROLEMIA: ICD-10-CM

## 2021-07-07 DIAGNOSIS — M79.652 LEFT THIGH PAIN: ICD-10-CM

## 2021-07-07 DIAGNOSIS — M51.9 LUMBAR DISC DISEASE: Primary | ICD-10-CM

## 2021-07-07 DIAGNOSIS — I73.9 PAD (PERIPHERAL ARTERY DISEASE): Chronic | ICD-10-CM

## 2021-07-07 DIAGNOSIS — I70.0 ATHEROSCLEROSIS OF AORTA: ICD-10-CM

## 2021-07-07 PROCEDURE — 99999 PR PBB SHADOW E&M-EST. PATIENT-LVL III: CPT | Mod: PBBFAC,,, | Performed by: INTERNAL MEDICINE

## 2021-07-07 PROCEDURE — 99215 OFFICE O/P EST HI 40 MIN: CPT | Mod: S$PBB,,, | Performed by: INTERNAL MEDICINE

## 2021-07-07 PROCEDURE — 99213 OFFICE O/P EST LOW 20 MIN: CPT | Mod: PBBFAC,PO | Performed by: INTERNAL MEDICINE

## 2021-07-07 PROCEDURE — 99215 PR OFFICE/OUTPT VISIT, EST, LEVL V, 40-54 MIN: ICD-10-PCS | Mod: S$PBB,,, | Performed by: INTERNAL MEDICINE

## 2021-07-07 PROCEDURE — 99999 PR PBB SHADOW E&M-EST. PATIENT-LVL III: ICD-10-PCS | Mod: PBBFAC,,, | Performed by: INTERNAL MEDICINE

## 2021-08-02 ENCOUNTER — HOSPITAL ENCOUNTER (EMERGENCY)
Facility: HOSPITAL | Age: 76
Discharge: HOME | End: 2021-08-02
Attending: EMERGENCY MEDICINE
Payer: MEDICARE

## 2021-08-02 VITALS
TEMPERATURE: 98 F | OXYGEN SATURATION: 98 % | DIASTOLIC BLOOD PRESSURE: 66 MMHG | WEIGHT: 160 LBS | BODY MASS INDEX: 27.46 KG/M2 | RESPIRATION RATE: 16 BRPM | SYSTOLIC BLOOD PRESSURE: 138 MMHG | HEART RATE: 81 BPM

## 2021-08-02 DIAGNOSIS — R10.9 LEFT FLANK PAIN: ICD-10-CM

## 2021-08-02 DIAGNOSIS — N20.0 KIDNEY STONE: Primary | ICD-10-CM

## 2021-08-02 LAB
ALBUMIN SERPL BCP-MCNC: 4.2 G/DL (ref 3.5–5.2)
ALP SERPL-CCNC: 59 U/L (ref 55–135)
ALT SERPL W/O P-5'-P-CCNC: 25 U/L (ref 10–44)
ANION GAP SERPL CALC-SCNC: 11 MMOL/L (ref 8–16)
AST SERPL-CCNC: 30 U/L (ref 10–40)
BACTERIA #/AREA URNS AUTO: ABNORMAL /HPF
BASOPHILS # BLD AUTO: 0.07 K/UL (ref 0–0.2)
BASOPHILS NFR BLD: 0.9 % (ref 0–1.9)
BILIRUB SERPL-MCNC: 0.5 MG/DL (ref 0.1–1)
BILIRUB UR QL STRIP: NEGATIVE
BUN SERPL-MCNC: 19 MG/DL (ref 8–23)
BUN SERPL-MCNC: 21 MG/DL (ref 6–30)
CALCIUM SERPL-MCNC: 10.2 MG/DL (ref 8.7–10.5)
CAOX CRY UR QL COMP ASSIST: ABNORMAL
CHLORIDE SERPL-SCNC: 107 MMOL/L (ref 95–110)
CHLORIDE SERPL-SCNC: 109 MMOL/L (ref 95–110)
CLARITY UR REFRACT.AUTO: ABNORMAL
CO2 SERPL-SCNC: 24 MMOL/L (ref 23–29)
COLOR UR AUTO: YELLOW
CREAT SERPL-MCNC: 1 MG/DL (ref 0.5–1.4)
CREAT SERPL-MCNC: 1.1 MG/DL (ref 0.5–1.4)
DIFFERENTIAL METHOD: ABNORMAL
EOSINOPHIL # BLD AUTO: 0.1 K/UL (ref 0–0.5)
EOSINOPHIL NFR BLD: 1 % (ref 0–8)
ERYTHROCYTE [DISTWIDTH] IN BLOOD BY AUTOMATED COUNT: 14.3 % (ref 11.5–14.5)
EST. GFR  (AFRICAN AMERICAN): 57.2 ML/MIN/1.73 M^2
EST. GFR  (NON AFRICAN AMERICAN): 49.6 ML/MIN/1.73 M^2
GLUCOSE SERPL-MCNC: 128 MG/DL (ref 70–110)
GLUCOSE SERPL-MCNC: 130 MG/DL (ref 70–110)
GLUCOSE UR QL STRIP: NEGATIVE
HCT VFR BLD AUTO: 45.9 % (ref 37–48.5)
HCT VFR BLD CALC: 46 %PCV (ref 36–54)
HGB BLD-MCNC: 15.1 G/DL (ref 12–16)
HGB UR QL STRIP: ABNORMAL
HYALINE CASTS UR QL AUTO: 13 /LPF
IMM GRANULOCYTES # BLD AUTO: 0.02 K/UL (ref 0–0.04)
IMM GRANULOCYTES NFR BLD AUTO: 0.3 % (ref 0–0.5)
KETONES UR QL STRIP: NEGATIVE
LACTATE SERPL-SCNC: 1.1 MMOL/L (ref 0.5–2.2)
LEUKOCYTE ESTERASE UR QL STRIP: NEGATIVE
LIPASE SERPL-CCNC: 48 U/L (ref 4–60)
LYMPHOCYTES # BLD AUTO: 1 K/UL (ref 1–4.8)
LYMPHOCYTES NFR BLD: 12.7 % (ref 18–48)
MCH RBC QN AUTO: 32.1 PG (ref 27–31)
MCHC RBC AUTO-ENTMCNC: 32.9 G/DL (ref 32–36)
MCV RBC AUTO: 98 FL (ref 82–98)
MICROSCOPIC COMMENT: ABNORMAL
MONOCYTES # BLD AUTO: 0.5 K/UL (ref 0.3–1)
MONOCYTES NFR BLD: 6.1 % (ref 4–15)
NEUTROPHILS # BLD AUTO: 6.2 K/UL (ref 1.8–7.7)
NEUTROPHILS NFR BLD: 79 % (ref 38–73)
NITRITE UR QL STRIP: NEGATIVE
NRBC BLD-RTO: 0 /100 WBC
PH UR STRIP: 5 [PH] (ref 5–8)
PLATELET # BLD AUTO: 271 K/UL (ref 150–450)
PMV BLD AUTO: 9 FL (ref 9.2–12.9)
POC IONIZED CALCIUM: 1.24 MMOL/L (ref 1.06–1.42)
POC TCO2 (MEASURED): 25 MMOL/L (ref 23–29)
POTASSIUM BLD-SCNC: 4.4 MMOL/L (ref 3.5–5.1)
POTASSIUM SERPL-SCNC: 4.5 MMOL/L (ref 3.5–5.1)
PROT SERPL-MCNC: 7.6 G/DL (ref 6–8.4)
PROT UR QL STRIP: NEGATIVE
RBC # BLD AUTO: 4.7 M/UL (ref 4–5.4)
RBC #/AREA URNS AUTO: >100 /HPF (ref 0–4)
SAMPLE: ABNORMAL
SODIUM BLD-SCNC: 145 MMOL/L (ref 136–145)
SODIUM SERPL-SCNC: 144 MMOL/L (ref 136–145)
SP GR UR STRIP: >=1.03 (ref 1–1.03)
SQUAMOUS #/AREA URNS AUTO: 1 /HPF
URN SPEC COLLECT METH UR: ABNORMAL
WBC # BLD AUTO: 7.86 K/UL (ref 3.9–12.7)
WBC #/AREA URNS AUTO: 3 /HPF (ref 0–5)

## 2021-08-02 PROCEDURE — 25000003 PHARM REV CODE 250: Performed by: INTERNAL MEDICINE

## 2021-08-02 PROCEDURE — 93010 ELECTROCARDIOGRAM REPORT: CPT | Mod: ,,, | Performed by: INTERNAL MEDICINE

## 2021-08-02 PROCEDURE — 25000003 PHARM REV CODE 250: Performed by: EMERGENCY MEDICINE

## 2021-08-02 PROCEDURE — 81001 URINALYSIS AUTO W/SCOPE: CPT | Performed by: EMERGENCY MEDICINE

## 2021-08-02 PROCEDURE — 80047 BASIC METABLC PNL IONIZED CA: CPT

## 2021-08-02 PROCEDURE — 83605 ASSAY OF LACTIC ACID: CPT | Performed by: INTERNAL MEDICINE

## 2021-08-02 PROCEDURE — 85025 COMPLETE CBC W/AUTO DIFF WBC: CPT | Performed by: EMERGENCY MEDICINE

## 2021-08-02 PROCEDURE — 25500020 PHARM REV CODE 255: Performed by: EMERGENCY MEDICINE

## 2021-08-02 PROCEDURE — 63600175 PHARM REV CODE 636 W HCPCS: Performed by: INTERNAL MEDICINE

## 2021-08-02 PROCEDURE — 96374 THER/PROPH/DIAG INJ IV PUSH: CPT

## 2021-08-02 PROCEDURE — 93010 EKG 12-LEAD: ICD-10-PCS | Mod: ,,, | Performed by: INTERNAL MEDICINE

## 2021-08-02 PROCEDURE — 83690 ASSAY OF LIPASE: CPT | Performed by: EMERGENCY MEDICINE

## 2021-08-02 PROCEDURE — 93005 ELECTROCARDIOGRAM TRACING: CPT

## 2021-08-02 PROCEDURE — 96375 TX/PRO/DX INJ NEW DRUG ADDON: CPT

## 2021-08-02 PROCEDURE — 99284 EMERGENCY DEPT VISIT MOD MDM: CPT | Mod: GC,,, | Performed by: EMERGENCY MEDICINE

## 2021-08-02 PROCEDURE — 80053 COMPREHEN METABOLIC PANEL: CPT | Performed by: EMERGENCY MEDICINE

## 2021-08-02 PROCEDURE — 99284 PR EMERGENCY DEPT VISIT,LEVEL IV: ICD-10-PCS | Mod: GC,,, | Performed by: EMERGENCY MEDICINE

## 2021-08-02 PROCEDURE — 82330 ASSAY OF CALCIUM: CPT

## 2021-08-02 PROCEDURE — 99285 EMERGENCY DEPT VISIT HI MDM: CPT | Mod: 25

## 2021-08-02 RX ORDER — TAMSULOSIN HYDROCHLORIDE 0.4 MG/1
0.4 CAPSULE ORAL
Status: COMPLETED | OUTPATIENT
Start: 2021-08-02 | End: 2021-08-02

## 2021-08-02 RX ORDER — HYDROCODONE BITARTRATE AND ACETAMINOPHEN 5; 325 MG/1; MG/1
1 TABLET ORAL EVERY 6 HOURS PRN
Qty: 18 TABLET | Refills: 0 | Status: SHIPPED | OUTPATIENT
Start: 2021-08-02 | End: 2021-08-02 | Stop reason: SDUPTHER

## 2021-08-02 RX ORDER — ONDANSETRON 4 MG/1
4 TABLET, FILM COATED ORAL EVERY 6 HOURS
Qty: 12 TABLET | Refills: 0 | Status: SHIPPED | OUTPATIENT
Start: 2021-08-02 | End: 2021-10-05 | Stop reason: ALTCHOICE

## 2021-08-02 RX ORDER — IBUPROFEN 600 MG/1
600 TABLET ORAL EVERY 6 HOURS PRN
Qty: 20 TABLET | Refills: 0 | Status: SHIPPED | OUTPATIENT
Start: 2021-08-02 | End: 2021-10-05 | Stop reason: ALTCHOICE

## 2021-08-02 RX ORDER — HYDROCODONE BITARTRATE AND ACETAMINOPHEN 5; 325 MG/1; MG/1
1 TABLET ORAL EVERY 6 HOURS PRN
Qty: 12 TABLET | Refills: 0 | Status: SHIPPED | OUTPATIENT
Start: 2021-08-02 | End: 2021-08-05

## 2021-08-02 RX ORDER — HYDROCODONE BITARTRATE AND ACETAMINOPHEN 5; 325 MG/1; MG/1
1 TABLET ORAL EVERY 6 HOURS PRN
Qty: 12 TABLET | Refills: 0 | Status: SHIPPED | OUTPATIENT
Start: 2021-08-02 | End: 2021-08-02 | Stop reason: SDUPTHER

## 2021-08-02 RX ORDER — MORPHINE SULFATE 4 MG/ML
4 INJECTION, SOLUTION INTRAMUSCULAR; INTRAVENOUS
Status: DISCONTINUED | OUTPATIENT
Start: 2021-08-02 | End: 2021-08-02

## 2021-08-02 RX ORDER — KETOROLAC TROMETHAMINE 30 MG/ML
10 INJECTION, SOLUTION INTRAMUSCULAR; INTRAVENOUS
Status: COMPLETED | OUTPATIENT
Start: 2021-08-02 | End: 2021-08-02

## 2021-08-02 RX ORDER — TAMSULOSIN HYDROCHLORIDE 0.4 MG/1
0.4 CAPSULE ORAL DAILY
Qty: 10 CAPSULE | Refills: 0 | Status: SHIPPED | OUTPATIENT
Start: 2021-08-02 | End: 2021-10-05 | Stop reason: ALTCHOICE

## 2021-08-02 RX ORDER — ONDANSETRON 2 MG/ML
4 INJECTION INTRAMUSCULAR; INTRAVENOUS
Status: COMPLETED | OUTPATIENT
Start: 2021-08-02 | End: 2021-08-02

## 2021-08-02 RX ADMIN — KETOROLAC TROMETHAMINE 10 MG: 30 INJECTION, SOLUTION INTRAMUSCULAR; INTRAVENOUS at 06:08

## 2021-08-02 RX ADMIN — ONDANSETRON 4 MG: 2 INJECTION INTRAMUSCULAR; INTRAVENOUS at 06:08

## 2021-08-02 RX ADMIN — IOHEXOL 75 ML: 350 INJECTION, SOLUTION INTRAVENOUS at 06:08

## 2021-08-02 RX ADMIN — SODIUM CHLORIDE 500 ML: 0.9 INJECTION, SOLUTION INTRAVENOUS at 06:08

## 2021-08-02 RX ADMIN — TAMSULOSIN HYDROCHLORIDE 0.4 MG: 0.4 CAPSULE ORAL at 07:08

## 2021-08-03 ENCOUNTER — TELEPHONE (OUTPATIENT)
Dept: INTERNAL MEDICINE | Facility: CLINIC | Age: 76
End: 2021-08-03

## 2021-08-06 ENCOUNTER — TELEPHONE (OUTPATIENT)
Dept: AUDIOLOGY | Facility: CLINIC | Age: 76
End: 2021-08-06

## 2021-08-10 ENCOUNTER — TELEPHONE (OUTPATIENT)
Dept: AUDIOLOGY | Facility: CLINIC | Age: 76
End: 2021-08-10

## 2021-08-10 DIAGNOSIS — H90.3 SENSORINEURAL HEARING LOSS, BILATERAL: Primary | ICD-10-CM

## 2021-08-19 ENCOUNTER — CLINICAL SUPPORT (OUTPATIENT)
Dept: AUDIOLOGY | Facility: CLINIC | Age: 76
End: 2021-08-19
Payer: MEDICARE

## 2021-08-19 DIAGNOSIS — H90.3 SENSORINEURAL HEARING LOSS, BILATERAL: ICD-10-CM

## 2021-08-19 DIAGNOSIS — H90.3 SENSORINEURAL HEARING LOSS, BILATERAL: Primary | ICD-10-CM

## 2021-08-19 PROCEDURE — 99999 PR PBB SHADOW E&M-EST. PATIENT-LVL I: ICD-10-PCS | Mod: PBBFAC,,, | Performed by: AUDIOLOGIST

## 2021-08-19 PROCEDURE — 92557 COMPREHENSIVE HEARING TEST: CPT | Mod: PBBFAC | Performed by: AUDIOLOGIST

## 2021-08-19 PROCEDURE — 99999 PR PBB SHADOW E&M-EST. PATIENT-LVL I: CPT | Mod: PBBFAC,,, | Performed by: AUDIOLOGIST

## 2021-08-19 PROCEDURE — 99211 OFF/OP EST MAY X REQ PHY/QHP: CPT | Mod: PBBFAC | Performed by: AUDIOLOGIST

## 2021-08-19 PROCEDURE — 99499 UNLISTED E&M SERVICE: CPT | Mod: S$PBB,,, | Performed by: AUDIOLOGIST

## 2021-08-19 PROCEDURE — 92567 TYMPANOMETRY: CPT | Mod: PBBFAC | Performed by: AUDIOLOGIST

## 2021-08-19 PROCEDURE — 99499 NO LOS: ICD-10-PCS | Mod: S$PBB,,, | Performed by: AUDIOLOGIST

## 2021-09-14 ENCOUNTER — PES CALL (OUTPATIENT)
Dept: ADMINISTRATIVE | Facility: CLINIC | Age: 76
End: 2021-09-14

## 2021-10-03 PROBLEM — N13.30 HYDRONEPHROSIS: Status: ACTIVE | Noted: 2021-10-03

## 2021-10-03 PROBLEM — N17.9 AKI (ACUTE KIDNEY INJURY): Status: ACTIVE | Noted: 2021-10-03

## 2021-10-05 ENCOUNTER — OFFICE VISIT (OUTPATIENT)
Dept: INTERNAL MEDICINE | Facility: CLINIC | Age: 76
End: 2021-10-05
Payer: MEDICARE

## 2021-10-05 ENCOUNTER — LAB VISIT (OUTPATIENT)
Dept: LAB | Facility: HOSPITAL | Age: 76
End: 2021-10-05
Attending: INTERNAL MEDICINE
Payer: MEDICARE

## 2021-10-05 VITALS
RESPIRATION RATE: 16 BRPM | WEIGHT: 163.13 LBS | SYSTOLIC BLOOD PRESSURE: 154 MMHG | OXYGEN SATURATION: 92 % | DIASTOLIC BLOOD PRESSURE: 74 MMHG | HEART RATE: 88 BPM | BODY MASS INDEX: 27.18 KG/M2 | TEMPERATURE: 98 F | HEIGHT: 65 IN

## 2021-10-05 DIAGNOSIS — R73.03 PRE-DIABETES: ICD-10-CM

## 2021-10-05 DIAGNOSIS — I70.0 ATHEROSCLEROSIS OF AORTA: ICD-10-CM

## 2021-10-05 DIAGNOSIS — J84.10 POSTINFLAMMATORY PULMONARY FIBROSIS: ICD-10-CM

## 2021-10-05 DIAGNOSIS — E03.9 HYPOTHYROIDISM, UNSPECIFIED TYPE: ICD-10-CM

## 2021-10-05 DIAGNOSIS — I73.9 PAD (PERIPHERAL ARTERY DISEASE): ICD-10-CM

## 2021-10-05 DIAGNOSIS — N13.30 HYDRONEPHROSIS, UNSPECIFIED HYDRONEPHROSIS TYPE: ICD-10-CM

## 2021-10-05 DIAGNOSIS — N17.9 AKI (ACUTE KIDNEY INJURY): ICD-10-CM

## 2021-10-05 DIAGNOSIS — I10 HYPERTENSION, UNSPECIFIED TYPE: ICD-10-CM

## 2021-10-05 DIAGNOSIS — M51.9 LUMBAR DISC DISEASE: ICD-10-CM

## 2021-10-05 DIAGNOSIS — R06.02 SOB (SHORTNESS OF BREATH) ON EXERTION: Primary | ICD-10-CM

## 2021-10-05 DIAGNOSIS — M51.36 DDD (DEGENERATIVE DISC DISEASE), LUMBAR: ICD-10-CM

## 2021-10-05 DIAGNOSIS — E78.00 PURE HYPERCHOLESTEROLEMIA: ICD-10-CM

## 2021-10-05 DIAGNOSIS — Z12.11 SCREEN FOR COLON CANCER: ICD-10-CM

## 2021-10-05 LAB
ALBUMIN SERPL BCP-MCNC: 4.1 G/DL (ref 3.5–5.2)
ALP SERPL-CCNC: 55 U/L (ref 55–135)
ALT SERPL W/O P-5'-P-CCNC: 29 U/L (ref 10–44)
ANION GAP SERPL CALC-SCNC: 12 MMOL/L (ref 8–16)
AST SERPL-CCNC: 29 U/L (ref 10–40)
BILIRUB SERPL-MCNC: 0.6 MG/DL (ref 0.1–1)
BUN SERPL-MCNC: 14 MG/DL (ref 8–23)
CALCIUM SERPL-MCNC: 9.9 MG/DL (ref 8.7–10.5)
CHLORIDE SERPL-SCNC: 102 MMOL/L (ref 95–110)
CO2 SERPL-SCNC: 23 MMOL/L (ref 23–29)
CREAT SERPL-MCNC: 0.8 MG/DL (ref 0.5–1.4)
EST. GFR  (AFRICAN AMERICAN): >60 ML/MIN/1.73 M^2
EST. GFR  (NON AFRICAN AMERICAN): >60 ML/MIN/1.73 M^2
GLUCOSE SERPL-MCNC: 79 MG/DL (ref 70–110)
POTASSIUM SERPL-SCNC: 4.3 MMOL/L (ref 3.5–5.1)
PROT SERPL-MCNC: 6.9 G/DL (ref 6–8.4)
SODIUM SERPL-SCNC: 137 MMOL/L (ref 136–145)
TSH SERPL DL<=0.005 MIU/L-ACNC: 3.57 UIU/ML (ref 0.4–4)

## 2021-10-05 PROCEDURE — 99215 OFFICE O/P EST HI 40 MIN: CPT | Mod: S$PBB,,, | Performed by: INTERNAL MEDICINE

## 2021-10-05 PROCEDURE — 93010 EKG 12-LEAD: ICD-10-PCS | Mod: S$PBB,,, | Performed by: INTERNAL MEDICINE

## 2021-10-05 PROCEDURE — 99215 PR OFFICE/OUTPT VISIT, EST, LEVL V, 40-54 MIN: ICD-10-PCS | Mod: S$PBB,,, | Performed by: INTERNAL MEDICINE

## 2021-10-05 PROCEDURE — 99999 PR PBB SHADOW E&M-EST. PATIENT-LVL IV: CPT | Mod: PBBFAC,,, | Performed by: INTERNAL MEDICINE

## 2021-10-05 PROCEDURE — 36415 COLL VENOUS BLD VENIPUNCTURE: CPT | Mod: PO | Performed by: INTERNAL MEDICINE

## 2021-10-05 PROCEDURE — 93005 ELECTROCARDIOGRAM TRACING: CPT | Mod: PBBFAC,PO | Performed by: INTERNAL MEDICINE

## 2021-10-05 PROCEDURE — 84443 ASSAY THYROID STIM HORMONE: CPT | Performed by: INTERNAL MEDICINE

## 2021-10-05 PROCEDURE — 93010 ELECTROCARDIOGRAM REPORT: CPT | Mod: S$PBB,,, | Performed by: INTERNAL MEDICINE

## 2021-10-05 PROCEDURE — 99214 OFFICE O/P EST MOD 30 MIN: CPT | Mod: PBBFAC,PO | Performed by: INTERNAL MEDICINE

## 2021-10-05 PROCEDURE — 80053 COMPREHEN METABOLIC PANEL: CPT | Performed by: INTERNAL MEDICINE

## 2021-10-05 PROCEDURE — 99999 PR PBB SHADOW E&M-EST. PATIENT-LVL IV: ICD-10-PCS | Mod: PBBFAC,,, | Performed by: INTERNAL MEDICINE

## 2021-10-05 RX ORDER — AMLODIPINE BESYLATE 5 MG/1
5 TABLET ORAL DAILY
Qty: 90 TABLET | Refills: 1 | Status: SHIPPED | OUTPATIENT
Start: 2021-10-05 | End: 2022-03-28 | Stop reason: SDUPTHER

## 2021-10-07 ENCOUNTER — TELEPHONE (OUTPATIENT)
Dept: INTERNAL MEDICINE | Facility: CLINIC | Age: 76
End: 2021-10-07

## 2021-10-11 ENCOUNTER — HOSPITAL ENCOUNTER (OUTPATIENT)
Dept: CARDIOLOGY | Facility: HOSPITAL | Age: 76
Discharge: HOME | End: 2021-10-11
Attending: INTERNAL MEDICINE
Payer: MEDICARE

## 2021-10-11 VITALS
SYSTOLIC BLOOD PRESSURE: 177 MMHG | HEART RATE: 77 BPM | WEIGHT: 163 LBS | RESPIRATION RATE: 18 BRPM | DIASTOLIC BLOOD PRESSURE: 80 MMHG | HEIGHT: 65 IN | BODY MASS INDEX: 27.16 KG/M2

## 2021-10-11 DIAGNOSIS — R06.02 SOB (SHORTNESS OF BREATH) ON EXERTION: ICD-10-CM

## 2021-10-11 LAB
ASCENDING AORTA: 3.26 CM
BSA FOR ECHO PROCEDURE: 1.84 M2
CV ECHO LV RWT: 0.4 CM
CV STRESS BASE HR: 81 BPM
DIASTOLIC BLOOD PRESSURE: 80 MMHG
DOP CALC LVOT AREA: 3.1 CM2
DOP CALC LVOT DIAMETER: 1.98 CM
DOP CALC LVOT PEAK VEL: 1.23 M/S
DOP CALC LVOT STROKE VOLUME: 65.7 CM3
DOP CALCLVOT PEAK VEL VTI: 21.35 CM
E WAVE DECELERATION TIME: 199.83 MSEC
E/A RATIO: 0.6
E/E' RATIO: 11.08 M/S
ECHO LV POSTERIOR WALL: 0.96 CM (ref 0.6–1.1)
EJECTION FRACTION: 65 %
FRACTIONAL SHORTENING: 47 % (ref 28–44)
INTERVENTRICULAR SEPTUM: 0.83 CM (ref 0.6–1.1)
IVRT: 88.49 MSEC
LA MAJOR: 4.77 CM
LA MINOR: 4.79 CM
LA WIDTH: 3.65 CM
LEFT ATRIUM SIZE: 3.38 CM
LEFT ATRIUM VOLUME INDEX MOD: 22.3 ML/M2
LEFT ATRIUM VOLUME INDEX: 27.7 ML/M2
LEFT ATRIUM VOLUME MOD: 40.3 CM3
LEFT ATRIUM VOLUME: 50.13 CM3
LEFT INTERNAL DIMENSION IN SYSTOLE: 2.58 CM (ref 2.1–4)
LEFT VENTRICLE DIASTOLIC VOLUME INDEX: 60.4 ML/M2
LEFT VENTRICLE DIASTOLIC VOLUME: 109.33 ML
LEFT VENTRICLE MASS INDEX: 82 G/M2
LEFT VENTRICLE SYSTOLIC VOLUME INDEX: 13.3 ML/M2
LEFT VENTRICLE SYSTOLIC VOLUME: 24.05 ML
LEFT VENTRICULAR INTERNAL DIMENSION IN DIASTOLE: 4.83 CM (ref 3.5–6)
LEFT VENTRICULAR MASS: 148.23 G
LV LATERAL E/E' RATIO: 10.29 M/S
LV SEPTAL E/E' RATIO: 12 M/S
MV A" WAVE DURATION": 11.99 MSEC
MV PEAK A VEL: 1.2 M/S
MV PEAK E VEL: 0.72 M/S
MV STENOSIS PRESSURE HALF TIME: 57.95 MS
MV VALVE AREA P 1/2 METHOD: 3.8 CM2
OHS CV CPX 1 MINUTE RECOVERY HEART RATE: 127 BPM
OHS CV CPX 85 PERCENT MAX PREDICTED HEART RATE MALE: 119
OHS CV CPX MAX PREDICTED HEART RATE: 140
OHS CV CPX PATIENT IS FEMALE: 1
OHS CV CPX PATIENT IS MALE: 0
OHS CV CPX PEAK DIASTOLIC BLOOD PRESSURE: 48 MMHG
OHS CV CPX PEAK HEAR RATE: 127 BPM
OHS CV CPX PEAK RATE PRESSURE PRODUCT: NORMAL
OHS CV CPX PEAK SYSTOLIC BLOOD PRESSURE: 185 MMHG
OHS CV CPX PERCENT MAX PREDICTED HEART RATE ACHIEVED: 91
OHS CV CPX RATE PRESSURE PRODUCT PRESENTING: NORMAL
PISA TR MAX VEL: 2.76 M/S
PULM VEIN S/D RATIO: 1.35
PV PEAK D VEL: 0.43 M/S
PV PEAK S VEL: 0.58 M/S
RA MAJOR: 3.87 CM
RA WIDTH: 3.08 CM
RIGHT VENTRICULAR END-DIASTOLIC DIMENSION: 2.88 CM
RV TISSUE DOPPLER FREE WALL SYSTOLIC VELOCITY 1 (APICAL 4 CHAMBER VIEW): 11.26 CM/S
SINUS: 2.7 CM
STJ: 2.96 CM
SYSTOLIC BLOOD PRESSURE: 177 MMHG
TDI LATERAL: 0.07 M/S
TDI SEPTAL: 0.06 M/S
TDI: 0.07 M/S
TR MAX PG: 30 MMHG
TRICUSPID ANNULAR PLANE SYSTOLIC EXCURSION: 2.51 CM

## 2021-10-11 PROCEDURE — 93351 STRESS ECHO (CUPID ONLY): ICD-10-PCS | Mod: 26,,, | Performed by: INTERNAL MEDICINE

## 2021-10-11 PROCEDURE — 63600175 PHARM REV CODE 636 W HCPCS: Performed by: INTERNAL MEDICINE

## 2021-10-11 PROCEDURE — 93351 STRESS TTE COMPLETE: CPT | Mod: 26,,, | Performed by: INTERNAL MEDICINE

## 2021-10-11 PROCEDURE — 93351 STRESS TTE COMPLETE: CPT

## 2021-10-11 RX ORDER — DOBUTAMINE HYDROCHLORIDE 400 MG/100ML
30 INJECTION INTRAVENOUS
Status: COMPLETED | OUTPATIENT
Start: 2021-10-11 | End: 2021-10-11

## 2021-10-11 RX ADMIN — DOBUTAMINE HYDROCHLORIDE 30 MCG/KG/MIN: 400 INJECTION INTRAVENOUS at 01:10

## 2021-10-13 ENCOUNTER — PATIENT OUTREACH (OUTPATIENT)
Dept: ADMINISTRATIVE | Facility: OTHER | Age: 76
End: 2021-10-13

## 2021-10-13 ENCOUNTER — TELEPHONE (OUTPATIENT)
Dept: INTERNAL MEDICINE | Facility: CLINIC | Age: 76
End: 2021-10-13

## 2021-10-14 ENCOUNTER — OFFICE VISIT (OUTPATIENT)
Dept: CARDIOLOGY | Facility: CLINIC | Age: 76
End: 2021-10-14
Payer: MEDICARE

## 2021-10-14 VITALS
BODY MASS INDEX: 27.27 KG/M2 | WEIGHT: 163.69 LBS | DIASTOLIC BLOOD PRESSURE: 60 MMHG | HEIGHT: 65 IN | OXYGEN SATURATION: 93 % | HEART RATE: 77 BPM | SYSTOLIC BLOOD PRESSURE: 132 MMHG

## 2021-10-14 DIAGNOSIS — E78.00 PURE HYPERCHOLESTEROLEMIA: ICD-10-CM

## 2021-10-14 DIAGNOSIS — R06.02 SOB (SHORTNESS OF BREATH) ON EXERTION: Primary | ICD-10-CM

## 2021-10-14 PROCEDURE — 99999 PR PBB SHADOW E&M-EST. PATIENT-LVL III: CPT | Mod: PBBFAC,,, | Performed by: INTERNAL MEDICINE

## 2021-10-14 PROCEDURE — 99213 OFFICE O/P EST LOW 20 MIN: CPT | Mod: PBBFAC,PO | Performed by: INTERNAL MEDICINE

## 2021-10-14 PROCEDURE — 99213 PR OFFICE/OUTPT VISIT, EST, LEVL III, 20-29 MIN: ICD-10-PCS | Mod: S$PBB,,, | Performed by: INTERNAL MEDICINE

## 2021-10-14 PROCEDURE — 99213 OFFICE O/P EST LOW 20 MIN: CPT | Mod: S$PBB,,, | Performed by: INTERNAL MEDICINE

## 2021-10-14 PROCEDURE — 99999 PR PBB SHADOW E&M-EST. PATIENT-LVL III: ICD-10-PCS | Mod: PBBFAC,,, | Performed by: INTERNAL MEDICINE

## 2021-10-24 LAB — NONINV COLON CA DNA+OCC BLD SCRN STL QL: POSITIVE

## 2021-10-29 ENCOUNTER — TELEPHONE (OUTPATIENT)
Dept: INTERNAL MEDICINE | Facility: CLINIC | Age: 76
End: 2021-10-29
Payer: COMMERCIAL

## 2021-10-29 DIAGNOSIS — R19.5 POSITIVE COLORECTAL CANCER SCREENING USING COLOGUARD TEST: Primary | ICD-10-CM

## 2021-11-03 ENCOUNTER — TELEPHONE (OUTPATIENT)
Dept: INTERNAL MEDICINE | Facility: CLINIC | Age: 76
End: 2021-11-03
Payer: COMMERCIAL

## 2021-11-16 ENCOUNTER — OFFICE VISIT (OUTPATIENT)
Dept: PODIATRY | Facility: CLINIC | Age: 76
End: 2021-11-16
Payer: MEDICARE

## 2021-11-16 VITALS
SYSTOLIC BLOOD PRESSURE: 137 MMHG | HEART RATE: 81 BPM | DIASTOLIC BLOOD PRESSURE: 70 MMHG | BODY MASS INDEX: 27.22 KG/M2 | WEIGHT: 163.56 LBS

## 2021-11-16 DIAGNOSIS — L60.9 DISEASE OF NAIL: Primary | ICD-10-CM

## 2021-11-16 DIAGNOSIS — B35.1 ONYCHOMYCOSIS DUE TO DERMATOPHYTE: ICD-10-CM

## 2021-11-16 PROCEDURE — 99203 PR OFFICE/OUTPT VISIT, NEW, LEVL III, 30-44 MIN: ICD-10-PCS | Mod: S$PBB,,, | Performed by: PODIATRIST

## 2021-11-16 PROCEDURE — 99213 OFFICE O/P EST LOW 20 MIN: CPT | Mod: PBBFAC | Performed by: PODIATRIST

## 2021-11-16 PROCEDURE — 99203 OFFICE O/P NEW LOW 30 MIN: CPT | Mod: S$PBB,,, | Performed by: PODIATRIST

## 2021-11-16 PROCEDURE — 99999 PR PBB SHADOW E&M-EST. PATIENT-LVL III: ICD-10-PCS | Mod: PBBFAC,,, | Performed by: PODIATRIST

## 2021-11-16 PROCEDURE — 99999 PR PBB SHADOW E&M-EST. PATIENT-LVL III: CPT | Mod: PBBFAC,,, | Performed by: PODIATRIST

## 2021-11-16 RX ORDER — KETOCONAZOLE 20 MG/G
CREAM TOPICAL DAILY
Qty: 60 G | Refills: 3 | Status: SHIPPED | OUTPATIENT
Start: 2021-11-16 | End: 2022-04-05

## 2021-11-16 RX ORDER — TERBINAFINE HYDROCHLORIDE 250 MG/1
250 TABLET ORAL DAILY
Qty: 30 TABLET | Refills: 2 | Status: SHIPPED | OUTPATIENT
Start: 2021-11-16 | End: 2021-12-16

## 2021-11-16 RX ORDER — EFINACONAZOLE 100 MG/ML
SOLUTION TOPICAL
COMMUNITY
End: 2022-09-13

## 2021-11-17 ENCOUNTER — PES CALL (OUTPATIENT)
Dept: ADMINISTRATIVE | Facility: CLINIC | Age: 76
End: 2021-11-17
Payer: COMMERCIAL

## 2021-11-30 ENCOUNTER — PATIENT MESSAGE (OUTPATIENT)
Dept: ENDOSCOPY | Facility: HOSPITAL | Age: 76
End: 2021-11-30
Payer: COMMERCIAL

## 2021-11-30 ENCOUNTER — TELEPHONE (OUTPATIENT)
Dept: ENDOSCOPY | Facility: HOSPITAL | Age: 76
End: 2021-11-30
Payer: COMMERCIAL

## 2021-11-30 DIAGNOSIS — Z12.11 SPECIAL SCREENING FOR MALIGNANT NEOPLASMS, COLON: Primary | ICD-10-CM

## 2021-11-30 RX ORDER — SODIUM, POTASSIUM,MAG SULFATES 17.5-3.13G
SOLUTION, RECONSTITUTED, ORAL ORAL
Qty: 1 KIT | Refills: 0 | Status: ON HOLD | OUTPATIENT
Start: 2021-11-30 | End: 2021-12-20 | Stop reason: HOSPADM

## 2021-12-09 ENCOUNTER — ANESTHESIA EVENT (OUTPATIENT)
Dept: ENDOSCOPY | Facility: HOSPITAL | Age: 76
End: 2021-12-09
Payer: MEDICARE

## 2021-12-09 ENCOUNTER — HOSPITAL ENCOUNTER (OUTPATIENT)
Facility: HOSPITAL | Age: 76
Discharge: HOME | End: 2021-12-09
Attending: INTERNAL MEDICINE | Admitting: INTERNAL MEDICINE
Payer: MEDICARE

## 2021-12-09 ENCOUNTER — ANESTHESIA (OUTPATIENT)
Dept: ENDOSCOPY | Facility: HOSPITAL | Age: 76
End: 2021-12-09
Payer: MEDICARE

## 2021-12-09 ENCOUNTER — PES CALL (OUTPATIENT)
Dept: ADMINISTRATIVE | Facility: CLINIC | Age: 76
End: 2021-12-09
Payer: COMMERCIAL

## 2021-12-09 VITALS
HEART RATE: 71 BPM | OXYGEN SATURATION: 100 % | WEIGHT: 158 LBS | RESPIRATION RATE: 17 BRPM | DIASTOLIC BLOOD PRESSURE: 70 MMHG | HEIGHT: 64 IN | SYSTOLIC BLOOD PRESSURE: 155 MMHG | BODY MASS INDEX: 26.98 KG/M2 | TEMPERATURE: 98 F

## 2021-12-09 DIAGNOSIS — Z86.010 HISTORY OF COLON POLYPS: ICD-10-CM

## 2021-12-09 PROCEDURE — 25000003 PHARM REV CODE 250: Performed by: INTERNAL MEDICINE

## 2021-12-09 PROCEDURE — 88342 IMHCHEM/IMCYTCHM 1ST ANTB: CPT | Mod: 26,,, | Performed by: PATHOLOGY

## 2021-12-09 PROCEDURE — E9220 PRA ENDO ANESTHESIA: HCPCS | Mod: ,,, | Performed by: NURSE ANESTHETIST, CERTIFIED REGISTERED

## 2021-12-09 PROCEDURE — 88307 TISSUE EXAM BY PATHOLOGIST: CPT | Performed by: PATHOLOGY

## 2021-12-09 PROCEDURE — 88341 PR IHC OR ICC EACH ADD'L SINGLE ANTIBODY  STAINPR: ICD-10-PCS | Mod: 26,,, | Performed by: PATHOLOGY

## 2021-12-09 PROCEDURE — 45385 COLONOSCOPY W/LESION REMOVAL: CPT | Performed by: INTERNAL MEDICINE

## 2021-12-09 PROCEDURE — 45385 COLONOSCOPY W/LESION REMOVAL: CPT | Mod: ,,, | Performed by: INTERNAL MEDICINE

## 2021-12-09 PROCEDURE — 88342 IMHCHEM/IMCYTCHM 1ST ANTB: CPT | Performed by: PATHOLOGY

## 2021-12-09 PROCEDURE — 88307 PR  SURG PATH,LEVEL V: ICD-10-PCS | Mod: 26,,, | Performed by: PATHOLOGY

## 2021-12-09 PROCEDURE — 45385 PR COLONOSCOPY,REMV LESN,SNARE: ICD-10-PCS | Mod: ,,, | Performed by: INTERNAL MEDICINE

## 2021-12-09 PROCEDURE — 37000008 HC ANESTHESIA 1ST 15 MINUTES: Performed by: INTERNAL MEDICINE

## 2021-12-09 PROCEDURE — 88307 TISSUE EXAM BY PATHOLOGIST: CPT | Mod: 26,,, | Performed by: PATHOLOGY

## 2021-12-09 PROCEDURE — 63600175 PHARM REV CODE 636 W HCPCS: Performed by: NURSE ANESTHETIST, CERTIFIED REGISTERED

## 2021-12-09 PROCEDURE — 88341 IMHCHEM/IMCYTCHM EA ADD ANTB: CPT | Mod: 26,,, | Performed by: PATHOLOGY

## 2021-12-09 PROCEDURE — E9220 PRA ENDO ANESTHESIA: ICD-10-PCS | Mod: ,,, | Performed by: NURSE ANESTHETIST, CERTIFIED REGISTERED

## 2021-12-09 PROCEDURE — 88342 CHG IMMUNOCYTOCHEMISTRY: ICD-10-PCS | Mod: 26,,, | Performed by: PATHOLOGY

## 2021-12-09 PROCEDURE — 37000009 HC ANESTHESIA EA ADD 15 MINS: Performed by: INTERNAL MEDICINE

## 2021-12-09 PROCEDURE — 27201089 HC SNARE, DISP (ANY): Performed by: INTERNAL MEDICINE

## 2021-12-09 PROCEDURE — 88341 IMHCHEM/IMCYTCHM EA ADD ANTB: CPT | Mod: 59 | Performed by: PATHOLOGY

## 2021-12-09 RX ORDER — PROPOFOL 10 MG/ML
VIAL (ML) INTRAVENOUS
Status: DISCONTINUED | OUTPATIENT
Start: 2021-12-09 | End: 2021-12-09

## 2021-12-09 RX ORDER — LIDOCAINE HCL/PF 100 MG/5ML
SYRINGE (ML) INTRAVENOUS
Status: DISCONTINUED | OUTPATIENT
Start: 2021-12-09 | End: 2021-12-09

## 2021-12-09 RX ORDER — PROPOFOL 10 MG/ML
VIAL (ML) INTRAVENOUS CONTINUOUS PRN
Status: DISCONTINUED | OUTPATIENT
Start: 2021-12-09 | End: 2021-12-09

## 2021-12-09 RX ORDER — SODIUM CHLORIDE 9 MG/ML
INJECTION, SOLUTION INTRAVENOUS CONTINUOUS
Status: DISCONTINUED | OUTPATIENT
Start: 2021-12-09 | End: 2021-12-09 | Stop reason: HOSPADM

## 2021-12-09 RX ADMIN — PROPOFOL 150 MCG/KG/MIN: 10 INJECTION, EMULSION INTRAVENOUS at 01:12

## 2021-12-09 RX ADMIN — Medication 100 MG: at 01:12

## 2021-12-09 RX ADMIN — SODIUM CHLORIDE: 0.9 INJECTION, SOLUTION INTRAVENOUS at 12:12

## 2021-12-09 RX ADMIN — PROPOFOL 60 MG: 10 INJECTION, EMULSION INTRAVENOUS at 01:12

## 2021-12-15 ENCOUNTER — TELEPHONE (OUTPATIENT)
Dept: GASTROENTEROLOGY | Facility: CLINIC | Age: 76
End: 2021-12-15
Payer: COMMERCIAL

## 2021-12-15 ENCOUNTER — PATIENT MESSAGE (OUTPATIENT)
Dept: GASTROENTEROLOGY | Facility: CLINIC | Age: 76
End: 2021-12-15
Payer: COMMERCIAL

## 2021-12-16 ENCOUNTER — TELEPHONE (OUTPATIENT)
Dept: HEMATOLOGY/ONCOLOGY | Facility: CLINIC | Age: 76
End: 2021-12-16
Payer: COMMERCIAL

## 2021-12-16 ENCOUNTER — PATIENT MESSAGE (OUTPATIENT)
Dept: ENDOSCOPY | Facility: HOSPITAL | Age: 76
End: 2021-12-16
Payer: COMMERCIAL

## 2021-12-16 ENCOUNTER — TELEPHONE (OUTPATIENT)
Dept: ENDOSCOPY | Facility: HOSPITAL | Age: 76
End: 2021-12-16
Payer: COMMERCIAL

## 2021-12-16 ENCOUNTER — TELEPHONE (OUTPATIENT)
Dept: GASTROENTEROLOGY | Facility: CLINIC | Age: 76
End: 2021-12-16
Payer: COMMERCIAL

## 2021-12-16 DIAGNOSIS — C18.5 MALIGNANT NEOPLASM OF SPLENIC FLEXURE: Primary | ICD-10-CM

## 2021-12-16 LAB
FINAL PATHOLOGIC DIAGNOSIS: NORMAL
GROSS: NORMAL
Lab: NORMAL
SUPPLEMENTAL DIAGNOSIS: NORMAL

## 2021-12-17 DIAGNOSIS — C18.5 MALIGNANT NEOPLASM OF SPLENIC FLEXURE: Primary | ICD-10-CM

## 2021-12-20 ENCOUNTER — HOSPITAL ENCOUNTER (OUTPATIENT)
Facility: HOSPITAL | Age: 76
Discharge: HOME | End: 2021-12-20
Attending: INTERNAL MEDICINE | Admitting: INTERNAL MEDICINE
Payer: MEDICARE

## 2021-12-20 ENCOUNTER — ANESTHESIA (OUTPATIENT)
Dept: ENDOSCOPY | Facility: HOSPITAL | Age: 76
End: 2021-12-20
Payer: MEDICARE

## 2021-12-20 ENCOUNTER — ANESTHESIA EVENT (OUTPATIENT)
Dept: ENDOSCOPY | Facility: HOSPITAL | Age: 76
End: 2021-12-20
Payer: MEDICARE

## 2021-12-20 VITALS
DIASTOLIC BLOOD PRESSURE: 67 MMHG | HEART RATE: 74 BPM | HEIGHT: 64 IN | SYSTOLIC BLOOD PRESSURE: 152 MMHG | BODY MASS INDEX: 27.31 KG/M2 | WEIGHT: 160 LBS | OXYGEN SATURATION: 98 % | RESPIRATION RATE: 16 BRPM | TEMPERATURE: 97 F

## 2021-12-20 DIAGNOSIS — Z86.010 HISTORY OF COLON POLYPS: ICD-10-CM

## 2021-12-20 PROCEDURE — D9220A PRA ANESTHESIA: Mod: ANES,,, | Performed by: ANESTHESIOLOGY

## 2021-12-20 PROCEDURE — 88305 TISSUE EXAM BY PATHOLOGIST: CPT | Performed by: STUDENT IN AN ORGANIZED HEALTH CARE EDUCATION/TRAINING PROGRAM

## 2021-12-20 PROCEDURE — 88305 TISSUE EXAM BY PATHOLOGIST: CPT | Mod: 26,,, | Performed by: STUDENT IN AN ORGANIZED HEALTH CARE EDUCATION/TRAINING PROGRAM

## 2021-12-20 PROCEDURE — 88305 TISSUE EXAM BY PATHOLOGIST: ICD-10-PCS | Mod: 26,,, | Performed by: STUDENT IN AN ORGANIZED HEALTH CARE EDUCATION/TRAINING PROGRAM

## 2021-12-20 PROCEDURE — 45335 SIGMOIDOSCOPY W/SUBMUC INJ: CPT | Mod: 51,,, | Performed by: INTERNAL MEDICINE

## 2021-12-20 PROCEDURE — 27201012 HC FORCEPS, HOT/COLD, DISP: Performed by: INTERNAL MEDICINE

## 2021-12-20 PROCEDURE — 45335 PR SIGMOIDOSCOPY,FLEX,W/DIR SUBMUC INJECT: ICD-10-PCS | Mod: 51,,, | Performed by: INTERNAL MEDICINE

## 2021-12-20 PROCEDURE — D9220A PRA ANESTHESIA: ICD-10-PCS | Mod: CRNA,,, | Performed by: NURSE ANESTHETIST, CERTIFIED REGISTERED

## 2021-12-20 PROCEDURE — 27201028 HC NEEDLE, SCLERO: Performed by: INTERNAL MEDICINE

## 2021-12-20 PROCEDURE — 25000003 PHARM REV CODE 250: Performed by: INTERNAL MEDICINE

## 2021-12-20 PROCEDURE — 27200997: Performed by: INTERNAL MEDICINE

## 2021-12-20 PROCEDURE — D9220A PRA ANESTHESIA: Mod: CRNA,,, | Performed by: NURSE ANESTHETIST, CERTIFIED REGISTERED

## 2021-12-20 PROCEDURE — 45335 SIGMOIDOSCOPY W/SUBMUC INJ: CPT | Performed by: INTERNAL MEDICINE

## 2021-12-20 PROCEDURE — 45331 SIGMOIDOSCOPY AND BIOPSY: CPT | Mod: ,,, | Performed by: INTERNAL MEDICINE

## 2021-12-20 PROCEDURE — 45331 SIGMOIDOSCOPY AND BIOPSY: CPT | Performed by: INTERNAL MEDICINE

## 2021-12-20 PROCEDURE — 37000008 HC ANESTHESIA 1ST 15 MINUTES: Performed by: INTERNAL MEDICINE

## 2021-12-20 PROCEDURE — 45331 PR SIGMOIDOSCOPY,BIOPSY: ICD-10-PCS | Mod: ,,, | Performed by: INTERNAL MEDICINE

## 2021-12-20 PROCEDURE — D9220A PRA ANESTHESIA: ICD-10-PCS | Mod: ANES,,, | Performed by: ANESTHESIOLOGY

## 2021-12-20 PROCEDURE — 37000009 HC ANESTHESIA EA ADD 15 MINS: Performed by: INTERNAL MEDICINE

## 2021-12-20 PROCEDURE — 63600175 PHARM REV CODE 636 W HCPCS: Performed by: NURSE ANESTHETIST, CERTIFIED REGISTERED

## 2021-12-20 RX ORDER — SODIUM CHLORIDE 9 MG/ML
INJECTION, SOLUTION INTRAVENOUS CONTINUOUS
Status: DISCONTINUED | OUTPATIENT
Start: 2021-12-20 | End: 2021-12-20 | Stop reason: HOSPADM

## 2021-12-20 RX ORDER — ONDANSETRON 2 MG/ML
4 INJECTION INTRAMUSCULAR; INTRAVENOUS DAILY PRN
Status: DISCONTINUED | OUTPATIENT
Start: 2021-12-20 | End: 2021-12-20 | Stop reason: HOSPADM

## 2021-12-20 RX ORDER — PROPOFOL 10 MG/ML
VIAL (ML) INTRAVENOUS
Status: DISCONTINUED | OUTPATIENT
Start: 2021-12-20 | End: 2021-12-20

## 2021-12-20 RX ORDER — LIDOCAINE HCL/PF 100 MG/5ML
SYRINGE (ML) INTRAVENOUS
Status: DISCONTINUED | OUTPATIENT
Start: 2021-12-20 | End: 2021-12-20

## 2021-12-20 RX ORDER — PROPOFOL 10 MG/ML
VIAL (ML) INTRAVENOUS CONTINUOUS PRN
Status: DISCONTINUED | OUTPATIENT
Start: 2021-12-20 | End: 2021-12-20

## 2021-12-20 RX ADMIN — Medication 100 MG: at 07:12

## 2021-12-20 RX ADMIN — PROPOFOL 125 MCG/KG/MIN: 10 INJECTION, EMULSION INTRAVENOUS at 07:12

## 2021-12-20 RX ADMIN — SODIUM CHLORIDE: 0.9 INJECTION, SOLUTION INTRAVENOUS at 07:12

## 2021-12-20 RX ADMIN — PROPOFOL 60 MG: 10 INJECTION, EMULSION INTRAVENOUS at 07:12

## 2021-12-23 RX ORDER — LEVOTHYROXINE SODIUM 50 UG/1
TABLET ORAL
Qty: 90 TABLET | Refills: 3 | Status: SHIPPED | OUTPATIENT
Start: 2021-12-23 | End: 2022-12-04

## 2021-12-27 ENCOUNTER — OFFICE VISIT (OUTPATIENT)
Dept: INTERNAL MEDICINE | Facility: CLINIC | Age: 76
End: 2021-12-27
Payer: MEDICARE

## 2021-12-27 ENCOUNTER — PATIENT MESSAGE (OUTPATIENT)
Dept: NEUROLOGY | Facility: CLINIC | Age: 76
End: 2021-12-27
Payer: COMMERCIAL

## 2021-12-27 VITALS
SYSTOLIC BLOOD PRESSURE: 122 MMHG | BODY MASS INDEX: 27.52 KG/M2 | DIASTOLIC BLOOD PRESSURE: 70 MMHG | HEART RATE: 79 BPM | TEMPERATURE: 98 F | WEIGHT: 161.19 LBS | HEIGHT: 64 IN | RESPIRATION RATE: 18 BRPM

## 2021-12-27 DIAGNOSIS — R41.3 MEMORY DISORDER: ICD-10-CM

## 2021-12-27 DIAGNOSIS — L98.9 SKIN LESION OF FACE: Primary | ICD-10-CM

## 2021-12-27 PROCEDURE — 99999 PR PBB SHADOW E&M-EST. PATIENT-LVL V: CPT | Mod: PBBFAC,,, | Performed by: INTERNAL MEDICINE

## 2021-12-27 PROCEDURE — 99999 PR PBB SHADOW E&M-EST. PATIENT-LVL V: ICD-10-PCS | Mod: PBBFAC,,, | Performed by: INTERNAL MEDICINE

## 2021-12-27 PROCEDURE — 99214 OFFICE O/P EST MOD 30 MIN: CPT | Mod: S$PBB,,, | Performed by: INTERNAL MEDICINE

## 2021-12-27 PROCEDURE — 99215 OFFICE O/P EST HI 40 MIN: CPT | Mod: PBBFAC,PO | Performed by: INTERNAL MEDICINE

## 2021-12-27 PROCEDURE — 99214 PR OFFICE/OUTPT VISIT, EST, LEVL IV, 30-39 MIN: ICD-10-PCS | Mod: S$PBB,,, | Performed by: INTERNAL MEDICINE

## 2021-12-30 ENCOUNTER — PATIENT MESSAGE (OUTPATIENT)
Dept: INTERNAL MEDICINE | Facility: CLINIC | Age: 76
End: 2021-12-30
Payer: COMMERCIAL

## 2021-12-30 ENCOUNTER — PES CALL (OUTPATIENT)
Dept: ADMINISTRATIVE | Facility: CLINIC | Age: 76
End: 2021-12-30
Payer: COMMERCIAL

## 2021-12-30 DIAGNOSIS — Z12.31 VISIT FOR SCREENING MAMMOGRAM: Primary | ICD-10-CM

## 2022-01-03 ENCOUNTER — OFFICE VISIT (OUTPATIENT)
Dept: NEUROLOGY | Facility: CLINIC | Age: 77
End: 2022-01-03
Payer: MEDICARE

## 2022-01-03 VITALS
SYSTOLIC BLOOD PRESSURE: 169 MMHG | BODY MASS INDEX: 28 KG/M2 | DIASTOLIC BLOOD PRESSURE: 83 MMHG | HEART RATE: 82 BPM | HEIGHT: 64 IN | WEIGHT: 164 LBS

## 2022-01-03 DIAGNOSIS — I10 PRIMARY HYPERTENSION: Chronic | ICD-10-CM

## 2022-01-03 DIAGNOSIS — R41.3 MEMORY IMPAIRMENT: Primary | ICD-10-CM

## 2022-01-03 PROBLEM — K59.1 FUNCTIONAL DIARRHEA: Status: RESOLVED | Noted: 2017-01-17 | Resolved: 2022-01-03

## 2022-01-03 PROBLEM — N17.9 AKI (ACUTE KIDNEY INJURY): Status: RESOLVED | Noted: 2021-10-03 | Resolved: 2022-01-03

## 2022-01-03 PROBLEM — Z79.890 HORMONE REPLACEMENT THERAPY (HRT): Status: RESOLVED | Noted: 2020-10-19 | Resolved: 2022-01-03

## 2022-01-03 PROCEDURE — 99999 PR PBB SHADOW E&M-EST. PATIENT-LVL III: CPT | Mod: PBBFAC,,, | Performed by: PSYCHIATRY & NEUROLOGY

## 2022-01-03 PROCEDURE — 99417 PR PROLONGED SVC, OUTPT, W/WO DIRECT PT CONTACT,  EA ADDTL 15 MIN: ICD-10-PCS | Mod: S$PBB,,, | Performed by: PSYCHIATRY & NEUROLOGY

## 2022-01-03 PROCEDURE — 99215 PR OFFICE/OUTPT VISIT, EST, LEVL V, 40-54 MIN: ICD-10-PCS | Mod: S$PBB,,, | Performed by: PSYCHIATRY & NEUROLOGY

## 2022-01-03 PROCEDURE — 99999 PR PBB SHADOW E&M-EST. PATIENT-LVL III: ICD-10-PCS | Mod: PBBFAC,,, | Performed by: PSYCHIATRY & NEUROLOGY

## 2022-01-03 PROCEDURE — 99417 PROLNG OP E/M EACH 15 MIN: CPT | Mod: S$PBB,,, | Performed by: PSYCHIATRY & NEUROLOGY

## 2022-01-03 PROCEDURE — 99213 OFFICE O/P EST LOW 20 MIN: CPT | Mod: PBBFAC | Performed by: PSYCHIATRY & NEUROLOGY

## 2022-01-03 PROCEDURE — 99215 OFFICE O/P EST HI 40 MIN: CPT | Mod: S$PBB,,, | Performed by: PSYCHIATRY & NEUROLOGY

## 2022-01-03 RX ORDER — CILOSTAZOL 100 MG/1
100 TABLET ORAL 2 TIMES DAILY
COMMUNITY
Start: 2021-12-14 | End: 2022-02-03 | Stop reason: CLARIF

## 2022-01-03 NOTE — PROGRESS NOTES
[facility]    Name: Eugenio Aldridge  : 1945  MRN: [id number]    Date: 1/3/2022      Assessment:     Ms. Aldridge is a 76 y.o. right handed female with colon adenocarcinoma, HTN, HLD, PAD, prediabetes, hypothyroidism, osteoporosis, SNHL who presents to the [facility] due to memory deficits. Patient has been experiencing progressively worsening trouble with memory since 2019. She has SNHL which may contribute somewhat to perceived cognitive symptoms. She does not endorse much trouble with other cognitive domains. On evaluation today, physical exam is notable for mild weakness of her right deltoid and mild paratonia b/l, but is otherwise unremarkable. MMSE was 21/30 with significant deficits in delayed recall and lexical fluency. Very mild deficits were noted in visuospatial ability, confrontation naming, and orientation. Labs are largely unremarkable for potential causes of cognitive impairment. No neuroimaging is available to review. The underlying cause of Ms. Aldridge's cognitive symptoms is unclear at this time. Given her report of progressively worsening cognitive symptoms, there is concern for an underlying neurodegenerative disorder. Additionally, vascular risk factors raise the possibility that small vessel disease is contributing. Will get brain MRI and labs to further assess.     Recommendations:     Workup   MRI brain without contrast, cognitive disorder protocol - ordered   Labs: TSH, FT4, B1, B9, B12, MMA, HCYS, treponemal ab, HIV - ordered    Treatment   Patient would like to defer starting medications until diagnosis is more clear.     Safety-related   We recommend that a medication management system be put in place to avoid errors in taking medication. Helpful services include [employer] (pillpack[employer]com; free service) and [employer] ([employer]; paid subscription service).    Health maintenance    Continue to follow-up with primary care doctor to monitor and treat  vascular risk factors.   Recommend performing moderate-intensity cardiovascular exercise as able, ideally 30 minutes per day, 5 days per week.   Recommend staying socially and cognitively active.   Recommend eating a healthy and balanced diet (Mediterranean or DASH diet).    Follow-up: Follow up in about 6 weeks (around 2/14/2022). I provided Ms. Aldridge and her significant other, Marzena, with our contact information should they have any questions or concerns.      David Ferrari MD   Behavioral Neurology & Neuropsychiatry  [facility]    Subjective:      Chief complaint:  Memory Loss    Consultation requested by: No ref. provider found    History of present illness:  Ms. Aldridge is a 76 y.o. right handed female with a history of colon adenocarcinoma, HTN, HLD, PAD, prediabetes, hypothyroidism osteoporosis, SNHL who presents today to the [facility] due to concerns regarding memory deficits. Ms. Aldridge is accompanied by her significant other, Marzena, who participates in providing history. Additional information is obtained by reviewing available medical records.     HPI by Dr. Gates from May 2021:  This 75 year old woman, a retired , reports that she has experienced two years of what she describes as memory disturbance/forgetfulness.  She will, at times, not remember where she has left her keys or hearing aids.  The patient reports that she has not experienced becoming lost when driving.  She reports no serious errors in decisions made at home.  The patient points out that with Covid, she has been living in relative isolation from friends from her past. I have reviewed the notes from 3-29-21,from  Dr. Salguero. Noted is a history of osteoporosis and peripheral artery disease.  The patient has spoken of  problems of pain, with exercise, in the left upper thigh.  Clinic notes from Dr. Stahl, from [facility], have been reviewed from  4-5-21.  He has described the patient's problems with varying degrees of claudication at the left leg when walking.  He has prescribed cilostazol and a regimen of exercise. The patient had an MRI scan of the lumbosacral spine, dated 3-31-21.  I have reviewed the study.  Noted are multiple disc bulges, and mild  spondylosis, however, I note no evidence of severe disc protrusion that might explain the symptoms related to pain with exercise. The patient reports neither dizziness nor vertigo.  There have been no episodes of weakness, long-standing or transient, weakness or numbness of the extremities.  There is no reported chest pain or shortness of breath.       Interval history (01/03/22):  Patient began experiencing trouble with memory around early 2019 which has progressively worsened since onset. Symptoms have become more obvious over time and her friends began noticing that she was having trouble in early 2021. Patient reports difficulty remembering recent events, upcoming events, conversations. She is able to remember to take medications because they sit out on a table that she sits at every morning. She reports sometimes walking into the kitchen and forgetting which cabinet a particular dish is in despite living in the same place for many years. She often forgets why she walked into a room. Patient reports that she recently she and friends went to a restaurant, paid the bill, and had no recollection of paying by the time she got home. She often loses objects around her home such as her keys or hearing aids. She continues to be able to live independently.     Review of systems for cognition, behavior, motor, sensory, and sleep:  Memory   See HPI.    Executive function   Slowed cognitive processing speed: No   Difficulty with attention and concentration: No   Difficulty with judgment and/or problem solving: No   Difficulty with planning and/or organization: No    Language   Difficulty with fluency and  ivan: No   Word-finding difficulties: Yes - rare   Word substitutions: No    Visuospatial ability   Difficulty navigating: Yes - mild difficulty, worse over time   Becomes lost in familiar places: No   Difficulty recognizing objects or faces: No    Behavior   Disinhibition: No   Irritability: Yes - worse over the past year   Apathy: No   Hygiene: No   Appetite: No   Depression: No   Anxiety: Yes - intermittent mild   Hallucinations: No   Delusions: No    Motor   Difficulty walking: No   Imbalance: No   Falls: No   Weakness: No   Trouble with fine motor movements: No   Tremor: No   Involuntary muscle movement: No   Difficulty swallowing: No    Sensory   Paresthesia or pain: No   Trouble with vision: Yes - decreased acuity    Trouble with hearing: Yes - sensorineural hearing loss    Sleep   Insomnia: No   Snoring: No   Apnea: No   Dream-enactment: No    Functional status:   iADLs:   Household chores: independent   Meal preparation: independent   Medication management: independent   Shopping: independent   Managing money: independent   Transportation: independent   Telephoning/communication: independent    ADLs:   Transferring: independent   Feeding: independent   Hygiene: independent   Toileting: independent   Bathing: independent   Dressing: independent    Other functional status and safety issues:   She does drive.     Past Medical History:   Diagnosis Date    Actinic keratosis     Aortic valve disorders     Atherosclerosis of aorta     Atherosclerosis of native arteries of the extremities with intermittent claudication     Carcinoma in situ, vulva     Left bundle branch hemiblock     Other and unspecified hyperlipidemia     Postinflammatory pulmonary fibrosis     Senile nuclear sclerosis      Past Surgical History:   Procedure Laterality Date    ARTHROSCOPIC REPAIR OF ROTATOR CUFF OF SHOULDER Right 4/24/2019    Procedure: REPAIR, ROTATOR CUFF, ARTHROSCOPIC  WITH REGENERATIN IMPLANT;  Surgeon: Walter Hernandez MD;  Location: [facility];  Service: Orthopedics;  Laterality: Right;  regional w/o catheter     BTL      COLONOSCOPY N/A 12/9/2021    Procedure: COLONOSCOPY;  Surgeon: Juan Swain MD;  Location: [facility] ENDO (4TH FLR);  Service: Endoscopy;  Laterality: N/A;  fully vaccinated    DECOMPRESSION OF SUBACROMIAL SPACE Right 4/24/2019    Procedure: DECOMPRESSION, SUBACROMIAL SPACE WITH DISTAL CLAVICLE EXCISION (DCE);  Surgeon: Walter Hernandez MD;  Location: [facility] [place], operated by [facility];  Service: Orthopedics;  Laterality: Right;    FLEXIBLE SIGMOIDOSCOPY N/A 12/20/2021    Procedure: SIGMOIDOSCOPY-FLEXIBLE;  Surgeon: Juan Swain MD;  Location: [facility] ENDO (2ND FLR);  Service: Endoscopy;  Laterality: N/A;  please schedule with me Monday 12/20 AM. Urgent for marking for colon cancer  spot held 2/20/21 12/16 fully vaccinated; instructions to portal-st    RHYTIDECTOMY      SHOULDER ARTHROSCOPY Right 4/24/2019    Procedure: ARTHROSCOPY, SHOULDER WITH LABRAL DEBRIDEMENT;  Surgeon: Walter Hernandez MD;  Location: [facility];  Service: Orthopedics;  Laterality: Right;    TONSILLECTOMY, ADENOIDECTOMY      VULVA SURGERY       Family History   Problem Relation Age of Onset    Bladder Cancer Father         d. 69    Alzheimer's disease Mother 96        d. 97    Bladder Cancer Paternal Uncle     Lung cancer Paternal Uncle     Breast cancer Paternal Grandmother     Pancreatic cancer Paternal Aunt     Other Brother         d. 45 o/d    Pneumonia Brother         d. 58, smoker, obese    No Known Problems Sister     No Known Problems Sister     No Known Problems Sister     Amblyopia Neg Hx     Blindness Neg Hx     Cataracts Neg Hx     Diabetes Neg Hx     Glaucoma Neg Hx     Hypertension Neg Hx     Macular degeneration Neg Hx     Retinal detachment Neg Hx     Strabismus Neg Hx     Stroke Neg Hx     Thyroid disease Neg Hx      Social History     Socioeconomic History    Marital  status: Significant Other    Number of children: 0    Years of education: 18    Highest education level: Master's degree (e.g., MA, MS, Vinicio, MEd, MSW, AMBROCIO)   Occupational History    Occupation: CPA     Comment: Retired 2019   Tobacco Use    Smoking status: Former Smoker     Packs/day: 1.50     Years: 39.00     Pack years: 58.50     Types: Cigarettes     Start date:      Quit date: 2001     Years since quittin.3    Smokeless tobacco: Never Used   Substance and Sexual Activity    Alcohol use: Yes     Alcohol/week: 7.0 - 14.0 standard drinks     Types: 7 - 14 Standard drinks or equivalent per week    Drug use: No    Sexual activity: Yes   Social History Narrative    Significant other s/p colon cancer, now w/ bladder cancer;f/u imaging clear    + walking in [place], 3-4 /wk    + working CPA     Social Determinants of Health     Financial Resource Strain: Low Risk     Difficulty of Paying Living Expenses: Not hard at all   Food Insecurity: No Food Insecurity    Worried About Running Out of Food in the Last Year: Never true    Ran Out of Food in the Last Year: Never true   Transportation Needs: No Transportation Needs    Lack of Transportation (Medical): No    Lack of Transportation (Non-Medical): No   Physical Activity: Insufficiently Active    Days of Exercise per Week: 3 days    Minutes of Exercise per Session: 30 min   Stress: Stress Concern Present    Feeling of Stress : Rather much   Social Connections: Unknown    Frequency of Communication with Friends and Family: Twice a week    Frequency of Social Gatherings with Friends and Family: Once a week    Active Member of Clubs or Organizations: Yes    Attends Club or Organization Meetings: More than 4 times per year    Marital Status: Living with partner   Housing Stability: Low Risk     Unable to Pay for Housing in the Last Year: No    Number of Places Lived in the Last Year: 1    Unstable Housing in the Last Year: No     Current  "Outpatient Medications:     amLODIPine (NORVASC) 5 MG tablet, Take 1 tablet (5 mg total) by mouth once daily., Disp: 90 tablet, Rfl: 1    atorvastatin (LIPITOR) 40 MG tablet, TAKE 1 TABLET(40 MG) BY MOUTH EVERY DAY, Disp: 90 tablet, Rfl: 3    calcium-vitamin D3 (OS-MARYANA 500 + D3) 500 mg(1,250mg) -200 unit per tablet, Take 1 tablet by mouth once daily. , Disp: , Rfl:     cyanocobalamin, vitamin B-12, (VITAMIN B-12 ORAL), Take 1 tablet by mouth once daily., Disp: , Rfl:     efinaconazole (JUBLIA) 10 % Nissa, Apply topically., Disp: , Rfl:     ketoconazole (NIZORAL) 2 % cream, Apply topically once daily., Disp: 60 g, Rfl: 3    levothyroxine (SYNTHROID) 50 MCG tablet, TAKE 1 TABLET(50 MCG) BY MOUTH BEFORE BREAKFAST, Disp: 90 tablet, Rfl: 3    multivitamin (THERAGRAN) per tablet, Take by mouth. 1 Tablet Oral Every day, Disp: , Rfl:     cilostazoL (PLETAL) 100 MG Tab, Take 100 mg by mouth 2 (two) times daily., Disp: , Rfl:     Allergies:  Codeine    Objective:     Vital signs:  Blood pressure (!) 169/83, pulse 82, height 5' 4" (1.626 m), weight 74.4 kg (164 lb 0.4 oz).    NEUROLOGICAL EXAMINATION:     MENTAL STATUS   Oriented to person, place, and time.   Attention: normal. Concentration: normal.   Speech: speech is normal   Level of consciousness: alert  Knowledge: good.   Able to name object (Named 6/6 objects). Able to read (Read 6/6 words and 5/5 sentences). Able to repeat. Able to write. Normal comprehension. Praxis: normal     CRANIAL NERVES     CN II   Visual fields full to confrontation.     CN III, IV, VI   Pupils are equal, round, and reactive to light.  Extraocular motions are normal.   Nystagmus: none   Diplopia: none  Ophthalmoparesis: none  Upgaze: normal  Downgaze: normal    CN V   Facial sensation intact.     CN VII   Facial expression full, symmetric.     CN VIII   Hearing: intact    CN IX, X   Palate: symmetric    CN XI   Right sternocleidomastoid strength: normal  Left sternocleidomastoid " "strength: normal  Right trapezius strength: normal  Left trapezius strength: normal    CN XII   Tongue: not atrophic  Fasciculations: absent  Tongue deviation: none    MOTOR EXAM   Muscle bulk: normal  Overall muscle tone: increased (mild paratonia b/l)    Strength   Strength 5/5 except as noted.   Right deltoid: 4/5    REFLEXES     Reflexes   Reflexes 2+ except as noted.   Right Alonso: absent  Left Alonso: absent    SENSORY EXAM   Light touch normal.     GAIT AND COORDINATION     Gait  Gait: normal     Coordination   Finger to nose coordination: normal    Tremor   Resting tremor: absent  Intention tremor: absent    Mason Cognitive Assessment (MoCA v7.1):  Visuospatial/Executive Score Mean (SD)   Trail making test: 1 - correct 0.87 (0.34)   Cube copy: 0 - incorrect 0.71 (0.46)   Clock contour: 1 - correct  2.56 (0.65)   Clock numbers: 1 - correct     Clock hands: 1 - correct     Naming   Lion: 1 - correct  2.88 (0.36)   Rhino: 0 - incorrect ("Hippo")     Camel: 1 - correct     Memory - First Trial (Not Scored)   Face: Yes     Velvet: Yes     Tenriism: Yes     Su: Yes     Red: Yes     Memory - Second Trial (Not Scored)   Face: Yes     Velvet: Yes     Tenriism: Yes     Su: Yes     Red: Yes     Attention   Digits forward (2 1 8 5 4): 1 - correct  1.82 (0.44)   Digits backward (7 4 2): 1 - correct     Letter A: 1 - correct  0.97 (0.18)   Serial 7: 3 - 4 or 5 correct  2.89 (0.41)   Language   Sentence repetition (Al): 1 - correct  1.83 (0.37)   Sentence repetition (Cat): 1 - correct     Lexical fluency (D words): 0 - incorrect (generated 6 words in 1 minutes)  0.87 (0.34)   Abstraction   Train - Bicycle: 1 - correct  1.83 (0.43)   Watch - Ruler: 1 - correct     Delayed Recall   Face: 0 - incorrect  3.73 (1.27)   Velvet: 0 - incorrect (recalled with category cue)     Tenriism: 0 - incorrect     Su: 0 - incorrect (recalled with category cue)     Red: 0 - incorrect (recalled with category cue)     Orientation " "  Date: 0 - incorrect ("4th")  5.99 (0.11)   Month: 1 - correct    Year: 1 - correct    Day: 1 - correct    Place: 1 - correct    City: 1 - correct    Other   ? 12 years of education: 0 - incorrect    Total Score* 21/30 27.37 (2.20)   *Ranges of severity: 18-25 = mild cognitive impairment, 10-17 = moderate cognitive impairment, <10 = severe cognitive impairment. The cut-off score of 18 is usually considered to separate MCI from AD but there is overlap in the scores since, by definition, AD is determined by the presence of cognitive impairment in addition to loss of autonomy. The average MoCA score for MCI is 22 (range 19-25) and the average MoCA score for Mild AD is 16 (11-21).    Neuroimaging:  No results found for this or any previous visit.    Laboratory studies:  Admission on 12/09/2021, Discharged on 12/09/2021   Component Date Value Ref Range Status    Final Pathologic Diagnosis 12/09/2021    Corrected                    Value:SPLENIC FLEXURE POLYP, POLYPECTOMY: Minimally invasive moderately  differentiated adenocarcinoma, see synoptic report  SYNOPTIC REPORT:  - Tumor site: Splenic flexure  - Histologic type: Adenocarcinoma  - Histologic Grade: G2, moderately differentiated  - Size of Invasive Carcinoma: 0.7 cm  - Tumor Extent:  Invades muscularis mucosae  - Lymphovascular Invasion: Not identified  - Type of Polyp in which Invasive Carcinoma Arose: Sessile serrated adenoma  - Margin Status for Invasive Carcinoma: Deep margin involved by invasive  carcinoma  - Cancer biomarkers for microsatelite repair have been ordered and the  results will be issued in an addendum      Interp By Margarita Allen M.D., Signed on 12/16/2021 at 09:45    Supplemental Diagnosis 12/09/2021    Corrected                    Value:Immunohistochemistry (IHC) Testing for Mismatch Repair (MMR) Proteins:  MLH1 Result: Intact nuclear expression  MSH2 Result: Intact nuclear expression  MSH6 Result: Intact nuclear expression  PMS2 Result: " "Loss of nuclear expression  Background nonneoplastic tissue / internal control with intact nuclear  expression  Interpretation: Loss of nuclear expression of PMS2 only: high probability of  Rios syndrome.      Gross 12/09/2021    Corrected                    Value:Patient ID/Pathology ID:  [id number]  Received in formalin labeled "splenic flexure polyp" is a soft tan-brown  polypoid tissue fragment measuring 9 x 8 x 4 mm.  Visible deep surface inked  blue.  Trisected and submitted entirely in cassette [id number]  Grossed by GUILLERMO Peterson      Disclaimer 12/09/2021    Corrected                    Value:Unless the case is a 'gross only' or additional testing only, the final  diagnosis for each specimen is based on a microscopic examination of  appropriate tissue sections.       I performed a total of 127 minutes on Ms. Aldridge's care on the day of their visit excluding time spent related to any billed procedures. This time includes time spent with the patient as well as time spent documenting in the medical record, reviewing patient's records and tests, obtaining history, placing orders, communicating with other healthcare professionals, counseling the patient, family, or caregiver, and/or care coordination for the diagnoses above.    This note was dictated using [employer] speech recognition software. Please excuse any spelling or grammatical errors. Word recognition mistakes are occasionally missed on review.    "

## 2022-01-05 LAB
COMMENT: NORMAL
FINAL PATHOLOGIC DIAGNOSIS: NORMAL
GROSS: NORMAL
Lab: NORMAL

## 2022-01-06 ENCOUNTER — PATIENT MESSAGE (OUTPATIENT)
Dept: GASTROENTEROLOGY | Facility: CLINIC | Age: 77
End: 2022-01-06
Payer: COMMERCIAL

## 2022-01-07 ENCOUNTER — TELEPHONE (OUTPATIENT)
Dept: OPTOMETRY | Facility: CLINIC | Age: 77
End: 2022-01-07
Payer: COMMERCIAL

## 2022-01-12 ENCOUNTER — OFFICE VISIT (OUTPATIENT)
Dept: SURGERY | Facility: CLINIC | Age: 77
End: 2022-01-12
Payer: MEDICARE

## 2022-01-12 ENCOUNTER — LAB VISIT (OUTPATIENT)
Dept: LAB | Facility: HOSPITAL | Age: 77
End: 2022-01-12
Attending: COLON & RECTAL SURGERY
Payer: MEDICARE

## 2022-01-12 VITALS
DIASTOLIC BLOOD PRESSURE: 65 MMHG | HEIGHT: 64 IN | BODY MASS INDEX: 27.74 KG/M2 | SYSTOLIC BLOOD PRESSURE: 145 MMHG | HEART RATE: 96 BPM | WEIGHT: 162.5 LBS

## 2022-01-12 DIAGNOSIS — C18.5 MALIGNANT NEOPLASM OF SPLENIC FLEXURE: ICD-10-CM

## 2022-01-12 DIAGNOSIS — Z01.818 PRE-OP TESTING: ICD-10-CM

## 2022-01-12 DIAGNOSIS — C18.5 MALIGNANT NEOPLASM OF SPLENIC FLEXURE: Primary | ICD-10-CM

## 2022-01-12 LAB
ANION GAP SERPL CALC-SCNC: 8 MMOL/L (ref 8–16)
BASOPHILS # BLD AUTO: 0.05 K/UL (ref 0–0.2)
BASOPHILS NFR BLD: 0.9 % (ref 0–1.9)
BUN SERPL-MCNC: 13 MG/DL (ref 8–23)
CALCIUM SERPL-MCNC: 10.2 MG/DL (ref 8.7–10.5)
CEA SERPL-MCNC: <1.7 NG/ML (ref 0–5)
CHLORIDE SERPL-SCNC: 106 MMOL/L (ref 95–110)
CO2 SERPL-SCNC: 30 MMOL/L (ref 23–29)
CREAT SERPL-MCNC: 0.8 MG/DL (ref 0.5–1.4)
DIFFERENTIAL METHOD: ABNORMAL
EOSINOPHIL # BLD AUTO: 0 K/UL (ref 0–0.5)
EOSINOPHIL NFR BLD: 0.7 % (ref 0–8)
ERYTHROCYTE [DISTWIDTH] IN BLOOD BY AUTOMATED COUNT: 14 % (ref 11.5–14.5)
EST. GFR  (AFRICAN AMERICAN): >60 ML/MIN/1.73 M^2
EST. GFR  (NON AFRICAN AMERICAN): >60 ML/MIN/1.73 M^2
GLUCOSE SERPL-MCNC: 99 MG/DL (ref 70–110)
HCT VFR BLD AUTO: 45.8 % (ref 37–48.5)
HGB BLD-MCNC: 14.7 G/DL (ref 12–16)
IMM GRANULOCYTES # BLD AUTO: 0.01 K/UL (ref 0–0.04)
IMM GRANULOCYTES NFR BLD AUTO: 0.2 % (ref 0–0.5)
LYMPHOCYTES # BLD AUTO: 1.1 K/UL (ref 1–4.8)
LYMPHOCYTES NFR BLD: 20 % (ref 18–48)
MCH RBC QN AUTO: 31.7 PG (ref 27–31)
MCHC RBC AUTO-ENTMCNC: 32.1 G/DL (ref 32–36)
MCV RBC AUTO: 99 FL (ref 82–98)
MONOCYTES # BLD AUTO: 0.7 K/UL (ref 0.3–1)
MONOCYTES NFR BLD: 13.1 % (ref 4–15)
NEUTROPHILS # BLD AUTO: 3.6 K/UL (ref 1.8–7.7)
NEUTROPHILS NFR BLD: 65.1 % (ref 38–73)
NRBC BLD-RTO: 0 /100 WBC
PLATELET # BLD AUTO: 247 K/UL (ref 150–450)
PMV BLD AUTO: 9.1 FL (ref 9.2–12.9)
POTASSIUM SERPL-SCNC: 4.2 MMOL/L (ref 3.5–5.1)
RBC # BLD AUTO: 4.63 M/UL (ref 4–5.4)
SODIUM SERPL-SCNC: 144 MMOL/L (ref 136–145)
WBC # BLD AUTO: 5.49 K/UL (ref 3.9–12.7)

## 2022-01-12 PROCEDURE — 80048 BASIC METABOLIC PNL TOTAL CA: CPT | Performed by: COLON & RECTAL SURGERY

## 2022-01-12 PROCEDURE — 99999 PR PBB SHADOW E&M-EST. PATIENT-LVL IV: CPT | Mod: PBBFAC,,, | Performed by: COLON & RECTAL SURGERY

## 2022-01-12 PROCEDURE — 36415 COLL VENOUS BLD VENIPUNCTURE: CPT | Performed by: COLON & RECTAL SURGERY

## 2022-01-12 PROCEDURE — 99999 PR PBB SHADOW E&M-EST. PATIENT-LVL IV: ICD-10-PCS | Mod: PBBFAC,,, | Performed by: COLON & RECTAL SURGERY

## 2022-01-12 PROCEDURE — 82378 CARCINOEMBRYONIC ANTIGEN: CPT | Performed by: COLON & RECTAL SURGERY

## 2022-01-12 PROCEDURE — 99205 PR OFFICE/OUTPT VISIT, NEW, LEVL V, 60-74 MIN: ICD-10-PCS | Mod: S$PBB,,, | Performed by: COLON & RECTAL SURGERY

## 2022-01-12 PROCEDURE — 85025 COMPLETE CBC W/AUTO DIFF WBC: CPT | Performed by: COLON & RECTAL SURGERY

## 2022-01-12 PROCEDURE — 99205 OFFICE O/P NEW HI 60 MIN: CPT | Mod: S$PBB,,, | Performed by: COLON & RECTAL SURGERY

## 2022-01-12 PROCEDURE — 99214 OFFICE O/P EST MOD 30 MIN: CPT | Mod: PBBFAC | Performed by: COLON & RECTAL SURGERY

## 2022-01-12 NOTE — H&P (VIEW-ONLY)
General Surgery History & Physical    SUBJECTIVE:     CC: new colon cancer    History of Present Illness:  Eugenio Aldridge is a 76 y.o. female presents for evaluation of newly diagnosed colon cancer of the splenic flexure. Colonoscopy done on 12/9/21 following positive FIT test done for asymptomatic screening. Denies blood per rectum, change in stool habits or caliber, no weight loss.     SPLENIC FLEXURE POLYP, POLYPECTOMY: Minimally invasive moderately   differentiated adenocarcinoma, see synoptic report   SYNOPTIC REPORT:   - Tumor site: Splenic flexure   - Histologic type: Adenocarcinoma   - Histologic Grade: G2, moderately differentiated   - Size of Invasive Carcinoma: 0.7 cm   - Tumor Extent:  Invades muscularis mucosae   - Lymphovascular Invasion: Not identified   - Type of Polyp in which Invasive Carcinoma Arose: Sessile serrated adenoma   - Margin Status for Invasive Carcinoma: Deep margin involved by invasive   carcinoma     Flex sig done 12/20 and clip placed.     No family history of colon or rectal cancer.     Review of patient's allergies indicates:   Allergen Reactions    Codeine      Other reaction(s): Vomiting       Current Outpatient Medications   Medication Sig Dispense Refill    amLODIPine (NORVASC) 5 MG tablet Take 1 tablet (5 mg total) by mouth once daily. 90 tablet 1    atorvastatin (LIPITOR) 40 MG tablet TAKE 1 TABLET(40 MG) BY MOUTH EVERY DAY 90 tablet 3    calcium-vitamin D3 (OS-MARYANA 500 + D3) 500 mg(1,250mg) -200 unit per tablet Take 1 tablet by mouth once daily.       cilostazoL (PLETAL) 100 MG Tab Take 100 mg by mouth 2 (two) times daily.      cyanocobalamin, vitamin B-12, (VITAMIN B-12 ORAL) Take 1 tablet by mouth once daily.      efinaconazole (JUBLIA) 10 % Nissa Apply topically.      ketoconazole (NIZORAL) 2 % cream Apply topically once daily. 60 g 3    levothyroxine (SYNTHROID) 50 MCG tablet TAKE 1 TABLET(50 MCG) BY MOUTH BEFORE BREAKFAST 90 tablet 3    multivitamin (THERAGRAN) per  tablet Take by mouth. 1 Tablet Oral Every day       No current facility-administered medications for this visit.       Past Medical History:   Diagnosis Date    Actinic keratosis     Aortic valve disorders     Atherosclerosis of aorta     Atherosclerosis of native arteries of the extremities with intermittent claudication     Carcinoma in situ, vulva     Left bundle branch hemiblock     Other and unspecified hyperlipidemia     Postinflammatory pulmonary fibrosis     Senile nuclear sclerosis      Past Surgical History:   Procedure Laterality Date    ARTHROSCOPIC REPAIR OF ROTATOR CUFF OF SHOULDER Right 4/24/2019    Procedure: REPAIR, ROTATOR CUFF, ARTHROSCOPIC WITH REGENERATIN IMPLANT;  Surgeon: Walter Hernandez MD;  Location: [facility] OR;  Service: Orthopedics;  Laterality: Right;  regional w/o catheter     BTL      COLONOSCOPY N/A 12/9/2021    Procedure: COLONOSCOPY;  Surgeon: Juan Swain MD;  Location: [facility] ENDO (4TH FLR);  Service: Endoscopy;  Laterality: N/A;  fully vaccinated    DECOMPRESSION OF SUBACROMIAL SPACE Right 4/24/2019    Procedure: DECOMPRESSION, SUBACROMIAL SPACE WITH DISTAL CLAVICLE EXCISION (DCE);  Surgeon: Walter Hernandez MD;  Location: [facility] OR;  Service: Orthopedics;  Laterality: Right;    FLEXIBLE SIGMOIDOSCOPY N/A 12/20/2021    Procedure: SIGMOIDOSCOPY-FLEXIBLE;  Surgeon: Juan Swain MD;  Location: [facility] ENDO (2ND FLR);  Service: Endoscopy;  Laterality: N/A;  please schedule with me Monday 12/20 AM. Urgent for marking for colon cancer  spot held 2/20/21 12/16 fully vaccinated; instructions to portal-st    RHYTIDECTOMY      SHOULDER ARTHROSCOPY Right 4/24/2019    Procedure: ARTHROSCOPY, SHOULDER WITH LABRAL DEBRIDEMENT;  Surgeon: Walter Hernandez MD;  Location: [facility];  Service: Orthopedics;  Laterality: Right;    TONSILLECTOMY, ADENOIDECTOMY      VULVA SURGERY       Family History   Problem Relation Age of Onset    Bladder Cancer Father         d. 69    Alzheimer's  "disease Mother 96        d. 97    Bladder Cancer Paternal Uncle     Lung cancer Paternal Uncle     Breast cancer Paternal Grandmother     Pancreatic cancer Paternal Aunt     Other Brother         d. 45 o/d    Pneumonia Brother         d. 58, smoker, obese    No Known Problems Sister     No Known Problems Sister     No Known Problems Sister     Amblyopia Neg Hx     Blindness Neg Hx     Cataracts Neg Hx     Diabetes Neg Hx     Glaucoma Neg Hx     Hypertension Neg Hx     Macular degeneration Neg Hx     Retinal detachment Neg Hx     Strabismus Neg Hx     Stroke Neg Hx     Thyroid disease Neg Hx      Social History     Tobacco Use    Smoking status: Former Smoker     Packs/day: 1.50     Years: 39.00     Pack years: 58.50     Types: Cigarettes     Start date:      Quit date: 2001     Years since quittin.3    Smokeless tobacco: Never Used   Substance Use Topics    Alcohol use: Yes     Alcohol/week: 7.0 - 14.0 standard drinks     Types: 7 - 14 Standard drinks or equivalent per week    Drug use: No        Review of Systems:  Review of Systems   Constitutional: Negative.  Negative for activity change, appetite change, chills, fatigue, fever and unexpected weight change.   HENT: Negative.    Eyes: Negative.    Respiratory: Negative.    Cardiovascular: Negative.    Gastrointestinal: Negative.    Endocrine: Negative.    Genitourinary: Negative.    Musculoskeletal: Negative.    Neurological: Negative.    Hematological: Negative.    Psychiatric/Behavioral: Negative.        OBJECTIVE:   Vital Signs (Most Recent)  Pulse: 96 (22 1310)  BP: (!) 145/65 (22 1310)  5' 4" (1.626 m)  73.7 kg (162 lb 8 oz)   Physical Exam:  Physical Exam  Vitals and nursing note reviewed.   Constitutional:       Appearance: Normal appearance.   HENT:      Head: Normocephalic.      Nose: Nose normal.      Mouth/Throat:      Mouth: Mucous membranes are moist.   Eyes:      Extraocular Movements: Extraocular " movements intact.      Pupils: Pupils are equal, round, and reactive to light.   Cardiovascular:      Rate and Rhythm: Normal rate and regular rhythm.   Pulmonary:      Effort: Pulmonary effort is normal.   Abdominal:      General: Abdomen is flat. There is no distension.      Palpations: Abdomen is soft. There is no mass.      Tenderness: There is no abdominal tenderness.      Hernia: No hernia is present.   Skin:     General: Skin is warm.   Neurological:      General: No focal deficit present.      Mental Status: She is alert and oriented to person, place, and time.   Psychiatric:         Mood and Affect: Mood normal.         Behavior: Behavior normal.         Laboratory  Available labs reviewed.    Diagnostic Results:  Available imaging and diagnostic studies reviewed.     ASSESSMENT/PLAN:     76 y.o. female with splenic flexure adenocarcinoma    PLAN:  Plan lap splenic flexure resection after staging CT C/A/P   Check CEA and preop labs   Surgery scheduled for 2/4/21

## 2022-01-12 NOTE — PROGRESS NOTES
General Surgery History & Physical    SUBJECTIVE:     CC: new colon cancer    History of Present Illness:  Eugenio Aldridge is a 76 y.o. female presents for evaluation of newly diagnosed colon cancer of the splenic flexure. Colonoscopy done on 12/9/21 following positive FIT test done for asymptomatic screening. Denies blood per rectum, change in stool habits or caliber, no weight loss.     SPLENIC FLEXURE POLYP, POLYPECTOMY: Minimally invasive moderately   differentiated adenocarcinoma, see synoptic report   SYNOPTIC REPORT:   - Tumor site: Splenic flexure   - Histologic type: Adenocarcinoma   - Histologic Grade: G2, moderately differentiated   - Size of Invasive Carcinoma: 0.7 cm   - Tumor Extent:  Invades muscularis mucosae   - Lymphovascular Invasion: Not identified   - Type of Polyp in which Invasive Carcinoma Arose: Sessile serrated adenoma   - Margin Status for Invasive Carcinoma: Deep margin involved by invasive   carcinoma     Flex sig done 12/20 and clip placed.     No family history of colon or rectal cancer.     Review of patient's allergies indicates:   Allergen Reactions    Codeine      Other reaction(s): Vomiting       Current Outpatient Medications   Medication Sig Dispense Refill    amLODIPine (NORVASC) 5 MG tablet Take 1 tablet (5 mg total) by mouth once daily. 90 tablet 1    atorvastatin (LIPITOR) 40 MG tablet TAKE 1 TABLET(40 MG) BY MOUTH EVERY DAY 90 tablet 3    calcium-vitamin D3 (OS-MARYANA 500 + D3) 500 mg(1,250mg) -200 unit per tablet Take 1 tablet by mouth once daily.       cilostazoL (PLETAL) 100 MG Tab Take 100 mg by mouth 2 (two) times daily.      cyanocobalamin, vitamin B-12, (VITAMIN B-12 ORAL) Take 1 tablet by mouth once daily.      efinaconazole (JUBLIA) 10 % Nissa Apply topically.      ketoconazole (NIZORAL) 2 % cream Apply topically once daily. 60 g 3    levothyroxine (SYNTHROID) 50 MCG tablet TAKE 1 TABLET(50 MCG) BY MOUTH BEFORE BREAKFAST 90 tablet 3    multivitamin (THERAGRAN) per  tablet Take by mouth. 1 Tablet Oral Every day       No current facility-administered medications for this visit.       Past Medical History:   Diagnosis Date    Actinic keratosis     Aortic valve disorders     Atherosclerosis of aorta     Atherosclerosis of native arteries of the extremities with intermittent claudication     Carcinoma in situ, vulva     Left bundle branch hemiblock     Other and unspecified hyperlipidemia     Postinflammatory pulmonary fibrosis     Senile nuclear sclerosis      Past Surgical History:   Procedure Laterality Date    ARTHROSCOPIC REPAIR OF ROTATOR CUFF OF SHOULDER Right 4/24/2019    Procedure: REPAIR, ROTATOR CUFF, ARTHROSCOPIC WITH REGENERATIN IMPLANT;  Surgeon: Walter Hernandez MD;  Location: [facility] OR;  Service: Orthopedics;  Laterality: Right;  regional w/o catheter     BTL      COLONOSCOPY N/A 12/9/2021    Procedure: COLONOSCOPY;  Surgeon: Juan Swain MD;  Location: [facility] ENDO (4TH FLR);  Service: Endoscopy;  Laterality: N/A;  fully vaccinated    DECOMPRESSION OF SUBACROMIAL SPACE Right 4/24/2019    Procedure: DECOMPRESSION, SUBACROMIAL SPACE WITH DISTAL CLAVICLE EXCISION (DCE);  Surgeon: Walter Hernandez MD;  Location: [facility] OR;  Service: Orthopedics;  Laterality: Right;    FLEXIBLE SIGMOIDOSCOPY N/A 12/20/2021    Procedure: SIGMOIDOSCOPY-FLEXIBLE;  Surgeon: Juan Swain MD;  Location: [facility] ENDO (2ND FLR);  Service: Endoscopy;  Laterality: N/A;  please schedule with me Monday 12/20 AM. Urgent for marking for colon cancer  spot held 2/20/21 12/16 fully vaccinated; instructions to portal-st    RHYTIDECTOMY      SHOULDER ARTHROSCOPY Right 4/24/2019    Procedure: ARTHROSCOPY, SHOULDER WITH LABRAL DEBRIDEMENT;  Surgeon: Walter Hernandez MD;  Location: [facility];  Service: Orthopedics;  Laterality: Right;    TONSILLECTOMY, ADENOIDECTOMY      VULVA SURGERY       Family History   Problem Relation Age of Onset    Bladder Cancer Father         d. 69    Alzheimer's  "disease Mother 96        d. 97    Bladder Cancer Paternal Uncle     Lung cancer Paternal Uncle     Breast cancer Paternal Grandmother     Pancreatic cancer Paternal Aunt     Other Brother         d. 45 o/d    Pneumonia Brother         d. 58, smoker, obese    No Known Problems Sister     No Known Problems Sister     No Known Problems Sister     Amblyopia Neg Hx     Blindness Neg Hx     Cataracts Neg Hx     Diabetes Neg Hx     Glaucoma Neg Hx     Hypertension Neg Hx     Macular degeneration Neg Hx     Retinal detachment Neg Hx     Strabismus Neg Hx     Stroke Neg Hx     Thyroid disease Neg Hx      Social History     Tobacco Use    Smoking status: Former Smoker     Packs/day: 1.50     Years: 39.00     Pack years: 58.50     Types: Cigarettes     Start date:      Quit date: 2001     Years since quittin.3    Smokeless tobacco: Never Used   Substance Use Topics    Alcohol use: Yes     Alcohol/week: 7.0 - 14.0 standard drinks     Types: 7 - 14 Standard drinks or equivalent per week    Drug use: No        Review of Systems:  Review of Systems   Constitutional: Negative.  Negative for activity change, appetite change, chills, fatigue, fever and unexpected weight change.   HENT: Negative.    Eyes: Negative.    Respiratory: Negative.    Cardiovascular: Negative.    Gastrointestinal: Negative.    Endocrine: Negative.    Genitourinary: Negative.    Musculoskeletal: Negative.    Neurological: Negative.    Hematological: Negative.    Psychiatric/Behavioral: Negative.        OBJECTIVE:   Vital Signs (Most Recent)  Pulse: 96 (22 1310)  BP: (!) 145/65 (22 1310)  5' 4" (1.626 m)  73.7 kg (162 lb 8 oz)   Physical Exam:  Physical Exam  Vitals and nursing note reviewed.   Constitutional:       Appearance: Normal appearance.   HENT:      Head: Normocephalic.      Nose: Nose normal.      Mouth/Throat:      Mouth: Mucous membranes are moist.   Eyes:      Extraocular Movements: Extraocular " movements intact.      Pupils: Pupils are equal, round, and reactive to light.   Cardiovascular:      Rate and Rhythm: Normal rate and regular rhythm.   Pulmonary:      Effort: Pulmonary effort is normal.   Abdominal:      General: Abdomen is flat. There is no distension.      Palpations: Abdomen is soft. There is no mass.      Tenderness: There is no abdominal tenderness.      Hernia: No hernia is present.   Skin:     General: Skin is warm.   Neurological:      General: No focal deficit present.      Mental Status: She is alert and oriented to person, place, and time.   Psychiatric:         Mood and Affect: Mood normal.         Behavior: Behavior normal.         Laboratory  Available labs reviewed.    Diagnostic Results:  Available imaging and diagnostic studies reviewed.     ASSESSMENT/PLAN:     76 y.o. female with splenic flexure adenocarcinoma    PLAN:  Plan lap splenic flexure resection after staging CT C/A/P   Check CEA and preop labs   Surgery scheduled for 2/4/21

## 2022-01-13 ENCOUNTER — HOSPITAL ENCOUNTER (OUTPATIENT)
Dept: RADIOLOGY | Facility: HOSPITAL | Age: 77
Discharge: HOME | End: 2022-01-13
Attending: COLON & RECTAL SURGERY
Payer: MEDICARE

## 2022-01-13 ENCOUNTER — TELEPHONE (OUTPATIENT)
Dept: PODIATRY | Facility: CLINIC | Age: 77
End: 2022-01-13
Payer: COMMERCIAL

## 2022-01-13 DIAGNOSIS — C18.5 MALIGNANT NEOPLASM OF SPLENIC FLEXURE: ICD-10-CM

## 2022-01-13 PROCEDURE — 71260 CT CHEST ABDOMEN PELVIS WITH CONTRAST (XPD): ICD-10-PCS | Mod: 26,,, | Performed by: RADIOLOGY

## 2022-01-13 PROCEDURE — 74177 CT ABD & PELVIS W/CONTRAST: CPT | Mod: TC

## 2022-01-13 PROCEDURE — 71260 CT THORAX DX C+: CPT | Mod: TC

## 2022-01-13 PROCEDURE — 74177 CT ABD & PELVIS W/CONTRAST: CPT | Mod: 26,,, | Performed by: RADIOLOGY

## 2022-01-13 PROCEDURE — 25500020 PHARM REV CODE 255: Performed by: COLON & RECTAL SURGERY

## 2022-01-13 PROCEDURE — 74177 CT CHEST ABDOMEN PELVIS WITH CONTRAST (XPD): ICD-10-PCS | Mod: 26,,, | Performed by: RADIOLOGY

## 2022-01-13 PROCEDURE — 71260 CT THORAX DX C+: CPT | Mod: 26,,, | Performed by: RADIOLOGY

## 2022-01-13 RX ADMIN — IOHEXOL 15 ML: 350 INJECTION, SOLUTION INTRAVENOUS at 12:01

## 2022-01-13 RX ADMIN — IOHEXOL 75 ML: 350 INJECTION, SOLUTION INTRAVENOUS at 12:01

## 2022-01-20 ENCOUNTER — HOSPITAL ENCOUNTER (OUTPATIENT)
Dept: RADIOLOGY | Facility: HOSPITAL | Age: 77
Discharge: HOME | End: 2022-01-20
Attending: PSYCHIATRY & NEUROLOGY
Payer: MEDICARE

## 2022-01-20 DIAGNOSIS — R41.3 MEMORY IMPAIRMENT: ICD-10-CM

## 2022-01-20 PROCEDURE — 70551 MRI BRAIN STEM W/O DYE: CPT | Mod: TC

## 2022-01-20 PROCEDURE — 70551 MRI BRAIN STEM W/O DYE: CPT | Mod: 26,,, | Performed by: RADIOLOGY

## 2022-01-20 PROCEDURE — 70551 MRI BRAIN WITHOUT CONTRAST: ICD-10-PCS | Mod: 26,,, | Performed by: RADIOLOGY

## 2022-01-20 RX ORDER — NEOMYCIN SULFATE 500 MG/1
TABLET ORAL
Qty: 6 TABLET | Refills: 0 | Status: SHIPPED | OUTPATIENT
Start: 2022-01-20 | End: 2022-04-05

## 2022-01-20 RX ORDER — POLYETHYLENE GLYCOL 3350, SODIUM SULFATE ANHYDROUS, SODIUM BICARBONATE, SODIUM CHLORIDE, POTASSIUM CHLORIDE 236; 22.74; 6.74; 5.86; 2.97 G/4L; G/4L; G/4L; G/4L; G/4L
4 POWDER, FOR SOLUTION ORAL ONCE
Qty: 1 EACH | Refills: 0 | Status: SHIPPED | OUTPATIENT
Start: 2022-01-20 | End: 2022-01-20

## 2022-01-20 RX ORDER — METRONIDAZOLE 500 MG/1
TABLET ORAL
Qty: 3 TABLET | Refills: 0 | Status: SHIPPED | OUTPATIENT
Start: 2022-01-20 | End: 2022-04-05

## 2022-01-24 ENCOUNTER — HOSPITAL ENCOUNTER (OUTPATIENT)
Dept: RADIOLOGY | Facility: HOSPITAL | Age: 77
Discharge: HOME | End: 2022-01-24
Attending: INTERNAL MEDICINE
Payer: MEDICARE

## 2022-01-24 DIAGNOSIS — Z12.31 VISIT FOR SCREENING MAMMOGRAM: ICD-10-CM

## 2022-01-24 DIAGNOSIS — Z12.31 ENCOUNTER FOR SCREENING MAMMOGRAM FOR BREAST CANCER: ICD-10-CM

## 2022-01-24 PROCEDURE — 77063 BREAST TOMOSYNTHESIS BI: CPT | Mod: 26,,, | Performed by: RADIOLOGY

## 2022-01-24 PROCEDURE — 77067 MAMMO DIGITAL SCREENING BILAT WITH TOMO: ICD-10-PCS | Mod: 26,,, | Performed by: RADIOLOGY

## 2022-01-24 PROCEDURE — 77063 BREAST TOMOSYNTHESIS BI: CPT | Mod: TC,PO

## 2022-01-24 PROCEDURE — 77063 MAMMO DIGITAL SCREENING BILAT WITH TOMO: ICD-10-PCS | Mod: 26,,, | Performed by: RADIOLOGY

## 2022-01-24 PROCEDURE — 77067 SCR MAMMO BI INCL CAD: CPT | Mod: 26,,, | Performed by: RADIOLOGY

## 2022-01-26 ENCOUNTER — TELEPHONE (OUTPATIENT)
Dept: INTERNAL MEDICINE | Facility: CLINIC | Age: 77
End: 2022-01-26
Payer: COMMERCIAL

## 2022-01-31 ENCOUNTER — PATIENT MESSAGE (OUTPATIENT)
Dept: SURGERY | Facility: HOSPITAL | Age: 77
End: 2022-01-31
Payer: COMMERCIAL

## 2022-02-01 ENCOUNTER — TELEPHONE (OUTPATIENT)
Dept: SURGERY | Facility: CLINIC | Age: 77
End: 2022-02-01
Payer: COMMERCIAL

## 2022-02-01 ENCOUNTER — LAB VISIT (OUTPATIENT)
Dept: PRIMARY CARE CLINIC | Facility: CLINIC | Age: 77
DRG: 330 | End: 2022-02-01
Payer: MEDICARE

## 2022-02-01 DIAGNOSIS — Z01.818 PRE-OP TESTING: ICD-10-CM

## 2022-02-01 LAB
SARS-COV-2 RNA RESP QL NAA+PROBE: NOT DETECTED
SARS-COV-2- CYCLE NUMBER: NORMAL

## 2022-02-01 PROCEDURE — U0005 INFEC AGEN DETEC AMPLI PROBE: HCPCS | Performed by: COLON & RECTAL SURGERY

## 2022-02-01 PROCEDURE — U0003 INFECTIOUS AGENT DETECTION BY NUCLEIC ACID (DNA OR RNA); SEVERE ACUTE RESPIRATORY SYNDROME CORONAVIRUS 2 (SARS-COV-2) (CORONAVIRUS DISEASE [COVID-19]), AMPLIFIED PROBE TECHNIQUE, MAKING USE OF HIGH THROUGHPUT TECHNOLOGIES AS DESCRIBED BY CMS-2020-01-R: HCPCS | Performed by: COLON & RECTAL SURGERY

## 2022-02-01 NOTE — TELEPHONE ENCOUNTER
----- Message from Carmencita Brooks sent at 2/1/2022  9:41 AM CST -----  Regarding: procedure  Contact: pt  Pt requesting a call back from Pat in regards to upcoming procedure.      Pt @ [phone]

## 2022-02-03 ENCOUNTER — PATIENT MESSAGE (OUTPATIENT)
Dept: SURGERY | Facility: CLINIC | Age: 77
End: 2022-02-03
Payer: COMMERCIAL

## 2022-02-03 ENCOUNTER — ANESTHESIA EVENT (OUTPATIENT)
Dept: SURGERY | Facility: HOSPITAL | Age: 77
DRG: 330 | End: 2022-02-03
Payer: MEDICARE

## 2022-02-03 ENCOUNTER — TELEPHONE (OUTPATIENT)
Dept: SURGERY | Facility: CLINIC | Age: 77
End: 2022-02-03
Payer: COMMERCIAL

## 2022-02-03 NOTE — PRE-PROCEDURE INSTRUCTIONS
PREOP INSTRUCTIONS:Nothing to eat or drink for 8 hours before surgery.Instructed to follow the surgeon's instructions if they differ from these.Shower instructions as well as directions to the Surgery Center were given.Encouraged to wear loose fitting,comfortable clothing.Medication instructions for pm prior to and am of procedure reviewed.Instructed to avoid taking vitamins,supplements,aspirin and ibuprofen the morning of surgery.    Patient denies any side effects or issues with anesthesia or sedation.     Patient does not know arrival time.Explained that this information comes from the surgeon's office and if they haven't heard from them by  3 pm to call the office.Patient stated an understanding.

## 2022-02-03 NOTE — TELEPHONE ENCOUNTER
----- Message from Kimberly Siu sent at 2/3/2022 12:23 PM CST -----  Contact: Patient  Patient requesting call back in regards to arrival time for procedure.      [phone] ([place])

## 2022-02-03 NOTE — ANESTHESIA PREPROCEDURE EVALUATION
02/03/2022  Pre-operative evaluation for Procedure(s) (LRB):  COLECTOMY, PARTIAL, LAPAROSCOPIC Splenic flexure (N/A)    Becker DONELL Aldridge is a 76 y.o. female     Patient Active Problem List   Diagnosis    Other specified hypothyroidism    Pure hypercholesterolemia    PAD (peripheral artery disease)    Nuclear sclerosis - Both Eyes    Postinflammatory pulmonary fibrosis    Cervical radiculopathy    Atherosclerosis of aorta    Other osteoporosis without current pathological fracture    Nontraumatic tear of right rotator cuff    Sensorineural hearing loss (SNHL) of both ears    Limited range of motion (ROM) of shoulder-right    Overweight (BMI 25.0-29.9)    Claudication of left lower extremity    Left thigh pain    Lumbar disc disease    Memory disturbance    Hydronephrosis    Pre-diabetes       Review of patient's allergies indicates:   Allergen Reactions    Codeine Nausea And Vomiting       No current facility-administered medications on file prior to encounter.     Current Outpatient Medications on File Prior to Encounter   Medication Sig Dispense Refill    amLODIPine (NORVASC) 5 MG tablet Take 1 tablet (5 mg total) by mouth once daily. 90 tablet 1    atorvastatin (LIPITOR) 40 MG tablet TAKE 1 TABLET(40 MG) BY MOUTH EVERY DAY 90 tablet 3    calcium-vitamin D3 (OS-MARYANA 500 + D3) 500 mg(1,250mg) -200 unit per tablet Take 1 tablet by mouth once daily.       cyanocobalamin, vitamin B-12, (VITAMIN B-12 ORAL) Take 1 tablet by mouth once daily.      levothyroxine (SYNTHROID) 50 MCG tablet TAKE 1 TABLET(50 MCG) BY MOUTH BEFORE BREAKFAST 90 tablet 3    multivitamin (THERAGRAN) per tablet Take by mouth. 1 Tablet Oral Every day      efinaconazole (JUBLIA) 10 % Nissa Apply topically.      ketoconazole (NIZORAL) 2 % cream Apply topically once daily. 60 g 3       Past Surgical History:   Procedure  Laterality Date    ARTHROSCOPIC REPAIR OF ROTATOR CUFF OF SHOULDER Right 2019    Procedure: REPAIR, ROTATOR CUFF, ARTHROSCOPIC WITH REGENERATIN IMPLANT;  Surgeon: Walter Hernandez MD;  Location: [facility] OR;  Service: Orthopedics;  Laterality: Right;  regional w/o catheter     BTL      COLONOSCOPY N/A 2021    Procedure: COLONOSCOPY;  Surgeon: Juan Swain MD;  Location: [facility] ENDO (4TH FLR);  Service: Endoscopy;  Laterality: N/A;  fully vaccinated    DECOMPRESSION OF SUBACROMIAL SPACE Right 2019    Procedure: DECOMPRESSION, SUBACROMIAL SPACE WITH DISTAL CLAVICLE EXCISION (DCE);  Surgeon: Walter Hernandez MD;  Location: [facility] OR;  Service: Orthopedics;  Laterality: Right;    FLEXIBLE SIGMOIDOSCOPY N/A 2021    Procedure: SIGMOIDOSCOPY-FLEXIBLE;  Surgeon: Juan Swain MD;  Location: [facility] ENDO (2ND FLR);  Service: Endoscopy;  Laterality: N/A;  please schedule with me  AM. Urgent for marking for colon cancer  spot held 21 fully vaccinated; instructions to portal-st    RHYTIDECTOMY      SHOULDER ARTHROSCOPY Right 2019    Procedure: ARTHROSCOPY, SHOULDER WITH LABRAL DEBRIDEMENT;  Surgeon: Walter Hernandez MD;  Location: [facility];  Service: Orthopedics;  Laterality: Right;    TONSILLECTOMY, ADENOIDECTOMY      VULVA SURGERY               CBC: No results for input(s): WBC, RBC, HGB, HCT, PLT, MCV, MCH, MCHC in the last 72 hours.    CMP: No results for input(s): NA, K, CL, CO2, BUN, CREATININE, GLU, MG, PHOS, CALCIUM, ALBUMIN, PROT, ALKPHOS, ALT, AST, BILITOT in the last 72 hours.    INR  No results for input(s): PT, INR, PROTIME, APTT in the last 72 hours.        Diagnostic Studies:      EKD Echo:  No results found for this or any previous visit.      Anesthesia Evaluation    I have reviewed the Patient Summary Reports.   I have reviewed the NPO Status.      Review of Systems  Anesthesia Hx:  History of prior surgery of interest to airway management or  planning: Denies Family Hx of Anesthesia complications.   Denies Personal Hx of Anesthesia complications.       Physical Exam  General:  Well nourished    Airway/Jaw/Neck:  Airway Findings: Mouth Opening: Normal Tongue: Normal  General Airway Assessment: Adult  Mallampati: II  TM Distance: Normal, at least 6 cm      Dental:  Dental Findings: In tact   Chest/Lungs:  Chest/Lungs Findings: Clear to auscultation, Normal Respiratory Rate         Mental Status:  Mental Status Findings:  Cooperative, Alert and Oriented         Anesthesia Plan  Type of Anesthesia, risks & benefits discussed:  Anesthesia Type:  general    Patient's Preference:   Plan Factors:          Intra-op Monitoring Plan: standard ASA monitors  Intra-op Monitoring Plan Comments:   Post Op Pain Control Plan: multimodal analgesia  Post Op Pain Control Plan Comments:     Induction:   IV  Beta Blocker:  Patient is not currently on a Beta-Blocker (No further documentation required).       Informed Consent: Patient understands risks and agrees with Anesthesia plan.  Questions answered. Anesthesia consent signed with patient.  ASA Score: 3     Day of Surgery Review of History & Physical:    H&P update referred to the surgeon.         Ready For Surgery From Anesthesia Perspective.

## 2022-02-04 ENCOUNTER — ANESTHESIA (OUTPATIENT)
Dept: SURGERY | Facility: HOSPITAL | Age: 77
DRG: 330 | End: 2022-02-04
Payer: MEDICARE

## 2022-02-04 ENCOUNTER — HOSPITAL ENCOUNTER (INPATIENT)
Facility: HOSPITAL | Age: 77
LOS: 1 days | Discharge: HOME | DRG: 330 | End: 2022-02-05
Attending: COLON & RECTAL SURGERY | Admitting: COLON & RECTAL SURGERY
Payer: MEDICARE

## 2022-02-04 DIAGNOSIS — C18.9 COLON CANCER: Primary | ICD-10-CM

## 2022-02-04 LAB
ABO + RH BLD: NORMAL
BLD GP AB SCN CELLS X3 SERPL QL: NORMAL
POCT GLUCOSE: 127 MG/DL (ref 70–110)
POCT GLUCOSE: 135 MG/DL (ref 70–110)
POCT GLUCOSE: 186 MG/DL (ref 70–110)

## 2022-02-04 PROCEDURE — 44213 PR LAP, SURG MOBIL SPLENIC FL DUR PTL COLECTOMY: ICD-10-PCS | Mod: GC,,, | Performed by: COLON & RECTAL SURGERY

## 2022-02-04 PROCEDURE — S0030 INJECTION, METRONIDAZOLE: HCPCS | Performed by: NURSE PRACTITIONER

## 2022-02-04 PROCEDURE — 36000710: Performed by: COLON & RECTAL SURGERY

## 2022-02-04 PROCEDURE — 63600175 PHARM REV CODE 636 W HCPCS

## 2022-02-04 PROCEDURE — 25000003 PHARM REV CODE 250: Performed by: ANESTHESIOLOGY

## 2022-02-04 PROCEDURE — 86901 BLOOD TYPING SEROLOGIC RH(D): CPT | Performed by: NURSE PRACTITIONER

## 2022-02-04 PROCEDURE — 44207 L COLECTOMY/COLOPROCTOSTOMY: CPT | Mod: GC,,, | Performed by: COLON & RECTAL SURGERY

## 2022-02-04 PROCEDURE — 25000003 PHARM REV CODE 250: Performed by: STUDENT IN AN ORGANIZED HEALTH CARE EDUCATION/TRAINING PROGRAM

## 2022-02-04 PROCEDURE — 88305 TISSUE EXAM BY PATHOLOGIST: ICD-10-PCS | Mod: 26,,, | Performed by: PATHOLOGY

## 2022-02-04 PROCEDURE — 63600175 PHARM REV CODE 636 W HCPCS: Performed by: ANESTHESIOLOGY

## 2022-02-04 PROCEDURE — 37000009 HC ANESTHESIA EA ADD 15 MINS: Performed by: COLON & RECTAL SURGERY

## 2022-02-04 PROCEDURE — 44213 LAP MOBIL SPLENIC FL ADD-ON: CPT | Mod: GC,,, | Performed by: COLON & RECTAL SURGERY

## 2022-02-04 PROCEDURE — 44207 PR LAP,SURG,COLECTOMY,W/ANAST: ICD-10-PCS | Mod: GC,,, | Performed by: COLON & RECTAL SURGERY

## 2022-02-04 PROCEDURE — 63600175 PHARM REV CODE 636 W HCPCS: Performed by: NURSE PRACTITIONER

## 2022-02-04 PROCEDURE — 37000008 HC ANESTHESIA 1ST 15 MINUTES: Performed by: COLON & RECTAL SURGERY

## 2022-02-04 PROCEDURE — 82962 GLUCOSE BLOOD TEST: CPT | Performed by: COLON & RECTAL SURGERY

## 2022-02-04 PROCEDURE — 71000033 HC RECOVERY, INTIAL HOUR: Performed by: COLON & RECTAL SURGERY

## 2022-02-04 PROCEDURE — 71000016 HC POSTOP RECOV ADDL HR: Performed by: COLON & RECTAL SURGERY

## 2022-02-04 PROCEDURE — 71000015 HC POSTOP RECOV 1ST HR: Performed by: COLON & RECTAL SURGERY

## 2022-02-04 PROCEDURE — 25000003 PHARM REV CODE 250: Performed by: NURSE PRACTITIONER

## 2022-02-04 PROCEDURE — 88307 TISSUE EXAM BY PATHOLOGIST: CPT | Performed by: PATHOLOGY

## 2022-02-04 PROCEDURE — 36000711: Performed by: COLON & RECTAL SURGERY

## 2022-02-04 PROCEDURE — D9220A PRA ANESTHESIA: Mod: ,,, | Performed by: ANESTHESIOLOGY

## 2022-02-04 PROCEDURE — 88307 PR  SURG PATH,LEVEL V: ICD-10-PCS | Mod: 26,,, | Performed by: PATHOLOGY

## 2022-02-04 PROCEDURE — 88305 TISSUE EXAM BY PATHOLOGIST: CPT | Mod: 26,,, | Performed by: PATHOLOGY

## 2022-02-04 PROCEDURE — 88307 TISSUE EXAM BY PATHOLOGIST: CPT | Mod: 26,,, | Performed by: PATHOLOGY

## 2022-02-04 PROCEDURE — 25000003 PHARM REV CODE 250: Performed by: COLON & RECTAL SURGERY

## 2022-02-04 PROCEDURE — 27201423 OPTIME MED/SURG SUP & DEVICES STERILE SUPPLY: Performed by: COLON & RECTAL SURGERY

## 2022-02-04 PROCEDURE — 94761 N-INVAS EAR/PLS OXIMETRY MLT: CPT

## 2022-02-04 PROCEDURE — 88305 TISSUE EXAM BY PATHOLOGIST: CPT | Mod: 59 | Performed by: PATHOLOGY

## 2022-02-04 PROCEDURE — 63600175 PHARM REV CODE 636 W HCPCS: Performed by: STUDENT IN AN ORGANIZED HEALTH CARE EDUCATION/TRAINING PROGRAM

## 2022-02-04 PROCEDURE — D9220A PRA ANESTHESIA: ICD-10-PCS | Mod: ,,, | Performed by: ANESTHESIOLOGY

## 2022-02-04 PROCEDURE — 20600001 HC STEP DOWN PRIVATE ROOM

## 2022-02-04 RX ORDER — NEOSTIGMINE METHYLSULFATE 0.5 MG/ML
INJECTION, SOLUTION INTRAVENOUS
Status: DISCONTINUED | OUTPATIENT
Start: 2022-02-04 | End: 2022-02-04

## 2022-02-04 RX ORDER — OXYCODONE HYDROCHLORIDE 10 MG/1
10 TABLET ORAL EVERY 4 HOURS PRN
Status: DISCONTINUED | OUTPATIENT
Start: 2022-02-04 | End: 2022-02-05 | Stop reason: HOSPADM

## 2022-02-04 RX ORDER — IBUPROFEN 400 MG/1
800 TABLET ORAL EVERY 8 HOURS
Status: DISCONTINUED | OUTPATIENT
Start: 2022-02-05 | End: 2022-02-05 | Stop reason: HOSPADM

## 2022-02-04 RX ORDER — GABAPENTIN 300 MG/1
300 CAPSULE ORAL 3 TIMES DAILY
Status: DISCONTINUED | OUTPATIENT
Start: 2022-02-04 | End: 2022-02-04

## 2022-02-04 RX ORDER — SODIUM CHLORIDE 0.9 % (FLUSH) 0.9 %
10 SYRINGE (ML) INJECTION
Status: DISCONTINUED | OUTPATIENT
Start: 2022-02-04 | End: 2022-02-05 | Stop reason: HOSPADM

## 2022-02-04 RX ORDER — FENTANYL CITRATE 50 UG/ML
INJECTION, SOLUTION INTRAMUSCULAR; INTRAVENOUS
Status: DISCONTINUED | OUTPATIENT
Start: 2022-02-04 | End: 2022-02-04

## 2022-02-04 RX ORDER — LEVOTHYROXINE SODIUM 50 UG/1
50 TABLET ORAL
Status: DISCONTINUED | OUTPATIENT
Start: 2022-02-05 | End: 2022-02-05 | Stop reason: HOSPADM

## 2022-02-04 RX ORDER — MUPIROCIN 20 MG/G
1 OINTMENT TOPICAL
Status: COMPLETED | OUTPATIENT
Start: 2022-02-04 | End: 2022-02-04

## 2022-02-04 RX ORDER — ACETAMINOPHEN 500 MG
1000 TABLET ORAL EVERY 8 HOURS
Status: DISCONTINUED | OUTPATIENT
Start: 2022-02-05 | End: 2022-02-05 | Stop reason: HOSPADM

## 2022-02-04 RX ORDER — IBUPROFEN 200 MG
16 TABLET ORAL
Status: DISCONTINUED | OUTPATIENT
Start: 2022-02-04 | End: 2022-02-05 | Stop reason: HOSPADM

## 2022-02-04 RX ORDER — SODIUM CHLORIDE 9 MG/ML
INJECTION, SOLUTION INTRAVENOUS CONTINUOUS
Status: DISCONTINUED | OUTPATIENT
Start: 2022-02-04 | End: 2022-02-05 | Stop reason: HOSPADM

## 2022-02-04 RX ORDER — SODIUM CHLORIDE 9 MG/ML
INJECTION, SOLUTION INTRAVENOUS
Status: DISCONTINUED | OUTPATIENT
Start: 2022-02-04 | End: 2022-02-04

## 2022-02-04 RX ORDER — MUPIROCIN 20 MG/G
OINTMENT TOPICAL 2 TIMES DAILY
Status: DISCONTINUED | OUTPATIENT
Start: 2022-02-04 | End: 2022-02-05 | Stop reason: HOSPADM

## 2022-02-04 RX ORDER — BUPIVACAINE HYDROCHLORIDE 2.5 MG/ML
INJECTION, SOLUTION EPIDURAL; INFILTRATION; INTRACAUDAL
Status: DISCONTINUED | OUTPATIENT
Start: 2022-02-04 | End: 2022-02-04 | Stop reason: HOSPADM

## 2022-02-04 RX ORDER — ONDANSETRON 2 MG/ML
4 INJECTION INTRAMUSCULAR; INTRAVENOUS EVERY 12 HOURS PRN
Status: DISCONTINUED | OUTPATIENT
Start: 2022-02-04 | End: 2022-02-05 | Stop reason: HOSPADM

## 2022-02-04 RX ORDER — ACETAMINOPHEN 10 MG/ML
1000 INJECTION, SOLUTION INTRAVENOUS EVERY 8 HOURS
Status: DISCONTINUED | OUTPATIENT
Start: 2022-02-04 | End: 2022-02-05 | Stop reason: HOSPADM

## 2022-02-04 RX ORDER — SODIUM CHLORIDE 9 MG/ML
INJECTION, SOLUTION INTRAVENOUS CONTINUOUS
Status: ACTIVE | OUTPATIENT
Start: 2022-02-04

## 2022-02-04 RX ORDER — INSULIN ASPART 100 [IU]/ML
0-5 INJECTION, SOLUTION INTRAVENOUS; SUBCUTANEOUS
Status: DISCONTINUED | OUTPATIENT
Start: 2022-02-04 | End: 2022-02-05 | Stop reason: HOSPADM

## 2022-02-04 RX ORDER — ONDANSETRON 2 MG/ML
INJECTION INTRAMUSCULAR; INTRAVENOUS
Status: DISCONTINUED | OUTPATIENT
Start: 2022-02-04 | End: 2022-02-04

## 2022-02-04 RX ORDER — LIDOCAINE HYDROCHLORIDE 10 MG/ML
1 INJECTION, SOLUTION EPIDURAL; INFILTRATION; INTRACAUDAL; PERINEURAL
Status: COMPLETED | OUTPATIENT
Start: 2022-02-04 | End: 2022-02-04

## 2022-02-04 RX ORDER — SODIUM CHLORIDE 0.9 % (FLUSH) 0.9 %
3 SYRINGE (ML) INJECTION
Status: DISCONTINUED | OUTPATIENT
Start: 2022-02-04 | End: 2022-02-04 | Stop reason: HOSPADM

## 2022-02-04 RX ORDER — ALVIMOPAN 12 MG/1
12 CAPSULE ORAL
Status: COMPLETED | OUTPATIENT
Start: 2022-02-04 | End: 2022-02-04

## 2022-02-04 RX ORDER — PROCHLORPERAZINE EDISYLATE 5 MG/ML
5 INJECTION INTRAMUSCULAR; INTRAVENOUS EVERY 6 HOURS PRN
Status: DISCONTINUED | OUTPATIENT
Start: 2022-02-04 | End: 2022-02-05 | Stop reason: HOSPADM

## 2022-02-04 RX ORDER — HYDROMORPHONE HYDROCHLORIDE 1 MG/ML
INJECTION, SOLUTION INTRAMUSCULAR; INTRAVENOUS; SUBCUTANEOUS
Status: COMPLETED
Start: 2022-02-04 | End: 2022-02-04

## 2022-02-04 RX ORDER — ALVIMOPAN 12 MG/1
12 CAPSULE ORAL 2 TIMES DAILY
Status: DISCONTINUED | OUTPATIENT
Start: 2022-02-04 | End: 2022-02-05 | Stop reason: HOSPADM

## 2022-02-04 RX ORDER — PROCHLORPERAZINE EDISYLATE 5 MG/ML
INJECTION INTRAMUSCULAR; INTRAVENOUS
Status: DISCONTINUED | OUTPATIENT
Start: 2022-02-04 | End: 2022-02-04

## 2022-02-04 RX ORDER — KETAMINE HCL IN 0.9 % NACL 50 MG/5 ML
SYRINGE (ML) INTRAVENOUS
Status: DISCONTINUED | OUTPATIENT
Start: 2022-02-04 | End: 2022-02-04

## 2022-02-04 RX ORDER — HEPARIN SODIUM 5000 [USP'U]/ML
5000 INJECTION, SOLUTION INTRAVENOUS; SUBCUTANEOUS EVERY 8 HOURS
Status: COMPLETED | OUTPATIENT
Start: 2022-02-04 | End: 2022-02-04

## 2022-02-04 RX ORDER — AMLODIPINE BESYLATE 5 MG/1
5 TABLET ORAL DAILY
Status: DISCONTINUED | OUTPATIENT
Start: 2022-02-05 | End: 2022-02-05 | Stop reason: HOSPADM

## 2022-02-04 RX ORDER — GABAPENTIN 300 MG/1
300 CAPSULE ORAL 3 TIMES DAILY
Status: DISPENSED | OUTPATIENT
Start: 2022-02-04

## 2022-02-04 RX ORDER — IBUPROFEN 200 MG
24 TABLET ORAL
Status: DISCONTINUED | OUTPATIENT
Start: 2022-02-04 | End: 2022-02-05 | Stop reason: HOSPADM

## 2022-02-04 RX ORDER — GLUCAGON 1 MG
1 KIT INJECTION
Status: DISCONTINUED | OUTPATIENT
Start: 2022-02-04 | End: 2022-02-05 | Stop reason: HOSPADM

## 2022-02-04 RX ORDER — PROPOFOL 10 MG/ML
VIAL (ML) INTRAVENOUS
Status: DISCONTINUED | OUTPATIENT
Start: 2022-02-04 | End: 2022-02-04

## 2022-02-04 RX ORDER — HYDROMORPHONE HYDROCHLORIDE 1 MG/ML
0.2 INJECTION, SOLUTION INTRAMUSCULAR; INTRAVENOUS; SUBCUTANEOUS EVERY 5 MIN PRN
Status: DISCONTINUED | OUTPATIENT
Start: 2022-02-04 | End: 2022-02-04 | Stop reason: HOSPADM

## 2022-02-04 RX ORDER — LIDOCAINE HYDROCHLORIDE 20 MG/ML
INJECTION, SOLUTION EPIDURAL; INFILTRATION; INTRACAUDAL; PERINEURAL
Status: DISCONTINUED | OUTPATIENT
Start: 2022-02-04 | End: 2022-02-04

## 2022-02-04 RX ORDER — TRAMADOL HYDROCHLORIDE 50 MG/1
50 TABLET ORAL EVERY 6 HOURS PRN
Status: DISCONTINUED | OUTPATIENT
Start: 2022-02-04 | End: 2022-02-05 | Stop reason: HOSPADM

## 2022-02-04 RX ORDER — ENOXAPARIN SODIUM 100 MG/ML
40 INJECTION SUBCUTANEOUS EVERY 24 HOURS
Status: DISCONTINUED | OUTPATIENT
Start: 2022-02-05 | End: 2022-02-05 | Stop reason: HOSPADM

## 2022-02-04 RX ORDER — PHENYLEPHRINE HYDROCHLORIDE 10 MG/ML
INJECTION INTRAVENOUS
Status: DISCONTINUED | OUTPATIENT
Start: 2022-02-04 | End: 2022-02-04

## 2022-02-04 RX ORDER — ACETAMINOPHEN 650 MG/20.3ML
975 LIQUID ORAL
Status: COMPLETED | OUTPATIENT
Start: 2022-02-04 | End: 2022-02-04

## 2022-02-04 RX ORDER — HALOPERIDOL 5 MG/ML
INJECTION INTRAMUSCULAR
Status: COMPLETED
Start: 2022-02-04 | End: 2022-02-04

## 2022-02-04 RX ORDER — OXYCODONE HYDROCHLORIDE 5 MG/1
5 TABLET ORAL EVERY 6 HOURS PRN
Status: DISCONTINUED | OUTPATIENT
Start: 2022-02-04 | End: 2022-02-05 | Stop reason: HOSPADM

## 2022-02-04 RX ORDER — HALOPERIDOL 5 MG/ML
0.5 INJECTION INTRAMUSCULAR EVERY 10 MIN PRN
Status: COMPLETED | OUTPATIENT
Start: 2022-02-04 | End: 2022-02-04

## 2022-02-04 RX ORDER — GABAPENTIN 300 MG/1
300 CAPSULE ORAL
Status: COMPLETED | OUTPATIENT
Start: 2022-02-04 | End: 2022-02-04

## 2022-02-04 RX ORDER — SUCCINYLCHOLINE CHLORIDE 20 MG/ML
INJECTION INTRAMUSCULAR; INTRAVENOUS
Status: DISCONTINUED | OUTPATIENT
Start: 2022-02-04 | End: 2022-02-04

## 2022-02-04 RX ORDER — DEXAMETHASONE SODIUM PHOSPHATE 4 MG/ML
INJECTION, SOLUTION INTRA-ARTICULAR; INTRALESIONAL; INTRAMUSCULAR; INTRAVENOUS; SOFT TISSUE
Status: DISCONTINUED | OUTPATIENT
Start: 2022-02-04 | End: 2022-02-04

## 2022-02-04 RX ORDER — ROCURONIUM BROMIDE 10 MG/ML
INJECTION, SOLUTION INTRAVENOUS
Status: DISCONTINUED | OUTPATIENT
Start: 2022-02-04 | End: 2022-02-04

## 2022-02-04 RX ORDER — TRIPROLIDINE/PSEUDOEPHEDRINE 2.5MG-60MG
600 TABLET ORAL
Status: COMPLETED | OUTPATIENT
Start: 2022-02-04 | End: 2022-02-04

## 2022-02-04 RX ORDER — METRONIDAZOLE 500 MG/100ML
500 INJECTION, SOLUTION INTRAVENOUS
Status: COMPLETED | OUTPATIENT
Start: 2022-02-04 | End: 2022-02-04

## 2022-02-04 RX ADMIN — SODIUM CHLORIDE: 0.9 INJECTION, SOLUTION INTRAVENOUS at 03:02

## 2022-02-04 RX ADMIN — HALOPERIDOL LACTATE 0.5 MG: 5 INJECTION, SOLUTION INTRAMUSCULAR at 03:02

## 2022-02-04 RX ADMIN — ACETAMINOPHEN 976.6 MG: 160 SOLUTION ORAL at 09:02

## 2022-02-04 RX ADMIN — FENTANYL CITRATE 25 MCG: 50 INJECTION INTRAMUSCULAR; INTRAVENOUS at 03:02

## 2022-02-04 RX ADMIN — FENTANYL CITRATE 25 MCG: 50 INJECTION INTRAMUSCULAR; INTRAVENOUS at 02:02

## 2022-02-04 RX ADMIN — ROCURONIUM BROMIDE 20 MG: 10 INJECTION, SOLUTION INTRAVENOUS at 01:02

## 2022-02-04 RX ADMIN — HYDROMORPHONE HYDROCHLORIDE 0.2 MG: 1 INJECTION, SOLUTION INTRAMUSCULAR; INTRAVENOUS; SUBCUTANEOUS at 03:02

## 2022-02-04 RX ADMIN — ALVIMOPAN 12 MG: 12 CAPSULE ORAL at 09:02

## 2022-02-04 RX ADMIN — MUPIROCIN: 20 OINTMENT TOPICAL at 09:02

## 2022-02-04 RX ADMIN — ROCURONIUM BROMIDE 5 MG: 10 INJECTION, SOLUTION INTRAVENOUS at 11:02

## 2022-02-04 RX ADMIN — PROCHLORPERAZINE EDISYLATE 5 MG: 5 INJECTION INTRAMUSCULAR; INTRAVENOUS at 01:02

## 2022-02-04 RX ADMIN — CEFTRIAXONE SODIUM 2 G: 2 INJECTION, SOLUTION INTRAVENOUS at 11:02

## 2022-02-04 RX ADMIN — HEPARIN SODIUM 5000 UNITS: 5000 INJECTION INTRAVENOUS; SUBCUTANEOUS at 09:02

## 2022-02-04 RX ADMIN — PROPOFOL 10 MG: 10 INJECTION, EMULSION INTRAVENOUS at 03:02

## 2022-02-04 RX ADMIN — METRONIDAZOLE 500 MG: 500 INJECTION, SOLUTION INTRAVENOUS at 11:02

## 2022-02-04 RX ADMIN — GLYCOPYRROLATE 0.6 MG: 0.2 INJECTION INTRAMUSCULAR; INTRAVENOUS at 03:02

## 2022-02-04 RX ADMIN — FENTANYL CITRATE 50 MCG: 50 INJECTION INTRAMUSCULAR; INTRAVENOUS at 01:02

## 2022-02-04 RX ADMIN — IBUPROFEN 800 MG: 800 INJECTION INTRAVENOUS at 09:02

## 2022-02-04 RX ADMIN — PROPOFOL 120 MG: 10 INJECTION, EMULSION INTRAVENOUS at 11:02

## 2022-02-04 RX ADMIN — IBUPROFEN 600 MG: 100 SUSPENSION ORAL at 09:02

## 2022-02-04 RX ADMIN — ROCURONIUM BROMIDE 10 MG: 10 INJECTION, SOLUTION INTRAVENOUS at 02:02

## 2022-02-04 RX ADMIN — ROCURONIUM BROMIDE 45 MG: 10 INJECTION, SOLUTION INTRAVENOUS at 11:02

## 2022-02-04 RX ADMIN — GABAPENTIN 300 MG: 300 CAPSULE ORAL at 03:02

## 2022-02-04 RX ADMIN — SUCCINYLCHOLINE CHLORIDE 120 MG: 20 INJECTION, SOLUTION INTRAMUSCULAR; INTRAVENOUS; PARENTERAL at 11:02

## 2022-02-04 RX ADMIN — GABAPENTIN 300 MG: 300 CAPSULE ORAL at 09:02

## 2022-02-04 RX ADMIN — SODIUM CHLORIDE 0.25 MCG/KG/MIN: 9 INJECTION, SOLUTION INTRAVENOUS at 11:02

## 2022-02-04 RX ADMIN — ONDANSETRON 4 MG: 2 INJECTION INTRAMUSCULAR; INTRAVENOUS at 01:02

## 2022-02-04 RX ADMIN — OXYCODONE 10 MG: 5 TABLET ORAL at 03:02

## 2022-02-04 RX ADMIN — ACETAMINOPHEN 1000 MG: 10 INJECTION, SOLUTION INTRAVENOUS at 09:02

## 2022-02-04 RX ADMIN — DEXAMETHASONE SODIUM PHOSPHATE 4 MG: 4 INJECTION INTRA-ARTICULAR; INTRALESIONAL; INTRAMUSCULAR; INTRAVENOUS; SOFT TISSUE at 12:02

## 2022-02-04 RX ADMIN — Medication 10 MG: at 01:02

## 2022-02-04 RX ADMIN — PHENYLEPHRINE HYDROCHLORIDE 200 MCG: 10 INJECTION INTRAVENOUS at 02:02

## 2022-02-04 RX ADMIN — MUPIROCIN 1 G: 20 OINTMENT TOPICAL at 10:02

## 2022-02-04 RX ADMIN — Medication 20 MG: at 11:02

## 2022-02-04 RX ADMIN — NEOSTIGMINE METHYLSULFATE 3 MG: 0.5 INJECTION INTRAVENOUS at 03:02

## 2022-02-04 RX ADMIN — FENTANYL CITRATE 50 MCG: 50 INJECTION INTRAMUSCULAR; INTRAVENOUS at 11:02

## 2022-02-04 RX ADMIN — ROCURONIUM BROMIDE 20 MG: 10 INJECTION, SOLUTION INTRAVENOUS at 12:02

## 2022-02-04 RX ADMIN — LIDOCAINE HYDROCHLORIDE 100 MG: 20 INJECTION, SOLUTION EPIDURAL; INFILTRATION; INTRACAUDAL at 11:02

## 2022-02-04 RX ADMIN — SODIUM CHLORIDE: 0.9 INJECTION, SOLUTION INTRAVENOUS at 10:02

## 2022-02-04 RX ADMIN — LIDOCAINE HYDROCHLORIDE 0.1 MG: 10 INJECTION, SOLUTION EPIDURAL; INFILTRATION; INTRACAUDAL at 10:02

## 2022-02-04 NOTE — NURSING TRANSFER
Nursing Transfer Note      2/4/2022     Reason patient is being transferred: post op    Transfer To: [id number]    Transfer via bed    Transfer with IV pump    Transported by patient transport    Medicines sent: none    Any special needs or follow-up needed: routine    Chart send with patient: Yes    Notified: family via surgical texting system     Patient reassessed at: 2/4/2022 5:30 PM

## 2022-02-04 NOTE — ANESTHESIA PROCEDURE NOTES
Intubation    Date/Time: 2/4/2022 11:08 AM  Performed by: Kemal Alvarado MD  Authorized by: Kemal Alvarado MD     Intubation:     Induction:  Intravenous    Intubated:  Postinduction    Mask Ventilation:  Easy mask    Attempts:  1    Attempted By:  Staff anesthesiologist    Method of Intubation:  Direct    Blade:  Newton 2    Laryngeal View Grade: Grade IIA - cords partially seen      Difficult Airway Encountered?: No      Complications:  None    Airway Device:  Oral endotracheal tube    Airway Device Size:  7.5    Style/Cuff Inflation:  Cuffed    Inflation Amount (mL):  8    Tube secured:  20    Secured at:  The lips    Placement Verified By:  Capnometry    Complicating Factors:  None    Findings Post-Intubation:  BS equal bilateral

## 2022-02-04 NOTE — TRANSFER OF CARE
"Anesthesia Transfer of Care Note    Patient: Becker B Nix    Procedure(s) Performed: Procedure(s) (LRB):  COLECTOMY, LEFT, LAPAROSCOPIC (N/A)  MOBILIZATION, SPLENIC FLEXURE (N/A)  SIGMOIDOSCOPY, FLEXIBLE (N/A)    Patient location: PACU    Anesthesia Type: general    Transport from OR: Transported from OR on 6-10 L/min O2 by face mask with adequate spontaneous ventilation    Post pain: adequate analgesia    Post assessment: no apparent anesthetic complications    Post vital signs: stable    Level of consciousness: responds to stimulation    Complications: none    Transfer of care protocol was followed      Last vitals:   Visit Vitals  BP (!) 108/53   Pulse 81   Temp 36.3 °C (97.3 °F) (Temporal)   Resp 20   Ht 5' 4" (1.626 m)   Wt 73 kg (161 lb)   SpO2 100%   Breastfeeding No   BMI 27.64 kg/m²     "

## 2022-02-04 NOTE — PLAN OF CARE
Patient was prepared for surgery.    Dr Olguin is at the bedside.     Belongings sent with Sig other [place].

## 2022-02-04 NOTE — BRIEF OP NOTE
[facility] - Surgery ([facility])  Brief Operative Note    SUMMARY     Surgery Date: 2/4/2022     Surgeon(s) and Role:     * GUILLERMO Olguin MD - Primary     * Chantal Rivera MD - Fellow    Assisting Surgeon: None    Pre-op Diagnosis:  Malignant neoplasm of splenic flexure [C18.5]    Post-op Diagnosis:  Post-Op Diagnosis Codes:     * Malignant neoplasm of splenic flexure [C18.5]    Procedure(s) (LRB):  COLECTOMY, LEFT, LAPAROSCOPIC (N/A)  MOBILIZATION, SPLENIC FLEXURE (N/A)  SIGMOIDOSCOPY, FLEXIBLE (N/A)    Anesthesia: General    Operative Findings: Tattoo noted just proximal to splenic flexure. Extended left hemicolectomy performed with transverse colon to rectal stapled end to end anastomosis. Negative leak test with flexible sigmoidoscopy.    Estimated Blood Loss: 100cc    Estimated Blood Loss has been documented.         Specimens:   Specimen (24h ago, onward)             Start     Ordered    02/04/22 1438  Specimen to Pathology, Surgery Gastrointestinal tract  Once        Comments: Pre-op Diagnosis: Malignant neoplasm of splenic flexure [C18.5]    Procedure(s):  COLECTOMY, PARTIAL, LAPAROSCOPIC  DISSECTION-SPLENIC FLEXURE     Number of specimens: 3    Name of specimens: 1.  Left colon- Permanent  2.  Distal anastomotic donut- Permanent  3.  Proximal anastomotic donut- Permanent   References:    Click here for ordering Quick Tip   Question Answer Comment   Procedure Type: Gastrointestinal tract    Release to patient Immediate        02/04/22 1442                [id number]

## 2022-02-04 NOTE — ANESTHESIA POSTPROCEDURE EVALUATION
Anesthesia Post Evaluation    Patient: Becker B Nix    Procedure(s) Performed: Procedure(s) (LRB):  COLECTOMY, LEFT, LAPAROSCOPIC (N/A)  MOBILIZATION, SPLENIC FLEXURE (N/A)  SIGMOIDOSCOPY, FLEXIBLE (N/A)    Final Anesthesia Type: general      Patient location during evaluation: PACU  Patient participation: Yes- Able to Participate  Level of consciousness: awake and alert  Post-procedure vital signs: reviewed and stable  Pain management: adequate  Airway patency: patent    PONV status at discharge: No PONV  Anesthetic complications: no      Cardiovascular status: blood pressure returned to baseline  Respiratory status: unassisted  Hydration status: euvolemic  Follow-up not needed.          Vitals Value Taken Time   /56 02/04/22 1732   Temp 36.7 °C (98 °F) 02/04/22 1730   Pulse 76 02/04/22 1736   Resp 20 02/04/22 1736   SpO2 98 % 02/04/22 1736   Vitals shown include unvalidated device data.      Event Time   Out of Recovery 16:00:00         Pain/Octaviano Score: Pain Rating Prior to Med Admin: 8 (2/4/2022  3:42 PM)  Octaviano Score: 9 (2/4/2022  4:00 PM)

## 2022-02-05 VITALS
HEIGHT: 64 IN | TEMPERATURE: 98 F | BODY MASS INDEX: 27.49 KG/M2 | WEIGHT: 161 LBS | RESPIRATION RATE: 18 BRPM | HEART RATE: 88 BPM | DIASTOLIC BLOOD PRESSURE: 52 MMHG | OXYGEN SATURATION: 94 % | SYSTOLIC BLOOD PRESSURE: 111 MMHG

## 2022-02-05 PROBLEM — C18.5 MALIGNANT NEOPLASM OF SPLENIC FLEXURE: Status: ACTIVE | Noted: 2022-02-05

## 2022-02-05 LAB
ANION GAP SERPL CALC-SCNC: 7 MMOL/L (ref 8–16)
BASOPHILS # BLD AUTO: 0.03 K/UL (ref 0–0.2)
BASOPHILS NFR BLD: 0.3 % (ref 0–1.9)
BUN SERPL-MCNC: 8 MG/DL (ref 8–23)
CALCIUM SERPL-MCNC: 9 MG/DL (ref 8.7–10.5)
CHLORIDE SERPL-SCNC: 107 MMOL/L (ref 95–110)
CO2 SERPL-SCNC: 27 MMOL/L (ref 23–29)
CREAT SERPL-MCNC: 0.8 MG/DL (ref 0.5–1.4)
DIFFERENTIAL METHOD: ABNORMAL
EOSINOPHIL # BLD AUTO: 0 K/UL (ref 0–0.5)
EOSINOPHIL NFR BLD: 0 % (ref 0–8)
ERYTHROCYTE [DISTWIDTH] IN BLOOD BY AUTOMATED COUNT: 14 % (ref 11.5–14.5)
EST. GFR  (AFRICAN AMERICAN): >60 ML/MIN/1.73 M^2
EST. GFR  (NON AFRICAN AMERICAN): >60 ML/MIN/1.73 M^2
GLUCOSE SERPL-MCNC: 117 MG/DL (ref 70–110)
HCT VFR BLD AUTO: 39.4 % (ref 37–48.5)
HGB BLD-MCNC: 12.7 G/DL (ref 12–16)
IMM GRANULOCYTES # BLD AUTO: 0.03 K/UL (ref 0–0.04)
IMM GRANULOCYTES NFR BLD AUTO: 0.3 % (ref 0–0.5)
LYMPHOCYTES # BLD AUTO: 0.7 K/UL (ref 1–4.8)
LYMPHOCYTES NFR BLD: 7.3 % (ref 18–48)
MAGNESIUM SERPL-MCNC: 1.9 MG/DL (ref 1.6–2.6)
MCH RBC QN AUTO: 32 PG (ref 27–31)
MCHC RBC AUTO-ENTMCNC: 32.2 G/DL (ref 32–36)
MCV RBC AUTO: 99 FL (ref 82–98)
MONOCYTES # BLD AUTO: 0.9 K/UL (ref 0.3–1)
MONOCYTES NFR BLD: 9.8 % (ref 4–15)
NEUTROPHILS # BLD AUTO: 7.5 K/UL (ref 1.8–7.7)
NEUTROPHILS NFR BLD: 82.3 % (ref 38–73)
NRBC BLD-RTO: 0 /100 WBC
PLATELET # BLD AUTO: 211 K/UL (ref 150–450)
PMV BLD AUTO: 9.6 FL (ref 9.2–12.9)
POCT GLUCOSE: 123 MG/DL (ref 70–110)
POTASSIUM SERPL-SCNC: 4.1 MMOL/L (ref 3.5–5.1)
RBC # BLD AUTO: 3.97 M/UL (ref 4–5.4)
SODIUM SERPL-SCNC: 141 MMOL/L (ref 136–145)
WBC # BLD AUTO: 9.16 K/UL (ref 3.9–12.7)

## 2022-02-05 PROCEDURE — 94799 UNLISTED PULMONARY SVC/PX: CPT

## 2022-02-05 PROCEDURE — 80048 BASIC METABOLIC PNL TOTAL CA: CPT | Performed by: STUDENT IN AN ORGANIZED HEALTH CARE EDUCATION/TRAINING PROGRAM

## 2022-02-05 PROCEDURE — 36415 COLL VENOUS BLD VENIPUNCTURE: CPT | Performed by: STUDENT IN AN ORGANIZED HEALTH CARE EDUCATION/TRAINING PROGRAM

## 2022-02-05 PROCEDURE — 97165 OT EVAL LOW COMPLEX 30 MIN: CPT

## 2022-02-05 PROCEDURE — 97530 THERAPEUTIC ACTIVITIES: CPT

## 2022-02-05 PROCEDURE — 94761 N-INVAS EAR/PLS OXIMETRY MLT: CPT

## 2022-02-05 PROCEDURE — 25000003 PHARM REV CODE 250: Performed by: STUDENT IN AN ORGANIZED HEALTH CARE EDUCATION/TRAINING PROGRAM

## 2022-02-05 PROCEDURE — 63600175 PHARM REV CODE 636 W HCPCS: Performed by: STUDENT IN AN ORGANIZED HEALTH CARE EDUCATION/TRAINING PROGRAM

## 2022-02-05 PROCEDURE — 83735 ASSAY OF MAGNESIUM: CPT | Performed by: STUDENT IN AN ORGANIZED HEALTH CARE EDUCATION/TRAINING PROGRAM

## 2022-02-05 PROCEDURE — 97116 GAIT TRAINING THERAPY: CPT

## 2022-02-05 PROCEDURE — 99900035 HC TECH TIME PER 15 MIN (STAT)

## 2022-02-05 PROCEDURE — 85025 COMPLETE CBC W/AUTO DIFF WBC: CPT | Performed by: STUDENT IN AN ORGANIZED HEALTH CARE EDUCATION/TRAINING PROGRAM

## 2022-02-05 PROCEDURE — 97161 PT EVAL LOW COMPLEX 20 MIN: CPT

## 2022-02-05 PROCEDURE — 25000003 PHARM REV CODE 250: Performed by: NURSE PRACTITIONER

## 2022-02-05 RX ORDER — OXYCODONE HYDROCHLORIDE 5 MG/1
5 TABLET ORAL EVERY 4 HOURS PRN
Qty: 20 TABLET | Refills: 0 | Status: SHIPPED | OUTPATIENT
Start: 2022-02-05 | End: 2022-04-05

## 2022-02-05 RX ADMIN — IBUPROFEN 800 MG: 800 INJECTION INTRAVENOUS at 06:02

## 2022-02-05 RX ADMIN — ALVIMOPAN 12 MG: 12 CAPSULE ORAL at 09:02

## 2022-02-05 RX ADMIN — LEVOTHYROXINE SODIUM 50 MCG: 50 TABLET ORAL at 06:02

## 2022-02-05 RX ADMIN — GABAPENTIN 300 MG: 300 CAPSULE ORAL at 09:02

## 2022-02-05 RX ADMIN — AMLODIPINE BESYLATE 5 MG: 5 TABLET ORAL at 09:02

## 2022-02-05 RX ADMIN — ACETAMINOPHEN 1000 MG: 10 INJECTION, SOLUTION INTRAVENOUS at 05:02

## 2022-02-05 RX ADMIN — MUPIROCIN: 20 OINTMENT TOPICAL at 09:02

## 2022-02-05 NOTE — NURSING
Pt has been having frequent BMs with large amount of loose stool, notified intern on-call for surgery . Pt has been tolerating her diet, denied nausea

## 2022-02-05 NOTE — PROGRESS NOTES
[facility]  Colorectal Surgery  Progress Note    Patient Name: Eugenio Aldridge  MRN: [id number]  Admission Date: 2/4/2022  Hospital Length of Stay: 1 days  Attending Physician: GUILLERMO Olguin MD    Subjective:     Interval History:   No acute events overnight   POD 1 s/p lap L hemicolectomy   Tolerating diet without N/V   - flatus - BM   Pain well controlled     Post-Op Info:  Procedure(s) (LRB):  COLECTOMY, LEFT, LAPAROSCOPIC (N/A)  MOBILIZATION, SPLENIC FLEXURE (N/A)  SIGMOIDOSCOPY, FLEXIBLE (N/A)   1 Day Post-Op      Medications:  Continuous Infusions:   sodium chloride 0.9%      sodium chloride 0.9% 40 mL/hr at 02/04/22 1539     Scheduled Meds:   acetaminophen  1,000 mg Intravenous Q8H    Followed by    acetaminophen  1,000 mg Oral Q8H    alvimopan  12 mg Oral BID    amLODIPine  5 mg Oral Daily    enoxaparin  40 mg Subcutaneous Daily    gabapentin  300 mg Oral TID    ibuprofen  800 mg Intravenous Q8H    Followed by    ibuprofen  800 mg Oral Q8H    levothyroxine  50 mcg Oral Before breakfast    mupirocin   Nasal BID     PRN Meds:   dextrose 10%    dextrose 10%    glucagon (human recombinant)    glucose    glucose    insulin aspart U-100    ondansetron    oxyCODONE    oxyCODONE    prochlorperazine    sodium chloride 0.9%    traMADoL        Objective:     Vital Signs (Most Recent):  Temp: 98.4 °F (36.9 °C) (02/05/22 0725)  Pulse: 88 (02/05/22 0725)  Resp: 18 (02/05/22 0725)  BP: (!) 111/52 (02/05/22 0725)  SpO2: (!) 94 % (02/05/22 0726) Vital Signs (24h Range):  Temp:  [96.5 °F (35.8 °C)-98.4 °F (36.9 °C)] 98.4 °F (36.9 °C)  Pulse:  [64-95] 88  Resp:  [10-20] 18  SpO2:  [88 %-100 %] 94 %  BP: (100-158)/(48-72) 111/52     Intake/Output - Last 3 Shifts       02/03 0700 02/04 0659 02/04 0700 02/05 0659 02/05 0700 02/06 0659    I.V. (mL/kg)  433.4 (5.9)     IV Piggyback  1350     Total Intake(mL/kg)  1783.4 (24.4)     Urine (mL/kg/hr)  1050     Stool  0     Total Output  1050     Net  +733.4             Urine Occurrence  1 x     Stool Occurrence  0 x           Physical Exam  Vitals and nursing note reviewed.   Constitutional:       Appearance: Normal appearance.   HENT:      Head: Normocephalic and atraumatic.      Nose: Nose normal.   Cardiovascular:      Rate and Rhythm: Normal rate.   Pulmonary:      Effort: Pulmonary effort is normal.   Abdominal:      General: Abdomen is flat.      Palpations: Abdomen is soft.      Tenderness: There is abdominal tenderness (appropriately TTP).      Comments: Incisions c/d/i   Skin:     General: Skin is warm.   Neurological:      General: No focal deficit present.      Mental Status: She is alert and oriented to person, place, and time.   Psychiatric:         Mood and Affect: Mood normal.         Behavior: Behavior normal.       Significant Labs:  CBC (Last 3 Results):   Recent Labs   Lab 02/05/22  0326   WBC 9.16   RBC 3.97*   HGB 12.7   HCT 39.4      MCV 99*   MCH 32.0*   MCHC 32.2     CMP (Last 3 Results):   Recent Labs   Lab 02/05/22  0326   *   CALCIUM 9.0      K 4.1   CO2 27      BUN 8   CREATININE 0.8       Significant Diagnostics:  I have reviewed all pertinent imaging results/findings within the past 24 hours.    Assessment/Plan:     Malignant neoplasm of splenic flexure  76 year old F with malignant neoplasm of the splenic flexure now s/p L hemicolectomy on 2/4.     -- MM pain control  -- LRD   -- d/c mchugh   -- await ROBF  -- Ppx: SCDs, lovenox   -- OOB  -- PT/OT           Toshia Dyer MD  Colorectal Surgery  [facility]

## 2022-02-05 NOTE — NURSING
Discharge instructions given to the pt . Pt ate her breakfast and lunch, tolerated her diet well, denied nausea. Had multiple BMs with loose stool,  and   awared. Pt denied pain. Waiting for her  to  her medicine and pick her up. No further questions at this time

## 2022-02-05 NOTE — PLAN OF CARE
Problem: Occupational Therapy Goal  Goal: Occupational Therapy Goal  Outcome: Met     OT eval completed.  No further OT needed at this time.

## 2022-02-05 NOTE — ASSESSMENT & PLAN NOTE
76 year old F with malignant neoplasm of the splenic flexure now s/p L hemicolectomy on 2/4.     -- MM pain control  -- LRD   -- d/c mchugh   -- await ROBF  -- Ppx: SCDs, lovenox   -- OOB  -- PT/OT

## 2022-02-05 NOTE — PLAN OF CARE
Plan of care reviewed with pt:  -Transferred from PACU this late afternoon  -Still drowsy but responded well to voice,  alert, oriented x4  - On 2L of O2 via NC with SpO2 at 92%. VS is stable. Due to pt drowsiness, has not done IS  -4 ABD lap sites and 1 lower ABD transverse incision with Dermabond CDI CHUNG  -Denied pain and nausea. Tolerated her diet  -Douglas intact, adequate amount clear yellow urine  -SCD applied.   -Call light in reach. WCTM

## 2022-02-05 NOTE — DISCHARGE SUMMARY
[facility]  Colorectal Surgery  Discharge Summary      Patient Name: Eugenio Aldridge  MRN: [id number]  Admission Date: 2/4/2022  Hospital Length of Stay: 1 days  Discharge Date and Time:  02/05/2022 9:58 AM  Attending Physician: GUILLERMO Olguin MD   Discharging Provider: Chantal Rivera MD  Primary Care Provider: Bertha Salguero MD     HPI: Eugenio Aldridge is a 76 y.o. female presents for evaluation of newly diagnosed colon cancer of the splenic flexure. Colonoscopy done on 12/9/21 following positive FIT test done for asymptomatic screening. Denies blood per rectum, change in stool habits or caliber, no weight loss. Staging imaging with no evidence of metastatic disease.    Procedure(s) (LRB):  COLECTOMY, LEFT, LAPAROSCOPIC (N/A)  MOBILIZATION, SPLENIC FLEXURE (N/A)  SIGMOIDOSCOPY, FLEXIBLE (N/A)     Hospital Course: Patient presented on 2/4/22 for planned surgery. Intraoperatively, tattoo was noted just proximal to splenic flexure. Left hemicolectomy was performed with transverse colon to rectal anastomosis. Postoperatively she was admitted to [facility]. She was given a regular diet which she tolerated. On POD1 her pain was well controlled. She was passing flatus and had BMs. She was discharged to home with instructions to follow up as an outpatient.        Significant Diagnostic Studies: Labs:   BMP:   Recent Labs   Lab 02/05/22  0326   *      K 4.1      CO2 27   BUN 8   CREATININE 0.8   CALCIUM 9.0   MG 1.9    and CBC   Recent Labs   Lab 02/05/22  0326   WBC 9.16   HGB 12.7   HCT 39.4          Pending Diagnostic Studies:     Procedure Component Value Units Date/Time    Specimen to Pathology, Surgery Gastrointestinal tract [[id number]] Collected: 02/04/22 1452    Order Status: Sent Lab Status: In process Updated: 02/04/22 9105        Final Active Diagnoses:    Diagnosis Date Noted POA    PRINCIPAL PROBLEM:  Malignant neoplasm of splenic flexure [C18.5] 02/05/2022 Yes      Problems Resolved  During this Admission:      Discharged Condition: good    Disposition: Home or Self Care    Follow Up:   Follow-up Information     H Jonathan Olguin MD In 2 weeks.    Specialty: Colon and Rectal Surgery  Contact information:  [address]  [facility] [id number] [phone]                       Patient Instructions:      Diet Adult Regular     Activity as tolerated     Medications:  Reconciled Home Medications:      Medication List      START taking these medications    oxyCODONE 5 MG immediate release tablet  Commonly known as: ROXICODONE  Take 1 tablet (5 mg total) by mouth every 4 (four) hours as needed for Pain.        CONTINUE taking these medications    amLODIPine 5 MG tablet  Commonly known as: NORVASC  Take 1 tablet (5 mg total) by mouth once daily.     atorvastatin 40 MG tablet  Commonly known as: LIPITOR  TAKE 1 TABLET(40 MG) BY MOUTH EVERY DAY     calcium-vitamin D3 500 mg-5 mcg (200 unit) per tablet  Commonly known as: OS-MARYANA 500 + D3  Take 1 tablet by mouth once daily.     JUBLIA 10 % Mago  Generic drug: efinaconazole  Apply topically.     ketoconazole 2 % cream  Commonly known as: NIZORAL  Apply topically once daily.     levothyroxine 50 MCG tablet  Commonly known as: SYNTHROID  TAKE 1 TABLET(50 MCG) BY MOUTH BEFORE BREAKFAST     metroNIDAZOLE 500 MG tablet  Commonly known as: FLAGYL  Take 1 tablet at 1pm, 2pm, and 11pm the day before surgery.     multivitamin per tablet  Commonly known as: THERAGRAN  Take by mouth. 1 Tablet Oral Every day     neomycin 500 mg Tab  Commonly known as: MYCIFRADIN  Take 2 tablets at 1pm, 2pm, and 11pm the day before surgery.     VITAMIN B-12 ORAL  Take 1 tablet by mouth once daily.            Chantal Rivera MD  Colorectal Surgery  [facility]

## 2022-02-05 NOTE — SUBJECTIVE & OBJECTIVE
Subjective:     Interval History:   No acute events overnight   POD 1 s/p lap L hemicolectomy   Tolerating diet without N/V   - flatus - BM   Pain well controlled     Post-Op Info:  Procedure(s) (LRB):  COLECTOMY, LEFT, LAPAROSCOPIC (N/A)  MOBILIZATION, SPLENIC FLEXURE (N/A)  SIGMOIDOSCOPY, FLEXIBLE (N/A)   1 Day Post-Op      Medications:  Continuous Infusions:   sodium chloride 0.9%      sodium chloride 0.9% 40 mL/hr at 02/04/22 1539     Scheduled Meds:   acetaminophen  1,000 mg Intravenous Q8H    Followed by    acetaminophen  1,000 mg Oral Q8H    alvimopan  12 mg Oral BID    amLODIPine  5 mg Oral Daily    enoxaparin  40 mg Subcutaneous Daily    gabapentin  300 mg Oral TID    ibuprofen  800 mg Intravenous Q8H    Followed by    ibuprofen  800 mg Oral Q8H    levothyroxine  50 mcg Oral Before breakfast    mupirocin   Nasal BID     PRN Meds:   dextrose 10%    dextrose 10%    glucagon (human recombinant)    glucose    glucose    insulin aspart U-100    ondansetron    oxyCODONE    oxyCODONE    prochlorperazine    sodium chloride 0.9%    traMADoL        Objective:     Vital Signs (Most Recent):  Temp: 98.4 °F (36.9 °C) (02/05/22 0725)  Pulse: 88 (02/05/22 0725)  Resp: 18 (02/05/22 0725)  BP: (!) 111/52 (02/05/22 0725)  SpO2: (!) 94 % (02/05/22 0726) Vital Signs (24h Range):  Temp:  [96.5 °F (35.8 °C)-98.4 °F (36.9 °C)] 98.4 °F (36.9 °C)  Pulse:  [64-95] 88  Resp:  [10-20] 18  SpO2:  [88 %-100 %] 94 %  BP: (100-158)/(48-72) 111/52     Intake/Output - Last 3 Shifts       02/03 0700  02/04 0659 02/04 0700 02/05 0659 02/05 0700 02/06 0659    I.V. (mL/kg)  433.4 (5.9)     IV Piggyback  1350     Total Intake(mL/kg)  1783.4 (24.4)     Urine (mL/kg/hr)  1050     Stool  0     Total Output  1050     Net  +733.4            Urine Occurrence  1 x     Stool Occurrence  0 x           Physical Exam  Vitals and nursing note reviewed.   Constitutional:       Appearance: Normal appearance.   HENT:      Head:  Normocephalic and atraumatic.      Nose: Nose normal.   Cardiovascular:      Rate and Rhythm: Normal rate.   Pulmonary:      Effort: Pulmonary effort is normal.   Abdominal:      General: Abdomen is flat.      Palpations: Abdomen is soft.      Tenderness: There is abdominal tenderness (appropriately TTP).      Comments: Incisions c/d/i   Skin:     General: Skin is warm.   Neurological:      General: No focal deficit present.      Mental Status: She is alert and oriented to person, place, and time.   Psychiatric:         Mood and Affect: Mood normal.         Behavior: Behavior normal.       Significant Labs:  CBC (Last 3 Results):   Recent Labs   Lab 02/05/22  0326   WBC 9.16   RBC 3.97*   HGB 12.7   HCT 39.4      MCV 99*   MCH 32.0*   MCHC 32.2     CMP (Last 3 Results):   Recent Labs   Lab 02/05/22  0326   *   CALCIUM 9.0      K 4.1   CO2 27      BUN 8   CREATININE 0.8       Significant Diagnostics:  I have reviewed all pertinent imaging results/findings within the past 24 hours.

## 2022-02-05 NOTE — PT/OT/SLP EVAL
Physical Therapy Evaluation and Discharge Note    Patient Name:  Eugenio Aldridge   MRN:  [id number]    Recommendations:     Discharge Recommendations:  home   Discharge Equipment Recommendations: none   Barriers to discharge: None    Assessment:     Eugenio Aldridge is a 76 y.o. female admitted with a medical diagnosis of Malignant neoplasm of splenic flexure. .  At this time, patient is functioning at their prior level of function and does not require further acute PT services.     Recent Surgery: Procedure(s) (LRB):  COLECTOMY, LEFT, LAPAROSCOPIC (N/A)  MOBILIZATION, SPLENIC FLEXURE (N/A)  SIGMOIDOSCOPY, FLEXIBLE (N/A) 1 Day Post-Op    Plan:     During this hospitalization, patient does not require further acute PT services.  Please re-consult if situation changes.      Subjective     Chief Complaint: none  Patient/Family Comments/goals: to go home  Pain/Comfort:  · Pain Rating 1: 0/10  · Pain Rating Post-Intervention 1: 0/10    Patients cultural, spiritual, Adventist conflicts given the current situation: no    Living Environment:  Pt. Lives with significant other in [facility] with no ISIAH  Prior to admission, patients level of function was indep.  Equipment used at home: walker, rolling.  Upon discharge, patient will have assistance from significant other.    Objective:     Communicated with nursing prior to session.  Patient found supine with peripheral IV upon PT entry to room.    General Precautions: Standard, fall   Orthopedic Precautions:N/A   Braces: N/A   Respiratory Status: Room air    Exams:  · RLE ROM: WFL  · RLE Strength: WFL  · LLE ROM: WFL  · LLE Strength: WFL    Functional Mobility:  · Bed Mobility:     · Rolling Left:  modified independence  · Scooting: modified independence  · Supine to Sit: modified independence  · Sit to Supine: modified independence  · Transfers:     · Sit to Stand:  modified independence with no AD  · Gait: >400' with RW and Supervision without LOB  · Balance: fair+    AM-PAC 6 CLICK  MOBILITY  Total Score:23       Therapeutic Activities and Exercises:   Discussed therapy needs, goals, and POC.    AM-PAC 6 CLICK MOBILITY  Total Score:23     Patient left supine with all lines intact and call button in reach.    GOALS:   Multidisciplinary Problems     Physical Therapy Goals     Not on file                History:     Past Medical History:   Diagnosis Date    Actinic keratosis     Aortic valve disorders     Atherosclerosis of aorta     Atherosclerosis of native arteries of the extremities with intermittent claudication     Carcinoma in situ, vulva     Left bundle branch hemiblock     Other and unspecified hyperlipidemia     Postinflammatory pulmonary fibrosis     Senile nuclear sclerosis        Past Surgical History:   Procedure Laterality Date    ARTHROSCOPIC REPAIR OF ROTATOR CUFF OF SHOULDER Right 4/24/2019    Procedure: REPAIR, ROTATOR CUFF, ARTHROSCOPIC WITH REGENERATIN IMPLANT;  Surgeon: Walter Hernandez MD;  Location: [facility];  Service: Orthopedics;  Laterality: Right;  regional w/o catheter     BTL      COLONOSCOPY N/A 12/9/2021    Procedure: COLONOSCOPY;  Surgeon: Juan Swain MD;  Location: [facility] (4TH FLR);  Service: Endoscopy;  Laterality: N/A;  fully vaccinated    DECOMPRESSION OF SUBACROMIAL SPACE Right 4/24/2019    Procedure: DECOMPRESSION, SUBACROMIAL SPACE WITH DISTAL CLAVICLE EXCISION (DCE);  Surgeon: Walter Hernandez MD;  Location: [facility];  Service: Orthopedics;  Laterality: Right;    FLEXIBLE SIGMOIDOSCOPY N/A 12/20/2021    Procedure: SIGMOIDOSCOPY-FLEXIBLE;  Surgeon: Juan Swain MD;  Location: [facility] (2ND FLR);  Service: Endoscopy;  Laterality: N/A;  please schedule with me Monday 12/20 AM. Urgent for marking for colon cancer  spot held 2/20/21 12/16 fully vaccinated; instructions to portal-st    RHYTIDECTOMY      SHOULDER ARTHROSCOPY Right 4/24/2019    Procedure: ARTHROSCOPY, SHOULDER WITH LABRAL DEBRIDEMENT;  Surgeon: Walter Hernandez MD;   Location: [facility];  Service: Orthopedics;  Laterality: Right;    TONSILLECTOMY, ADENOIDECTOMY      VULVA SURGERY         Time Tracking:     PT Received On: 02/05/22  PT Start Time: 1113     PT Stop Time: 1130  PT Total Time (min): 17 min     Billable Minutes: Evaluation 8 and Gait Training 9      02/05/2022

## 2022-02-05 NOTE — PLAN OF CARE
POC reviewed w/ pt, verbalized understanding. Pt AAOx4. VSS on RA. Patient denies pain overnight. x4 lap site with dermabond. Pt voids per mchugh, with adequate UOP overnight. will dc this morning Pt tolerating regular diet with no c/o nausea. Pt slept between care. Frequent rounds made for pt safety. Call light in reach and bed in lowest position. Will continue to monitor POC.

## 2022-02-05 NOTE — PT/OT/SLP EVAL
Occupational Therapy   Evaluation and Discharge Note    Name: Eugenio Aldridge  MRN: [id number]  Admitting Diagnosis:  Malignant neoplasm of splenic flexure   Recent Surgery: Procedure(s) (LRB):  COLECTOMY, LEFT, LAPAROSCOPIC (N/A)  MOBILIZATION, SPLENIC FLEXURE (N/A)  SIGMOIDOSCOPY, FLEXIBLE (N/A) 1 Day Post-Op    Recommendations:     Discharge Recommendations: home  Discharge Equipment Recommendations:  none  Barriers to discharge:  None    Assessment:     Eugenio Aldridge is a 76 y.o. female with a medical diagnosis of Malignant neoplasm of splenic flexure. At this time, patient is functioning at their prior level of function and does not require further acute OT services.     Plan:     During this hospitalization, patient does not require further acute OT services.  Please re-consult if situation changes.    · Plan of Care Reviewed with: patient    Subjective   Pt was agreeable to therapy evaluation.  Chief Complaint: none stated  Patient/Family Comments/goals: to go home soon    Occupational Profile:  Living Environment: Pt resides with significant other in 1 story house with no steps to enter.  Pt was independent with ADL's and ambulation PTA.  Pt is an active  and is retired. Pt has a walk-in shower for bathing.  Pt enjoys needle point, reading, & internet activities.  Equipment Used at home:   (owns a shower bench & hand held shower hose)  Assistance upon Discharge: significant other    Pain/Comfort:  · Pain Rating 1: 0/10  · Pain Rating Post-Intervention 1: 0/10    Patients cultural, spiritual, Muslim conflicts given the current situation: no    Objective:     Communicated with: RN prior to session.  Patient found up in chair with  (lines remained intact, no family present) upon OT entry to room.    General Precautions: Standard, fall   Orthopedic Precautions:N/A   Braces: N/A    Occupational Performance:    Functional Mobility/Transfers:  · Patient completed Sit <> Stand Transfer with independence  with  no  assistive device   · Patient completed Toilet Transfer Step Transfer technique with independence with  no AD  · Functional Mobility: independent with functional mobility in room    Activities of Daily Living:  · Upper Body Dressing: independence donning gown around back  · Lower Body Dressing: independence donnig socks seated in bedside chair    Cognitive/Visual Perceptual:  Cognitive/Psychosocial Skills:     -       Oriented to: Person, Place, Time and Situation   -       Follows Commands/attention:Follows multistep  commands  -       Safety awareness/insight to disability: intact     Physical Exam:  Sensation:    -       Intact  Dominant hand:    -       right  Upper Extremity Range of Motion:  WFL BUE (mildly decreased right shoulder flexion due to prior RTC repair)  Upper Extremity Strength: WFL BUE except 3+/5 right shoulder flexion   Strength: BUE WFL    AMPAC 6 Click ADL:  AMPAC Total Score: 24    Treatment & Education:  Provided education on safety at home upon discharge home during mobility and following any MD restrictions regarding lifting objects and driving. Provided education regarding role of OT, POC, & discharge recommendations with pt verbalizing understanding.  Pt had no further questions & when asked whether there were any concerns pt reported none.  Education:    Patient left up in chair with all lines intact, call button in reach and RN notified    GOALS:   Multidisciplinary Problems     Occupational Therapy Goals     Not on file          Multidisciplinary Problems (Resolved)        Problem: Occupational Therapy Goal    Goal Priority Disciplines Outcome Interventions   Occupational Therapy Goal   (Resolved)     OT, PT/OT Met                    History:     Past Medical History:   Diagnosis Date    Actinic keratosis     Aortic valve disorders     Atherosclerosis of aorta     Atherosclerosis of native arteries of the extremities with intermittent claudication     Carcinoma in situ, vulva      Left bundle branch hemiblock     Other and unspecified hyperlipidemia     Postinflammatory pulmonary fibrosis     Senile nuclear sclerosis        Past Surgical History:   Procedure Laterality Date    ARTHROSCOPIC REPAIR OF ROTATOR CUFF OF SHOULDER Right 4/24/2019    Procedure: REPAIR, ROTATOR CUFF, ARTHROSCOPIC WITH REGENERATIN IMPLANT;  Surgeon: Walter Hernandez MD;  Location: [facility];  Service: Orthopedics;  Laterality: Right;  regional w/o catheter     BTL      COLONOSCOPY N/A 12/9/2021    Procedure: COLONOSCOPY;  Surgeon: Juan Swain MD;  Location: [facility] ENDO (4TH FLR);  Service: Endoscopy;  Laterality: N/A;  fully vaccinated    DECOMPRESSION OF SUBACROMIAL SPACE Right 4/24/2019    Procedure: DECOMPRESSION, SUBACROMIAL SPACE WITH DISTAL CLAVICLE EXCISION (DCE);  Surgeon: Walter Hernandez MD;  Location: [facility];  Service: Orthopedics;  Laterality: Right;    FLEXIBLE SIGMOIDOSCOPY N/A 12/20/2021    Procedure: SIGMOIDOSCOPY-FLEXIBLE;  Surgeon: Juan Swain MD;  Location: [facility] END[facility] (2ND FLR);  Service: Endoscopy;  Laterality: N/A;  please schedule with me Monday 12/20 AM. Urgent for marking for colon cancer  spot held 2/20/21 12/16 fully vaccinated; instructions to portal-st    RHYTIDECTOMY      SHOULDER ARTHROSCOPY Right 4/24/2019    Procedure: ARTHROSCOPY, SHOULDER WITH LABRAL DEBRIDEMENT;  Surgeon: Walter Hernandez MD;  Location: [facility];  Service: Orthopedics;  Laterality: Right;    TONSILLECTOMY, ADENOIDECTOMY      VULVA SURGERY         Time Tracking:     OT Date of Treatment: 02/05/22  OT Start Time: 0826  OT Stop Time: 0849  OT Total Time (min): 23 min    Billable Minutes:Evaluation 13  Therapeutic Activity 10    2/5/2022

## 2022-02-05 NOTE — OP NOTE
Date of procedure:   February 4, 2022    Indications for procedure:  77 yo female with history of splenic flexure broad based polyp removed by hot snare.  The final pathology demonstrated invasive cancer, deep margin positive.    The patient is brought to the OR today for definitve surgical resection.      Preoperative diagnosis:   Splenic flexure cancer    Postoperative diagnosis:  same    Name of procedure:  Laparoscopic extended left colectomy, splenic flexure mobilization, flexible sigmoidoscopy    Surgeon:   GUILLERMO Olguin MD    Assistant surgeon:  Chantal Rivera MD    Type of anesthesia:  GETA    EBL:  100  Cc's    Drains:  none    Specimen:    1.  Extended left colon (left transverse colon to rectosigmoid colon)  2, 3.  Proximal, distal anastomotic donuts    Findings:  1.  Tattoo seen at the splenic flexure   2.  Diverticulitis of the sigmoid colon  3.  Proximal resection margin 10 cm invivo  4.  No distant metastases  5.  Anastomosis between the transverse colon to rectal anastomosis   6.  Air leak test negative.      Technique in detail:  Patient was brought to the operating room positively identified and placed on the operating table in the supine position with sequential compression devices on her lower extremities.  She was on a patient positioning system.  She had undergone an outpatient oral mechanical and oral antibiotic bowel preparation.  She received intravenous antibiotics.  She received subcutaneous aqueous heparin.  She underwent general endotracheal anesthesia without complication.  Douglas catheter and orogastric tube were inserted.  She was positioned in the modified lithotomy position and padded [facility] stirrups.  Both arms were padded and tucked at her side.  All pressure points were appropriately padded.  Her abdomen and perineum were prepared and draped in usual sterile fashion.    A critical time-out was performed.    Veress needle was introduced into the peritoneal cavity at [facility]  point in the left upper quadrant.  Saline drop test was noted to be negative.  Insufflation of the peritoneal cavity was performed using carbon dioxide gas.  Pressures were monitored throughout procedure.  A 5 mm port was then introduced in the supraumbilical midline position using an Optiview technique with a 5 mm 0 degree laparoscoped.  The peritoneal cavity was entered.  Video laparoscopy was undertaken.  There was no evidence of any intra-abdominal injury in the left upper quadrant.  The abdomen was carefully inspected.  There was no evidence of peritoneal disease and the liver was noted be unremarkable.  Two 5 mm ports were then placed in the right side of the abdomen and a 3rd 5 mm port was placed in the left side of the abdomen.  The midline port was exchanged over to a 12 mm port and video laparoscopy was then performed using a 10 mm 30 degree laparoscoped.  Transversus abdominis plane block was performed using Exparel, Marcaine and saline solution.    The patient was placed in right side down position.  The omentum and transverse colon were placed into the upper abdomen.  The small bowel was swept to the right side of the midline aorta.  The sigmoid colon was remarkable for evidence of chronic diverticulitis with mesenteric thickening and adhesion to the left pelvic region.  The tattoo was identified in the splenic flexure, medial aspect ordered most distal aspect of the transverse colon.  The inferior mesenteric vein was then mobilized off the retroperitoneum just to the left of the ligament of Treitz.  Medial to lateral dissection was undertaken in a vascular plane.  The vein was dissected towards the root of inferior mesenteric artery.  We then mobilized the sigmoid colon and left colon along the white line Toldt.  The bowel mesentery were carefully reflected off the retroperitoneum in a lateral to medial fashion with identification and preservation of the left ureter and left retroperitoneal vessels.   The splenic flexure was taken down under direct vision dissecting the omentum off of the transverse colon and the descending colon.  The lesser sac was entered.  The omentum was completely dissected off of the transverse colon back to the appendix flexure.  Attachments of the transverse colon and left colon mesentery to the perinephric fascia and the inferior border of the pancreas were completely divided back to the ligament Treitz.  The inferior mesenteric vein was then carefully isolated and divided just to the left of the ligament of Treitz.    I carefully dissected the rectosigmoid junction off of the left pelvic sidewall again with care being taken to avoid injury to the major vessels of the pelvis as well as the left ureter.   A 10 cm Pfannenstiel incision was then made 1-2 finger breaths cranial to the symphysis pubis.  The muscle was split bluntly in the midline and the posterior sheath and peritoneum were opened vertically.  A GelPort was placed in the midline wound.  We carefully identified the rectosigmoid junction.  I scored the mesorectum on either side of the rectal muscular tube distal to the rectosigmoid.  The rectal muscular tube was then divided using the echelon 60 stapler with a blue cartridge.  The mesorectum at this level was divided using the LigaSure device and the superior hemorrhoidal vessels were isolated clamped and ligated.  This was all performed through a GelPort wound.  Pneumoperitoneum was then restored.  I performed laparoscopic mobilization of the left colonic mesentery back to the root of the inferior mesenteric artery.  The vessel was skeletonized clipped proximally and divided using the LigaSure.  This completed the mesenteric dissection.  Pneumoperitoneum was relieved.  The left colon was exteriorized through the GelPort wound.  The transverse colon could also be exteriorized and 10 cm proximal to the tattoo was chosen as a site for proximal resection.  The mesentery at this  level included the left branch of the middle colic artery which was divided using LigaSure device.  The marginal artery of Bow was then carefully skeletonized at the site of transection.  The vessel was divided and allowed to bleed freely.  Pulsatile arterial blood flow was noted in the vessels ligated.  We divided the transverse colon using the Furness clamp.  The bowel was transected with a knife and handed off the operative field.  Nylon suture on a Lenard needle was used to fashion the pursestring.  A 29 anvil was placed into the bowel proximally in the pursestring suture was securely tied down.  The mesentery was straightened.  Care was taken to preserve the marginal artery of Carlito.  The end of the transverse colon easily reached below the level of the sacral promontory to achieve a tension-free anastomosis.  The assistant surgeon went to the perineum and dilated the anus to 2 finger breaths.  Intestinal sizers were guided to the top of the residual rectum and subsequently the CDH 29 stapler.  The spike was extruded just anterior to the staple line with good effacement of the rectal muscular tube on the stapler head.  The colon was brought into proximity with care being taken to properly orient the mesentery.  The instrument was mated, cinched down and fired in routine fashion with recovery of 2 complete anastomotic donuts.  We then performed flexible sigmoidoscopy.  Pink viable mucosa was noted of the rectum as well as the transverse colon.  The anastomosis was circumferential, intact and hemostatic.  Upon air leak testing it was noted to be airtight.  The bowel was deflated and the colonoscope removed.    We changed gown and gloves.  A separate closing tray was employed for closure of the abdomen.  The 12 mm umbilical port was closed using a UR 6 needle to approximate the fascia.  The Pfannenstiel incision was closed in layers.  0 Vicryl suture was used in a continuous fashion to approximate the  posterior sheath and peritoneum.  The anterior rectus transverse incision was then repaired using 1 PDS suture.  Larissa's fascia was approximated using interrupted 3-0 Vicryl.  All skin incisions were irrigated with saline solution and closed using subcuticular Monocryl and skin glue.    The patient tolerated the procedure well and there were no complications noted.  The patient was returned to the supine position, awakened from anesthesia and extubated without incident.  The patient was returned to the recovery area in stable condition.  I was scrubbed and present for the entire operation.

## 2022-02-15 ENCOUNTER — PES CALL (OUTPATIENT)
Dept: ADMINISTRATIVE | Facility: CLINIC | Age: 77
End: 2022-02-15
Payer: COMMERCIAL

## 2022-02-17 ENCOUNTER — OFFICE VISIT (OUTPATIENT)
Dept: DERMATOLOGY | Facility: CLINIC | Age: 77
End: 2022-02-17
Payer: MEDICARE

## 2022-02-17 DIAGNOSIS — L81.4 LENTIGINES: ICD-10-CM

## 2022-02-17 DIAGNOSIS — L57.0 ACTINIC KERATOSIS: Primary | ICD-10-CM

## 2022-02-17 DIAGNOSIS — L98.9 SKIN LESION OF FACE: ICD-10-CM

## 2022-02-17 DIAGNOSIS — L82.1 SEBORRHEIC KERATOSES: ICD-10-CM

## 2022-02-17 DIAGNOSIS — D22.9 NEVUS OF MULTIPLE SITES: ICD-10-CM

## 2022-02-17 DIAGNOSIS — Z12.83 SKIN EXAM, SCREENING FOR CANCER: ICD-10-CM

## 2022-02-17 PROCEDURE — 99999 PR PBB SHADOW E&M-EST. PATIENT-LVL III: ICD-10-PCS | Mod: PBBFAC,,, | Performed by: DERMATOLOGY

## 2022-02-17 PROCEDURE — 99214 PR OFFICE/OUTPT VISIT, EST, LEVL IV, 30-39 MIN: ICD-10-PCS | Mod: 25,S$PBB,, | Performed by: DERMATOLOGY

## 2022-02-17 PROCEDURE — 17000 PR DESTRUCTION(LASER SURGERY,CRYOSURGERY,CHEMOSURGERY),PREMALIGNANT LESIONS,FIRST LESION: ICD-10-PCS | Mod: S$PBB,,, | Performed by: DERMATOLOGY

## 2022-02-17 PROCEDURE — 17000 DESTRUCT PREMALG LESION: CPT | Mod: S$PBB,,, | Performed by: DERMATOLOGY

## 2022-02-17 PROCEDURE — 99213 OFFICE O/P EST LOW 20 MIN: CPT | Mod: PBBFAC,PO | Performed by: DERMATOLOGY

## 2022-02-17 PROCEDURE — 99999 PR PBB SHADOW E&M-EST. PATIENT-LVL III: CPT | Mod: PBBFAC,,, | Performed by: DERMATOLOGY

## 2022-02-17 PROCEDURE — 17000 DESTRUCT PREMALG LESION: CPT | Mod: PBBFAC,PO | Performed by: DERMATOLOGY

## 2022-02-17 PROCEDURE — 99214 OFFICE O/P EST MOD 30 MIN: CPT | Mod: 25,S$PBB,, | Performed by: DERMATOLOGY

## 2022-02-17 NOTE — PROGRESS NOTES
Subjective:       Patient ID:  Eugenio Aldridge is a 76 y.o. female who presents for   Chief Complaint   Patient presents with    Lesion     nose    Skin Check     UBSE     Would like skin check, new spot on nose.       Review of Systems   Constitutional: Negative for fever, chills, weight loss, weight gain, fatigue, night sweats and malaise.   Skin: Negative for daily sunscreen use, activity-related sunscreen use and wears hat.   Hematologic/Lymphatic: Does not bruise/bleed easily.        Objective:    Physical Exam   Constitutional: She appears well-developed and well-nourished. No distress.   Neurological: She is alert and oriented to person, place, and time. She is not disoriented.   Psychiatric: She has a normal mood and affect.   Skin:   Areas Examined (abnormalities noted in diagram):   Head / Face Inspection Performed  Neck Inspection Performed  Chest / Axilla Inspection Performed  Back Inspection Performed  RUE Inspected  LUE Inspection Performed                   Diagram Legend     Erythematous scaling macule/papule c/w actinic keratosis       Vascular papule c/w angioma      Pigmented verrucoid papule/plaque c/w seborrheic keratosis      Yellow umbilicated papule c/w sebaceous hyperplasia      Irregularly shaped tan macule c/w lentigo     1-2 mm smooth white papules consistent with Milia      Movable subcutaneous cyst with punctum c/w epidermal inclusion cyst      Subcutaneous movable cyst c/w pilar cyst      Firm pink to brown papule c/w dermatofibroma      Pedunculated fleshy papule(s) c/w skin tag(s)      Evenly pigmented macule c/w junctional nevus     Mildly variegated pigmented, slightly irregular-bordered macule c/w mildly atypical nevus      Flesh colored to evenly pigmented papule c/w intradermal nevus       Pink pearly papule/plaque c/w basal cell carcinoma      Erythematous hyperkeratotic cursted plaque c/w SCC      Surgical scar with no sign of skin cancer recurrence      Open and closed  "comedones      Inflammatory papules and pustules      Verrucoid papule consistent consistent with wart     Erythematous eczematous patches and plaques     Dystrophic onycholytic nail with subungual debris c/w onychomycosis     Umbilicated papule    Erythematous-base heme-crusted tan verrucoid plaque consistent with inflamed seborrheic keratosis     Erythematous Silvery Scaling Plaque c/w Psoriasis     See annotation      Assessment / Plan:        Actinic keratosis   Cryosurgery Procedure Note    Verbal consent from the patient is obtained and the patient is aware of the precancerous quality and need for treatment of these lesions. Liquid nitrogen cryosurgery is applied to the 1 actinic keratoses, as detailed in the physical exam, to produce a freeze injury.  Brochure provided skin ca      Skin lesion of face  -     Ambulatory referral/consult to Dermatology    Lentigines  The "ABCD" rules to observe pigmented lesions were reviewed.      Nevus of multiple sites  The "ABCD" rules to observe pigmented lesions were reviewed.      Skin exam, screening for cancer   No lesions suspicious for malignancy noted.    Recommend daily sun protection/avoidance and use of at least SPF 30, broad spectrum sunscreen (OTC drug).       Seborrheic keratoses  reassurance               Follow up in about 1 year (around 2/17/2023).  "

## 2022-02-18 LAB
FINAL PATHOLOGIC DIAGNOSIS: NORMAL
Lab: NORMAL
SUPPLEMENTAL DIAGNOSIS: NORMAL

## 2022-02-21 ENCOUNTER — OFFICE VISIT (OUTPATIENT)
Dept: NEUROLOGY | Facility: CLINIC | Age: 77
End: 2022-02-21
Payer: MEDICARE

## 2022-02-21 VITALS
WEIGHT: 161.19 LBS | DIASTOLIC BLOOD PRESSURE: 78 MMHG | HEART RATE: 91 BPM | BODY MASS INDEX: 27.66 KG/M2 | SYSTOLIC BLOOD PRESSURE: 126 MMHG

## 2022-02-21 DIAGNOSIS — R41.3 MEMORY IMPAIRMENT: Primary | ICD-10-CM

## 2022-02-21 PROBLEM — I10 HYPERTENSION: Chronic | Status: ACTIVE | Noted: 2022-02-21

## 2022-02-21 PROCEDURE — 99999 PR PBB SHADOW E&M-EST. PATIENT-LVL III: ICD-10-PCS | Mod: PBBFAC,,, | Performed by: PSYCHIATRY & NEUROLOGY

## 2022-02-21 PROCEDURE — 99999 PR PBB SHADOW E&M-EST. PATIENT-LVL III: CPT | Mod: PBBFAC,,, | Performed by: PSYCHIATRY & NEUROLOGY

## 2022-02-21 PROCEDURE — 99215 OFFICE O/P EST HI 40 MIN: CPT | Mod: S$PBB,,, | Performed by: PSYCHIATRY & NEUROLOGY

## 2022-02-21 PROCEDURE — 99215 PR OFFICE/OUTPT VISIT, EST, LEVL V, 40-54 MIN: ICD-10-PCS | Mod: S$PBB,,, | Performed by: PSYCHIATRY & NEUROLOGY

## 2022-02-21 PROCEDURE — 99213 OFFICE O/P EST LOW 20 MIN: CPT | Mod: PBBFAC | Performed by: PSYCHIATRY & NEUROLOGY

## 2022-02-21 NOTE — PROGRESS NOTES
[facility]    Name: Eugenio Aldridge  : 1945  MRN: [id number]    Date: 2022      Assessment:     Ms. Aldridge is a 76 y.o. right handed female with colon adenocarcinoma, HTN, HLD, PAD, prediabetes, hypothyroidism, osteoporosis, SNHL who presents to the [facility] [place] due to memory deficits. Patient has been experiencing progressively worsening trouble with memory since 2019. She has SNHL which may contribute somewhat to perceived cognitive symptoms. She does not endorse much trouble with other cognitive domains. On evaluation today, physical exam is notable for mild weakness of her right deltoid and mild paratonia b/l, but is otherwise unremarkable. MMSE was 21/30 with significant deficits in delayed recall and lexical fluency. Very mild deficits were noted in visuospatial ability, confrontation naming, and orientation. Labs are largely unremarkable for potential causes of cognitive impairment.  MRI brain performed 22 notable for substantial volume loss in the frontal and parietal lobes the vertex, as well as mild volume loss of the hippocampi bilaterally.  Additionally, there is mild chronic microvascular disease involving the periventricular and deep white matter. The underlying cause of Ms. Aldridge's cognitive symptoms is unclear at this time. Given her report of progressively worsening cognitive symptoms and volume loss noted on MRI, there is concern for an underlying neurodegenerative disorder. Will get amyloid PET through new ideas study, however is, the studies currently on hold at all sites due to directive by the sponsor. Will notify patient when study resumes.    Recommendations:     Workup   Amyloid PET through [facility] study.  Study is currently on hold at all sites due to directive by sponsor.  Will follow-up with patient once study resumes.    Treatment   Patient would like to defer starting medications until diagnosis is more clear.     Safety-related   We  recommend that a medication management system be put in place to avoid errors in taking medication. Helpful services include [employer] (pillpack[employer]com; free service) and [employer] ([employer]; paid subscription service).    Health maintenance    Continue to follow-up with primary care doctor to monitor and treat vascular risk factors.   Recommend performing moderate-intensity cardiovascular exercise as able, ideally 30 minutes per day, 5 days per week.   Recommend staying socially and cognitively active.   Recommend eating a healthy and balanced diet (Mediterranean or DASH diet).    Follow-up: Follow up in about 6 months (around 8/21/2022). I provided Ms. Aldridge and her significant other, Marzena, with our contact information should they have any questions or concerns.      David Ferrari MD   Behavioral Neurology & Neuropsychiatry  [facility]    Subjective:      Chief complaint:  Memory Deficits    History of present illness:  Ms. Aldridge is a 76 y.o. right handed female with a history of colon adenocarcinoma, HTN, HLD, PAD, prediabetes, hypothyroidism osteoporosis, SNHL who presents today to the [facility] due to concerns regarding memory deficits. Ms. Aldridge is accompanied by her significant other, Marzena, who participates in providing history. Additional information is obtained by reviewing available medical records.     HPI by Dr. Gates from May 2021:  This 75 year old woman, a retired , reports that she has experienced two years of what she describes as memory disturbance/forgetfulness.  She will, at times, not remember where she has left her keys or hearing aids.  The patient reports that she has not experienced becoming lost when driving.  She reports no serious errors in decisions made at home.  The patient points out that with Covid, she has been living in relative isolation from friends from her past. I have reviewed the notes from  3-29-21,from  Dr. Salguero. Noted is a history of osteoporosis and peripheral artery disease.  The patient has spoken of  problems of pain, with exercise, in the left upper thigh.  Clinic notes from Dr. Stahl, from Interventional Cardiology, have been reviewed from 4-5-21.  He has described the patient's problems with varying degrees of claudication at the left leg when walking.  He has prescribed cilostazol and a regimen of exercise. The patient had an MRI scan of the lumbosacral spine, dated 3-31-21.  I have reviewed the study.  Noted are multiple disc bulges, and mild  spondylosis, however, I note no evidence of severe disc protrusion that might explain the symptoms related to pain with exercise. The patient reports neither dizziness nor vertigo.  There have been no episodes of weakness, long-standing or transient, weakness or numbness of the extremities.  There is no reported chest pain or shortness of breath.       Interval history (01/03/22):  Patient began experiencing trouble with memory around early 2019 which has progressively worsened since onset. Symptoms have become more obvious over time and her friends began noticing that she was having trouble in early 2021. Patient reports difficulty remembering recent events, upcoming events, conversations. She is able to remember to take medications because they sit out on a table that she sits at every morning. She reports sometimes walking into the kitchen and forgetting which cabinet a particular dish is in despite living in the same place for many years. She often forgets why she walked into a room. Patient reports that she recently she and friends went to a restaurant, paid the bill, and had no recollection of paying by the time she got home. She often loses objects around her home such as her keys or hearing aids. She continues to be able to live independently.     Interval history (2/21/22):  Patient has not experienced worsening of memory  symptoms since last appointment on 1/3/22.  Purpose of today's visit is to discuss results of MRI and next steps.  MRI notable for volume loss of the frontal and parietal lobes at the vertex, as well as mild hippocampal volume loss and mild chronic microvascular disease.  Discussed starting donepezil with patient.  Patient would like to defer medication until diagnosis is confirmed.  Discussed amyloid PET verses CSF biomarkers for the diagnosis of Alzheimer's disease.  Patient would like to undergo amyloid PET when [facility] study resumes.    Review of systems for cognition, behavior, motor, sensory, and sleep:  Memory   See HPI.    Executive function   Slowed cognitive processing speed: No   Difficulty with attention and concentration: No   Difficulty with judgment and/or problem solving: No   Difficulty with planning and/or organization: No    Language   Difficulty with fluency and ivan: No   Word-finding difficulties: Yes - rare   Word substitutions: No    Visuospatial ability   Difficulty navigating: Yes - mild difficulty, worse over time   Becomes lost in familiar places: No   Difficulty recognizing objects or faces: No    Behavior   Disinhibition: No   Irritability: Yes - worse over the past year   Apathy: No   Hygiene: No   Appetite: No   Depression: No   Anxiety: Yes - intermittent mild   Hallucinations: No   Delusions: No    Motor   Difficulty walking: No   Imbalance: No   Falls: No   Weakness: No   Trouble with fine motor movements: No   Tremor: No   Involuntary muscle movement: No   Difficulty swallowing: No    Sensory   Paresthesia or pain: No   Trouble with vision: Yes - decreased acuity    Trouble with hearing: Yes - sensorineural hearing loss    Sleep   Insomnia: No   Snoring: No   Apnea: No   Dream-enactment: No    Functional status:   iADLs:   Household chores: independent   Meal preparation: independent   Medication management: independent   Shopping:  independent   Managing money: independent   Transportation: independent   Telephoning/communication: independent    ADLs:   Transferring: independent   Feeding: independent   Hygiene: independent   Toileting: independent   Bathing: independent   Dressing: independent    Other functional status and safety issues:   She does drive.     Past Medical History:   Diagnosis Date    Actinic keratosis     Aortic valve disorders     Atherosclerosis of aorta     Atherosclerosis of native arteries of the extremities with intermittent claudication     Carcinoma in situ, vulva     Hypertension 2/21/2022    Left bundle branch hemiblock     Other and unspecified hyperlipidemia     Postinflammatory pulmonary fibrosis     Senile nuclear sclerosis      Past Surgical History:   Procedure Laterality Date    ARTHROSCOPIC REPAIR OF ROTATOR CUFF OF SHOULDER Right 4/24/2019    Procedure: REPAIR, ROTATOR CUFF, ARTHROSCOPIC WITH REGENERATIN IMPLANT;  Surgeon: Walter Hernandez MD;  Location: [facility];  Service: Orthopedics;  Laterality: Right;  regional w/o catheter     BTL      COLONOSCOPY N/A 12/9/2021    Procedure: COLONOSCOPY;  Surgeon: Juan Swain MD;  [facility] ([facility]);  Service: Endoscopy;  Laterality: N/A;  fully vaccinated    DECOMPRESSION OF SUBACROMIAL SPACE Right 4/24/2019    Procedure: DECOMPRESSION, SUBACROMIAL SPACE WITH DISTAL CLAVICLE EXCISION (DCE);  Surgeon: Walter Hernandez MD;  Location: [facility];  Service: Orthopedics;  Laterality: Right;    FLEXIBLE SIGMOIDOSCOPY N/A 12/20/2021    Procedure: SIGMOIDOSCOPY-FLEXIBLE;  Surgeon: Juan Swain MD;  Location: [facility] (2ND FLR);  Service: Endoscopy;  Laterality: N/A;  please schedule with me Monday 12/20 AM. Urgent for marking for colon cancer  spot held 2/20/21 12/16 fully vaccinated; instructions to portal-st    FLEXIBLE SIGMOIDOSCOPY N/A 2/4/2022    Procedure: SIGMOIDOSCOPY, FLEXIBLE;  Surgeon: GUILLERMO Olguin MD;  Location: [facility]  OR 2ND FLR;  Service: Colon and Rectal;  Laterality: N/A;    LAPAROSCOPIC LEFT COLECTOMY N/A 2/4/2022    Procedure: COLECTOMY, LEFT, LAPAROSCOPIC;  Surgeon: GUILLERMO Olguin MD;  Location: [facility] OR 2ND FLR;  Service: Colon and Rectal;  Laterality: N/A;  Extended    MOBILIZATION OF SPLENIC FLEXURE N/A 2/4/2022    Procedure: MOBILIZATION, SPLENIC FLEXURE;  Surgeon: GUILLERMO Olguin MD;  Location: [facility] OR 2ND FLR;  Service: Colon and Rectal;  Laterality: N/A;    RHYTIDECTOMY      SHOULDER ARTHROSCOPY Right 4/24/2019    Procedure: ARTHROSCOPY, SHOULDER WITH LABRAL DEBRIDEMENT;  Surgeon: Walter Hernandez MD;  Location[facility];  Service: Orthopedics;  Laterality: Right;    TONSILLECTOMY, ADENOIDECTOMY      VULVA SURGERY       Family History   Problem Relation Age of Onset    Bladder Cancer Father         d. 69    Alzheimer's disease Mother 96        d. 97    Bladder Cancer Paternal Uncle     Lung cancer Paternal Uncle     Breast cancer Paternal Grandmother     Pancreatic cancer Paternal Aunt     Other Brother         d. 45 o/d    Pneumonia Brother         d. 58, smoker, obese    No Known Problems Sister     No Known Problems Sister     No Known Problems Sister     Amblyopia Neg Hx     Blindness Neg Hx     Cataracts Neg Hx     Diabetes Neg Hx     Glaucoma Neg Hx     Hypertension Neg Hx     Macular degeneration Neg Hx     Retinal detachment Neg Hx     Strabismus Neg Hx     Stroke Neg Hx     Thyroid disease Neg Hx      Social History     Socioeconomic History    Marital status: Significant Other    Number of children: 0    Years of education: 18    Highest education level: Master's degree (e.g., MA, MS, Vinicio, MEd, MSW, AMBROCIO)   Occupational History    Occupation: CPA     Comment: Retired 2019   Tobacco Use    Smoking status: Former Smoker     Packs/day: 1.50     Years: 39.00     Pack years: 58.50     Types: Cigarettes     Start date: 1962     Quit date: 9/11/2001     Years since quitting:  20.4    Smokeless tobacco: Never Used   Substance and Sexual Activity    Alcohol use: Yes     Alcohol/week: 7.0 - 14.0 standard drinks     Types: 7 - 14 Standard drinks or equivalent per week    Drug use: No    Sexual activity: Yes   Social History Narrative    Significant other s/p colon cancer, now w/ bladder cancer;f/u imaging clear    + walking in [place], 3-4 /wk    + working CPA     Social Determinants of Health     Financial Resource Strain: Low Risk     Difficulty of Paying Living Expenses: Not hard at all   Food Insecurity: No Food Insecurity    Worried About Running Out of Food in the Last Year: Never true    Ran Out of Food in the Last Year: Never true   Transportation Needs: No Transportation Needs    Lack of Transportation (Medical): No    Lack of Transportation (Non-Medical): No   Physical Activity: Sufficiently Active    Days of Exercise per Week: 5 days    Minutes of Exercise per Session: 60 min   Stress: No Stress Concern Present    Feeling of Stress : Not at all   Social Connections: Unknown    Frequency of Communication with Friends and Family: More than three times a week    Frequency of Social Gatherings with Friends and Family: Once a week    Active Member of Clubs or Organizations: No    Attends Club or Organization Meetings: Patient refused    Marital Status: Living with partner   Housing Stability: Low Risk     Unable to Pay for Housing in the Last Year: No    Number of Places Lived in the Last Year: 1    Unstable Housing in the Last Year: No     Current Outpatient Medications:     amLODIPine (NORVASC) 5 MG tablet, Take 1 tablet (5 mg total) by mouth once daily., Disp: 90 tablet, Rfl: 1    atorvastatin (LIPITOR) 40 MG tablet, TAKE 1 TABLET(40 MG) BY MOUTH EVERY DAY, Disp: 90 tablet, Rfl: 3    calcium-vitamin D3 (OS-MARYANA 500 + D3) 500 mg(1,250mg) -200 unit per tablet, Take 1 tablet by mouth once daily. , Disp: , Rfl:     cyanocobalamin, vitamin B-12, (VITAMIN B-12 ORAL),  Take 1 tablet by mouth once daily., Disp: , Rfl:     efinaconazole (JUBLIA) 10 % Nissa, Apply topically., Disp: , Rfl:     ketoconazole (NIZORAL) 2 % cream, Apply topically once daily., Disp: 60 g, Rfl: 3    levothyroxine (SYNTHROID) 50 MCG tablet, TAKE 1 TABLET(50 MCG) BY MOUTH BEFORE BREAKFAST, Disp: 90 tablet, Rfl: 3    metroNIDAZOLE (FLAGYL) 500 MG tablet, Take 1 tablet at 1pm, 2pm, and 11pm the day before surgery., Disp: 3 tablet, Rfl: 0    multivitamin (THERAGRAN) per tablet, Take by mouth. 1 Tablet Oral Every day, Disp: , Rfl:     neomycin (MYCIFRADIN) 500 mg Tab, Take 2 tablets at 1pm, 2pm, and 11pm the day before surgery., Disp: 6 tablet, Rfl: 0    oxyCODONE (ROXICODONE) 5 MG immediate release tablet, Take 1 tablet (5 mg total) by mouth every 4 (four) hours as needed for Pain., Disp: 20 tablet, Rfl: 0  No current facility-administered medications for this visit.    Facility-Administered Medications Ordered in Other Visits:     0.9%  NaCl infusion, , Intravenous, Continuous, Sanna Ann NP    gabapentin capsule 300 mg, 300 mg, Oral, TID, Sanna Ann NP, 300 mg at 02/05/22 0911    Allergies:  Codeine    Objective:     Vital signs:  Blood pressure 126/78, pulse 91, weight 73.1 kg (161 lb 2.5 oz).    Neurological examination:  Mental status: Ms. Aldridge was appropriate and pleasant. She was alert and oriented to person, place, and situation. Attention and concentration were intact. Knowledge of recent autobiographical events and current events was intact.  Language: Ms. Aldridge's speech was fluent, non-effortful, and grammatically intact. She did not have word-finding difficulty or make word-substitutions. Her comprehension was also intact to syntactically complex sentences.  Cranial nerves: Extraocular movements were intact. There was no eyelid dysfunction. Her facial movements were symmetric, no hypomimia. Hearing was intact to voice. The tongue protruded in the midline with intact movement  bilaterally. She did not have slurring or other speech abnormalities.  Motor examination: Muscle bulk was normal in the upper and lower extremities. She moved all limbs symmetrically. She did not have resting tremor, postural tremor, or other involuntary movements.  Coordination/sensory:  Grossly normal.  Station/Gait:  Gait was largely unremarkable.    Neuroimaging:  Results for orders placed or performed during the hospital encounter of 01/20/22   MRI Brain Without Contrast    Narrative    EXAMINATION:  MRI BRAIN WITHOUT CONTRAST    CLINICAL HISTORY:  Memory loss;  Other amnesia    TECHNIQUE:  Sagittal and axial T1, axial T2, axial FLAIR, axial gradient, 3D MP rage and axial diffusion imaging of the whole brain without contrast.    COMPARISON:  None    FINDINGS:  Generalized cerebral volume loss most prominent in the frontal and parietal lobes at the vertex.  Compensatory enlargement ventricles sulci and cisterns without hydrocephalus.  Several scattered punctate size foci of T2 FLAIR signal hyperintensity supratentorial white matter additional clustered foci in the demario without corresponding diffusion signal abnormality which are nonspecific and may represent mild moderate degree of chronic microvascular ischemic change.  No restricted diffusion to suggest acute or recent infarction.  Major intracranial T2 flow voids are present.  No significant advanced temporal lobe volume loss.    No abnormal parenchymal susceptibility to suggest parenchymal hemorrhage.      Impression    Cerebral volume loss with scattered foci of T2 FLAIR signal hyperintensity supratentorial white matter and demario which is nonspecific and may be sequela of mild moderate degree of chronic ischemic change.  No evidence for acute infarction or hydrocephalus    Please note volume loss most pronounced in the frontal and parietal lobes at the vertex, no significant age advanced temporal lobe volume loss, underlying neuro degenerative process not  excluded and clinical correlation advised.      Electronically signed by: Wayne Houser DO  Date:    01/20/2022  Time:    08:39     Laboratory studies:  Admission on 02/04/2022, Discharged on 02/05/2022   Component Date Value Ref Range Status    Group & Rh 02/04/2022 A POS   Final    Indirect Bernard 02/04/2022 NEG   Final    Final Pathologic Diagnosis 02/04/2022    Corrected                    Value:[facility] DIAGNOSIS:  A. LEFT COLON:  -No residual adenocarcinoma, sessile serrated lesion or adenomatous lesion.  -Benign colonic parenchyma with focal tattoo at the splenic flexure with  associated focal muscular hypertrophy and diffuse diverticulosis, see  comment. -Multiple levels examined.  B. DISTAL ANASTOMOTIC DONUT:  -Benign colonic parenchyma consistent with distal anastomotic donut.  C. PROXIMAL ANASTOMOTIC DONUT:  -Benign colonic parenchyma consistent with proximal anastomotic donut.  COMMENT:  The specimen was re-examined grossly on 02-11-22. No gross evidence of  residual adenocarcinoma or polypoid lesions is identified. No evidence of  residual adenocarcinoma, sessile serrated lesion or adenomatous lesion is  identified on multiple deeper levels obtained on multiple blocks.      Shawanda Alvarez M.D.  Report attached.  Performing site:  [place] [address] [place] [address]  Note for [facility] Sign-Out Pathologist:  This case diagnosis was rendered  completely b                          y the outside consultation pathologist and the case is  electronically signed by an [facility] pathologist listed below solely to  release the report into the medical record.      Interp By Radha Townsend MD, Signed on 02/18/2022 at 10:19    Supplemental Diagnosis 02/04/2022    Corrected                    Value:[facility] ADDENDUM (02-17-22):  The number of lymph nodes was previously omitted from the previous report on  specimen A.  There are twenty-four (24) lymph nodes negative for  metastatic  carcinoma.  Shawanda Alvarez M.D.  Report attached.  Performing site:  [address] [address]      Disclaimer 02/04/2022    Corrected                    Value:Unless the case is a 'gross only' or additional testing only, the final  diagnosis for each specimen is based on a microscopic examination of  appropriate tissue sections.      POCT Glucose 02/04/2022 135 (A) 70 - 110 mg/dL Final    POCT Glucose 02/04/2022 127 (A) 70 - 110 mg/dL Final    POCT Glucose 02/04/2022 186 (A) 70 - 110 mg/dL Final    WBC 02/05/2022 9.16  3.90 - 12.70 K/uL Final    RBC 02/05/2022 3.97 (A) 4.00 - 5.40 M/uL Final    Hemoglobin 02/05/2022 12.7  12.0 - 16.0 g/dL Final    Hematocrit 02/05/2022 39.4  37.0 - 48.5 % Final    MCV 02/05/2022 99 (A) 82 - 98 fL Final    MCH 02/05/2022 32.0 (A) 27.0 - 31.0 pg Final    MCHC 02/05/2022 32.2  32.0 - 36.0 g/dL Final    RDW 02/05/2022 14.0  11.5 - 14.5 % Final    Platelets 02/05/2022 211  150 - 450 K/uL Final    MPV 02/05/2022 9.6  9.2 - 12.9 fL Final    Immature Granulocytes 02/05/2022 0.3  0.0 - 0.5 % Final    Gran # (ANC) 02/05/2022 7.5  1.8 - 7.7 K/uL Final    Immature Grans (Abs) 02/05/2022 0.03  0.00 - 0.04 K/uL Final    Comment: Mild elevation in immature granulocytes is non specific and   can be seen in a variety of conditions including stress response,   acute inflammation, trauma and pregnancy. Correlation with other   laboratory and clinical findings is essential.      Lymph # 02/05/2022 0.7 (A) 1.0 - 4.8 K/uL Final    Mono # 02/05/2022 0.9  0.3 - 1.0 K/uL Final    Eos # 02/05/2022 0.0  0.0 - 0.5 K/uL Final    Baso # 02/05/2022 0.03  0.00 - 0.20 K/uL Final    nRBC 02/05/2022 0  0 /100 WBC Final    Gran % 02/05/2022 82.3 (A) 38.0 - 73.0 % Final    Lymph % 02/05/2022 7.3 (A) 18.0 - 48.0 % Final    Mono % 02/05/2022 9.8  4.0 - 15.0 % Final    Eosinophil % 02/05/2022 0.0  0.0 - 8.0 % Final    Basophil % 02/05/2022 0.3  0.0 - 1.9 %  Final    Differential Method 02/05/2022 Automated   Final    Sodium 02/05/2022 141  136 - 145 mmol/L Final    Potassium 02/05/2022 4.1  3.5 - 5.1 mmol/L Final    Chloride 02/05/2022 107  95 - 110 mmol/L Final    CO2 02/05/2022 27  23 - 29 mmol/L Final    Glucose 02/05/2022 117 (A) 70 - 110 mg/dL Final    BUN 02/05/2022 8  8 - 23 mg/dL Final    Creatinine 02/05/2022 0.8  0.5 - 1.4 mg/dL Final    Calcium 02/05/2022 9.0  8.7 - 10.5 mg/dL Final    Anion Gap 02/05/2022 7 (A) 8 - 16 mmol/L Final    eGFR if African American 02/05/2022 >60.0  >60 mL/min/1.73 m^2 Final    eGFR if non African American 02/05/2022 >60.0  >60 mL/min/1.73 m^2 Final    Comment: Calculation used to obtain the estimated glomerular filtration  rate (eGFR) is the CKD-EPI equation.       Magnesium 02/05/2022 1.9  1.6 - 2.6 mg/dL Final    POCT Glucose 02/05/2022 123 (A) 70 - 110 mg/dL Final   Lab Visit on 02/01/2022   Component Date Value Ref Range Status    SARS-CoV2 (COVID-19) Qualitative P* 02/01/2022 Not Detected  Not Detected Final    Comment: This test utilizes a real-time reverse transcription  polymerase chain reaction procedure to amplify and   detect the SARS-CoV-2 RdRp and N genes.    The analytical sensitivity (limit of detection) of   this assay is 100 copies/mL.     A Detected result is considered positive for COVID-19.  This patient is considered infected with the   SARS-CoV-2 virus and is presumed to be contagious.    A Not Detected result means that SARS-CoV-2 RNA is not  present above the limit of detection. It does not rule  out the possibility of COVID-19 and should not be the  sole basis for treatment decisions.  If COVID-19 is   strongly suspected based on clinical and exposure   history,re-testing should be considered.      This test is only for use under Food and Drug   Administration s Emergency Use Authorization (EUA).   Commercial reagents are provided by [employer].  Performance characteristics of the  EUA have been   independently verified by [facility]   Department of Pathology a                           nd Laboratory Medicine.        SARS-COV-2- Cycle Number 02/01/2022 N/A   Final    Comment: CT (Cycle Threshold) values are surrogate markers of   nucleic acid concentration in a sample. They are non-standard  measurements and should only be interpreted by those familiar   with both PCR technology and the patient's clinical presentation.     Lab Visit on 01/13/2022   Component Date Value Ref Range Status    WBC 01/13/2022 6.90  3.90 - 12.70 K/uL Final    RBC 01/13/2022 4.55  4.00 - 5.40 M/uL Final    Hemoglobin 01/13/2022 14.4  12.0 - 16.0 g/dL Final    Hematocrit 01/13/2022 44.3  37.0 - 48.5 % Final    MCV 01/13/2022 97  82 - 98 fL Final    MCH 01/13/2022 31.6 (A) 27.0 - 31.0 pg Final    MCHC 01/13/2022 32.5  32.0 - 36.0 g/dL Final    RDW 01/13/2022 13.9  11.5 - 14.5 % Final    Platelets 01/13/2022 276  150 - 450 K/uL Final    MPV 01/13/2022 9.6  9.2 - 12.9 fL Final    Immature Granulocytes 01/13/2022 0.3  0.0 - 0.5 % Final    Gran # (ANC) 01/13/2022 5.5  1.8 - 7.7 K/uL Final    Immature Grans (Abs) 01/13/2022 0.02  0.00 - 0.04 K/uL Final    Comment: Mild elevation in immature granulocytes is non specific and   can be seen in a variety of conditions including stress response,   acute inflammation, trauma and pregnancy. Correlation with other   laboratory and clinical findings is essential.      Lymph # 01/13/2022 0.7 (A) 1.0 - 4.8 K/uL Final    Mono # 01/13/2022 0.5  0.3 - 1.0 K/uL Final    Eos # 01/13/2022 0.1  0.0 - 0.5 K/uL Final    Baso # 01/13/2022 0.03  0.00 - 0.20 K/uL Final    nRBC 01/13/2022 0  0 /100 WBC Final    Gran % 01/13/2022 80.2 (A) 38.0 - 73.0 % Final    Lymph % 01/13/2022 10.7 (A) 18.0 - 48.0 % Final    Mono % 01/13/2022 7.5  4.0 - 15.0 % Final    Eosinophil % 01/13/2022 0.9  0.0 - 8.0 % Final    Basophil % 01/13/2022 0.4  0.0 - 1.9 % Final    Differential  Method 01/13/2022 Automated   Final    Folate 01/13/2022 35.7 (A) 4.0 - 24.0 ng/mL Final    HIV 1/2 Ag/Ab 01/13/2022 Negative  Negative Final    Homocysteine 01/13/2022 6.7  4.0 - 15.5 umol/L Final    Methlymalonic Acid 01/13/2022 0.20  <0.40 umol/L Final    Comment: If applicable, any drug confirmation testing reported  here was developed and the performance characteristics  determined by [facility]. This   confirmation testing has not been cleared or approved  by the FDA. The laboratory is regulated under CLIA as  qualified to perform high-complexity testing. This test  is used for patient testing purposes. It should not be  regarded as investigational or for research.    Test performed at [facility],  [facility] [place] [address] [place]  [address]     [phone]  Olivier Gonzales MD  - Medical Director      Syphilis Treponemal Ab 01/13/2022 Nonreactive  Nonreactive Final    Comment: No serologic evidence of infection with T. pallidum  (syphilis).  Repeat testing may be considered in patients  with suspected acute or primary syphilis in 2-4 weeks.    For additional information on interpretation of   the syphilis reverse algorithm and results, see:   [email or web address]    Test Performed by:  [facility]  [address] [place] [address]  : Olivier Hernandez M.D. Ph.D.; CLIA# [id number]      Vitamin B-12 01/13/2022 520  210 - 950 pg/mL Final    Thiamine 01/13/2022 145 (A) 38 - 122 ug/L Final    Comment: This test was developed and the performance   characteristics determined by [facility].   It has not been cleared or approved by the FDA.   The laboratory is regulated under CLIA as qualified to   perform high-complexity testing. This test is used for   patient testing purposes. It should not be regarded   as investigational or for research.    Test  performed at [facility],  [address] , [place]  [address]     [phone]  Olivier Gonzales MD  - Medical Director      Sodium 01/13/2022 143  136 - 145 mmol/L Final    Potassium 01/13/2022 4.0  3.5 - 5.1 mmol/L Final    Chloride 01/13/2022 106  95 - 110 mmol/L Final    CO2 01/13/2022 28  23 - 29 mmol/L Final    Glucose 01/13/2022 98  70 - 110 mg/dL Final    BUN 01/13/2022 16  8 - 23 mg/dL Final    Creatinine 01/13/2022 0.8  0.5 - 1.4 mg/dL Final    Calcium 01/13/2022 9.9  8.7 - 10.5 mg/dL Final    Total Protein 01/13/2022 6.9  6.0 - 8.4 g/dL Final    Albumin 01/13/2022 4.0  3.5 - 5.2 g/dL Final    Total Bilirubin 01/13/2022 0.6  0.1 - 1.0 mg/dL Final    Comment: For infants and newborns, interpretation of results should be based  on gestational age, weight and in agreement with clinical  observations.    Premature Infant recommended reference ranges:  Up to 24 hours.............<8.0 mg/dL  Up to 48 hours............<12.0 mg/dL  3-5 days..................<15.0 mg/dL  6-29 days.................<15.0 mg/dL      Alkaline Phosphatase 01/13/2022 59  55 - 135 U/L Final    AST 01/13/2022 26  10 - 40 U/L Final    ALT 01/13/2022 21  10 - 44 U/L Final    Anion Gap 01/13/2022 9  8 - 16 mmol/L Final    eGFR if African American 01/13/2022 >60.0  >60 mL/min/1.73 m^2 Final    eGFR if non African American 01/13/2022 >60.0  >60 mL/min/1.73 m^2 Final    Comment: Calculation used to obtain the estimated glomerular filtration  rate (eGFR) is the CKD-EPI equation.      Lab Visit on 01/12/2022   Component Date Value Ref Range Status    CEA 01/12/2022 <1.7  0.0 - 5.0 ng/mL Final    Comment: CEA Normal Range:  Non-Smokers: 0-3.0 ng/mL  Smokers:     0-5.0 ng/mL      Sodium 01/12/2022 144  136 - 145 mmol/L Final    Potassium 01/12/2022 4.2  3.5 - 5.1 mmol/L Final    Chloride 01/12/2022 106  95 - 110 mmol/L Final    CO2 01/12/2022 30 (A) 23 - 29 mmol/L Final    Glucose 01/12/2022 99  70 - 110  mg/dL Final    BUN 01/12/2022 13  8 - 23 mg/dL Final    Creatinine 01/12/2022 0.8  0.5 - 1.4 mg/dL Final    Calcium 01/12/2022 10.2  8.7 - 10.5 mg/dL Final    Anion Gap 01/12/2022 8  8 - 16 mmol/L Final    eGFR if African American 01/12/2022 >60.0  >60 mL/min/1.73 m^2 Final    eGFR if non African American 01/12/2022 >60.0  >60 mL/min/1.73 m^2 Final    Comment: Calculation used to obtain the estimated glomerular filtration  rate (eGFR) is the CKD-EPI equation.       WBC 01/12/2022 5.49  3.90 - 12.70 K/uL Final    RBC 01/12/2022 4.63  4.00 - 5.40 M/uL Final    Hemoglobin 01/12/2022 14.7  12.0 - 16.0 g/dL Final    Hematocrit 01/12/2022 45.8  37.0 - 48.5 % Final    MCV 01/12/2022 99 (A) 82 - 98 fL Final    MCH 01/12/2022 31.7 (A) 27.0 - 31.0 pg Final    MCHC 01/12/2022 32.1  32.0 - 36.0 g/dL Final    RDW 01/12/2022 14.0  11.5 - 14.5 % Final    Platelets 01/12/2022 247  150 - 450 K/uL Final    MPV 01/12/2022 9.1 (A) 9.2 - 12.9 fL Final    Immature Granulocytes 01/12/2022 0.2  0.0 - 0.5 % Final    Gran # (ANC) 01/12/2022 3.6  1.8 - 7.7 K/uL Final    Immature Grans (Abs) 01/12/2022 0.01  0.00 - 0.04 K/uL Final    Comment: Mild elevation in immature granulocytes is non specific and   can be seen in a variety of conditions including stress response,   acute inflammation, trauma and pregnancy. Correlation with other   laboratory and clinical findings is essential.      Lymph # 01/12/2022 1.1  1.0 - 4.8 K/uL Final    Mono # 01/12/2022 0.7  0.3 - 1.0 K/uL Final    Eos # 01/12/2022 0.0  0.0 - 0.5 K/uL Final    Baso # 01/12/2022 0.05  0.00 - 0.20 K/uL Final    nRBC 01/12/2022 0  0 /100 WBC Final    Gran % 01/12/2022 65.1  38.0 - 73.0 % Final    Lymph % 01/12/2022 20.0  18.0 - 48.0 % Final    Mono % 01/12/2022 13.1  4.0 - 15.0 % Final    Eosinophil % 01/12/2022 0.7  0.0 - 8.0 % Final    Basophil % 01/12/2022 0.9  0.0 - 1.9 % Final    Differential Method 01/12/2022 Automated   Final     I performed a  total of 53 minutes on Ms. Aldridge's care on the day of their visit excluding time spent related to any billed procedures. This time includes time spent with the patient as well as time spent documenting in the medical record, reviewing patient's records and tests, obtaining history, placing orders, communicating with other healthcare professionals, counseling the patient, family, or caregiver, and/or care coordination for the diagnoses above.    This note was dictated using Kopi speech recognition software. Please excuse any spelling or grammatical errors. Word recognition mistakes are occasionally missed on review.

## 2022-02-22 ENCOUNTER — OFFICE VISIT (OUTPATIENT)
Dept: PODIATRY | Facility: CLINIC | Age: 77
End: 2022-02-22
Payer: MEDICARE

## 2022-02-22 VITALS
HEART RATE: 93 BPM | SYSTOLIC BLOOD PRESSURE: 142 MMHG | BODY MASS INDEX: 27.66 KG/M2 | WEIGHT: 161.19 LBS | DIASTOLIC BLOOD PRESSURE: 82 MMHG

## 2022-02-22 DIAGNOSIS — B35.1 ONYCHOMYCOSIS DUE TO DERMATOPHYTE: ICD-10-CM

## 2022-02-22 DIAGNOSIS — L60.9 DISEASE OF NAIL: Primary | ICD-10-CM

## 2022-02-22 PROCEDURE — 99213 OFFICE O/P EST LOW 20 MIN: CPT | Mod: PBBFAC | Performed by: PODIATRIST

## 2022-02-22 PROCEDURE — 99213 PR OFFICE/OUTPT VISIT, EST, LEVL III, 20-29 MIN: ICD-10-PCS | Mod: S$PBB,,, | Performed by: PODIATRIST

## 2022-02-22 PROCEDURE — 99999 PR PBB SHADOW E&M-EST. PATIENT-LVL III: CPT | Mod: PBBFAC,,, | Performed by: PODIATRIST

## 2022-02-22 PROCEDURE — 99213 OFFICE O/P EST LOW 20 MIN: CPT | Mod: S$PBB,,, | Performed by: PODIATRIST

## 2022-02-22 PROCEDURE — 99999 PR PBB SHADOW E&M-EST. PATIENT-LVL III: ICD-10-PCS | Mod: PBBFAC,,, | Performed by: PODIATRIST

## 2022-02-22 RX ORDER — TERBINAFINE HYDROCHLORIDE 250 MG/1
250 TABLET ORAL DAILY
Qty: 90 TABLET | Refills: 0 | Status: SHIPPED | OUTPATIENT
Start: 2022-02-22 | End: 2022-05-23

## 2022-02-23 ENCOUNTER — OFFICE VISIT (OUTPATIENT)
Dept: SURGERY | Facility: CLINIC | Age: 77
End: 2022-02-23
Payer: MEDICARE

## 2022-02-23 VITALS
BODY MASS INDEX: 27.13 KG/M2 | WEIGHT: 158.88 LBS | SYSTOLIC BLOOD PRESSURE: 163 MMHG | HEART RATE: 83 BPM | HEIGHT: 64 IN | DIASTOLIC BLOOD PRESSURE: 69 MMHG

## 2022-02-23 DIAGNOSIS — C18.5 MALIGNANT NEOPLASM OF SPLENIC FLEXURE: Primary | ICD-10-CM

## 2022-02-23 PROCEDURE — 99024 PR POST-OP FOLLOW-UP VISIT: ICD-10-PCS | Mod: POP,,, | Performed by: COLON & RECTAL SURGERY

## 2022-02-23 PROCEDURE — 99999 PR PBB SHADOW E&M-EST. PATIENT-LVL III: CPT | Mod: PBBFAC,,, | Performed by: COLON & RECTAL SURGERY

## 2022-02-23 PROCEDURE — 99024 POSTOP FOLLOW-UP VISIT: CPT | Mod: POP,,, | Performed by: COLON & RECTAL SURGERY

## 2022-02-23 PROCEDURE — 99999 PR PBB SHADOW E&M-EST. PATIENT-LVL III: ICD-10-PCS | Mod: PBBFAC,,, | Performed by: COLON & RECTAL SURGERY

## 2022-02-23 PROCEDURE — 99213 OFFICE O/P EST LOW 20 MIN: CPT | Mod: PBBFAC | Performed by: COLON & RECTAL SURGERY

## 2022-02-24 ENCOUNTER — PATIENT MESSAGE (OUTPATIENT)
Dept: SURGERY | Facility: CLINIC | Age: 77
End: 2022-02-24
Payer: COMMERCIAL

## 2022-02-26 NOTE — PROGRESS NOTES
HPI:  Eugenio Aldridge is a 76 y.o. female with history of splenic flexure cancer.    12- colonoscopy with polypectomy   SPLENIC FLEXURE POLYP, POLYPECTOMY: Minimally invasive moderately   differentiated adenocarcinoma, see synoptic report   SYNOPTIC REPORT:   - Tumor site: Splenic flexure   - Histologic type: Adenocarcinoma   - Histologic Grade: G2, moderately differentiated   - Size of Invasive Carcinoma: 0.7 cm   - Tumor Extent:  Invades muscularis mucosae   - Lymphovascular Invasion: Not identified   - Type of Polyp in which Invasive Carcinoma Arose: Sessile serrated adenoma   - Margin Status for Invasive Carcinoma: Deep margin involved by invasive   carcinoma   - Cancer biomarkers for microsatelite repair have been ordered and the   results will be issued in an addendum    Comment: Interp By Margarita Allen M.D., Signed on 12/16/2021 at 09:45       2- laparoscopic left colectomy (sigmoid diverticulitis found)    Final Pathologic Diagnosis RELIAPA DIAGNOSIS:   A. LEFT COLON:   -No residual adenocarcinoma, sessile serrated lesion or adenomatous lesion.   -Benign colonic parenchyma with focal tattoo at the splenic flexure with   associated focal muscular hypertrophy and diffuse diverticulosis, see   comment. -Multiple levels examined.   B. DISTAL ANASTOMOTIC DONUT:   -Benign colonic parenchyma consistent with distal anastomotic donut.   C. PROXIMAL ANASTOMOTIC DONUT:   -Benign colonic parenchyma consistent with proximal anastomotic donut.   COMMENT:   The specimen was re-examined grossly on 02-11-22. No gross evidence of   residual adenocarcinoma or polypoid lesions is identified. No evidence of   residual adenocarcinoma, sessile serrated lesion or adenomatous lesion is   identified on multiple deeper levels obtained on multiple blocks.       Shawanda Alvarez M.D.   Report attached.   Performing site:   [address] [address]   Note for [facility] Sign-Out Pathologist:  This  case diagnosis was rendered   completely by the outside consultation pathologist and the case is   electronically signed by an [facility] pathologist listed below solely to   release the report into the medical record.  VC      Comment: Interp By Radha Townsend MD, Signed on 02/18/2022 at 10:19   Supplemental Diagnosis [facility] ADDENDUM (02-17-22):   The number of lymph nodes was previously omitted from the previous report on   specimen A.  There are twenty-four (24) lymph nodes negative for metastatic   carcinoma.   Shawanda Alvarez M.D.   Report attached.   Performing site:   [facility]   [place] [address]         Interval history  Feels well.  Sore.  BMs irregular.  No blood.  No N/V.  Energy levels        Past Medical History:   Diagnosis Date    Actinic keratosis     Aortic valve disorders     Atherosclerosis of aorta     Atherosclerosis of native arteries of the extremities with intermittent claudication     Carcinoma in situ, vulva     Hypertension 2/21/2022    Left bundle branch hemiblock     Other and unspecified hyperlipidemia     Postinflammatory pulmonary fibrosis     Senile nuclear sclerosis         Past Surgical History:   Procedure Laterality Date    ARTHROSCOPIC REPAIR OF ROTATOR CUFF OF SHOULDER Right 4/24/2019    Procedure: REPAIR, ROTATOR CUFF, ARTHROSCOPIC WITH REGENERATIN IMPLANT;  Surgeon: Walter Hernandez MD;  Location: [facility];  Service: Orthopedics;  Laterality: Right;  regional w/o catheter     BTL      COLONOSCOPY N/A 12/9/2021    Procedure: COLONOSCOPY;  Surgeon: Juan Swain MD;  Location: [facility] ([address] [place] [address]);  Service: Endoscopy;  Laterality: N/A;  fully vaccinated    DECOMPRESSION OF SUBACROMIAL SPACE Right 4/24/2019    Procedure: DECOMPRESSION, SUBACROMIAL SPACE WITH DISTAL CLAVICLE EXCISION (DCE);  Surgeon: Walter Hernandez MD;  Location: [facility];  Service: Orthopedics;  Laterality: Right;    FLEXIBLE SIGMOIDOSCOPY N/A 12/20/2021     Procedure: SIGMOIDOSCOPY-FLEXIBLE;  Surgeon: Juan Swain MD;  Location: [facility] ENDO (2ND FLR);  Service: Endoscopy;  Laterality: N/A;  please schedule with me Monday 12/20 AM. Urgent for marking for colon cancer  spot held 2/20/21 12/16 fully vaccinated; instructions to portal-st    FLEXIBLE SIGMOIDOSCOPY N/A 2/4/2022    Procedure: SIGMOIDOSCOPY, FLEXIBLE;  Surgeon: GUILLERMO Olguin MD;  Location: [facility] OR 2ND FLR;  Service: Colon and Rectal;  Laterality: N/A;    LAPAROSCOPIC LEFT COLECTOMY N/A 2/4/2022    Procedure: COLECTOMY, LEFT, LAPAROSCOPIC;  Surgeon: GUILLERMO Olguin MD;  Location: [facility] OR 2ND FLR;  Service: Colon and Rectal;  Laterality: N/A;  Extended    MOBILIZATION OF SPLENIC FLEXURE N/A 2/4/2022    Procedure: MOBILIZATION, SPLENIC FLEXURE;  Surgeon: GUILLERMO Olguin MD;  [facility];  Service: Colon and Rectal;  Laterality: N/A;    RHYTIDECTOMY      SHOULDER ARTHROSCOPY Right 4/24/2019    Procedure: ARTHROSCOPY, SHOULDER WITH LABRAL DEBRIDEMENT;  Surgeon: Walter Hernandez MD;  Location: [facility];  Service: Orthopedics;  Laterality: Right;    TONSILLECTOMY, ADENOIDECTOMY      VULVA SURGERY         Review of patient's allergies indicates:   Allergen Reactions    Codeine Nausea And Vomiting       Family History   Problem Relation Age of Onset    Bladder Cancer Father         d. 69    Alzheimer's disease Mother 96        d. 97    Bladder Cancer Paternal Uncle     Lung cancer Paternal Uncle     Breast cancer Paternal Grandmother     Pancreatic cancer Paternal Aunt     Other Brother         d. 45 o/d    Pneumonia Brother         d. 58, smoker, obese    No Known Problems Sister     No Known Problems Sister     No Known Problems Sister     Amblyopia Neg Hx     Blindness Neg Hx     Cataracts Neg Hx     Diabetes Neg Hx     Glaucoma Neg Hx     Hypertension Neg Hx     Macular degeneration Neg Hx     Retinal detachment Neg Hx     Strabismus Neg Hx     Stroke Neg Hx      Thyroid disease Neg Hx        Social History     Socioeconomic History    Marital status: Significant Other    Number of children: 0    Years of education: 18    Highest education level: Master's degree (e.g., MA, MS, Vinicio, MEd, MSW, AMBROCIO)   Occupational History    Occupation: CPA     Comment: Retired 2019   Tobacco Use    Smoking status: Former Smoker     Packs/day: 1.50     Years: 39.00     Pack years: 58.50     Types: Cigarettes     Start date:      Quit date: 2001     Years since quittin.4    Smokeless tobacco: Never Used   Substance and Sexual Activity    Alcohol use: Yes     Alcohol/week: 7.0 - 14.0 standard drinks     Types: 7 - 14 Standard drinks or equivalent per week    Drug use: No    Sexual activity: Yes   Social History Narrative    Significant other s/p colon cancer, now w/ bladder cancer;f/u imaging clear    + walking in [place], 3-4 /wk    + working CPA     Social Determinants of Health     Financial Resource Strain: Low Risk     Difficulty of Paying Living Expenses: Not hard at all   Food Insecurity: No Food Insecurity    Worried About Running Out of Food in the Last Year: Never true    Ran Out of Food in the Last Year: Never true   Transportation Needs: No Transportation Needs    Lack of Transportation (Medical): No    Lack of Transportation (Non-Medical): No   Physical Activity: Sufficiently Active    Days of Exercise per Week: 5 days    Minutes of Exercise per Session: 60 min   Stress: No Stress Concern Present    Feeling of Stress : Not at all   Social Connections: Unknown    Frequency of Communication with Friends and Family: More than three times a week    Frequency of Social Gatherings with Friends and Family: Once a week    Active Member of Clubs or Organizations: No    Attends Club or Organization Meetings: Patient refused    Marital Status: Living with partner   Housing Stability: Low Risk     Unable to Pay for Housing in the Last Year: No    Number of  "Places Lived in the Last Year: 1    Unstable Housing in the Last Year: No       ROS:  GENERAL: No fever, chills, fatigability or weight loss.  Integument: No rashes, redness, icterus  CHEST: Denies VOGEL, cyanosis, wheezing, cough and sputum production.  CARDIOVASCULAR: Denies chest pain, PND, orthopnea or reduced exercise tolerance.  GI: Denies abd pain, dysphagia, nausea, vomiting, no hematemesis   : Denies burning on urination, no hematuria, no bacteriuria  MSK: No deformities, swelling, joint pain swelling  Neurologic: No HAs, seizures, weakness, paresthesias, gait problems    PE:  General appearance well  BP (!) 163/69 (BP Location: Right arm, Patient Position: Sitting, BP Method: Large (Automatic))   Pulse 83   Ht 5' 4" (1.626 m)   Wt 72.1 kg (158 lb 14.4 oz)   BMI 27.28 kg/m²   Sclera/ Skin anicteric  LN none palpable  AT NC EOMI  Neck supple trachea midline   Chest symmetric, nl excursion, no retractions, breathing comfortably  Abdomen    Incisions CDI  ND soft NT.  no masses, no organomegaly  EXT - no CCE  Neuro:  Mood/ affect nl, alert and oriented x 3, moves all ext's, gait nl    Assessment:  Wounds healed  Good performance status following left colectomy (transverse colon to rectal anastomosis  Stage 1 colon cancer    Plan:  High fiber diet - 25 grams per day.  Emphasis on whole grain breads such as double fiber wheat bread and high fiber cereals and bars.    Drink 8 glasses of water per day 64 - 96 oz per day  Fiber supplements such as KONSYL, METAMUCIL can be taken 1-2 x per day  Regular exercise - 30 min brisk walking 5 days per week  OV 6 weeks      "

## 2022-03-03 NOTE — PROGRESS NOTES
Subjective:      Patient ID: Eugenio Aldridge is a 76 y.o. female.    Chief Complaint: Nail Problem    Eugenio is a 76 y.o. female who presents to the clinic complaining of thick and discolored toenails on the left foot.  She has been attempting topical medication with little success.  She would like to discuss other treatment options today.      Review of Systems   Constitutional: Negative for chills, decreased appetite, fever and malaise/fatigue.   HENT: Negative for congestion, hearing loss, nosebleeds and tinnitus.    Eyes: Negative for double vision, pain, photophobia and visual disturbance.   Cardiovascular: Negative for chest pain, claudication, cyanosis and leg swelling.   Respiratory: Negative for cough, hemoptysis, shortness of breath and wheezing.    Endocrine: Negative for cold intolerance and heat intolerance.   Hematologic/Lymphatic: Negative for adenopathy and bleeding problem.   Skin: Positive for color change and nail changes. Negative for dry skin and itching.   Musculoskeletal: Negative for arthritis, joint pain, myalgias and stiffness.   Gastrointestinal: Negative for abdominal pain, jaundice, nausea and vomiting.   Genitourinary: Negative for dysuria, frequency and hematuria.   Neurological: Negative for difficulty with concentration, loss of balance, numbness, paresthesias and sensory change.   Psychiatric/Behavioral: Negative for altered mental status, hallucinations and suicidal ideas. The patient is not nervous/anxious.    Allergic/Immunologic: Negative for environmental allergies and persistent infections.           Objective:      Physical Exam  Vitals reviewed.   Constitutional:       Appearance: She is well-developed.   HENT:      Head: Normocephalic and atraumatic.   Cardiovascular:      Pulses:           Dorsalis pedis pulses are 2+ on the right side and 2+ on the left side.        Posterior tibial pulses are 2+ on the right side and 2+ on the left side.   Pulmonary:      Effort: Pulmonary  effort is normal.   Musculoskeletal:         General: Normal range of motion.      Comments: Inspection and palpation of the muscles joints and bones of both lower extremities reveal that muscle strength for the anterior lateral and posterior muscle groups and intrinsic muscle groups of the foot are all 5 over 5 symmetrical.  Ankle subtalar midtarsal and digital joint range of motion are within normal limits, nonpainful, without crepitus or effusion.  Patient exhibits a normal angle and base of gait.  Palpation of the tendons reveal no defects.   Skin:     General: Skin is warm and dry.      Capillary Refill: Capillary refill takes 2 to 3 seconds.      Comments: Skin turgor is normal bilaterally.  Skin texture is well hydrated to both lower extremities.  No lesions or rashes or wounds appreciated bilaterally.        Left hallux noted be discolored mildly thickened an   Neurological:      Mental Status: She is alert and oriented to person, place, and time.      Comments: Sharp dull light touch vibratory proprioceptive sensation are intact bilaterally.  Deep tendon reflexes to patellar and Achilles tendon are symmetrical 2 over 4 bilaterally.  No ankle clonus or Babinski reflexes noted bilaterally.  Coordination is normal to both feet and lower extremities.   Psychiatric:         Behavior: Behavior normal.               Assessment:       Encounter Diagnoses   Name Primary?    Disease of nail Yes    Onychomycosis due to dermatophyte          Plan:       Eugenio was seen today for nail problem.    Diagnoses and all orders for this visit:    Disease of nail    Onychomycosis due to dermatophyte    Other orders  -     terbinafine HCL (LAMISIL) 250 mg tablet; Take 1 tablet (250 mg total) by mouth once daily.      I counseled the patient on her conditions, their implications and medical management.      We discussed using Lamisil for onychomycosis. This drug offers a fairly high but not universal cure rate. A 12 week  treatment course is recommended. The patient is aware that rare cases of liver injury have been reported; and agrees to come in for liver function tests at 6 weeks of treatment. The symptoms of liver disease have been discussed; call if such occurs. In addition, some insurance plans do not cover the expense of this drug for treating a cosmetic condition, and the patient understands they may have to pay for the medication. Other side effects, such as headaches and rashes, have also been discussed.      Follow-up in 3 months.  .

## 2022-03-03 NOTE — TELEPHONE ENCOUNTER
Please advise pt that her left LE is + decreased blood flow  Likely contributing to her leg pain  rec Consult Vascular Med    Consult order placed   show

## 2022-03-15 ENCOUNTER — PES CALL (OUTPATIENT)
Dept: ADMINISTRATIVE | Facility: CLINIC | Age: 77
End: 2022-03-15
Payer: COMMERCIAL

## 2022-03-21 ENCOUNTER — PATIENT MESSAGE (OUTPATIENT)
Dept: ADMINISTRATIVE | Facility: OTHER | Age: 77
End: 2022-03-21
Payer: COMMERCIAL

## 2022-03-22 ENCOUNTER — PATIENT MESSAGE (OUTPATIENT)
Dept: ADMINISTRATIVE | Facility: OTHER | Age: 77
End: 2022-03-22
Payer: COMMERCIAL

## 2022-03-28 ENCOUNTER — TELEPHONE (OUTPATIENT)
Dept: INTERNAL MEDICINE | Facility: CLINIC | Age: 77
End: 2022-03-28
Payer: COMMERCIAL

## 2022-03-28 DIAGNOSIS — E78.00 PURE HYPERCHOLESTEROLEMIA: ICD-10-CM

## 2022-03-28 DIAGNOSIS — E03.9 HYPOTHYROIDISM, UNSPECIFIED TYPE: ICD-10-CM

## 2022-03-28 DIAGNOSIS — I10 HYPERTENSION, UNSPECIFIED TYPE: Primary | ICD-10-CM

## 2022-03-28 DIAGNOSIS — R73.03 PRE-DIABETES: ICD-10-CM

## 2022-03-28 RX ORDER — AMLODIPINE BESYLATE 5 MG/1
5 TABLET ORAL DAILY
Qty: 90 TABLET | Refills: 3 | Status: SHIPPED | OUTPATIENT
Start: 2022-03-28 | End: 2022-04-05 | Stop reason: SDUPTHER

## 2022-03-28 NOTE — TELEPHONE ENCOUNTER
----- Message from Leena Giorgio sent at 3/28/2022  1:30 PM CDT -----  Regarding: Lab orders  Good afternoon,    This patient has a lab appointment for tomorrow, but does not have any orders in their chart.  Could you please add some?    Please and thank you!

## 2022-03-29 ENCOUNTER — LAB VISIT (OUTPATIENT)
Dept: LAB | Facility: HOSPITAL | Age: 77
End: 2022-03-29
Attending: INTERNAL MEDICINE
Payer: MEDICARE

## 2022-03-29 DIAGNOSIS — E78.00 PURE HYPERCHOLESTEROLEMIA: ICD-10-CM

## 2022-03-29 DIAGNOSIS — R73.03 PRE-DIABETES: ICD-10-CM

## 2022-03-29 DIAGNOSIS — I10 HYPERTENSION, UNSPECIFIED TYPE: ICD-10-CM

## 2022-03-29 DIAGNOSIS — E03.9 HYPOTHYROIDISM, UNSPECIFIED TYPE: ICD-10-CM

## 2022-03-29 LAB
ALBUMIN SERPL BCP-MCNC: 3.8 G/DL (ref 3.5–5.2)
ALP SERPL-CCNC: 51 U/L (ref 55–135)
ALT SERPL W/O P-5'-P-CCNC: 19 U/L (ref 10–44)
ANION GAP SERPL CALC-SCNC: 9 MMOL/L (ref 8–16)
AST SERPL-CCNC: 22 U/L (ref 10–40)
BASOPHILS # BLD AUTO: 0.06 K/UL (ref 0–0.2)
BASOPHILS NFR BLD: 1.3 % (ref 0–1.9)
BILIRUB SERPL-MCNC: 0.4 MG/DL (ref 0.1–1)
BUN SERPL-MCNC: 20 MG/DL (ref 8–23)
CALCIUM SERPL-MCNC: 9.7 MG/DL (ref 8.7–10.5)
CHLORIDE SERPL-SCNC: 105 MMOL/L (ref 95–110)
CHOLEST SERPL-MCNC: 161 MG/DL (ref 120–199)
CHOLEST/HDLC SERPL: 2 {RATIO} (ref 2–5)
CO2 SERPL-SCNC: 29 MMOL/L (ref 23–29)
CREAT SERPL-MCNC: 0.8 MG/DL (ref 0.5–1.4)
DIFFERENTIAL METHOD: ABNORMAL
EOSINOPHIL # BLD AUTO: 0.1 K/UL (ref 0–0.5)
EOSINOPHIL NFR BLD: 2.9 % (ref 0–8)
ERYTHROCYTE [DISTWIDTH] IN BLOOD BY AUTOMATED COUNT: 14.3 % (ref 11.5–14.5)
EST. GFR  (AFRICAN AMERICAN): >60 ML/MIN/1.73 M^2
EST. GFR  (NON AFRICAN AMERICAN): >60 ML/MIN/1.73 M^2
ESTIMATED AVG GLUCOSE: 108 MG/DL (ref 68–131)
GLUCOSE SERPL-MCNC: 100 MG/DL (ref 70–110)
HBA1C MFR BLD: 5.4 % (ref 4–5.6)
HCT VFR BLD AUTO: 44.4 % (ref 37–48.5)
HDLC SERPL-MCNC: 82 MG/DL (ref 40–75)
HDLC SERPL: 50.9 % (ref 20–50)
HGB BLD-MCNC: 14.5 G/DL (ref 12–16)
IMM GRANULOCYTES # BLD AUTO: 0.01 K/UL (ref 0–0.04)
IMM GRANULOCYTES NFR BLD AUTO: 0.2 % (ref 0–0.5)
LDLC SERPL CALC-MCNC: 68.4 MG/DL (ref 63–159)
LYMPHOCYTES # BLD AUTO: 1 K/UL (ref 1–4.8)
LYMPHOCYTES NFR BLD: 22.5 % (ref 18–48)
MCH RBC QN AUTO: 31.3 PG (ref 27–31)
MCHC RBC AUTO-ENTMCNC: 32.7 G/DL (ref 32–36)
MCV RBC AUTO: 96 FL (ref 82–98)
MONOCYTES # BLD AUTO: 0.6 K/UL (ref 0.3–1)
MONOCYTES NFR BLD: 12.7 % (ref 4–15)
NEUTROPHILS # BLD AUTO: 2.7 K/UL (ref 1.8–7.7)
NEUTROPHILS NFR BLD: 60.4 % (ref 38–73)
NONHDLC SERPL-MCNC: 79 MG/DL
NRBC BLD-RTO: 0 /100 WBC
PLATELET # BLD AUTO: 270 K/UL (ref 150–450)
PMV BLD AUTO: 9.7 FL (ref 9.2–12.9)
POTASSIUM SERPL-SCNC: 4.2 MMOL/L (ref 3.5–5.1)
PROT SERPL-MCNC: 6.5 G/DL (ref 6–8.4)
RBC # BLD AUTO: 4.63 M/UL (ref 4–5.4)
SODIUM SERPL-SCNC: 143 MMOL/L (ref 136–145)
T4 FREE SERPL-MCNC: 0.86 NG/DL (ref 0.71–1.51)
TRIGL SERPL-MCNC: 53 MG/DL (ref 30–150)
TSH SERPL DL<=0.005 MIU/L-ACNC: 5.03 UIU/ML (ref 0.4–4)
WBC # BLD AUTO: 4.48 K/UL (ref 3.9–12.7)

## 2022-03-29 PROCEDURE — 80053 COMPREHEN METABOLIC PANEL: CPT | Performed by: INTERNAL MEDICINE

## 2022-03-29 PROCEDURE — 84443 ASSAY THYROID STIM HORMONE: CPT | Performed by: INTERNAL MEDICINE

## 2022-03-29 PROCEDURE — 83036 HEMOGLOBIN GLYCOSYLATED A1C: CPT | Performed by: INTERNAL MEDICINE

## 2022-03-29 PROCEDURE — 80061 LIPID PANEL: CPT | Performed by: INTERNAL MEDICINE

## 2022-03-29 PROCEDURE — 85025 COMPLETE CBC W/AUTO DIFF WBC: CPT | Performed by: INTERNAL MEDICINE

## 2022-03-29 PROCEDURE — 36415 COLL VENOUS BLD VENIPUNCTURE: CPT | Mod: PO | Performed by: INTERNAL MEDICINE

## 2022-03-29 PROCEDURE — 84439 ASSAY OF FREE THYROXINE: CPT | Performed by: INTERNAL MEDICINE

## 2022-03-30 ENCOUNTER — TELEPHONE (OUTPATIENT)
Dept: INTERNAL MEDICINE | Facility: CLINIC | Age: 77
End: 2022-03-30
Payer: COMMERCIAL

## 2022-03-30 NOTE — TELEPHONE ENCOUNTER
----- Message from Suha Salguero MD sent at 3/29/2022  8:14 PM CDT -----  Please review your lab work and we will discuss at your pending office visit.   Suha Clifton

## 2022-04-03 NOTE — PROGRESS NOTES
76 y.o. femalepresents in f/u to hypothyroidism, PAD, dyslipidemia, thrombocytosis, and  Post inflammatory fibrosis    Colon cancer, s/p surgery  Weight loss, about 8#, and fatigue  + restful sleep  Constipation, Tx: Metamucil    HTN, tx amlodipine 5mg  Digital HTN onboarding now  BP from Home Digital running 140's on average      Bone density 2020-- RX FOSAMAX- doesn't like taking weekly, but staying the tx course   Impression:     1. Osteoporosis on treatment with Fosamax  2. Compared with previous DXA, BMD at the lumbar spine has remained stable, and the BMD at the total hip has remained stable.     RECOMMENDATIONS:  RECOMMENDATIONS of [facility]:     1. Recommend daily calcium intake 6475-5259 mg, dietary sources preferred; Vitamin D 3846-1223 IU daily.  2. Adequate exercise and fall precautions.  3. Recommend continued treatment with Fosamax.  If the patient has been treated with Fosamax for a period of at least 5 years and has not fractured, can consider drug holiday.  4. Repeat BMD in 2-3 years                   Dyslipidemia tx w/ atorvastatin  PAD- ++ claudication walks daily, 15' then 5' rest   Walks 5 days/week  CTA-2/2021  Impression:     1. Atherosclerosis.  Three-vessel runoff in both legs.  Approximate 50% focal stenosis in left external iliac artery.  2. No acute abnormality identified in the abdomen or pelvis.  3. Other findings as above    Denied unusual muscle pain or chest pain  LDL==>73 ( 9/2020)    Pre-Diabetes,   A1C==>5.4( 2/2020)  Denied increased thirst or urination    Hypothyroidism, tx w/ levothyroxine 50mcg  Denied fatigue or cold intolerance; ++ hot flashes     Thrombocytosis, recsolved, tx ASA and Fish oil   Platelet ct==>normalized @ 270K  Denied stroke like sx, confusion , or focal deficits    Post inflammatory fibrosis  Pt was in study w/ yearly   D/c'd tobacco > 15 yrs  Asx at present, rate limited by PAD  Denied angina or syncope associated    Denied heart palpitations  Denied wheezing, cough or mucus production    Memory disturbance  Seems to be progressing    ROS:  No fever, or chills  No blurry vision or visual disturbance  No dysphagia or early satiety  No palpitations or tachycardia  No angina or chest pain  No cough or wheezing  No GERD or abdominal pain  No blood in stool or urine  No dysuria or hematuria  No numbness or focal deficits  No cold intolerance; + hot flashes  ++ claudication , can walk  10' then has to sit and rest  No increased thirst or urination   dysphoric mood: No  ADL's: 75% s/p surgery  AD's: + living will  Memory: disturbance  Mental Health: intact  Safety: intact  Gait: no falls  Nutrition: decreased appetite    Remainder of review negative exgcept as previously noted        EYE exam UTD  Dental UTD.    VACCINATIONS:  Tdap, 7/2014  Pneumovax 9/08, update 1/2017  Prevnar, 2015  Zostavax 2010  FLU, yearly  Covid: 3/3    MEDS: Reviewed.     PHYSICAL EXAM:  VS: As noted  GENERAL: WDWN, A&O, NAD, conversant and co-operative; pleasant as always  EYES: Conj/lids unremarkable, sclera anicteric  NECK: Supple w/o lymphadenopathy or thyromegaly  RESPIRATORY: Efforts are unlabored. Lungs CTAP  CARDIOVASCULAR: Heart RRR. No carotid bruits noted.  Diminished pedal pulses. No LE edema  GASTROINTESTINAL: BS+, soft , NT/ND, - HSM noted  MUSCULOSKELETAL: Gait normal. No CCE    NEUROLOGIC: BARROW. 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

## 2022-04-05 ENCOUNTER — OFFICE VISIT (OUTPATIENT)
Dept: INTERNAL MEDICINE | Facility: CLINIC | Age: 77
End: 2022-04-05
Payer: MEDICARE

## 2022-04-05 ENCOUNTER — TELEPHONE (OUTPATIENT)
Dept: INTERNAL MEDICINE | Facility: CLINIC | Age: 77
End: 2022-04-05

## 2022-04-05 ENCOUNTER — HOSPITAL ENCOUNTER (OUTPATIENT)
Dept: RADIOLOGY | Facility: HOSPITAL | Age: 77
Discharge: HOME | End: 2022-04-05
Attending: INTERNAL MEDICINE
Payer: MEDICARE

## 2022-04-05 VITALS
OXYGEN SATURATION: 93 % | BODY MASS INDEX: 27.28 KG/M2 | HEART RATE: 83 BPM | RESPIRATION RATE: 16 BRPM | HEIGHT: 64 IN | DIASTOLIC BLOOD PRESSURE: 76 MMHG | SYSTOLIC BLOOD PRESSURE: 122 MMHG | TEMPERATURE: 97 F | WEIGHT: 159.81 LBS

## 2022-04-05 DIAGNOSIS — R06.02 SOB (SHORTNESS OF BREATH) ON EXERTION: ICD-10-CM

## 2022-04-05 DIAGNOSIS — M81.8 OTHER OSTEOPOROSIS WITHOUT CURRENT PATHOLOGICAL FRACTURE: ICD-10-CM

## 2022-04-05 DIAGNOSIS — E78.00 PURE HYPERCHOLESTEROLEMIA: ICD-10-CM

## 2022-04-05 DIAGNOSIS — E03.9 HYPOTHYROIDISM, UNSPECIFIED TYPE: ICD-10-CM

## 2022-04-05 DIAGNOSIS — I73.9 PAD (PERIPHERAL ARTERY DISEASE): ICD-10-CM

## 2022-04-05 DIAGNOSIS — R41.3 MEMORY DISORDER: ICD-10-CM

## 2022-04-05 DIAGNOSIS — R73.03 PRE-DIABETES: ICD-10-CM

## 2022-04-05 DIAGNOSIS — I10 HYPERTENSION, UNSPECIFIED TYPE: ICD-10-CM

## 2022-04-05 DIAGNOSIS — R73.03 PRE-DIABETES: Primary | ICD-10-CM

## 2022-04-05 DIAGNOSIS — I70.0 ATHEROSCLEROSIS OF AORTA: ICD-10-CM

## 2022-04-05 DIAGNOSIS — J84.10 POSTINFLAMMATORY PULMONARY FIBROSIS: ICD-10-CM

## 2022-04-05 DIAGNOSIS — C18.5 MALIGNANT NEOPLASM OF SPLENIC FLEXURE: ICD-10-CM

## 2022-04-05 DIAGNOSIS — I10 HYPERTENSION, UNSPECIFIED TYPE: Primary | ICD-10-CM

## 2022-04-05 PROCEDURE — 71046 X-RAY EXAM CHEST 2 VIEWS: CPT | Mod: 26,,, | Performed by: RADIOLOGY

## 2022-04-05 PROCEDURE — 71046 X-RAY EXAM CHEST 2 VIEWS: CPT | Mod: TC,PO

## 2022-04-05 PROCEDURE — 99999 PR PBB SHADOW E&M-EST. PATIENT-LVL V: CPT | Mod: PBBFAC,,, | Performed by: INTERNAL MEDICINE

## 2022-04-05 PROCEDURE — 99215 OFFICE O/P EST HI 40 MIN: CPT | Mod: S$PBB,,, | Performed by: INTERNAL MEDICINE

## 2022-04-05 PROCEDURE — 71046 XR CHEST PA AND LATERAL: ICD-10-PCS | Mod: 26,,, | Performed by: RADIOLOGY

## 2022-04-05 PROCEDURE — 99215 OFFICE O/P EST HI 40 MIN: CPT | Mod: PBBFAC,25,PO | Performed by: INTERNAL MEDICINE

## 2022-04-05 PROCEDURE — 99215 PR OFFICE/OUTPT VISIT, EST, LEVL V, 40-54 MIN: ICD-10-PCS | Mod: S$PBB,,, | Performed by: INTERNAL MEDICINE

## 2022-04-05 PROCEDURE — 99999 PR PBB SHADOW E&M-EST. PATIENT-LVL V: ICD-10-PCS | Mod: PBBFAC,,, | Performed by: INTERNAL MEDICINE

## 2022-04-05 RX ORDER — AMLODIPINE BESYLATE 10 MG/1
10 TABLET ORAL DAILY
Qty: 90 TABLET | Refills: 3 | Status: SHIPPED | OUTPATIENT
Start: 2022-04-05 | End: 2022-12-08

## 2022-04-05 NOTE — PROGRESS NOTES
HPI:  Eugenio Aldridge is a 76 y.o. female with history of splenic flexure cancer.    12- colonoscopy with polypectomy   SPLENIC FLEXURE POLYP, POLYPECTOMY: Minimally invasive moderately   differentiated adenocarcinoma, see synoptic report   SYNOPTIC REPORT:   - Tumor site: Splenic flexure   - Histologic type: Adenocarcinoma   - Histologic Grade: G2, moderately differentiated   - Size of Invasive Carcinoma: 0.7 cm   - Tumor Extent:  Invades muscularis mucosae   - Lymphovascular Invasion: Not identified   - Type of Polyp in which Invasive Carcinoma Arose: Sessile serrated adenoma   - Margin Status for Invasive Carcinoma: Deep margin involved by invasive   carcinoma   - Cancer biomarkers for microsatelite repair have been ordered and the   results will be issued in an addendum    Comment: Interp By Margarita Allen M.D., Signed on 12/16/2021 at 09:45       2- laparoscopic left colectomy (sigmoid diverticulitis found)    Final Pathologic Diagnosis RELIAPA DIAGNOSIS:   A. LEFT COLON:   -No residual adenocarcinoma, sessile serrated lesion or adenomatous lesion.   -Benign colonic parenchyma with focal tattoo at the splenic flexure with   associated focal muscular hypertrophy and diffuse diverticulosis, see   comment. -Multiple levels examined.   B. DISTAL ANASTOMOTIC DONUT:   -Benign colonic parenchyma consistent with distal anastomotic donut.   C. PROXIMAL ANASTOMOTIC DONUT:   -Benign colonic parenchyma consistent with proximal anastomotic donut.   COMMENT:   The specimen was re-examined grossly on 02-11-22. No gross evidence of   residual adenocarcinoma or polypoid lesions is identified. No evidence of   residual adenocarcinoma, sessile serrated lesion or adenomatous lesion is   identified on multiple deeper levels obtained on multiple blocks.       Shawanda Alvarez M.D.   Report attached.   Performing site:   [address] [address]   Note for [facility] Sign-Out Pathologist:  This  case diagnosis was rendered   completely by the outside consultation pathologist and the case is   electronically signed by an [facility] pathologist listed below solely to   release the report into the medical record.  VC      Comment: Interp By Radha Townsend MD, Signed on 02/18/2022 at 10:19   Supplemental Diagnosis [facility] ADDENDUM (02-17-22):   The number of lymph nodes was previously omitted from the previous report on   specimen A.  There are twenty-four (24) lymph nodes negative for metastatic   carcinoma.   Shawanda Alvarez M.D.   Report attached.   Performing site:   [facility]   [email or web address] [place] [address]         4/6/22   Interval history   Lethargy improving.    Hard to evacuate stool - straining.  BMs formed.  Not narrow.   Taking Metamucil 1 packet per day  Starting Miralax  No abd pain.  No fever.  No blood per rectum        Past Medical History:   Diagnosis Date    Actinic keratosis     Aortic valve disorders     Atherosclerosis of aorta     Atherosclerosis of native arteries of the extremities with intermittent claudication     Carcinoma in situ, vulva     Hypertension 2/21/2022    Left bundle branch hemiblock     Other and unspecified hyperlipidemia     Postinflammatory pulmonary fibrosis     Senile nuclear sclerosis         Past Surgical History:   Procedure Laterality Date    ARTHROSCOPIC REPAIR OF ROTATOR CUFF OF SHOULDER Right 4/24/2019    Procedure: REPAIR, ROTATOR CUFF, ARTHROSCOPIC WITH REGENERATIN IMPLANT;  Surgeon: Walter Hernandez MD;  Location: [facility];  Service: Orthopedics;  Laterality: Right;  regional w/o catheter     BTL      COLONOSCOPY N/A 12/9/2021    Procedure: COLONOSCOPY;  Surgeon: Juan Swain MD;  Location: [address]);  Service: Endoscopy;  Laterality: N/A;  fully vaccinated    DECOMPRESSION OF SUBACROMIAL SPACE Right 4/24/2019    Procedure: DECOMPRESSION, SUBACROMIAL SPACE WITH DISTAL CLAVICLE EXCISION (DCE);  Surgeon: Walter HOUGH  MD David;  Location: [facility];  Service: Orthopedics;  Laterality: Right;    FLEXIBLE SIGMOIDOSCOPY N/A 12/20/2021    Procedure: SIGMOIDOSCOPY-FLEXIBLE;  Surgeon: Juan Swain MD;  Location: [facility] (2ND FLR);  Service: Endoscopy;  Laterality: N/A;  please schedule with me Monday 12/20 AM. Urgent for marking for colon cancer  spot held 2/20/21 12/16 fully vaccinated; instructions to portal-st    FLEXIBLE SIGMOIDOSCOPY N/A 2/4/2022    Procedure: SIGMOIDOSCOPY, FLEXIBLE;  Surgeon: GUILLERMO Olguin MD;  [facility];  Service: Colon and Rectal;  Laterality: N/A;    LAPAROSCOPIC LEFT COLECTOMY N/A 2/4/2022    Procedure: COLECTOMY, LEFT, LAPAROSCOPIC;  Surgeon: GUILLERMO Olguin MD;  [facility];  Service: Colon and Rectal;  Laterality: N/A;  Extended    MOBILIZATION OF SPLENIC FLEXURE N/A 2/4/2022    Procedure: MOBILIZATION, SPLENIC FLEXURE;  Surgeon: GUILLERMO Olguin MD;  [facility];  Service: Colon and Rectal;  Laterality: N/A;    RHYTIDECTOMY      SHOULDER ARTHROSCOPY Right 4/24/2019    Procedure: ARTHROSCOPY, SHOULDER WITH LABRAL DEBRIDEMENT;  Surgeon: Walter Hernandez MD;  Location: [facility];  Service: Orthopedics;  Laterality: Right;    TONSILLECTOMY, ADENOIDECTOMY      VULVA SURGERY         Review of patient's allergies indicates:   Allergen Reactions    Codeine Nausea And Vomiting       Family History   Problem Relation Age of Onset    Bladder Cancer Father         d. 69    Alzheimer's disease Mother 96        d. 97    Bladder Cancer Paternal Uncle     Lung cancer Paternal Uncle     Breast cancer Paternal Grandmother     Pancreatic cancer Paternal Aunt     Other Brother         d. 45 o/d    Pneumonia Brother         d. 58, smoker, obese    No Known Problems Sister     No Known Problems Sister     No Known Problems Sister     Amblyopia Neg Hx     Blindness Neg Hx     Cataracts Neg Hx     Diabetes Neg Hx     Glaucoma Neg Hx      Hypertension Neg Hx     Macular degeneration Neg Hx     Retinal detachment Neg Hx     Strabismus Neg Hx     Stroke Neg Hx     Thyroid disease Neg Hx        Social History     Socioeconomic History    Marital status: Significant Other    Number of children: 0    Years of education: 18    Highest education level: Master's degree (e.g., MA, MS, Vinicio, MEd, MSW, AMBROCIO)   Occupational History    Occupation: CPA     Comment: Retired 2019   Tobacco Use    Smoking status: Former Smoker     Packs/day: 1.50     Years: 39.00     Pack years: 58.50     Types: Cigarettes     Start date:      Quit date: 2001     Years since quittin.5    Smokeless tobacco: Never Used   Substance and Sexual Activity    Alcohol use: Yes     Alcohol/week: 7.0 - 14.0 standard drinks     Types: 7 - 14 Standard drinks or equivalent per week    Drug use: No    Sexual activity: Yes   Social History Narrative    Significant other s/p colon cancer, now w/ bladder cancer;f/u imaging clear    + walking in [place], 3-4 /wk    + working CPA     Social Determinants of Health     Financial Resource Strain: Low Risk     Difficulty of Paying Living Expenses: Not hard at all   Food Insecurity: No Food Insecurity    Worried About Running Out of Food in the Last Year: Never true    Ran Out of Food in the Last Year: Never true   Transportation Needs: No Transportation Needs    Lack of Transportation (Medical): No    Lack of Transportation (Non-Medical): No   Physical Activity: Sufficiently Active    Days of Exercise per Week: 5 days    Minutes of Exercise per Session: 30 min   Stress: No Stress Concern Present    Feeling of Stress : Not at all   Social Connections: Unknown    Frequency of Communication with Friends and Family: Three times a week    Frequency of Social Gatherings with Friends and Family: Twice a week    Active Member of Clubs or Organizations: Yes    Attends Club or Organization Meetings: 1 to 4 times per year     "Marital Status: Living with partner   Housing Stability: Low Risk     Unable to Pay for Housing in the Last Year: No    Number of Places Lived in the Last Year: 1    Unstable Housing in the Last Year: No       ROS:  GENERAL: No fever, chills, fatigability or weight loss.  Integument: No rashes, redness, icterus  CHEST: Denies VOGEL, cyanosis, wheezing, cough and sputum production.  CARDIOVASCULAR: Denies chest pain, PND, orthopnea or reduced exercise tolerance.  GI: Denies abd pain, dysphagia, nausea, vomiting, no hematemesis   : Denies burning on urination, no hematuria, no bacteriuria  MSK: No deformities, swelling, joint pain swelling  Neurologic: No HAs, seizures, weakness, paresthesias, gait problems    PE:  General appearance well  BP (!) 145/65 (BP Location: Left arm, Patient Position: Sitting, BP Method: Medium (Automatic))   Pulse 83   Ht 5' 4" (1.626 m)   Wt 72.2 kg (159 lb 3.2 oz)   BMI 27.33 kg/m²     Sclera/ Skin anicteric  LN none palpable  AT NC EOMI  Neck supple trachea midline   Chest symmetric, nl excursion, no retractions, breathing comfortably  Abdomen - Incisions CDI  ND soft NT.  no masses, no organomegaly  EXT - no CCE  Neuro:  Mood/ affect nl, alert and oriented x 3, moves all ext's, gait nl    Assessment:  Wounds healed  Good performance status following left colectomy (transverse colon to rectal anastomosis  Stage 1 colon cancer  Difficulty evacuating.      Plan:  High fiber diet - 25 grams per day.  Emphasis on whole grain breads such as double fiber wheat bread and high fiber cereals and bars.    Drink 8 glasses of water per day 64 - 96 oz per day  Fiber supplements increase - METAMUCIL 2 teaspoons  per day  Regular exercise - 30 min brisk walking 5 days per week  RTC 3 months        "

## 2022-04-05 NOTE — TELEPHONE ENCOUNTER
----- Message from Suha Salguero MD sent at 4/5/2022  1:17 PM CDT -----  Need lab before next appt

## 2022-04-05 NOTE — PROGRESS NOTES
Subjective:       Patient ID: Eugenio Aldridge is a 76 y.o. female.    Chief Complaint:     The patient is a 76 year old woman with a MH of PAD, HTN, hyperlipidemia, hypothyroidism, osteoporosis, and colon cancer s/p left hemicolectomy on 2/2022 coming in for an annual exam.     Since her hemicolectomy in February 2022, the patient has felt more SOB, tired, decreased appetite, weight loss of approximately 8lbs, and constipated.     She reports SOB with exertion that began shortly after surgery. She was d/c'd home and was inacctive for approximately one week. Since has seen some improvement in SOB. She continues to talk 5d per week and after approximately 15 minutes feels left thigh and leg claudication, but is relieved with rest. Feels SOB during these walks as well. Denies chest pain, palpitations, orthopnea, fevers, cough, or wheeze.     Her appetite has decreased since the surgery as well. No early satiety or N/V, just doesn't feel like eating sometimes. She continues to take one packet of metamucil per day with 3 or 4 16oz bottles of water. Endorses hard stools and difficulty passing stool, but no blood or mucus and no longer taking opiate pain medication. Also endorses some abdominal distention.     She has been enrolled in the online BP monitoring through [facility]. Readings at home are 130-140s/80-90s. Takes amlodipine 5mg serenity.     Patient had complained of declining memory in the past. Would be amenable to seeing neurology for formal testing and diagnosis.     Labs reviewed with patient. Grossly normal.         Review of Systems   Constitutional: Positive for appetite change, fatigue and unexpected weight change. Negative for chills, diaphoresis and fever.   HENT: Negative.    Eyes: Negative.    Respiratory: Positive for shortness of breath (Since 2/22 surgery). Negative for apnea, cough, chest tightness and wheezing.    Cardiovascular: Positive for claudication (Unchanged). Negative for chest pain, palpitations  "and leg swelling.   Gastrointestinal: Positive for abdominal distention, change in bowel habit, constipation and change in bowel habit. Negative for abdominal pain, diarrhea, nausea, vomiting and fecal incontinence.   Endocrine: Negative.    Genitourinary: Negative.    Musculoskeletal: Negative.    Integumentary:  Negative.   Allergic/Immunologic: Negative.    Neurological: Negative for dizziness, syncope, weakness, light-headedness and headaches.   Hematological: Negative.    Psychiatric/Behavioral: Negative.          Objective:       Vitals:    04/05/22 0854   BP: 122/76   Pulse: 83   Resp: 16   Temp: 97.3 °F (36.3 °C)   TempSrc: Temporal   SpO2: (!) 93%   Weight: 72.5 kg (159 lb 13.3 oz)   Height: 5' 4" (1.626 m)     Physical Exam  Constitutional:       Appearance: Normal appearance.   Neck:      Vascular: No carotid bruit.   Cardiovascular:      Rate and Rhythm: Normal rate and regular rhythm.      Pulses: Normal pulses.      Heart sounds: Normal heart sounds.   Pulmonary:      Effort: Pulmonary effort is normal.      Breath sounds: Rales (Subtle, left lung mid thorax) present.   Abdominal:      General: Abdomen is flat. Bowel sounds are normal. There is no distension.      Palpations: Abdomen is soft.      Tenderness: There is no abdominal tenderness.   Musculoskeletal:         General: Normal range of motion.      Right lower leg: No edema.      Left lower leg: No edema.   Lymphadenopathy:      Cervical: No cervical adenopathy.   Skin:     General: Skin is warm and dry.   Neurological:      General: No focal deficit present.      Mental Status: She is alert.   Psychiatric:         Mood and Affect: Mood normal.         Behavior: Behavior normal.         Thought Content: Thought content normal.         Assessment:       Problem List Items Addressed This Visit        Pulmonary    Postinflammatory pulmonary fibrosis       Cardiac/Vascular    PAD (peripheral artery disease) (Chronic)    Hypertension - Primary " (Chronic)    Pure hypercholesterolemia    Atherosclerosis of aorta       Endocrine    Hypothyroidism    Pre-diabetes       Orthopedic    Other osteoporosis without current pathological fracture      Other Visit Diagnoses     Memory disorder        Relevant Orders    Ambulatory referral/consult to Neurology    SOB (shortness of breath) on exertion        Relevant Orders    X-Ray Chest PA And Lateral (Completed)          Plan:     1. Shortness of breath  --12/2021 chest CT saw atelectasis in lingula and right middle lobe indicating underlying lung pathology prior to visit.   --10/2021 Dobutamine stress echo found no significant abnormalities making HF less likely.   --3/2022 Hb 14.5, not anemic.   --Sp02 93% this visit, 92% in past. Former smoker.   --Patient might still be deconditioned from surgery, as an acute cardiorespiratory pathology is less likely  --Chest xray ordered for today, will advise of results  --Continue to walk and breath deeply as often as desired    2. Constipation  --Continue taking metamucil  --Add one top of miralax with along metamucil  --Continue with as much or more water intake  --Follow up as prescribed with colorectal    3. HTN  --Continue with home BP readings via [facility] digital medicine  --Amlodipine 10mg sent to pharmacy  --Start 2x amlodipine 5mg tomorrow (10mg total) until Rx runs out then can start 10mg pills    4. Memory issues  --Referral placed for neurology    5. PAD  --Stable  --Continue atorvastatin 40mg and physical activity    6. Hypothyroid  --Will recheck levels in 6 months

## 2022-04-06 ENCOUNTER — TELEPHONE (OUTPATIENT)
Dept: INTERNAL MEDICINE | Facility: CLINIC | Age: 77
End: 2022-04-06
Payer: COMMERCIAL

## 2022-04-06 ENCOUNTER — OFFICE VISIT (OUTPATIENT)
Dept: SURGERY | Facility: CLINIC | Age: 77
End: 2022-04-06
Payer: MEDICARE

## 2022-04-06 ENCOUNTER — LAB VISIT (OUTPATIENT)
Dept: LAB | Facility: HOSPITAL | Age: 77
End: 2022-04-06
Attending: COLON & RECTAL SURGERY
Payer: MEDICARE

## 2022-04-06 ENCOUNTER — OFFICE VISIT (OUTPATIENT)
Dept: CARDIOLOGY | Facility: CLINIC | Age: 77
End: 2022-04-06
Payer: MEDICARE

## 2022-04-06 VITALS
WEIGHT: 159.63 LBS | OXYGEN SATURATION: 94 % | DIASTOLIC BLOOD PRESSURE: 78 MMHG | SYSTOLIC BLOOD PRESSURE: 134 MMHG | BODY MASS INDEX: 27.25 KG/M2 | HEIGHT: 64 IN | HEART RATE: 90 BPM

## 2022-04-06 VITALS
DIASTOLIC BLOOD PRESSURE: 65 MMHG | HEIGHT: 64 IN | HEART RATE: 83 BPM | WEIGHT: 159.19 LBS | SYSTOLIC BLOOD PRESSURE: 145 MMHG | BODY MASS INDEX: 27.18 KG/M2

## 2022-04-06 DIAGNOSIS — I73.9 PAD (PERIPHERAL ARTERY DISEASE): Primary | Chronic | ICD-10-CM

## 2022-04-06 DIAGNOSIS — C18.5 MALIGNANT NEOPLASM OF SPLENIC FLEXURE: ICD-10-CM

## 2022-04-06 DIAGNOSIS — C18.5 MALIGNANT NEOPLASM OF SPLENIC FLEXURE: Primary | ICD-10-CM

## 2022-04-06 DIAGNOSIS — I10 PRIMARY HYPERTENSION: Chronic | ICD-10-CM

## 2022-04-06 DIAGNOSIS — E78.00 PURE HYPERCHOLESTEROLEMIA: ICD-10-CM

## 2022-04-06 LAB
ALBUMIN SERPL BCP-MCNC: 4.2 G/DL (ref 3.5–5.2)
ALP SERPL-CCNC: 62 U/L (ref 55–135)
ALT SERPL W/O P-5'-P-CCNC: 22 U/L (ref 10–44)
ANION GAP SERPL CALC-SCNC: 10 MMOL/L (ref 8–16)
AST SERPL-CCNC: 25 U/L (ref 10–40)
BASOPHILS # BLD AUTO: 0.07 K/UL (ref 0–0.2)
BASOPHILS NFR BLD: 1.4 % (ref 0–1.9)
BILIRUB SERPL-MCNC: 0.6 MG/DL (ref 0.1–1)
BUN SERPL-MCNC: 11 MG/DL (ref 8–23)
CALCIUM SERPL-MCNC: 10.5 MG/DL (ref 8.7–10.5)
CHLORIDE SERPL-SCNC: 102 MMOL/L (ref 95–110)
CO2 SERPL-SCNC: 31 MMOL/L (ref 23–29)
CREAT SERPL-MCNC: 0.9 MG/DL (ref 0.5–1.4)
CRP SERPL-MCNC: 0.3 MG/L (ref 0–8.2)
DIFFERENTIAL METHOD: ABNORMAL
EOSINOPHIL # BLD AUTO: 0.1 K/UL (ref 0–0.5)
EOSINOPHIL NFR BLD: 1.8 % (ref 0–8)
ERYTHROCYTE [DISTWIDTH] IN BLOOD BY AUTOMATED COUNT: 14.2 % (ref 11.5–14.5)
EST. GFR  (AFRICAN AMERICAN): >60 ML/MIN/1.73 M^2
EST. GFR  (NON AFRICAN AMERICAN): >60 ML/MIN/1.73 M^2
GLUCOSE SERPL-MCNC: 74 MG/DL (ref 70–110)
HCT VFR BLD AUTO: 46.6 % (ref 37–48.5)
HGB BLD-MCNC: 15 G/DL (ref 12–16)
IMM GRANULOCYTES # BLD AUTO: 0.01 K/UL (ref 0–0.04)
IMM GRANULOCYTES NFR BLD AUTO: 0.2 % (ref 0–0.5)
LYMPHOCYTES # BLD AUTO: 0.8 K/UL (ref 1–4.8)
LYMPHOCYTES NFR BLD: 16.4 % (ref 18–48)
MCH RBC QN AUTO: 31.8 PG (ref 27–31)
MCHC RBC AUTO-ENTMCNC: 32.2 G/DL (ref 32–36)
MCV RBC AUTO: 99 FL (ref 82–98)
MONOCYTES # BLD AUTO: 0.6 K/UL (ref 0.3–1)
MONOCYTES NFR BLD: 12.8 % (ref 4–15)
NEUTROPHILS # BLD AUTO: 3.3 K/UL (ref 1.8–7.7)
NEUTROPHILS NFR BLD: 67.4 % (ref 38–73)
NRBC BLD-RTO: 0 /100 WBC
PLATELET # BLD AUTO: 255 K/UL (ref 150–450)
PMV BLD AUTO: 9.7 FL (ref 9.2–12.9)
POTASSIUM SERPL-SCNC: 4.6 MMOL/L (ref 3.5–5.1)
PROT SERPL-MCNC: 6.9 G/DL (ref 6–8.4)
RBC # BLD AUTO: 4.72 M/UL (ref 4–5.4)
SODIUM SERPL-SCNC: 143 MMOL/L (ref 136–145)
WBC # BLD AUTO: 4.93 K/UL (ref 3.9–12.7)

## 2022-04-06 PROCEDURE — 99215 PR OFFICE/OUTPT VISIT, EST, LEVL V, 40-54 MIN: ICD-10-PCS | Mod: S$PBB,,, | Performed by: INTERNAL MEDICINE

## 2022-04-06 PROCEDURE — 99999 PR PBB SHADOW E&M-EST. PATIENT-LVL IV: CPT | Mod: PBBFAC,,, | Performed by: INTERNAL MEDICINE

## 2022-04-06 PROCEDURE — 85025 COMPLETE CBC W/AUTO DIFF WBC: CPT | Performed by: COLON & RECTAL SURGERY

## 2022-04-06 PROCEDURE — 99999 PR PBB SHADOW E&M-EST. PATIENT-LVL III: CPT | Mod: PBBFAC,,, | Performed by: COLON & RECTAL SURGERY

## 2022-04-06 PROCEDURE — 99999 PR PBB SHADOW E&M-EST. PATIENT-LVL III: ICD-10-PCS | Mod: PBBFAC,,, | Performed by: COLON & RECTAL SURGERY

## 2022-04-06 PROCEDURE — 99214 OFFICE O/P EST MOD 30 MIN: CPT | Mod: PBBFAC,27,PO | Performed by: INTERNAL MEDICINE

## 2022-04-06 PROCEDURE — 86140 C-REACTIVE PROTEIN: CPT | Performed by: COLON & RECTAL SURGERY

## 2022-04-06 PROCEDURE — 99024 PR POST-OP FOLLOW-UP VISIT: ICD-10-PCS | Mod: POP,,, | Performed by: COLON & RECTAL SURGERY

## 2022-04-06 PROCEDURE — 80053 COMPREHEN METABOLIC PANEL: CPT | Performed by: COLON & RECTAL SURGERY

## 2022-04-06 PROCEDURE — 36415 COLL VENOUS BLD VENIPUNCTURE: CPT | Performed by: COLON & RECTAL SURGERY

## 2022-04-06 PROCEDURE — 99024 POSTOP FOLLOW-UP VISIT: CPT | Mod: POP,,, | Performed by: COLON & RECTAL SURGERY

## 2022-04-06 PROCEDURE — 99215 OFFICE O/P EST HI 40 MIN: CPT | Mod: S$PBB,,, | Performed by: INTERNAL MEDICINE

## 2022-04-06 PROCEDURE — 99999 PR PBB SHADOW E&M-EST. PATIENT-LVL IV: ICD-10-PCS | Mod: PBBFAC,,, | Performed by: INTERNAL MEDICINE

## 2022-04-06 PROCEDURE — 99213 OFFICE O/P EST LOW 20 MIN: CPT | Mod: PBBFAC | Performed by: COLON & RECTAL SURGERY

## 2022-04-06 NOTE — PROGRESS NOTES
[facility]         Chief Complaint   Patient presents with    Left leg pain with walking         Subjective   Patient ID: Mrs. Eugenio Aldridge is a 76 years old female with peripheral arterial disease, osteoporosis, and hypothyroidism, who presents for a follow up appointment.      - Patient kindly referred to Vascular Medicine Clinic by Dr. Suha Benoit for peripheral arterial disease.     -At our initial clinic visit on 12/16/2020, Mrs. Aldridge reported progressive worsening of left thigh pain with walking for the past 2-3 months.  Symptoms occur with walking less than a block distance. Pain relieved with rest.  Patient reported no rest pain, tissue loss or leg swelling. She was noted to have abnormal exercise ABIs in 2004 and diagnosed with peripheral arterial disease.  AYDEN's improved in 2007.  CTA in 2008 revealed no significant disease except for plaque within the distal aorta and iliac arteries.  Patient reports smoking history for 20 years, a pack a day, and she quit 30 years ago. Patient has been taking asa 81 mg and atorvastatin for management of PAD. Labs reviewed (09/22/20) chol 167, TGL 55, HDL 83, LDL 73.   Plan:  Peripheral arterial disease - Noman IIb symptoms. Labs reviewed (09/22/20) chol 167, TGL 55, HDL 83, LDL 73.  Check CTA abd/pelvis to evaluate the degree of peripheral arterial disease.  Continue asa 81 mg daily and atorvastatin 40 mg daily, and start cilostazol 100 mg BID for medical management of PAD.  LDL goal < 70 mg/dL.  Start graduated walking program with goal 30 minutes 5 days a week.   Overweight - BMI today 28.68 Kg/M2. Encourage dietary modification and moderate/aerobic exercise.     -At follow up clinic visit on 2/22/2021, Mrs. Aldridge reported continued pain in left thigh with ambulation.  States the distance she can walk before she develops the left thigh pain varies.  Sometimes she can walk several blocks, and other times she can walk less than 1 block  before experiencing the pain.  CTA Abd/Pelvis with Iliofemoral Runoff on 2/17/2021 revealed  Approximate 50% focal stenosis in left external iliac artery with three-vessel runoff in both legs.   Plan:   Peripheral arterial diseas - Mrs. Aldridge presents with varying degrees of claudication, consisting of left thigh pain.  She has no rest pain or tissue loss to suggest CLI.  Resting AYDEN on 12/14/2020 is consistent with mild LLE PAD (0.8).  CTA Abds/Pelvis with Iliofemoral Runoff on 2/17/2021 revealed approximate 50% focal stenosis in left external iliac artery with three-vessel runoff in both legs.   It is unclear whether this degree of external iliac artery stenosis may account for the pt's symptoms.  Hence, will check segmental pressure study with stress to evaluate further.  Will also check MRI lumbar spine given Xray of lumbar spine from 2015 with degenerative disc disease.  Continue asa 81 mg daily and atorvastatin 40 mg daily, and cilostazol 100 mg BID for medical management of PAD.    Continue walking program with goal of at least 30 minutes a day/5 days a week.   Overweight - BMI today 28.68 Kg/M2. Encourage dietary modification and moderate/aerobic exercise.     -At clinic visit on 4/5/2021, Mrs. Aldridge reported significant improvement in left thigh pain since starting cilostazol and walking program.  Segmental Pressure Study on 3/29/2021 revealed right exercise AYDEN is reduced consistent with mild PAD (1.11-->0.71).  Left exercise AYDEN is reduced consistent with severe PAD (0.84-->0.55).  MRI Lumbar Spine on 3/31/2021 revealed mild lumbar spondylosis.  Prominent L4 superior endplate Schmorl's node with mild surrounding edema.   Plan:   Peripheral arterial disease - Mrs. Aldridge presents with varying degrees of claudication, consisting of left thigh pain.  Symptoms have significantly improved since starting cilostazol and walking program.  She has no rest pain or tissue loss to suggest CLI.    Resting AYDEN on 12/14/2020  is consistent with mild LLE PAD (0.8).  CTA Abds/Pelvis with Iliofemoral Runoff on 2/17/2021 revealed approximate 50% focal stenosis in left external iliac artery with three-vessel runoff in both legs.  Segmental Pressure Study on 3/29/2021 revealed right exercise AYDEN is reduced consistent with mild PAD (1.11-->0.71).  Left exercise AYDEN is reduced consistent with severe PAD (0.84-->0.55).  The significant drop in LLE exercise AYDEN confirms her symptoms are likely due to flow limiting PAD.     Lumbar disc disease may be also contributing, but to a much lesser degree.  Continue medical management of PAD with asa 81 mg daily and atorvastatin 40 mg daily, and cilostazol 100 mg BID.    Continue walking program with goal of at least 30 minutes a day/5 days a week.   Lumbar Disc Disease- MRI Lumbar Spine on 3/31/2021 revealed mild lumbar spondylosis.  Prominent L4 superior endplate Schmorl's node with mild surrounding edema.  Given these findings, will refer to Neurosurgery for evaluation.    Overweight - BMI today 28.68 Kg/M2. Encourage dietary modification and moderate/aerobic exercise.     7/7/2021 clinic visit: Mrs. Aldridge reports no left leg since our clinic visit on 4/5/2021.  Continues walking program at 30 minutes a day/4 days a week.  She has no rest pain or tissue loss.    Plan:  Peripheral arterial disease - Mrs. Aldridge reports no left leg since our clinic visit on 4/5/2021.  Continues walking program at 30 minutes a day/4 days a week.  She has no rest pain or tissue loss.   Continue medical management of PAD with asa 81 mg daily and atorvastatin 40 mg daily, and cilostazol 100 mg BID.    Continue walking program with goal of at least 30 minutes a day/5 days a week.   Lumbar Disc Disease- MRI Lumbar Spine on 3/31/2021 revealed mild lumbar spondylosis.  Prominent L4 superior endplate Schmorl's node with mild surrounding edema.  Given these findings, pt referred to Neurosurgery for evaluation.    Overweight-  Encourage  diet, exercise and weight loss.  Monitor BMI.    HPI:  Mrs. Aldridge reports feeling better. She walks at least 30 minutes a day. Some days she has mild pain and some days she doesn't experience any pain. She is compliant with her medications. She has no new complaints.    Past Medical History:   Diagnosis Date    Actinic keratosis     Aortic valve disorders     Atherosclerosis of aorta     Atherosclerosis of native arteries of the extremities with intermittent claudication     Carcinoma in situ, vulva     Hypertension 2/21/2022    Left bundle branch hemiblock     Other and unspecified hyperlipidemia     Postinflammatory pulmonary fibrosis     Senile nuclear sclerosis        Past Surgical History:   Procedure Laterality Date    ARTHROSCOPIC REPAIR OF ROTATOR CUFF OF SHOULDER Right 4/24/2019    Procedure: REPAIR, ROTATOR CUFF, ARTHROSCOPIC WITH REGENERATIN IMPLANT;  Surgeon: Walter Hernandez MD;  Location: [facility] OR;  Service: Orthopedics;  Laterality: Right;  regional w/o catheter     BTL      COLONOSCOPY N/A 12/9/2021    Procedure: COLONOSCOPY;  Surgeon: Juan Swain MD;  Location: [facility] ENDO (4TH FLR);  Service: Endoscopy;  Laterality: N/A;  fully vaccinated    DECOMPRESSION OF SUBACROMIAL SPACE Right 4/24/2019    Procedure: DECOMPRESSION, SUBACROMIAL SPACE WITH DISTAL CLAVICLE EXCISION (DCE);  Surgeon: Walter Hernandez MD;  Location: [facility] OR;  Service: Orthopedics;  Laterality: Right;    FLEXIBLE SIGMOIDOSCOPY N/A 12/20/2021    Procedure: SIGMOIDOSCOPY-FLEXIBLE;  Surgeon: Juan Swain MD;  Location: [facility] ENDO (2ND FLR);  Service: Endoscopy;  Laterality: N/A;  please schedule with me Monday 12/20 AM. Urgent for marking for colon cancer  spot held 2/20/21 12/16 fully vaccinated; instructions to portal-st    FLEXIBLE SIGMOIDOSCOPY N/A 2/4/2022    Procedure: SIGMOIDOSCOPY, FLEXIBLE;  Surgeon: GUILLERMO Olguin MD;  Location: [facility] OR 2ND FLR;  Service: Colon and Rectal;  Laterality: N/A;    LAPAROSCOPIC  LEFT COLECTOMY N/A 2022    Procedure: COLECTOMY, LEFT, LAPAROSCOPIC;  Surgeon: GUILLERMO Olguin MD;  Location: [facility] OR 2ND FLR;  Service: Colon and Rectal;  Laterality: N/A;  Extended    MOBILIZATION OF SPLENIC FLEXURE N/A 2022    Procedure: MOBILIZATION, SPLENIC FLEXURE;  Surgeon: GUILLERMO Olguin MD;  Location: [facility] OR 2ND FLR;  Service: Colon and Rectal;  Laterality: N/A;    RHYTIDECTOMY      SHOULDER ARTHROSCOPY Right 2019    Procedure: ARTHROSCOPY, SHOULDER WITH LABRAL DEBRIDEMENT;  Surgeon: Walter Hernandez MD;  Location: [facility] OR;  Service: Orthopedics;  Laterality: Right;    TONSILLECTOMY, ADENOIDECTOMY      VULVA SURGERY         Social History     Tobacco Use    Smoking status: Former Smoker     Packs/day: 1.50     Years: 39.00     Pack years: 58.50     Types: Cigarettes     Start date:      Quit date: 2001     Years since quittin.5    Smokeless tobacco: Never Used   Substance Use Topics    Alcohol use: Yes     Alcohol/week: 7.0 - 14.0 standard drinks     Types: 7 - 14 Standard drinks or equivalent per week       Family History   Problem Relation Age of Onset    Cancer Father         bladder d. 69    Alzheimer's disease Mother 96        d. 97    Bladder Cancer Paternal Uncle     Lung cancer Paternal Uncle     Breast cancer Paternal Grandmother     Pancreatic cancer Paternal Aunt     Other Brother         d. 45 o/d    Pneumonia Brother         d. 58, smoker, obese    No Known Problems Sister     No Known Problems Sister     No Known Problems Sister        Review of patient's allergies indicates:   Allergen Reactions    Codeine Nausea And Vomiting       Current Outpatient Medications on File Prior to Visit   Medication Sig Dispense Refill    amLODIPine (NORVASC) 10 MG tablet Take 1 tablet (10 mg total) by mouth once daily. 90 tablet 3    atorvastatin (LIPITOR) 40 MG tablet TAKE 1 TABLET(40 MG) BY MOUTH EVERY DAY 90 tablet 3    calcium-vitamin D3 (OS-MARYANA 500  "+ D3) 500 mg(1,250mg) -200 unit per tablet Take 1 tablet by mouth once daily.       cyanocobalamin, vitamin B-12, (VITAMIN B-12 ORAL) Take 1 tablet by mouth once daily.      efinaconazole (JUBLIA) 10 % Nissa Apply topically.      levothyroxine (SYNTHROID) 50 MCG tablet TAKE 1 TABLET(50 MCG) BY MOUTH BEFORE BREAKFAST 90 tablet 3    multivitamin (THERAGRAN) per tablet Take by mouth. 1 Tablet Oral Every day      terbinafine HCL (LAMISIL) 250 mg tablet Take 1 tablet (250 mg total) by mouth once daily. 90 tablet 0     Current Facility-Administered Medications on File Prior to Visit   Medication Dose Route Frequency Provider Last Rate Last Admin    0.9%  NaCl infusion   Intravenous Continuous Sanna Ann NP        gabapentin capsule 300 mg  300 mg Oral TID Sanna Ann NP   300 mg at 02/05/22 0911       Review of Systems   Constitution: Negative for chills, fever and malaise/fatigue.   HENT: Negative for congestion and hoarse voice.    Eyes: Negative for visual disturbance.   Cardiovascular: Positive for left thigh pain with walking. Negative for dyspnea on exertion, orthopnea and paroxysmal nocturnal dyspnea.   Respiratory: Negative for cough, shortness of breath and sputum production.    Endocrine: Negative for cold intolerance, heat intolerance, polydipsia and polyuria.   Hematologic/Lymphatic: Negative for adenopathy and bleeding problem. Does not bruise/bleed easily.   Skin: Negative for color change, flushing and itching.   Musculoskeletal: Positive for myalgias (left lower extremity). Negative for back pain, joint pain, joint swelling and muscle cramps.   Gastrointestinal: Negative for abdominal pain, constipation, diarrhea and nausea.   Genitourinary: Negative for pelvic pain.   Neurological: Negative for focal weakness, light-headedness, loss of balance, paresthesias, seizures and weakness      Objective  /78   Pulse 90   Ht 5' 4" (1.626 m)   Wt 72.4 kg (159 lb 9.8 oz)   SpO2 (!) " 94%   BMI 27.40 kg/m²     Physical Exam  Constitutional: No acute distress, conversant  HEENT: Sclera anicteric, Pupils equal, round and reactive to light, extraocular motions intact, Oropharynx clear  Neck: No JVD, no carotid bruits  Cardiovascular: regular rate and rhythm, no murmur, rubs or gallops, normal S1/S2  Pulmonary: Clear to auscultation bilaterally  Abdominal: Abdomen soft, nontender, nondistended, positive bowel sounds  Extremities:  no significant edema or TTP.  Pulses:  Carotid pulses are 2+ on the right side, and 2+ on the left side.  Radial pulses are 2+ on the right side, and 2+ on the left side.   Femoral pulses are 2+ on the right side, and 2+ on the left side.  Popliteal pulses are 2+ on the right side, and 2+ on the left side.   Dorsalis pedis pulses are 2+ on the right side, and 2+ on the left side.   Posterior tibial pulses are 2+ on the right side, and 2+ on the left side.    Skin: No ecchymosis, erythema, or ulcers  Psych: Alert and oriented x 3, appropriate affect  Neuro: CNII-XII intact, no focal deficits    CMP  Sodium   Date Value Ref Range Status   04/06/2022 143 136 - 145 mmol/L Final     Potassium   Date Value Ref Range Status   04/06/2022 4.6 3.5 - 5.1 mmol/L Final     Chloride   Date Value Ref Range Status   04/06/2022 102 95 - 110 mmol/L Final     CO2   Date Value Ref Range Status   04/06/2022 31 (H) 23 - 29 mmol/L Final     Glucose   Date Value Ref Range Status   04/06/2022 74 70 - 110 mg/dL Final     BUN   Date Value Ref Range Status   04/06/2022 11 8 - 23 mg/dL Final     Creatinine   Date Value Ref Range Status   04/06/2022 0.9 0.5 - 1.4 mg/dL Final     Calcium   Date Value Ref Range Status   04/06/2022 10.5 8.7 - 10.5 mg/dL Final     Total Protein   Date Value Ref Range Status   04/06/2022 6.9 6.0 - 8.4 g/dL Final     Albumin   Date Value Ref Range Status   04/06/2022 4.2 3.5 - 5.2 g/dL Final     Total Bilirubin   Date Value Ref Range Status   04/06/2022 0.6 0.1 - 1.0 mg/dL  Final     Comment:     For infants and newborns, interpretation of results should be based  on gestational age, weight and in agreement with clinical  observations.    Premature Infant recommended reference ranges:  Up to 24 hours.............<8.0 mg/dL  Up to 48 hours............<12.0 mg/dL  3-5 days..................<15.0 mg/dL  6-29 days.................<15.0 mg/dL       Alkaline Phosphatase   Date Value Ref Range Status   04/06/2022 62 55 - 135 U/L Final     AST   Date Value Ref Range Status   04/06/2022 25 10 - 40 U/L Final     ALT   Date Value Ref Range Status   04/06/2022 22 10 - 44 U/L Final     Anion Gap   Date Value Ref Range Status   04/06/2022 10 8 - 16 mmol/L Final     eGFR if    Date Value Ref Range Status   04/06/2022 >60.0 >60 mL/min/1.73 m^2 Final     eGFR if non    Date Value Ref Range Status   04/06/2022 >60.0 >60 mL/min/1.73 m^2 Final     Comment:     Calculation used to obtain the estimated glomerular filtration  rate (eGFR) is the CKD-EPI equation.          Lab Results   Component Value Date    WBC 4.93 04/06/2022    HGB 15.0 04/06/2022    HCT 46.6 04/06/2022    MCV 99 (H) 04/06/2022     04/06/2022     Segmental Pressure Study 3/29/2021:  · Right resting AYDEN is normal.  · Left resting AYDEN is reduced consistent with mild PAD.  · Right exercise AYDEN is reduced consistent with mild PAD (1.11-->0.71).  · Left exercise AYDEN is reduced consistent with severe PAD (0.84-->0.55).    MRI Lumbar Spine 3/31/2021:  Mild lumbar spondylosis.    Prominent L4 superior endplate Schmorl's node with mild surrounding edema.   Punctate left renal T2 hyperintensity, indeterminate however likely a cyst.    CTA Abd/Pelvis with Iliofemoral Runoff  2/17/2021:  1. Atherosclerosis.  Three-vessel runoff in both legs.  Approximate 50% focal stenosis in left external iliac artery.  2. No acute abnormality identified in the abdomen or pelvis.    AYDEN 12/14/2020:  · Right AYDEN is  normal.  · Left AYDEN is reduced consistent with LE PAD (left AYDEN 0.8; right AYDEN 1.03).    Assessment and Plan  Mrs. Eugenio Aldridge is a 76 years old female with peripheral arterial disease, osteoporosis, and hypothyroidism, who presents for a follow up appointment.     1. Peripheral arterial disease - Mrs. Aldridge reports no left leg since our clinic visit on 4/5/2021. She has no rest pain or tissue loss.   Continue medical management of PAD with asa 81 mg daily and atorvastatin 40 mg daily. Continue walking program with goal of at least 30 minutes a day/5 days a week.     2. Lumbar Disc Disease- MRI Lumbar Spine on 3/31/2021 revealed mild lumbar spondylosis.  Prominent L4 superior endplate Schmorl's node with mild surrounding edema.  Given these findings, pt referred to Neurosurgery for evaluation.      2. Overweight-  Encourage diet, exercise and weight loss.  Monitor BMI.  She has lost 8 pounds since last visit.    Follow up in 6 months     Total duration of face to face visit time 30 minutes.  Total time spent counseling greater than fifty percent of total visit time.  Counseling included discussion regarding imaging findings, diagnosis, possibilities, treatment options, risks and benefits.  The patient had many questions regarding the options and long-term effects.    Claritza Bañuelos MD  Vascular Medicine Fellow    Patient seen and discussed with Dr. Myers

## 2022-04-06 NOTE — TELEPHONE ENCOUNTER
----- Message from Suha Salugero MD sent at 4/5/2022  5:11 PM CDT -----  Your chest x-ray has resulted and there were no acute findings to explain your shortness of breath.  Please do not hesitate to call/email if you have any questions or concerns     Suha Clifton

## 2022-04-06 NOTE — Clinical Note
Slowly improving.   Problems evacuating but nothing to suggest a stricture of the anastomosis.  In addition to the Miralax you suggested I instructed her to increase Metamucil to 2 teaspoons per day. Thanks Suha Castillo

## 2022-04-06 NOTE — PATIENT INSTRUCTIONS
Assessment and Plan  Mrs. Eugenio Aldridge is a 76 years old female with peripheral arterial disease, osteoporosis, and hypothyroidism, who presents for a follow up appointment.     1. Peripheral arterial disease - Mrs. Aldridge reports no left leg since our clinic visit on 4/5/2021. She has no rest pain or tissue loss.   Continue medical management of PAD with asa 81 mg daily and atorvastatin 40 mg daily. Continue walking program with goal of at least 30 minutes a day/5 days a week.     2. Lumbar Disc Disease- MRI Lumbar Spine on 3/31/2021 revealed mild lumbar spondylosis.  Prominent L4 superior endplate Schmorl's node with mild surrounding edema.  Given these findings, pt referred to Neurosurgery for evaluation.      2. Overweight-  Encourage diet, exercise and weight loss.  Monitor BMI.  She has lost 8 pounds since last visit.    Follow up in 6 months

## 2022-04-25 ENCOUNTER — PATIENT MESSAGE (OUTPATIENT)
Dept: SURGERY | Facility: CLINIC | Age: 77
End: 2022-04-25
Payer: COMMERCIAL

## 2022-05-10 ENCOUNTER — PATIENT MESSAGE (OUTPATIENT)
Dept: INTERNAL MEDICINE | Facility: CLINIC | Age: 77
End: 2022-05-10
Payer: COMMERCIAL

## 2022-05-10 NOTE — TELEPHONE ENCOUNTER
It looks like she is seeing Dr. Ferrari for her memory as well. I do not see that he has referred the pt.

## 2022-05-17 ENCOUNTER — OFFICE VISIT (OUTPATIENT)
Dept: INTERNAL MEDICINE | Facility: CLINIC | Age: 77
End: 2022-05-17
Payer: MEDICARE

## 2022-05-17 VITALS
DIASTOLIC BLOOD PRESSURE: 58 MMHG | TEMPERATURE: 98 F | OXYGEN SATURATION: 94 % | HEIGHT: 64 IN | HEART RATE: 96 BPM | BODY MASS INDEX: 26.54 KG/M2 | WEIGHT: 155.44 LBS | SYSTOLIC BLOOD PRESSURE: 136 MMHG | RESPIRATION RATE: 16 BRPM

## 2022-05-17 DIAGNOSIS — R55 SYNCOPE AND COLLAPSE: Primary | ICD-10-CM

## 2022-05-17 DIAGNOSIS — S00.03XA HEMATOMA OF RIGHT PARIETAL SCALP, INITIAL ENCOUNTER: ICD-10-CM

## 2022-05-17 DIAGNOSIS — R42 DIZZINESS AND GIDDINESS: ICD-10-CM

## 2022-05-17 DIAGNOSIS — J31.0 CHRONIC RHINITIS: ICD-10-CM

## 2022-05-17 DIAGNOSIS — R41.3 MEMORY DISTURBANCE: ICD-10-CM

## 2022-05-17 DIAGNOSIS — S06.0X1A CONCUSSION WITH LOSS OF CONSCIOUSNESS OF 30 MINUTES OR LESS, INITIAL ENCOUNTER: ICD-10-CM

## 2022-05-17 PROCEDURE — 99215 PR OFFICE/OUTPT VISIT, EST, LEVL V, 40-54 MIN: ICD-10-PCS | Mod: S$PBB,,, | Performed by: INTERNAL MEDICINE

## 2022-05-17 PROCEDURE — 99999 PR PBB SHADOW E&M-EST. PATIENT-LVL V: CPT | Mod: PBBFAC,,, | Performed by: INTERNAL MEDICINE

## 2022-05-17 PROCEDURE — 99215 OFFICE O/P EST HI 40 MIN: CPT | Mod: PBBFAC,PO | Performed by: INTERNAL MEDICINE

## 2022-05-17 PROCEDURE — 93005 ELECTROCARDIOGRAM TRACING: CPT | Mod: PBBFAC,PO | Performed by: INTERNAL MEDICINE

## 2022-05-17 PROCEDURE — 93010 ELECTROCARDIOGRAM REPORT: CPT | Mod: S$PBB,,, | Performed by: INTERNAL MEDICINE

## 2022-05-17 PROCEDURE — 99999 PR PBB SHADOW E&M-EST. PATIENT-LVL V: ICD-10-PCS | Mod: PBBFAC,,, | Performed by: INTERNAL MEDICINE

## 2022-05-17 PROCEDURE — 99215 OFFICE O/P EST HI 40 MIN: CPT | Mod: S$PBB,,, | Performed by: INTERNAL MEDICINE

## 2022-05-17 PROCEDURE — 93010 EKG 12-LEAD: ICD-10-PCS | Mod: S$PBB,,, | Performed by: INTERNAL MEDICINE

## 2022-05-17 NOTE — PROGRESS NOTES
CC:   Chief Complaint   Patient presents with    Fall    Follow-up       76 y.o. yo female presents for fall , 5/5/2022  Sx started: pt was outside and lying on the ground, pt doesn't recall episode, significant other , OJ present and provides some context  Ring showed her going backward, had been sitting on porch,   had 2 ETOH drinks over the course of the day, but usually has 2 drinks w/o any difficulties  EMT 's were called out and evaluated and didn't recommend ED  Several days later had spinning when laying in bed, ceiling was going round and round  She has swollen knot on head where she hit it  Left arm   Answers for HPI/ROS submitted by the patient on 5/16/2022  activity change: Yes- difficulty getting out of bed when the spinning occurred, that has resolved in about 20'  unexpected weight change: No  neck pain: No  hearing loss: No  rhinorrhea: No  trouble swallowing: No  eye discharge: No  visual disturbance: No  chest tightness: No  wheezing: No  chest pain: No  palpitations: No  blood in stool: No  constipation: No  vomiting: No  diarrhea: No  polydipsia: No  polyuria: No  difficulty urinating: No  hematuria: No  menstrual problem: No  dysuria: No  joint swelling: No  arthralgias: No  headaches: Yes-slight pressure when hit head, tx : n/a  Improving as get further outfrom the fall  weakness: Yes- as related to the HA and vertigo from the fall  confusion: Yes- exacerbated after the fall, had after the fall  Was confused as to location, improving but not resolvedyet  dysphoric mood: No        MEDCARD:Reviewed  ROS:  No fever, chills , or night sweats  No visual disturbance or itchy/watery eyes  + PND w/ some blood mixed in w/ mucus, x 1 week  No ear or sinus pain/pressure  No dysphagia or early satiety  No chest pain or palpitations  No cough or wheezing  No GERD or abdominal pain  No change in bowel or bladder function  No blood in stool or urine  No dysuria or nocturia  No joint swelling or redness  No  skin rashes or blisters  No cold or heat intolerance  No increased thirst or urination  + HA w/o focal deficits  OJ thinks her memory is worse since the fall and she repeats herself more frequently  Remainder of review negative except as previously noted    PMHX: Reviewed  PSHX: Reviewed  SHX: Reviewed  FHX: Reviewed    PE:  VSS  GEN: WDWN, A&O, NAD, conversant and co-operative, pleasant as always  EYES: Conj/lids unremarkable, sclera anicteric pupils reactive, EOMI  No nystagmus noted  ENT: Hearing intact; canals w/o cerumen,  TM's unremarkable  Nasal mucosa/turbinates injected w/o exudate or edema  O/P injected w/o exudate or edema, mucus membranes moist;    Sinus nontender  NECK: Supple w/o lymphadenopathy or thyromegaly  RESP: Efforts unlabored, lungs CTAP  CV: Heart RRR, no carotid bruits, 1+ pedal pulses, no LE edema  GI:BS+. Soft, NT/ND, -HSM noted  MSK: Gait normal. No CCE noted  NEURO: BARROW. No tremor noted  CN II-XII grossly intact, DTR= 2+ and equal UE and LE  Strength 5/5 UE and LE  Toe, heel and tandem walking unremarkable, neg Romberg  SKIN: warm and dry, lemon/lime size hematoma that is tender   W/ resolving ecchymosis  Healing abrasions left forearm    IMPRESSION:  Syncope and collapse, no recollection of event or sx leading up to   Dizziness, most c/w vertigo  Concussion w/ LOC <30'  Hematoma right scalp  Memory disturbance , exacerbated  Chronic rhinitis      PLAN:  Head CT  EKG  Consult Cards   Caution w/ activities  Keep well hydrated  Caution ETOH

## 2022-05-23 ENCOUNTER — OFFICE VISIT (OUTPATIENT)
Dept: PODIATRY | Facility: CLINIC | Age: 77
End: 2022-05-23
Payer: MEDICARE

## 2022-05-23 VITALS
SYSTOLIC BLOOD PRESSURE: 123 MMHG | WEIGHT: 156.75 LBS | HEART RATE: 80 BPM | BODY MASS INDEX: 26.91 KG/M2 | DIASTOLIC BLOOD PRESSURE: 78 MMHG

## 2022-05-23 DIAGNOSIS — B35.1 ONYCHOMYCOSIS DUE TO DERMATOPHYTE: ICD-10-CM

## 2022-05-23 DIAGNOSIS — L60.9 DISEASE OF NAIL: Primary | ICD-10-CM

## 2022-05-23 PROCEDURE — 99999 PR PBB SHADOW E&M-EST. PATIENT-LVL III: ICD-10-PCS | Mod: PBBFAC,,, | Performed by: PODIATRIST

## 2022-05-23 PROCEDURE — 99213 PR OFFICE/OUTPT VISIT, EST, LEVL III, 20-29 MIN: ICD-10-PCS | Mod: S$PBB,,, | Performed by: PODIATRIST

## 2022-05-23 PROCEDURE — 99213 OFFICE O/P EST LOW 20 MIN: CPT | Mod: S$PBB,,, | Performed by: PODIATRIST

## 2022-05-23 PROCEDURE — 99999 PR PBB SHADOW E&M-EST. PATIENT-LVL III: CPT | Mod: PBBFAC,,, | Performed by: PODIATRIST

## 2022-05-23 PROCEDURE — 99213 OFFICE O/P EST LOW 20 MIN: CPT | Mod: PBBFAC | Performed by: PODIATRIST

## 2022-05-23 RX ORDER — KETOCONAZOLE 20 MG/G
CREAM TOPICAL DAILY
Qty: 60 G | Refills: 2 | Status: SHIPPED | OUTPATIENT
Start: 2022-05-23 | End: 2022-10-18

## 2022-05-26 ENCOUNTER — HOSPITAL ENCOUNTER (OUTPATIENT)
Dept: CARDIOLOGY | Facility: HOSPITAL | Age: 77
Discharge: HOME | End: 2022-05-26
Attending: INTERNAL MEDICINE
Payer: MEDICARE

## 2022-05-26 ENCOUNTER — OFFICE VISIT (OUTPATIENT)
Dept: CARDIOLOGY | Facility: CLINIC | Age: 77
End: 2022-05-26
Payer: MEDICARE

## 2022-05-26 VITALS
BODY MASS INDEX: 26.91 KG/M2 | HEART RATE: 77 BPM | SYSTOLIC BLOOD PRESSURE: 142 MMHG | HEIGHT: 64 IN | DIASTOLIC BLOOD PRESSURE: 68 MMHG | WEIGHT: 157.63 LBS

## 2022-05-26 DIAGNOSIS — R55 SYNCOPE AND COLLAPSE: ICD-10-CM

## 2022-05-26 PROCEDURE — 93227 XTRNL ECG REC<48 HR R&I: CPT | Mod: ,,, | Performed by: INTERNAL MEDICINE

## 2022-05-26 PROCEDURE — 93225 XTRNL ECG REC<48 HRS REC: CPT

## 2022-05-26 PROCEDURE — 99999 PR PBB SHADOW E&M-EST. PATIENT-LVL IV: ICD-10-PCS | Mod: PBBFAC,,, | Performed by: INTERNAL MEDICINE

## 2022-05-26 PROCEDURE — 99214 OFFICE O/P EST MOD 30 MIN: CPT | Mod: PBBFAC,PO | Performed by: INTERNAL MEDICINE

## 2022-05-26 PROCEDURE — 99215 PR OFFICE/OUTPT VISIT, EST, LEVL V, 40-54 MIN: ICD-10-PCS | Mod: S$PBB,,, | Performed by: INTERNAL MEDICINE

## 2022-05-26 PROCEDURE — 99999 PR PBB SHADOW E&M-EST. PATIENT-LVL IV: CPT | Mod: PBBFAC,,, | Performed by: INTERNAL MEDICINE

## 2022-05-26 PROCEDURE — 99215 OFFICE O/P EST HI 40 MIN: CPT | Mod: S$PBB,,, | Performed by: INTERNAL MEDICINE

## 2022-05-26 PROCEDURE — 93227 HOLTER MONITOR - 24 HOUR (CUPID ONLY): ICD-10-PCS | Mod: ,,, | Performed by: INTERNAL MEDICINE
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
## 2022-05-26 NOTE — PATIENT INSTRUCTIONS
Stay well hydrated.  Check BP at home sitting and standing the next few days and let us know on Monday.  Do not drive until these tests are completed

## 2022-05-27 ENCOUNTER — OFFICE VISIT (OUTPATIENT)
Dept: OPTOMETRY | Facility: CLINIC | Age: 77
End: 2022-05-27
Payer: MEDICARE

## 2022-05-27 DIAGNOSIS — H52.7 REFRACTIVE ERROR: ICD-10-CM

## 2022-05-27 DIAGNOSIS — H25.13 NUCLEAR SCLEROSIS OF BOTH EYES: Primary | ICD-10-CM

## 2022-05-27 PROCEDURE — 92014 COMPRE OPH EXAM EST PT 1/>: CPT | Mod: S$PBB,,, | Performed by: OPTOMETRIST

## 2022-05-27 PROCEDURE — 92015 DETERMINE REFRACTIVE STATE: CPT | Mod: ,,, | Performed by: OPTOMETRIST

## 2022-05-27 PROCEDURE — 92015 PR REFRACTION: ICD-10-PCS | Mod: ,,, | Performed by: OPTOMETRIST

## 2022-05-27 PROCEDURE — 99999 PR PBB SHADOW E&M-EST. PATIENT-LVL III: CPT | Mod: PBBFAC,,, | Performed by: OPTOMETRIST

## 2022-05-27 PROCEDURE — 92014 PR EYE EXAM, EST PATIENT,COMPREHESV: ICD-10-PCS | Mod: S$PBB,,, | Performed by: OPTOMETRIST

## 2022-05-27 PROCEDURE — 99999 PR PBB SHADOW E&M-EST. PATIENT-LVL III: ICD-10-PCS | Mod: PBBFAC,,, | Performed by: OPTOMETRIST

## 2022-05-27 PROCEDURE — 99213 OFFICE O/P EST LOW 20 MIN: CPT | Mod: PBBFAC,PO | Performed by: OPTOMETRIST

## 2022-05-27 NOTE — PROGRESS NOTES
HPI     Dle- 06/02/2020    Pt here for routine eye exam. Pt sts no changes to va since last seen.   Denies flashes/floaters/eye pain. No gtts.     Last edited by Hemalatha Newman MA on 5/27/2022 12:54 PM. (History)            Assessment /Plan     For exam results, see Encounter Report.    Nuclear sclerosis of both eyes    Refractive error      1. Educated pt on presence of cataracts and effects on vision. No surgery at this time. Recheck in one year.  2. Spectacle Rx given, discussed different options for glasses. RTC 1 year routine eye exam.

## 2022-05-27 NOTE — PROGRESS NOTES
Subjective:      Patient ID: Eugenio Aldridge is a 76 y.o. female.    Chief Complaint: Follow-up (Fungus on big toenail not sure which toenail)    Eugenio is a 76 y.o. female who presents to the clinic complaining of thick and discolored toenails on the left foot.  She would like discuss both oral and topical options.    Review of Systems   Constitutional: Negative for chills, decreased appetite, fever and malaise/fatigue.   HENT: Negative for congestion, hearing loss, nosebleeds and tinnitus.    Eyes: Negative for double vision, pain, photophobia and visual disturbance.   Cardiovascular: Negative for chest pain, claudication, cyanosis and leg swelling.   Respiratory: Negative for cough, hemoptysis, shortness of breath and wheezing.    Endocrine: Negative for cold intolerance and heat intolerance.   Hematologic/Lymphatic: Negative for adenopathy and bleeding problem.   Skin: Positive for color change and nail changes. Negative for dry skin and itching.   Musculoskeletal: Negative for arthritis, joint pain, myalgias and stiffness.   Gastrointestinal: Negative for abdominal pain, jaundice, nausea and vomiting.   Genitourinary: Negative for dysuria, frequency and hematuria.   Neurological: Negative for difficulty with concentration, loss of balance, numbness, paresthesias and sensory change.   Psychiatric/Behavioral: Negative for altered mental status, hallucinations and suicidal ideas. The patient is not nervous/anxious.    Allergic/Immunologic: Negative for environmental allergies and persistent infections.           Objective:      Physical Exam  Vitals reviewed.   Constitutional:       Appearance: She is well-developed.   HENT:      Head: Normocephalic and atraumatic.   Cardiovascular:      Pulses:           Dorsalis pedis pulses are 2+ on the right side and 2+ on the left side.        Posterior tibial pulses are 2+ on the right side and 2+ on the left side.   Pulmonary:      Effort: Pulmonary effort is normal.    Musculoskeletal:         General: Normal range of motion.      Comments: Inspection and palpation of the muscles joints and bones of both lower extremities reveal that muscle strength for the anterior lateral and posterior muscle groups and intrinsic muscle groups of the foot are all 5 over 5 symmetrical.  Ankle subtalar midtarsal and digital joint range of motion are within normal limits, nonpainful, without crepitus or effusion.  Patient exhibits a normal angle and base of gait.  Palpation of the tendons reveal no defects.   Skin:     General: Skin is warm and dry.      Capillary Refill: Capillary refill takes 2 to 3 seconds.      Comments: Skin turgor is normal bilaterally.  Skin texture is well hydrated to both lower extremities.  No lesions or rashes or wounds appreciated bilaterally.       Multiple thickened nails with discoloration noted left greater than the right.  Some proximal clearing noted after oral antifungal therapy.   Neurological:      Mental Status: She is alert and oriented to person, place, and time.      Comments: Sharp dull light touch vibratory proprioceptive sensation are intact bilaterally.  Deep tendon reflexes to patellar and Achilles tendon are symmetrical 2 over 4 bilaterally.  No ankle clonus or Babinski reflexes noted bilaterally.  Coordination is normal to both feet and lower extremities.   Psychiatric:         Behavior: Behavior normal.               Assessment:       Encounter Diagnoses   Name Primary?    Disease of nail Yes    Onychomycosis due to dermatophyte          Plan:       Eugenio was seen today for follow-up.    Diagnoses and all orders for this visit:    Disease of nail    Onychomycosis due to dermatophyte    Other orders  -     ketoconazole (NIZORAL) 2 % cream; Apply topically once daily.      I counseled the patient on her conditions, their implications and medical management.    The nature of the condition, options for management, as well as potential risks and  complications were discussed in detail with patient. Patient was amenable to my recommendations and left my office fully informed and will follow up as instructed or sooner if necessary.      .

## 2022-05-31 ENCOUNTER — TELEPHONE (OUTPATIENT)
Dept: INTERNAL MEDICINE | Facility: CLINIC | Age: 77
End: 2022-05-31
Payer: COMMERCIAL

## 2022-05-31 ENCOUNTER — OFFICE VISIT (OUTPATIENT)
Dept: NEUROLOGY | Facility: CLINIC | Age: 77
End: 2022-05-31
Payer: MEDICARE

## 2022-05-31 ENCOUNTER — HOSPITAL ENCOUNTER (OUTPATIENT)
Dept: RADIOLOGY | Facility: HOSPITAL | Age: 77
Discharge: HOME | End: 2022-05-31
Attending: INTERNAL MEDICINE
Payer: MEDICARE

## 2022-05-31 VITALS
HEIGHT: 64 IN | DIASTOLIC BLOOD PRESSURE: 72 MMHG | HEART RATE: 94 BPM | SYSTOLIC BLOOD PRESSURE: 122 MMHG | BODY MASS INDEX: 27.06 KG/M2

## 2022-05-31 DIAGNOSIS — R41.3 MEMORY IMPAIRMENT: Primary | ICD-10-CM

## 2022-05-31 DIAGNOSIS — R55 SYNCOPE AND COLLAPSE: ICD-10-CM

## 2022-05-31 DIAGNOSIS — R42 DIZZINESS AND GIDDINESS: ICD-10-CM

## 2022-05-31 LAB
OHS CV EVENT MONITOR DAY: 0
OHS CV HOLTER LENGTH DECIMAL HOURS: 24
OHS CV HOLTER LENGTH HOURS: 24
OHS CV HOLTER LENGTH MINUTES: 0
OHS CV HOLTER SINUS AVERAGE HR: 91
OHS CV HOLTER SINUS MAX HR: 133
OHS CV HOLTER SINUS MIN HR: 66

## 2022-05-31 PROCEDURE — 70450 CT HEAD/BRAIN W/O DYE: CPT | Mod: TC

## 2022-05-31 PROCEDURE — 99213 OFFICE O/P EST LOW 20 MIN: CPT | Mod: PBBFAC,25 | Performed by: PSYCHIATRY & NEUROLOGY

## 2022-05-31 PROCEDURE — 70450 CT HEAD/BRAIN W/O DYE: CPT | Mod: 26,,, | Performed by: RADIOLOGY

## 2022-05-31 PROCEDURE — 70450 CT HEAD WITHOUT CONTRAST: ICD-10-PCS | Mod: 26,,, | Performed by: RADIOLOGY

## 2022-05-31 PROCEDURE — 99213 PR OFFICE/OUTPT VISIT, EST, LEVL III, 20-29 MIN: ICD-10-PCS | Mod: S$PBB,,, | Performed by: PSYCHIATRY & NEUROLOGY

## 2022-05-31 PROCEDURE — 99999 PR PBB SHADOW E&M-EST. PATIENT-LVL III: ICD-10-PCS | Mod: PBBFAC,,, | Performed by: PSYCHIATRY & NEUROLOGY

## 2022-05-31 PROCEDURE — 99999 PR PBB SHADOW E&M-EST. PATIENT-LVL III: CPT | Mod: PBBFAC,,, | Performed by: PSYCHIATRY & NEUROLOGY

## 2022-05-31 PROCEDURE — 99213 OFFICE O/P EST LOW 20 MIN: CPT | Mod: S$PBB,,, | Performed by: PSYCHIATRY & NEUROLOGY

## 2022-05-31 NOTE — PROGRESS NOTES
[facility]    Name: Eugenio Aldridge  : 1945  MRN: [id number]    Date: 2022      Assessment:     Ms. Aldridge is a 76 y.o. right handed female with colon adenocarcinoma, HTN, HLD, PAD, prediabetes, hypothyroidism, osteoporosis, SNHL who presents to the [facility] due to memory deficits. Patient has been experiencing progressively worsening trouble with memory since 2019. She has SNHL which may contribute somewhat to perceived cognitive symptoms. She does not endorse much trouble with other cognitive domains. On evaluation today, physical exam is notable for mild weakness of her right deltoid and mild paratonia b/l, but is otherwise unremarkable. MMSE was 21/30 with significant deficits in delayed recall and lexical fluency. Very mild deficits were noted in visuospatial ability, confrontation naming, and orientation. Labs are largely unremarkable for potential causes of cognitive impairment.  MRI brain performed 22 notable for decreased volume in the frontal and parietal lobes at the vertex, as well as mild volume loss of the hippocampi bilaterally.  Additionally, there is mild chronic microvascular disease involving the periventricular and deep white matter. The underlying cause of Ms. Aldridge's cognitive symptoms is unclear at this time. Given her report of progressively worsening cognitive symptoms and volume loss noted on MRI, there is concern for an underlying neurodegenerative disorder. Will get amyloid PET through new ideas study, however is, the studies currently on hold at all sites due to directive by the sponsor. Will notify patient when study resumes.    Recommendations:     Workup   Amyloid PET through New IDE[employer] study.  Study is currently on hold at all sites due to directive by sponsor.  Will follow-up with patient once study resumes.    Treatment   Discussed medications with patient and .  They would like to defer starting medications at this time.   That being said, donepezil would be a reasonable choice.  If patient does decide to start medication in the future, recommend starting donepezil 2.5 mg daily and increasing by 2.5 mg every 2 weeks to a target of 10 mg daily.    Safety-related   We recommend that a medication management system be put in place to avoid errors in taking medication. Helpful services include PillPack (pillpack.com; free service) and [employer] ([employer]; paid subscription service).    Health maintenance    Continue to follow-up with primary care doctor to monitor and treat vascular risk factors.   Recommend performing moderate-intensity cardiovascular exercise as able, ideally 30 minutes per day, 5 days per week.   Recommend staying socially and cognitively active.   Recommend eating a healthy and balanced diet (Mediterranean or DASH diet).    Follow-up: Follow up in about 6 months (around 11/30/2022). I provided Ms. Aldridge and her significant other, Marzena, with our contact information should they have any questions or concerns.    Subjective:      Chief complaint:  Memory Deficits    History of present illness:  Ms. Aldridge is a 76 y.o. right handed female with a history of colon adenocarcinoma, HTN, HLD, PAD, prediabetes, hypothyroidism osteoporosis, SNHL who presents today to the [facility] due to concerns regarding memory deficits. Ms. Aldridge is accompanied by her significant other, Marzena, who participates in providing history. Additional information is obtained by reviewing available medical records.     HPI by Dr. Gates from May 2021:  This 75 year old woman, a retired , reports that she has experienced two years of what she describes as memory disturbance/forgetfulness.  She will, at times, not remember where she has left her keys or hearing aids.  The patient reports that she has not experienced becoming lost when driving.  She reports no serious errors in decisions made at home.   The patient points out that with Covid, she has been living in relative isolation from friends from her past. I have reviewed the notes from 3-29-21,from  Dr. Salguero. Noted is a history of osteoporosis and peripheral artery disease.  The patient has spoken of  problems of pain, with exercise, in the left upper thigh.  Clinic notes from Dr. Stahl, from Interventional Cardiology, have been reviewed from 4-5-21.  He has described the patient's problems with varying degrees of claudication at the left leg when walking.  He has prescribed cilostazol and a regimen of exercise. The patient had an MRI scan of the lumbosacral spine, dated 3-31-21.  I have reviewed the study.  Noted are multiple disc bulges, and mild  spondylosis, however, I note no evidence of severe disc protrusion that might explain the symptoms related to pain with exercise. The patient reports neither dizziness nor vertigo.  There have been no episodes of weakness, long-standing or transient, weakness or numbness of the extremities.  There is no reported chest pain or shortness of breath.       Interval history (01/03/22):  Patient began experiencing trouble with memory around early 2019 which has progressively worsened since onset. Symptoms have become more obvious over time and her friends began noticing that she was having trouble in early 2021. Patient reports difficulty remembering recent events, upcoming events, conversations. She is able to remember to take medications because they sit out on a table that she sits at every morning. She reports sometimes walking into the kitchen and forgetting which cabinet a particular dish is in despite living in the same place for many years. She often forgets why she walked into a room. Patient reports that she recently she and friends went to a restaurant, paid the bill, and had no recollection of paying by the time she got home. She often loses objects around her home such as her keys or hearing  aids. She continues to be able to live independently.     Interval history (2/21/22):  Patient has not experienced worsening of memory symptoms since last appointment on 1/3/22.  Purpose of today's visit is to discuss results of MRI and next steps.  MRI notable for volume loss of the frontal and parietal lobes at the vertex, as well as mild hippocampal volume loss and mild chronic microvascular disease.  Discussed starting donepezil with patient.  Patient would like to defer medication until diagnosis is confirmed.  Discussed amyloid PET verses CSF biomarkers for the diagnosis of Alzheimer's disease.  Patient would like to undergo amyloid PET when New IDE[employer] study resumes.    Interval history (05/31/22):  Colon surgery on Feb 4 for cancer. No chemo. Fell on May 5. She had a CT today and she still has a hematoma. No ICH. Patient has been doing relatively well.  Both patient and her significant other agree, she has not experienced any changes in cognition since her last appointment. Her cognition generally worsens some during the evening, but overall this has not changed since her initial evaluation.    Review of systems:   Negative except as noted in the HPI    Functional status:   iADLs:   Household chores: independent   Meal preparation: independent   Medication management: independent   Shopping: independent   Managing money: independent   Transportation: independent   Telephoning/communication: independent    ADLs:   Transferring: independent   Feeding: independent   Hygiene: independent   Toileting: independent   Bathing: independent   Dressing: independent    Other functional status and safety issues:   She does drive.     Past Medical History:   Diagnosis Date    Actinic keratosis     Aortic valve disorders     Atherosclerosis of aorta     Atherosclerosis of native arteries of the extremities with intermittent claudication     Carcinoma in situ, vulva     Hypertension 2/21/2022    Left  bundle branch hemiblock     Other and unspecified hyperlipidemia     Postinflammatory pulmonary fibrosis     Senile nuclear sclerosis      Past Surgical History:   Procedure Laterality Date    ARTHROSCOPIC REPAIR OF ROTATOR CUFF OF SHOULDER Right 4/24/2019    Procedure: REPAIR, ROTATOR CUFF, ARTHROSCOPIC WITH REGENERATIN IMPLANT;  Surgeon: Walter Hernandez MD;  Location: [facility];  Service: Orthopedics;  Laterality: Right;  regional w/o catheter     BTL      COLONOSCOPY N/A 12/9/2021    Procedure: COLONOSCOPY;  Surgeon: Juan Swain MD;  Location: [facility] ENDO (4TH FLR);  Service: Endoscopy;  Laterality: N/A;  fully vaccinated    DECOMPRESSION OF SUBACROMIAL SPACE Right 4/24/2019    Procedure: DECOMPRESSION, SUBACROMIAL SPACE WITH DISTAL CLAVICLE EXCISION (DCE);  Surgeon: Walter Hernandez MD;  Location: [facility] OR;  Service: Orthopedics;  Laterality: Right;    FLEXIBLE SIGMOIDOSCOPY N/A 12/20/2021    Procedure: SIGMOIDOSCOPY-FLEXIBLE;  Surgeon: Juan Swain MD;  Location: [facility] ENDO (2ND FLR);  Service: Endoscopy;  Laterality: N/A;  please schedule with me Monday 12/20 AM. Urgent for marking for colon cancer  spot held 2/20/21 12/16 fully vaccinated; instructions to portal-st    FLEXIBLE SIGMOIDOSCOPY N/A 2/4/2022    Procedure: SIGMOIDOSCOPY, FLEXIBLE;  Surgeon: GUILLERMO Olguin MD;  Location: [facility] OR 2ND FLR;  Service: Colon and Rectal;  Laterality: N/A;    LAPAROSCOPIC LEFT COLECTOMY N/A 2/4/2022    Procedure: COLECTOMY, LEFT, LAPAROSCOPIC;  Surgeon: GUILLERMO Olguin MD;  Location: [facility] OR 2ND FLR;  Service: Colon and Rectal;  Laterality: N/A;  Extended    MOBILIZATION OF SPLENIC FLEXURE N/A 2/4/2022    Procedure: MOBILIZATION, SPLENIC FLEXURE;  Surgeon: GUILLERMO Olguin MD;  Location: [facility] OR 2ND FLR;  Service: Colon and Rectal;  Laterality: N/A;    RHYTIDECTOMY      SHOULDER ARTHROSCOPY Right 4/24/2019    Procedure: ARTHROSCOPY, SHOULDER WITH LABRAL DEBRIDEMENT;  Surgeon: Walter Hernandez MD;   Location: [facility] OR;  Service: Orthopedics;  Laterality: Right;    TONSILLECTOMY, ADENOIDECTOMY      VULVA SURGERY       Family History   Problem Relation Age of Onset    Bladder Cancer Father         d. 69    Alzheimer's disease Mother 96        d. 97    Bladder Cancer Paternal Uncle     Lung cancer Paternal Uncle     Breast cancer Paternal Grandmother     Pancreatic cancer Paternal Aunt     Other Brother         d. 45 o/d    Pneumonia Brother         d. 58, smoker, obese    No Known Problems Sister     No Known Problems Sister     No Known Problems Sister     Amblyopia Neg Hx     Blindness Neg Hx     Cataracts Neg Hx     Diabetes Neg Hx     Glaucoma Neg Hx     Hypertension Neg Hx     Macular degeneration Neg Hx     Retinal detachment Neg Hx     Strabismus Neg Hx     Stroke Neg Hx     Thyroid disease Neg Hx      Social History     Socioeconomic History    Marital status: Significant Other    Number of children: 0    Years of education: 18    Highest education level: Master's degree (e.g., MA, MS, Vinicio, MEd, MSW, AMBROCIO)   Occupational History    Occupation: CPA     Comment: Retired 2019   Tobacco Use    Smoking status: Former Smoker     Packs/day: 1.50     Years: 39.00     Pack years: 58.50     Types: Cigarettes     Start date:      Quit date: 2001     Years since quittin.7    Smokeless tobacco: Never Used   Substance and Sexual Activity    Alcohol use: Yes     Alcohol/week: 7.0 - 14.0 standard drinks     Types: 7 - 14 Standard drinks or equivalent per week    Drug use: No    Sexual activity: Yes   Social History Narrative    Significant other s/p colon cancer, now w/ bladder cancer;f/u imaging clear    + walking in [place], 3-4 /wk    + working CPA     Social Determinants of Health     Financial Resource Strain: Low Risk     Difficulty of Paying Living Expenses: Not hard at all   Food Insecurity: No Food Insecurity    Worried About Running Out of Food in the Last Year:  Never true    Ran Out of Food in the Last Year: Never true   Transportation Needs: No Transportation Needs    Lack of Transportation (Medical): No    Lack of Transportation (Non-Medical): No   Physical Activity: Insufficiently Active    Days of Exercise per Week: 4 days    Minutes of Exercise per Session: 30 min   Stress: No Stress Concern Present    Feeling of Stress : Only a little   Social Connections: Unknown    Frequency of Communication with Friends and Family: Twice a week    Frequency of Social Gatherings with Friends and Family: Once a week    Active Member of Clubs or Organizations: Yes    Attends Club or Organization Meetings: 1 to 4 times per year    Marital Status: Living with partner   Housing Stability: Low Risk     Unable to Pay for Housing in the Last Year: No    Number of Places Lived in the Last Year: 1    Unstable Housing in the Last Year: No     Current Outpatient Medications:     amLODIPine (NORVASC) 10 MG tablet, Take 1 tablet (10 mg total) by mouth once daily., Disp: 90 tablet, Rfl: 3    atorvastatin (LIPITOR) 40 MG tablet, TAKE 1 TABLET(40 MG) BY MOUTH EVERY DAY, Disp: 90 tablet, Rfl: 3    calcium-vitamin D3 (OS-MARYANA 500 + D3) 500 mg(1,250mg) -200 unit per tablet, Take 1 tablet by mouth once daily. , Disp: , Rfl:     cyanocobalamin, vitamin B-12, (VITAMIN B-12 ORAL), Take 1 tablet by mouth once daily., Disp: , Rfl:     efinaconazole (JUBLIA) 10 % Nissa, Apply topically as needed., Disp: , Rfl:     ketoconazole (NIZORAL) 2 % cream, Apply topically once daily., Disp: 60 g, Rfl: 2    levothyroxine (SYNTHROID) 50 MCG tablet, TAKE 1 TABLET(50 MCG) BY MOUTH BEFORE BREAKFAST, Disp: 90 tablet, Rfl: 3    multivitamin (THERAGRAN) per tablet, Take by mouth. 1 Tablet Oral Every day, Disp: , Rfl:   No current facility-administered medications for this visit.    Facility-Administered Medications Ordered in Other Visits:     0.9%  NaCl infusion, , Intravenous, Continuous, Sanna A.  "FARIHA Ann    gabapentin capsule 300 mg, 300 mg, Oral, TID, Sanna Ann, NP, 300 mg at 02/05/22 0911    Allergies:  Codeine    Objective:     Vital signs:  Blood pressure 122/72, pulse 94, height 5' 4" (1.626 m).    Neurological examination:  Mental status: Ms. Aldridge was appropriate and pleasant. She was alert and oriented to person, place, and situation. Attention and concentration were intact. Knowledge of recent autobiographical events and current events was intact.  Language: Ms. Aldridge's speech was fluent, non-effortful, and grammatically intact. She did not have word-finding difficulty or make word-substitutions. Her comprehension was also intact to syntactically complex sentences.  Cranial nerves: Extraocular movements were intact. There was no eyelid dysfunction. Her facial movements were symmetric, no hypomimia. Hearing was intact to voice. The tongue protruded in the midline with intact movement bilaterally. She did not have slurring or other speech abnormalities.  Motor examination: Muscle bulk was normal in the upper and lower extremities. She moved all limbs symmetrically. She did not have resting tremor, postural tremor, or other involuntary movements.  Coordination/sensory:  Grossly normal.  Station/Gait:  Gait was largely unremarkable.    Neuroimaging:  Results for orders placed or performed during the hospital encounter of 05/31/22   CT Head Without Contrast    Narrative    EXAMINATION:  CT HEAD WITHOUT CONTRAST    CLINICAL HISTORY:  Dizziness, persistent/recurrent, cardiac or vascular cause suspected;syncope w/ scalp hematoma;  Dizziness and giddiness    TECHNIQUE:  Multiple sequential 5 mm axial images of the head without contrast.  Coronal and sagittal reformatted imaging from the axial acquisition.    COMPARISON:  MRI 01/20/2022    FINDINGS:  Induration right parietal soft tissues suggestive for subcutaneous hematoma without gross underlying calvarial fracture.  There is no evidence for " acute intracranial hemorrhage or sulcal effacement to suggest large territory recent infarction.  Mild generalized cerebral volume loss.  Mild patchy hypoattenuation supratentorial white matter while nonspecific may represent chronic ischemic change.  No sulcal effacement to suggest large territory recent infarction.  Visualized paranasal sinuses and mastoid air cells are clear.      Impression    No acute intracranial findings specifically without evidence for acute intracranial hemorrhage or sulcal effacement to suggest large territory recent infarction    Subcutaneous hematoma overlies the right parietal calvarium without gross underlying calvarial fracture.    Further evaluation with MRI as warranted if patient compatible.      Electronically signed by: Wayne Houser DO  Date:    05/31/2022  Time:    07:59   Results for orders placed or performed during the hospital encounter of 01/20/22   MRI Brain Without Contrast    Narrative    EXAMINATION:  MRI BRAIN WITHOUT CONTRAST    CLINICAL HISTORY:  Memory loss;  Other amnesia    TECHNIQUE:  Sagittal and axial T1, axial T2, axial FLAIR, axial gradient, 3D MP rage and axial diffusion imaging of the whole brain without contrast.    COMPARISON:  None    FINDINGS:  Generalized cerebral volume loss most prominent in the frontal and parietal lobes at the vertex.  Compensatory enlargement ventricles sulci and cisterns without hydrocephalus.  Several scattered punctate size foci of T2 FLAIR signal hyperintensity supratentorial white matter additional clustered foci in the demario without corresponding diffusion signal abnormality which are nonspecific and may represent mild moderate degree of chronic microvascular ischemic change.  No restricted diffusion to suggest acute or recent infarction.  Major intracranial T2 flow voids are present.  No significant advanced temporal lobe volume loss.    No abnormal parenchymal susceptibility to suggest parenchymal hemorrhage.       Impression    Cerebral volume loss with scattered foci of T2 FLAIR signal hyperintensity supratentorial white matter and demario which is nonspecific and may be sequela of mild moderate degree of chronic ischemic change.  No evidence for acute infarction or hydrocephalus    Please note volume loss most pronounced in the frontal and parietal lobes at the vertex, no significant age advanced temporal lobe volume loss, underlying neuro degenerative process not excluded and clinical correlation advised.      Electronically signed by: Wayne Houser DO  Date:    01/20/2022  Time:    08:39     Laboratory studies:  Hospital Outpatient Visit on 05/26/2022   Component Date Value Ref Range Status    Holter length hours 05/26/2022 24   In process    holter length minutes 05/26/2022 0   In process    holter length dec hours 05/26/2022 24.00   In process    Sinus min HR 05/26/2022 66   In process    Sinus max hr 05/26/2022 133   In process    Sinus avg hr 05/26/2022 91   In process    Event Monitor Day 05/26/2022 0   In process     I performed a total of 29 minutes on Ms. Aldridge's care on the day of their visit excluding time spent related to any billed procedures. This time includes time spent with the patient as well as time spent documenting in the medical record, reviewing patient's records and tests, obtaining history, placing orders, communicating with other healthcare professionals, counseling the patient, family, or caregiver, and/or care coordination for the diagnoses above.    This note was dictated using GridCure Fluency speech recognition software. Please excuse any spelling or grammatical errors. Word recognition mistakes are occasionally missed on review.      David Ferrari MD   Behavioral Neurology & Neuropsychiatry  [facility]

## 2022-05-31 NOTE — TELEPHONE ENCOUNTER
----- Message from Suha Salguero MD sent at 5/31/2022  8:57 AM CDT -----  Your head CT revealed the hematoma that was noted at your last office visit, but no other worrisome findings.  The hematoma will gradually resolve, but often takes weeks.  Please do not hesitate to call/email if you have any questions or concerns   Suha Clifton

## 2022-06-09 ENCOUNTER — HOSPITAL ENCOUNTER (OUTPATIENT)
Dept: CARDIOLOGY | Facility: HOSPITAL | Age: 77
Discharge: HOME | End: 2022-06-09
Attending: INTERNAL MEDICINE
Payer: MEDICARE

## 2022-06-09 VITALS
DIASTOLIC BLOOD PRESSURE: 64 MMHG | HEIGHT: 64 IN | SYSTOLIC BLOOD PRESSURE: 136 MMHG | WEIGHT: 157 LBS | BODY MASS INDEX: 26.8 KG/M2 | HEART RATE: 93 BPM

## 2022-06-09 DIAGNOSIS — R55 SYNCOPE AND COLLAPSE: ICD-10-CM

## 2022-06-09 LAB
ASCENDING AORTA: 3.34 CM
AV INDEX (PROSTH): 0.71
AV MEAN GRADIENT: 13 MMHG
AV PEAK GRADIENT: 22 MMHG
AV VALVE AREA: 2.45 CM2
AV VELOCITY RATIO: 0.62
BSA FOR ECHO PROCEDURE: 1.79 M2
CV ECHO LV RWT: 0.41 CM
DOP CALC AO PEAK VEL: 2.37 M/S
DOP CALC AO VTI: 39.11 CM
DOP CALC LVOT AREA: 3.4 CM2
DOP CALC LVOT DIAMETER: 2.09 CM
DOP CALC LVOT PEAK VEL: 1.46 M/S
DOP CALC LVOT STROKE VOLUME: 95.63 CM3
DOP CALCLVOT PEAK VEL VTI: 27.89 CM
E WAVE DECELERATION TIME: 231.71 MSEC
E/A RATIO: 0.65
E/E' RATIO: 11.57 M/S
ECHO LV POSTERIOR WALL: 0.92 CM (ref 0.6–1.1)
FRACTIONAL SHORTENING: 43 % (ref 28–44)
INTERVENTRICULAR SEPTUM: 0.9 CM (ref 0.6–1.1)
IVRT: 82.78 MSEC
LA MAJOR: 3.87 CM
LA MINOR: 4.04 CM
LA WIDTH: 3.54 CM
LEFT ATRIUM SIZE: 2.99 CM
LEFT ATRIUM VOLUME INDEX MOD: 18.3 ML/M2
LEFT ATRIUM VOLUME INDEX: 20.2 ML/M2
LEFT ATRIUM VOLUME MOD: 32.27 CM3
LEFT ATRIUM VOLUME: 35.57 CM3
LEFT INTERNAL DIMENSION IN SYSTOLE: 2.55 CM (ref 2.1–4)
LEFT VENTRICLE DIASTOLIC VOLUME INDEX: 51.84 ML/M2
LEFT VENTRICLE DIASTOLIC VOLUME: 91.23 ML
LEFT VENTRICLE MASS INDEX: 76 G/M2
LEFT VENTRICLE SYSTOLIC VOLUME INDEX: 13.3 ML/M2
LEFT VENTRICLE SYSTOLIC VOLUME: 23.38 ML
LEFT VENTRICULAR INTERNAL DIMENSION IN DIASTOLE: 4.47 CM (ref 3.5–6)
LEFT VENTRICULAR MASS: 133.34 G
LV LATERAL E/E' RATIO: 13.5 M/S
LV SEPTAL E/E' RATIO: 10.13 M/S
MV PEAK A VEL: 1.24 M/S
MV PEAK E VEL: 0.81 M/S
MV STENOSIS PRESSURE HALF TIME: 67.2 MS
MV VALVE AREA P 1/2 METHOD: 3.27 CM2
PISA TR MAX VEL: 2.94 M/S
PULM VEIN S/D RATIO: 1.7
PV PEAK D VEL: 0.46 M/S
PV PEAK S VEL: 0.78 M/S
RA MAJOR: 4.23 CM
RA PRESSURE: 3 MMHG
RA WIDTH: 2.44 CM
RIGHT VENTRICULAR END-DIASTOLIC DIMENSION: 3.04 CM
SINUS: 3.36 CM
STJ: 2.87 CM
TDI LATERAL: 0.06 M/S
TDI SEPTAL: 0.08 M/S
TDI: 0.07 M/S
TR MAX PG: 35 MMHG
TRICUSPID ANNULAR PLANE SYSTOLIC EXCURSION: 1.99 CM
TV REST PULMONARY ARTERY PRESSURE: 38 MMHG

## 2022-06-09 PROCEDURE — 93306 ECHO (CUPID ONLY): ICD-10-PCS | Mod: 26,,, | Performed by: INTERNAL MEDICINE

## 2022-06-09 PROCEDURE — 93306 TTE W/DOPPLER COMPLETE: CPT

## 2022-06-09 PROCEDURE — 93306 TTE W/DOPPLER COMPLETE: CPT | Mod: 26,,, | Performed by: INTERNAL MEDICINE

## 2022-06-15 ENCOUNTER — LAB VISIT (OUTPATIENT)
Dept: LAB | Facility: HOSPITAL | Age: 77
End: 2022-06-15
Attending: INTERNAL MEDICINE
Payer: MEDICARE

## 2022-06-15 ENCOUNTER — PES CALL (OUTPATIENT)
Dept: ADMINISTRATIVE | Facility: CLINIC | Age: 77
End: 2022-06-15
Payer: MEDICARE

## 2022-06-15 DIAGNOSIS — E78.00 PURE HYPERCHOLESTEROLEMIA: ICD-10-CM

## 2022-06-15 DIAGNOSIS — R73.03 PRE-DIABETES: ICD-10-CM

## 2022-06-15 DIAGNOSIS — I10 HYPERTENSION, UNSPECIFIED TYPE: ICD-10-CM

## 2022-06-15 DIAGNOSIS — E03.9 HYPOTHYROIDISM, UNSPECIFIED TYPE: ICD-10-CM

## 2022-06-15 LAB
ALBUMIN SERPL BCP-MCNC: 4 G/DL (ref 3.5–5.2)
ALP SERPL-CCNC: 62 U/L (ref 55–135)
ALT SERPL W/O P-5'-P-CCNC: 19 U/L (ref 10–44)
ANION GAP SERPL CALC-SCNC: 11 MMOL/L (ref 8–16)
AST SERPL-CCNC: 23 U/L (ref 10–40)
BILIRUB SERPL-MCNC: 0.5 MG/DL (ref 0.1–1)
BUN SERPL-MCNC: 15 MG/DL (ref 8–23)
CALCIUM SERPL-MCNC: 10.1 MG/DL (ref 8.7–10.5)
CHLORIDE SERPL-SCNC: 110 MMOL/L (ref 95–110)
CHOLEST SERPL-MCNC: 163 MG/DL (ref 120–199)
CHOLEST/HDLC SERPL: 1.9 {RATIO} (ref 2–5)
CO2 SERPL-SCNC: 25 MMOL/L (ref 23–29)
CREAT SERPL-MCNC: 0.7 MG/DL (ref 0.5–1.4)
EST. GFR  (AFRICAN AMERICAN): >60 ML/MIN/1.73 M^2
EST. GFR  (NON AFRICAN AMERICAN): >60 ML/MIN/1.73 M^2
ESTIMATED AVG GLUCOSE: 108 MG/DL (ref 68–131)
GLUCOSE SERPL-MCNC: 94 MG/DL (ref 70–110)
HBA1C MFR BLD: 5.4 % (ref 4–5.6)
HDLC SERPL-MCNC: 87 MG/DL (ref 40–75)
HDLC SERPL: 53.4 % (ref 20–50)
LDLC SERPL CALC-MCNC: 68.6 MG/DL (ref 63–159)
NONHDLC SERPL-MCNC: 76 MG/DL
POTASSIUM SERPL-SCNC: 4.4 MMOL/L (ref 3.5–5.1)
PROT SERPL-MCNC: 6.4 G/DL (ref 6–8.4)
SODIUM SERPL-SCNC: 146 MMOL/L (ref 136–145)
T4 FREE SERPL-MCNC: 0.89 NG/DL (ref 0.71–1.51)
TRIGL SERPL-MCNC: 37 MG/DL (ref 30–150)
TSH SERPL DL<=0.005 MIU/L-ACNC: 3.75 UIU/ML (ref 0.4–4)

## 2022-06-15 PROCEDURE — 36415 COLL VENOUS BLD VENIPUNCTURE: CPT | Mod: PO | Performed by: INTERNAL MEDICINE

## 2022-06-15 PROCEDURE — 80053 COMPREHEN METABOLIC PANEL: CPT | Performed by: INTERNAL MEDICINE

## 2022-06-15 PROCEDURE — 84439 ASSAY OF FREE THYROXINE: CPT | Performed by: INTERNAL MEDICINE

## 2022-06-15 PROCEDURE — 83036 HEMOGLOBIN GLYCOSYLATED A1C: CPT | Performed by: INTERNAL MEDICINE

## 2022-06-15 PROCEDURE — 80061 LIPID PANEL: CPT | Performed by: INTERNAL MEDICINE

## 2022-06-15 PROCEDURE — 84443 ASSAY THYROID STIM HORMONE: CPT | Performed by: INTERNAL MEDICINE

## 2022-06-16 NOTE — PROGRESS NOTES
CC:       76 y.o. yo female presents for f/up fall , 5/5/2022  Patient is complaining of vertigo type symptoms were her head is spinning,  But no nausea or vomiting episodes.  Sx are persisting, worse in morning are present when awakens  And occ at night when wakes up, but improves as day progresses  She noted the hematoma on her right had is  but only when pressure is applied.    Recall-  Sx started: pt was outside and lying on the ground, pt doesn't recall episode, significant other , OJ provided some context  Ring showed her going backward, had been sitting on porch,   had 2 ETOH drinks over the course of the day, but usually has 2 drinks w/o any difficulties  EMT 's were called out and evaluated and didn't recommend ED  Several days later had spinning when laying in bed, ceiling was going round and round  She has swollen knot on head where she hit it    Answers for HPI/ROS submitted by the patient on 6/14/2022  activity change: Yes- decreased  unexpected weight change: No  neck pain: No  hearing loss: No  rhinorrhea: No  trouble swallowing: No  eye discharge: No  visual disturbance: No  chest tightness: No  wheezing: No  chest pain: No  palpitations: No  blood in stool: No  constipation: No  vomiting: No  diarrhea: No  polydipsia: No  polyuria: No  difficulty urinating: No  hematuria: No  menstrual problem: No  dysuria: No  joint swelling: No  arthralgias: No  headaches: Yes- intermittent  confusion: Yes- memory disorder  dysphoric mood: No    Recall::  headaches: Yes-slight pressure when hit head, tx : n/a  Improving as get further outfrom the fall  weakness: Yes- as related to the HA and vertigo from the fall  confusion: Yes- exacerbated after the fall, had after the fall  Was confused as to location, thinks back to her baseline, but reports  Significant other gets frustrated w/ her repeat questions  dysphoric mood: No        MEDCARD:Reviewed  ROS:  No fever, chills , or night sweats  No visual  disturbance or itchy/watery eyes  No ear or sinus pain/pressure  No dysphagia or early satiety  No chest pain or palpitations  No cough or wheezing  No GERD or abdominal pain  No change in bowel or bladder function  No blood in stool or urine  No dysuria or nocturia  No joint swelling or redness  No skin rashes or blisters  No cold or heat intolerance  No increased thirst or urination    Remainder of review negative except as previously noted    PMHX: Reviewed  PSHX: Reviewed  SHX: Reviewed  FHX: Reviewed    PE:  VSS  GEN: WDWN, A&O, NAD, conversant and co-operative, pleasant as always  EYES: Conj/lids unremarkable, sclera anicteric pupils reactive, EOMI  No nystagmus noted  ENT: Hearing intact; canals w/o cerumen,  TM's unremarkable  Nasal mucosa/turbinates injected w/o exudate or edema  O/P injected w/o exudate or edema, mucus membranes moist;    Sinus nontender  NECK: Supple w/o lymphadenopathy or thyromegaly  RESP: Efforts unlabored, lungs CTAP  CV: Heart RRR, no carotid bruits, 1+ pedal pulses, no LE edema  GI:BS+. Soft, NT/ND, -HSM noted  MSK: Gait normal. No CCE noted  NEURO: BARORW. No tremor noted  CN II-XII grossly intact, DTR= 2+ and equal UE   Strength 5/5 UE   SKIN: warm and dry,  hematoma that is tender and improving       IMPRESSION:o   Dizziness, most c/w vertigo  Concussion w/ LOC <30'  Hematoma right scalp, resolving  Memory disturbance , exacerbated-       PLAN:  Reviewed note from Dr. Ferrari, 5/31/2022  Reviewed rec to start Aricept, pt agreeable  - 2.5 mg started w/ rec to increase to 5mg in two weeks  And request to call/email in 4 -6 weeks w/ update so the dosing can be increased to a maximum of 10mg  Caution w/ activities  Keep well hydrated  Caution ETOH

## 2022-06-20 ENCOUNTER — TELEPHONE (OUTPATIENT)
Dept: INTERNAL MEDICINE | Facility: CLINIC | Age: 77
End: 2022-06-20

## 2022-06-20 ENCOUNTER — OFFICE VISIT (OUTPATIENT)
Dept: INTERNAL MEDICINE | Facility: CLINIC | Age: 77
End: 2022-06-20
Payer: MEDICARE

## 2022-06-20 VITALS
BODY MASS INDEX: 26.49 KG/M2 | SYSTOLIC BLOOD PRESSURE: 132 MMHG | WEIGHT: 155.19 LBS | DIASTOLIC BLOOD PRESSURE: 78 MMHG | RESPIRATION RATE: 20 BRPM | OXYGEN SATURATION: 98 % | HEART RATE: 63 BPM | HEIGHT: 64 IN | TEMPERATURE: 98 F

## 2022-06-20 DIAGNOSIS — S06.0X1A CONCUSSION WITH LOSS OF CONSCIOUSNESS OF 30 MINUTES OR LESS, INITIAL ENCOUNTER: ICD-10-CM

## 2022-06-20 DIAGNOSIS — S00.03XA HEMATOMA OF RIGHT PARIETAL SCALP, INITIAL ENCOUNTER: ICD-10-CM

## 2022-06-20 DIAGNOSIS — R41.3 MEMORY DISTURBANCE: ICD-10-CM

## 2022-06-20 DIAGNOSIS — R42 DIZZINESS AND GIDDINESS: Primary | ICD-10-CM

## 2022-06-20 PROCEDURE — 99999 PR PBB SHADOW E&M-EST. PATIENT-LVL IV: ICD-10-PCS | Mod: PBBFAC,,, | Performed by: INTERNAL MEDICINE

## 2022-06-20 PROCEDURE — 99214 OFFICE O/P EST MOD 30 MIN: CPT | Mod: S$PBB,,, | Performed by: INTERNAL MEDICINE

## 2022-06-20 PROCEDURE — 99999 PR PBB SHADOW E&M-EST. PATIENT-LVL IV: CPT | Mod: PBBFAC,,, | Performed by: INTERNAL MEDICINE

## 2022-06-20 PROCEDURE — 99214 OFFICE O/P EST MOD 30 MIN: CPT | Mod: PBBFAC,PO | Performed by: INTERNAL MEDICINE

## 2022-06-20 PROCEDURE — 99214 PR OFFICE/OUTPT VISIT, EST, LEVL IV, 30-39 MIN: ICD-10-PCS | Mod: S$PBB,,, | Performed by: INTERNAL MEDICINE

## 2022-06-20 RX ORDER — DONEPEZIL HYDROCHLORIDE 5 MG/1
TABLET, FILM COATED ORAL
Qty: 90 TABLET | Refills: 0 | Status: SHIPPED | OUTPATIENT
Start: 2022-06-20 | End: 2022-08-09

## 2022-06-20 NOTE — Clinical Note
Lisette Jordan, saw our mutual pt in clinic today, s/p continued vertigo sx after her fall w/ LOC, on 5/5/22 I advised her that the vertigo/dizziness may lasts several months, but was wondering if you thought she might benefit from PT for the sx. We also reviewed your note and she was agreeable to start the Aricept, and she is very excited to participate in the pending study w/ PET Scan. Appreciate your assist. Suha Wray

## 2022-06-20 NOTE — TELEPHONE ENCOUNTER
----- Message from Suha Salguero MD sent at 6/17/2022  5:43 PM CDT -----  Please review your lab work and we will discuss at your pending office visit.   Suha Clifton

## 2022-06-23 ENCOUNTER — PATIENT MESSAGE (OUTPATIENT)
Dept: INTERNAL MEDICINE | Facility: CLINIC | Age: 77
End: 2022-06-23
Payer: MEDICARE

## 2022-06-23 RX ORDER — CILOSTAZOL 100 MG/1
100 TABLET ORAL 2 TIMES DAILY
Qty: 180 TABLET | Refills: 3 | Status: ON HOLD | OUTPATIENT
Start: 2022-06-23 | End: 2022-11-08 | Stop reason: HOSPADM

## 2022-06-23 NOTE — TELEPHONE ENCOUNTER
Refilled the medication but would like pt to continue to see someone in that dept  She recently saw her vascular cards specialist Dr. Myers,4/2022, and I will clarify w/ him

## 2022-06-24 NOTE — TELEPHONE ENCOUNTER
Also, Aricept and Pletal may have interaction where the QT prolongs  Pt was to start the 5mg of Aricept at 1/2 tablet for 2 weeks  She will need a f/up EKG, please see if you can book her for Monday so we can update EKG and I can review potential drug-drug interaction w/ pt

## 2022-06-28 ENCOUNTER — PATIENT MESSAGE (OUTPATIENT)
Dept: INTERNAL MEDICINE | Facility: CLINIC | Age: 77
End: 2022-06-28
Payer: MEDICARE

## 2022-06-28 ENCOUNTER — PES CALL (OUTPATIENT)
Dept: ADMINISTRATIVE | Facility: CLINIC | Age: 77
End: 2022-06-28
Payer: MEDICARE

## 2022-06-29 ENCOUNTER — OFFICE VISIT (OUTPATIENT)
Dept: INTERNAL MEDICINE | Facility: CLINIC | Age: 77
End: 2022-06-29
Payer: MEDICARE

## 2022-06-29 VITALS
WEIGHT: 154.31 LBS | OXYGEN SATURATION: 91 % | BODY MASS INDEX: 26.49 KG/M2 | HEART RATE: 81 BPM | DIASTOLIC BLOOD PRESSURE: 78 MMHG | SYSTOLIC BLOOD PRESSURE: 134 MMHG | TEMPERATURE: 97 F | RESPIRATION RATE: 16 BRPM

## 2022-06-29 DIAGNOSIS — I73.9 PAD (PERIPHERAL ARTERY DISEASE): ICD-10-CM

## 2022-06-29 DIAGNOSIS — R41.3 MEMORY DISTURBANCE: ICD-10-CM

## 2022-06-29 DIAGNOSIS — Z91.89 AT RISK FOR PROLONGED QT INTERVAL SYNDROME: Primary | ICD-10-CM

## 2022-06-29 PROCEDURE — 99999 PR PBB SHADOW E&M-EST. PATIENT-LVL III: ICD-10-PCS | Mod: PBBFAC,,, | Performed by: INTERNAL MEDICINE

## 2022-06-29 PROCEDURE — 99999 PR PBB SHADOW E&M-EST. PATIENT-LVL III: CPT | Mod: PBBFAC,,, | Performed by: INTERNAL MEDICINE

## 2022-06-29 PROCEDURE — 93010 EKG 12-LEAD: ICD-10-PCS | Mod: S$PBB,,, | Performed by: INTERNAL MEDICINE

## 2022-06-29 PROCEDURE — 99213 PR OFFICE/OUTPT VISIT, EST, LEVL III, 20-29 MIN: ICD-10-PCS | Mod: S$PBB,,, | Performed by: INTERNAL MEDICINE

## 2022-06-29 PROCEDURE — 99213 OFFICE O/P EST LOW 20 MIN: CPT | Mod: S$PBB,,, | Performed by: INTERNAL MEDICINE

## 2022-06-29 PROCEDURE — 99213 OFFICE O/P EST LOW 20 MIN: CPT | Mod: PBBFAC,PO | Performed by: INTERNAL MEDICINE

## 2022-06-29 PROCEDURE — 93010 ELECTROCARDIOGRAM REPORT: CPT | Mod: S$PBB,,, | Performed by: INTERNAL MEDICINE

## 2022-06-29 PROCEDURE — 93005 ELECTROCARDIOGRAM TRACING: CPT | Mod: PBBFAC,PO | Performed by: INTERNAL MEDICINE

## 2022-06-29 NOTE — PROGRESS NOTES
CC:       76 y.o. yo female presents for f/up of start of new med Aricept w/ the Pletal   could have a drug interaction w/ QT prolongation.  Patient taking 5mg Aricept, she couldn't break in half, and she takes only one Pletal daily.  Pt presents today for f/up EKG as she is ow on both meds.  She is tolerating both w/o c/o       MEDCARD:Reviewed  ROS:  No fever, chills , or night sweats  No visual disturbance or itchy/watery eyes  No ear or sinus pain/pressure  No dysphagia or early satiety  No chest pain or palpitations  No cough or wheezing  No GERD or abdominal pain  No change in bowel or bladder function  No blood in stool or urine  No dysuria or nocturia  No joint swelling or redness  No skin rashes or blisters  No cold or heat intolerance  No increased thirst or urination    Remainder of review negative except as previously noted    PMHX: Reviewed  PSHX: Reviewed  SHX: Reviewed  FHX: Reviewed    PE:  VSS  GEN: WDWN, A&O, NAD, conversant and co-operative, pleasant as always  EYES: Conj/lids unremarkable, sclera anicteric pupils reactive  RESP: Efforts unlabored,  CV: Heart RRR,  MSK: Gait normal. No CCE noted  NEURO: BARROW. No tremor noted    SKIN: warm and dry    IMPRESSION:  At risk QT prolongation  Memory disturbance , exacerbated, now on Aricept  PAD, w/o claudication on Pletal      PLAN:  EKG- w/o change QT  Reviewed w/ pt, to remain at present dose of both meds  Call prn

## 2022-07-06 ENCOUNTER — LAB VISIT (OUTPATIENT)
Dept: LAB | Facility: HOSPITAL | Age: 77
End: 2022-07-06
Payer: MEDICARE

## 2022-07-06 ENCOUNTER — OFFICE VISIT (OUTPATIENT)
Dept: SURGERY | Facility: CLINIC | Age: 77
End: 2022-07-06
Payer: MEDICARE

## 2022-07-06 VITALS
SYSTOLIC BLOOD PRESSURE: 139 MMHG | DIASTOLIC BLOOD PRESSURE: 67 MMHG | BODY MASS INDEX: 26.08 KG/M2 | HEIGHT: 64 IN | WEIGHT: 152.75 LBS | HEART RATE: 79 BPM

## 2022-07-06 DIAGNOSIS — Z85.038 ENCOUNTER FOR FOLLOW-UP SURVEILLANCE OF COLON CANCER: ICD-10-CM

## 2022-07-06 DIAGNOSIS — Z08 ENCOUNTER FOR FOLLOW-UP SURVEILLANCE OF COLON CANCER: ICD-10-CM

## 2022-07-06 DIAGNOSIS — Z08 ENCOUNTER FOR FOLLOW-UP SURVEILLANCE OF COLON CANCER: Primary | ICD-10-CM

## 2022-07-06 DIAGNOSIS — Z85.038 ENCOUNTER FOR FOLLOW-UP SURVEILLANCE OF COLON CANCER: Primary | ICD-10-CM

## 2022-07-06 LAB — CEA SERPL-MCNC: <1.7 NG/ML (ref 0–5)

## 2022-07-06 PROCEDURE — 99999 PR PBB SHADOW E&M-EST. PATIENT-LVL III: CPT | Mod: PBBFAC,,, | Performed by: COLON & RECTAL SURGERY

## 2022-07-06 PROCEDURE — 99214 PR OFFICE/OUTPT VISIT, EST, LEVL IV, 30-39 MIN: ICD-10-PCS | Mod: S$PBB,,, | Performed by: COLON & RECTAL SURGERY

## 2022-07-06 PROCEDURE — 36415 COLL VENOUS BLD VENIPUNCTURE: CPT

## 2022-07-06 PROCEDURE — 99214 OFFICE O/P EST MOD 30 MIN: CPT | Mod: S$PBB,,, | Performed by: COLON & RECTAL SURGERY

## 2022-07-06 PROCEDURE — 99213 OFFICE O/P EST LOW 20 MIN: CPT | Mod: PBBFAC | Performed by: COLON & RECTAL SURGERY

## 2022-07-06 PROCEDURE — 82378 CARCINOEMBRYONIC ANTIGEN: CPT

## 2022-07-06 PROCEDURE — 99999 PR PBB SHADOW E&M-EST. PATIENT-LVL III: ICD-10-PCS | Mod: PBBFAC,,, | Performed by: COLON & RECTAL SURGERY

## 2022-07-06 NOTE — PROGRESS NOTES
Assessment:  Eugenio Aldridge is a 75 y/o female with history of malignant neoplasm of splenic flexure T1N0M0 who underwent Laparoscopic extended left colectomy, splenic flexure mobilization, flexible sigmoidoscopy on 2/4/2022 and came to the office today for a follow-up visit. Patient refers feeling well, no complaints of nausea, diarrhea, abdominal pain. ECOG 0.   Denies weight loss or malaise.  Patient refers regular bowel movements, once a day, with no blood. Describes occasionally straining during bowel movements, for which she takes metamucil and says this helps her evacuate with ease.   On examination, bowel is soft, nondistended, nontender. Incisions are dry, well healed.     Plan:   1.Order CEA lab today   2. Follow up in 3 months (Oct 2022   3. Due for colonoscopy in December

## 2022-07-20 PROBLEM — M79.652 LEFT THIGH PAIN: Status: RESOLVED | Noted: 2021-02-22 | Resolved: 2022-07-20

## 2022-07-20 PROBLEM — M25.619 LIMITED RANGE OF MOTION (ROM) OF SHOULDER: Status: RESOLVED | Noted: 2020-10-19 | Resolved: 2022-07-20

## 2022-07-25 ENCOUNTER — OFFICE VISIT (OUTPATIENT)
Dept: INTERNAL MEDICINE | Facility: CLINIC | Age: 77
End: 2022-07-25
Payer: MEDICARE

## 2022-07-25 VITALS
RESPIRATION RATE: 14 BRPM | OXYGEN SATURATION: 96 % | BODY MASS INDEX: 26.46 KG/M2 | SYSTOLIC BLOOD PRESSURE: 130 MMHG | DIASTOLIC BLOOD PRESSURE: 70 MMHG | HEART RATE: 83 BPM | HEIGHT: 64 IN | WEIGHT: 155 LBS

## 2022-07-25 DIAGNOSIS — Z00.00 ENCOUNTER FOR PREVENTIVE HEALTH EXAMINATION: Primary | ICD-10-CM

## 2022-07-25 DIAGNOSIS — H90.3 SENSORINEURAL HEARING LOSS (SNHL) OF BOTH EARS: ICD-10-CM

## 2022-07-25 DIAGNOSIS — J84.10 POSTINFLAMMATORY PULMONARY FIBROSIS: ICD-10-CM

## 2022-07-25 DIAGNOSIS — I73.9 CLAUDICATION OF LEFT LOWER EXTREMITY: ICD-10-CM

## 2022-07-25 DIAGNOSIS — I70.0 ATHEROSCLEROSIS OF AORTA: ICD-10-CM

## 2022-07-25 DIAGNOSIS — E66.3 OVERWEIGHT (BMI 25.0-29.9): ICD-10-CM

## 2022-07-25 DIAGNOSIS — R73.03 PRE-DIABETES: ICD-10-CM

## 2022-07-25 DIAGNOSIS — E78.00 PURE HYPERCHOLESTEROLEMIA: ICD-10-CM

## 2022-07-25 DIAGNOSIS — Z78.0 MENOPAUSE: ICD-10-CM

## 2022-07-25 DIAGNOSIS — H25.10 NUCLEAR SCLEROSIS, UNSPECIFIED LATERALITY: ICD-10-CM

## 2022-07-25 DIAGNOSIS — I10 HYPERTENSION, UNSPECIFIED TYPE: Chronic | ICD-10-CM

## 2022-07-25 DIAGNOSIS — M51.9 LUMBAR DISC DISEASE: ICD-10-CM

## 2022-07-25 DIAGNOSIS — C18.5 MALIGNANT NEOPLASM OF SPLENIC FLEXURE: ICD-10-CM

## 2022-07-25 DIAGNOSIS — M81.8 OTHER OSTEOPOROSIS WITHOUT CURRENT PATHOLOGICAL FRACTURE: ICD-10-CM

## 2022-07-25 DIAGNOSIS — R41.3 MEMORY DISTURBANCE: ICD-10-CM

## 2022-07-25 DIAGNOSIS — M75.101 NONTRAUMATIC TEAR OF RIGHT ROTATOR CUFF, UNSPECIFIED TEAR EXTENT: ICD-10-CM

## 2022-07-25 DIAGNOSIS — N13.30 HYDRONEPHROSIS, UNSPECIFIED HYDRONEPHROSIS TYPE: ICD-10-CM

## 2022-07-25 DIAGNOSIS — I73.9 PAD (PERIPHERAL ARTERY DISEASE): Chronic | ICD-10-CM

## 2022-07-25 DIAGNOSIS — E03.9 HYPOTHYROIDISM, UNSPECIFIED TYPE: ICD-10-CM

## 2022-07-25 DIAGNOSIS — M54.12 CERVICAL RADICULOPATHY: ICD-10-CM

## 2022-07-25 PROCEDURE — 99999 PR PBB SHADOW E&M-EST. PATIENT-LVL V: ICD-10-PCS | Mod: PBBFAC,,, | Performed by: NURSE PRACTITIONER

## 2022-07-25 PROCEDURE — G0439 PPPS, SUBSEQ VISIT: HCPCS | Mod: ,,, | Performed by: NURSE PRACTITIONER

## 2022-07-25 PROCEDURE — 99215 OFFICE O/P EST HI 40 MIN: CPT | Mod: PBBFAC,PO | Performed by: NURSE PRACTITIONER

## 2022-07-25 PROCEDURE — 99999 PR PBB SHADOW E&M-EST. PATIENT-LVL V: CPT | Mod: PBBFAC,,, | Performed by: NURSE PRACTITIONER

## 2022-07-25 PROCEDURE — G0439 PR MEDICARE ANNUAL WELLNESS SUBSEQUENT VISIT: ICD-10-PCS | Mod: ,,, | Performed by: NURSE PRACTITIONER

## 2022-07-25 RX ORDER — NAPROXEN SODIUM 220 MG/1
81 TABLET, FILM COATED ORAL DAILY
Status: ON HOLD | COMMUNITY
End: 2022-11-08 | Stop reason: HOSPADM

## 2022-07-25 NOTE — PATIENT INSTRUCTIONS
Counseling and Referral of Other Preventative  (Italic type indicates deductible and co-insurance are waived)    Patient Name: Eugenio Aldridge  Today's Date: 7/25/2022    Health Maintenance       Date Due Completion Date    Influenza Vaccine (1) 09/01/2022 8/16/2021    High Dose Statin 07/25/2023 7/25/2022    DEXA Scan 10/12/2022   10/12/2020    TETANUS VACCINE 07/11/2024 7/11/2014    Colorectal Cancer Screening Copy of order- check with Dr Mo   12/20/2021    Lipid Panel 06/15/2023   6/15/2022        Orders Placed This Encounter   Procedures    DXA Bone Density Spine And Hip     The following information is provided to all patients.  This information is to help you find resources for any of the problems found today that may be affecting your health:                Living healthy guide: www.[facility].[place].[facility]      Understanding Diabetes: www.diabetes.org      Eating healthy: www.cdc.gov/healthyweight      CDC home safety checklist: www.cdc.gov/steadi/patient.html      Agency on Aging: www.goea.[place].[facility]      Alcoholics anonymous (AA): www.aa.org      Physical Activity: www.nereida.nih.gov/jz0ldly      Tobacco use: www.quitwithusla.org

## 2022-07-25 NOTE — PROGRESS NOTES
"Eugenio Aldridge presented for a  Medicare AWV and comprehensive Health Risk Assessment today. The following components were reviewed and updated:    · Medical history  · Family History  · Social history  · Allergies and Current Medications  · Health Risk Assessment  · Health Maintenance  · Care Team     ** See Completed Assessments for Annual Wellness Visit within the encounter summary.**       The following assessments were completed:  · Living Situation  · CAGE  · Depression Screening  · Timed Get Up and Go  · Whisper Test-SNHL- not wearing aides  · Cognitive Function Screening- 3- abnormal  · Nutrition Screening  · ADL Screening  · PAQ Screening    Vitals:    07/25/22 0753   BP: 130/70   BP Location: Right arm   Pulse: 83   Resp: 14   SpO2: 96%   Weight: 70.3 kg (155 lb)   Height: 5' 4" (1.626 m)     Body mass index is 26.61 kg/m².  Physical Exam  Constitutional:       Appearance: She is well-developed.   HENT:      Head: Normocephalic.      Comments: Wears face mask     Right Ear: External ear normal.      Left Ear: External ear normal.      Mouth/Throat:      Pharynx: No oropharyngeal exudate.   Eyes:      General: No scleral icterus.  Cardiovascular:      Rate and Rhythm: Regular rhythm.   Pulmonary:      Breath sounds: Normal breath sounds.   Abdominal:      Palpations: Abdomen is soft.      Comments: overweight   Musculoskeletal:         General: Normal range of motion.   Skin:     General: Skin is warm and dry.   Neurological:      Mental Status: She is alert and oriented to person, place, and time.      Motor: No abnormal muscle tone.      Deep Tendon Reflexes: Reflexes are normal and symmetric.   Psychiatric:         Mood and Affect: Mood normal.         Behavior: Behavior normal.         Thought Content: Thought content normal.         Judgment: Judgment normal.           Lab Results   Component Value Date    HGBA1C 5.4 06/15/2022    CHOL 163 06/15/2022    HDL 87 (H) 06/15/2022    LDLCALC 68.6 06/15/2022    " TRIG 37 06/15/2022    CHOLHDL 53.4 (H) 06/15/2022      Results for orders placed in visit on 10/12/20    DXA Bone Density Spine And Hip    Narrative  EXAMINATION:  DEXA BONE DENSITY SPINE HIP    CLINICAL HISTORY:  Asymptomatic menopausal state.  76 y/o female with no history of fractures.  She had menopausal symptom at 53 y/o.  Pt's Mother had hip fracture.  She is taking Calcium, Vit D, Estrogen and Fosamax.  She exercises daily and does not smoke.    TECHNIQUE:  DXA specification: Kynded [id number]    Bone Mineral Density scanning was performed over the hip and lumbar spine.    Review of the images confirms satisfactory positioning and technique.    COMPARISON:  Comparison study done on 06/12/2018.    Lumbar spine:  BMD 0.873 g/cm2 and T-score -1.6.    Total Hip:        BMD 0.674 g/cm2 and T-score -2.2.    FINDINGS:  Lumbar spine (L1-L4):              BMD is 0.870 g/cm2, T-score is -1.6, and Z-score is 0.7.    Total hip:                               BMD is 0.681 g/cm2, T-score is -2.1, and Z-score is -0.4.    Femoral neck:                         BMD is 0.613 g/cm2, T-score is -2.1, and Z-score is -0.1.    FRAX:    22% risk of a major osteoporotic fracture in the next 10 years.    12% risk of hip fracture in the next 10 years.    Impression  1. Osteoporosis on treatment with Fosamax  2. Compared with previous DXA, BMD at the lumbar spine has remained stable, and the BMD at the total hip has remained stable.    RECOMMENDATIONS:  RECOMMENDATIONS of [facility]:    1. Recommend daily calcium intake 5330-8600 mg, dietary sources preferred; Vitamin D 8245-4448 IU daily.  2. Adequate exercise and fall precautions.  3. Recommend continued treatment with Fosamax.  If the patient has been treated with Fosamax for a period of at least 5 years and has not fractured, can consider drug holiday.  4. Repeat BMD in 2-3 years    EXPLANATION OF RESULTS:  The T-score compares  these results to the average bone density of a 20-29 year-old of the same gender. The Z-score compares this result to the average bone density to people of the same age, gender, and race. The amounts indicate the number of standard deviations above or below the mean.    * Osteoporosis is generally defined as having a T-score between less than or equal to -2.5.    * Osteopenia is generally defined as having a T-score between -1.0 and -2.5.    * The normal range is generally defined as having a T-score greater than or equal to -1.0.    * Calculated FRAX scores for fracture risk prediction may not be accurate in the setting of certain clinical factors such as pharmacologic therapy for osteoporosis, prior fragility fractures, high dose glucocorticoid use.      Electronically signed by: Kerwin Marks  Date:    10/14/2020  Time:    13:20    Results for orders placed during the hospital encounter of 08/15/14    Mammo Digital Screening Bilat with CAD    Narrative  The present examination has been compared to prior imaging studies.    BILATERAL MAMMOGRAM FINDINGS:  The breasts are almost entirely fat.    No significant abnormalities are seen.    These images  were processed to produce digital images analyzed for potential  abnormalities.    IMPRESSION:    There is no mammographic evidence of malignancy.    Mammogram in 1 year is recommended.    ACR BI-RADS Category 1: Negative      LOLI LAYTON M.D.  08/15/2014                Diagnoses and health risks identified today and associated recommendations/orders:    1. Atherosclerosis of aorta  Stable- followed by PCP, cardiology    2. Cervical radiculopathy  Stable- followed by PCP    3. Claudication of left lower extremity  Stable- followed by PCP, cardiology, vascular    4. Hydronephrosis, unspecified hydronephrosis type  Stable- followed by PCP    5. Lumbar disc disease  Stable- followed by PCP    6. Hypothyroidism, unspecified type  Stable- followed by PCP    7. Memory  disturbance  Stable- followed by PCP, neuro    8. Nontraumatic tear of right rotator cuff, unspecified tear extent  Stable- followed by PCP    9. Other osteoporosis without current pathological fracture  Stable- followed by PCP    10. PAD (peripheral artery disease)  Stable- followed by PCP, cardiology, vascular    11. Postinflammatory pulmonary fibrosis  Stable- followed by PCP    12. Pre-diabetes  Stable- followed by PCP    13. Sensorineural hearing loss (SNHL) of both ears  Stable- followed by PCP    14. Pure hypercholesterolemia  Stable- followed by PCP, cardiology    15. Hypertension, unspecified type  Stable- followed by PCP, cardiology    16. Malignant neoplasm of splenic flexure  Stable- followed by PCP., CRS    17. Nuclear sclerosis, unspecified laterality  Stable- followed by PCP, opth    18. Overweight (BMI 25.0-29.9)  Improving. Followed by PCP.   Centers for Disease Control and Prevention (CDC)  weight recommendations for current BMI & ideal BMI range discussed with patient.  Recommended  gradual weight loss,  mediterranean diet , structured regular exercise x 1 hour every day.      19. Menopause  Stable- followed by PCP    20. Encounter for preventive health examination  Here for Health Risk Assessment/Annual Wellness Visit.  Health maintenance reviewed and updated. Follow up in one year.         Provided Becker with a 5-10 year written screening schedule and personal prevention plan. Recommendations were developed using the USPSTF age appropriate recommendations. Education, counseling, and referrals were provided as needed. After Visit Summary printed and given to patient which includes a list of additional screenings\tests needed. Abnormal cog function screen 3- F/U with PCP. On fosamax- [facility]. Colonoscopy order noted in epic- copy & scheduling number given for pt to arrange- referred questions to ordering ASHLEIGH Mo.     Follow up in about 1 year (around 7/25/2023) for HRA.    Afua  BAL Kenyon NP I offered to discuss advanced care planning, including how to pick a person who would make decisions for you if you were unable to make them for yourself, called a health care power of , and what kind of decisions you might make such as use of life sustaining treatments such as ventilators and tube feeding when faced with a life limiting illness recorded on a living will that they will need to know. (How you want to be cared for as you near the end of your natural life)     X Patient is interested in learning more about how to make advanced directives.  I provided them paperwork and offered to discuss this with them.

## 2022-08-08 ENCOUNTER — TELEPHONE (OUTPATIENT)
Dept: CARDIOLOGY | Facility: CLINIC | Age: 77
End: 2022-08-08
Payer: MEDICARE

## 2022-09-05 DIAGNOSIS — Z85.038 ENCOUNTER FOR FOLLOW-UP SURVEILLANCE OF COLON CANCER: Primary | ICD-10-CM

## 2022-09-05 DIAGNOSIS — Z08 ENCOUNTER FOR FOLLOW-UP SURVEILLANCE OF COLON CANCER: Primary | ICD-10-CM

## 2022-09-14 ENCOUNTER — OFFICE VISIT (OUTPATIENT)
Dept: SURGERY | Facility: CLINIC | Age: 77
End: 2022-09-14
Payer: MEDICARE

## 2022-09-14 VITALS
DIASTOLIC BLOOD PRESSURE: 61 MMHG | HEIGHT: 64 IN | SYSTOLIC BLOOD PRESSURE: 131 MMHG | WEIGHT: 155 LBS | HEART RATE: 98 BPM | BODY MASS INDEX: 26.46 KG/M2

## 2022-09-14 DIAGNOSIS — R15.0 INCOMPLETE DEFECATION: Primary | ICD-10-CM

## 2022-09-14 DIAGNOSIS — Z08 ENCOUNTER FOR FOLLOW-UP SURVEILLANCE OF COLON CANCER: ICD-10-CM

## 2022-09-14 DIAGNOSIS — Z85.038 ENCOUNTER FOR FOLLOW-UP SURVEILLANCE OF COLON CANCER: ICD-10-CM

## 2022-09-14 PROCEDURE — 99213 OFFICE O/P EST LOW 20 MIN: CPT | Mod: PBBFAC | Performed by: COLON & RECTAL SURGERY

## 2022-09-14 PROCEDURE — 99999 PR PBB SHADOW E&M-EST. PATIENT-LVL III: CPT | Mod: PBBFAC,,, | Performed by: COLON & RECTAL SURGERY

## 2022-09-14 PROCEDURE — 99213 PR OFFICE/OUTPT VISIT, EST, LEVL III, 20-29 MIN: ICD-10-PCS | Mod: S$PBB,,, | Performed by: COLON & RECTAL SURGERY

## 2022-09-14 PROCEDURE — 99999 PR PBB SHADOW E&M-EST. PATIENT-LVL III: ICD-10-PCS | Mod: PBBFAC,,, | Performed by: COLON & RECTAL SURGERY

## 2022-09-14 PROCEDURE — 99213 OFFICE O/P EST LOW 20 MIN: CPT | Mod: S$PBB,,, | Performed by: COLON & RECTAL SURGERY

## 2022-09-14 NOTE — PROGRESS NOTES
HPI:  Eugenio Aldridge is a 77 y.o. 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       Bowel irregularity  Only taking metamucil when she needs it  Moving bowels 6 small stools per day.  Can't leave house.  No blood.  No abd pain    Past Medical History:   Diagnosis Date    Actinic keratosis     Aortic valve disorders     Atherosclerosis of aorta     Atherosclerosis of native arteries of the extremities with intermittent claudication     Carcinoma in situ, vulva     Hypertension 2/21/2022    Left bundle branch hemiblock     Other and unspecified hyperlipidemia     Postinflammatory pulmonary fibrosis     Senile nuclear sclerosis         Past Surgical History:   Procedure Laterality Date    ARTHROSCOPIC REPAIR OF ROTATOR CUFF OF SHOULDER Right 4/24/2019    Procedure: REPAIR, ROTATOR CUFF, ARTHROSCOPIC WITH REGENERATIN IMPLANT;  Surgeon: Walter Hernandez MD;  Location: [facility];  Service: Orthopedics;  Laterality: Right;  regional w/o catheter     BTL      COLONOSCOPY N/A 12/9/2021    Procedure: COLONOSCOPY;  Surgeon: Juan Swain MD;  Location: [facility] (4TH FLR);  Service: Endoscopy;  Laterality: N/A;  fully vaccinated    DECOMPRESSION OF SUBACROMIAL SPACE Right 4/24/2019    Procedure: DECOMPRESSION, SUBACROMIAL SPACE WITH DISTAL CLAVICLE EXCISION (DCE);  Surgeon: Walter Hernandez MD;  Location: [facility] OR;  Service: Orthopedics;  Laterality: Right;    FLEXIBLE SIGMOIDOSCOPY N/A 12/20/2021    Procedure: SIGMOIDOSCOPY-FLEXIBLE;  Surgeon: Juan Swain MD;  Location: [facility] (2ND FLR);  Service: Endoscopy;  Laterality: N/A;  please schedule with me Monday 12/20 AM. Urgent for marking for colon cancer  spot held 2/20/21 12/16 fully vaccinated; instructions to portal-st    FLEXIBLE SIGMOIDOSCOPY N/A 2/4/2022    Procedure: SIGMOIDOSCOPY, FLEXIBLE;  Surgeon: GUILLERMO Olguin MD;  Location: [facility] OR 2ND FLR;  Service: Colon and Rectal;  Laterality: N/A;    LAPAROSCOPIC LEFT COLECTOMY N/A 2/4/2022     Procedure: COLECTOMY, LEFT, LAPAROSCOPIC;  Surgeon: GUILLERMO Olguin MD;  Location: [facility] OR 2ND FLR;  Service: Colon and Rectal;  Laterality: N/A;  Extended    MOBILIZATION OF SPLENIC FLEXURE N/A 2022    Procedure: MOBILIZATION, SPLENIC FLEXURE;  Surgeon: GUILLERMO Olguin MD;  Location: [facility] OR 2ND FLR;  Service: Colon and Rectal;  Laterality: N/A;    RHYTIDECTOMY      SHOULDER ARTHROSCOPY Right 2019    Procedure: ARTHROSCOPY, SHOULDER WITH LABRAL DEBRIDEMENT;  Surgeon: Walter Hernandez MD;  Location: [facility] OR;  Service: Orthopedics;  Laterality: Right;    TONSILLECTOMY, ADENOIDECTOMY      VULVA SURGERY         Review of patient's allergies indicates:   Allergen Reactions    Codeine Nausea And Vomiting       Family History   Problem Relation Age of Onset    Bladder Cancer Father         d. 69    Alzheimer's disease Mother 96        d. 97    Bladder Cancer Paternal Uncle     Lung cancer Paternal Uncle     Breast cancer Paternal Grandmother     Pancreatic cancer Paternal Aunt     Other Brother         d. 45 o/d    Pneumonia Brother         d. 58, smoker, obese    No Known Problems Sister     No Known Problems Sister     No Known Problems Sister     Amblyopia Neg Hx     Blindness Neg Hx     Cataracts Neg Hx     Diabetes Neg Hx     Glaucoma Neg Hx     Hypertension Neg Hx     Macular degeneration Neg Hx     Retinal detachment Neg Hx     Strabismus Neg Hx     Stroke Neg Hx     Thyroid disease Neg Hx        Social History     Socioeconomic History    Marital status: Significant Other    Number of children: 0    Years of education: 18    Highest education level: Master's degree (e.g., MA, MS, Vinicio, MEd, MSW, AMBROCIO)   Occupational History    Occupation: CPA     Comment: Retired    Tobacco Use    Smoking status: Former     Packs/day: 1.50     Years: 39.00     Pack years: 58.50     Types: Cigarettes     Start date:      Quit date: 2001     Years since quittin.0    Smokeless tobacco: Never    Substance and Sexual Activity    Alcohol use: Yes     Alcohol/week: 7.0 - 14.0 standard drinks     Types: 7 - 14 Standard drinks or equivalent per week    Drug use: No    Sexual activity: Yes   Social History Narrative    Significant other s/p colon cancer, now w/ bladder cancer;f/u imaging clear    + walking in [place], 3-4 /wk    + working CPA     Social Determinants of Health     Financial Resource Strain: Low Risk     Difficulty of Paying Living Expenses: Not hard at all   Food Insecurity: No Food Insecurity    Worried About Running Out of Food in the Last Year: Never true    Ran Out of Food in the Last Year: Never true   Transportation Needs: No Transportation Needs    Lack of Transportation (Medical): No    Lack of Transportation (Non-Medical): No   Physical Activity: Insufficiently Active    Days of Exercise per Week: 3 days    Minutes of Exercise per Session: 30 min   Stress: No Stress Concern Present    Feeling of Stress : Not at all   Social Connections: Unknown    Frequency of Communication with Friends and Family: Three times a week    Frequency of Social Gatherings with Friends and Family: Twice a week    Active Member of Clubs or Organizations: Yes    Attends Club or Organization Meetings: More than 4 times per year    Marital Status: Living with partner   Housing Stability: Low Risk     Unable to Pay for Housing in the Last Year: No    Number of Places Lived in the Last Year: 1    Unstable Housing in the Last Year: No       ROS:  GENERAL: No fever, chills, fatigability or weight loss.  Integument: No rashes, redness, icterus  CHEST: Denies VOGEL, cyanosis, wheezing, cough and sputum production.  CARDIOVASCULAR: Denies chest pain, PND, orthopnea or reduced exercise tolerance.  GI: Denies abd pain, dysphagia, nausea, vomiting, no hematemesis   : Denies burning on urination, no hematuria, no bacteriuria  MSK: No deformities, swelling, joint pain swelling  Neurologic: No HAs, seizures, weakness,  "paresthesias, gait problems    PE:  General appearance well  /61 (BP Location: Right arm, Patient Position: Sitting, BP Method: Large (Automatic))   Pulse 98   Ht 5' 4" (1.626 m)   Wt 70.3 kg (154 lb 15.7 oz)   BMI 26.60 kg/m²     Sclera/ Skin anicteric  LN none palpable  AT NC EOMI  Neck supple trachea midline   Chest symmetric, nl excursion, no retractions, breathing comfortably  Abdomen  ND soft NT.  no masses, no organomegaly  EXT - no CCE  Neuro:  Mood/ affect nl, alert and oriented x 3, moves all ext's, gait nl        Assessment:  Bowel irregularity, stool fragmentation after extended left colectomy    Plan:  High fiber diet - 25 grams per day.  Emphasis on whole grain breads such as double fiber wheat bread and high fiber cereals and bars.    Drink 8 glasses of water per day 64 - 96 oz per day  Daily Fiber supplements -METAMUCIL can be taken 1-2 x per day  Regular exercise - 30 min brisk walking 5 days per week        "

## 2022-09-15 ENCOUNTER — PATIENT MESSAGE (OUTPATIENT)
Dept: INTERNAL MEDICINE | Facility: CLINIC | Age: 77
End: 2022-09-15
Payer: MEDICARE

## 2022-09-15 ENCOUNTER — TELEPHONE (OUTPATIENT)
Dept: INTERNAL MEDICINE | Facility: CLINIC | Age: 77
End: 2022-09-15
Payer: MEDICARE

## 2022-09-15 NOTE — TELEPHONE ENCOUNTER
----- Message -----   From: Eugenio Aldridge   Sent: 9/14/2022   4:58 PM CDT   To: [facility] Clinical Support   Subject: Appointment Request                                Appointment Request From: Eugenio Aldridge      With Provider: Bertha Salguero MD [[facility]]      Preferred Date Range: Any date 10/15/2022 or later      Preferred Times: Any Time      Reason for visit: Office Visit      Comments:   general physical health problems

## 2022-09-24 DIAGNOSIS — E78.00 PURE HYPERCHOLESTEROLEMIA: ICD-10-CM

## 2022-09-26 RX ORDER — ATORVASTATIN CALCIUM 40 MG/1
TABLET, FILM COATED ORAL
Qty: 90 TABLET | Refills: 3 | Status: SHIPPED | OUTPATIENT
Start: 2022-09-26 | End: 2023-10-23 | Stop reason: SDUPTHER

## 2022-10-04 ENCOUNTER — TELEPHONE (OUTPATIENT)
Dept: ENDOSCOPY | Facility: HOSPITAL | Age: 77
End: 2022-10-04
Payer: MEDICARE

## 2022-10-10 ENCOUNTER — OFFICE VISIT (OUTPATIENT)
Dept: NEUROLOGY | Facility: CLINIC | Age: 77
End: 2022-10-10
Payer: MEDICARE

## 2022-10-10 VITALS
WEIGHT: 152.69 LBS | HEIGHT: 64 IN | DIASTOLIC BLOOD PRESSURE: 78 MMHG | SYSTOLIC BLOOD PRESSURE: 145 MMHG | HEART RATE: 79 BPM | BODY MASS INDEX: 26.07 KG/M2

## 2022-10-10 DIAGNOSIS — G31.84 MILD COGNITIVE IMPAIRMENT: Primary | ICD-10-CM

## 2022-10-10 PROCEDURE — 99999 PR PBB SHADOW E&M-EST. PATIENT-LVL III: ICD-10-PCS | Mod: PBBFAC,,, | Performed by: PSYCHIATRY & NEUROLOGY

## 2022-10-10 PROCEDURE — 99213 OFFICE O/P EST LOW 20 MIN: CPT | Mod: PBBFAC | Performed by: PSYCHIATRY & NEUROLOGY

## 2022-10-10 PROCEDURE — 99213 PR OFFICE/OUTPT VISIT, EST, LEVL III, 20-29 MIN: ICD-10-PCS | Mod: S$PBB,,, | Performed by: PSYCHIATRY & NEUROLOGY

## 2022-10-10 PROCEDURE — 99213 OFFICE O/P EST LOW 20 MIN: CPT | Mod: S$PBB,,, | Performed by: PSYCHIATRY & NEUROLOGY

## 2022-10-10 PROCEDURE — 99999 PR PBB SHADOW E&M-EST. PATIENT-LVL III: CPT | Mod: PBBFAC,,, | Performed by: PSYCHIATRY & NEUROLOGY

## 2022-10-10 RX ORDER — DONEPEZIL HYDROCHLORIDE 5 MG/1
7.5 TABLET, FILM COATED ORAL DAILY
Qty: 21 TABLET | Refills: 0 | Status: SHIPPED | OUTPATIENT
Start: 2022-10-10 | End: 2022-10-18 | Stop reason: DRUGHIGH

## 2022-10-10 RX ORDER — DONEPEZIL HYDROCHLORIDE 10 MG/1
10 TABLET, FILM COATED ORAL DAILY
Qty: 90 TABLET | Refills: 3 | Status: SHIPPED | OUTPATIENT
Start: 2022-10-24 | End: 2023-10-23 | Stop reason: SDUPTHER

## 2022-10-10 NOTE — PROGRESS NOTES
[facility]    Name: Eugenio Aldridge  : 1945  MRN: [id number]    Date: 10/10/2022    Follow-up Evaluation    Assessment:     Ms. Aldridge is a 77 y.o. right handed female with colon adenocarcinoma, HTN, HLD, PAD, prediabetes, hypothyroidism, osteoporosis, SNHL who presents to the [facility] due to memory deficits. Patient has been experiencing progressively worsening trouble with memory since 2019. She has SNHL which may contribute somewhat to perceived cognitive symptoms. She does not endorse much trouble with other cognitive domains. On evaluation today, physical exam is notable for mild weakness of her right deltoid and mild paratonia b/l, but is otherwise unremarkable. MMSE was 21/30 with significant deficits in delayed recall and lexical fluency. Very mild deficits were noted in visuospatial ability, confrontation naming, and orientation. Labs are largely unremarkable for potential causes of cognitive impairment.  MRI brain performed 22 notable for decreased volume in the frontal and parietal lobes at the vertex, as well as mild volume loss of the hippocampi bilaterally.  Additionally, there is mild chronic microvascular disease involving the periventricular and deep white matter. The underlying cause of Ms. Aldridge's cognitive symptoms is unclear at this time. Given her report of progressively worsening cognitive symptoms and volume loss noted on MRI, there is concern for an underlying neurodegenerative disorder. Will get amyloid PET through new ideas study, however is, the studies currently on hold at all sites due to directive by the sponsor. Will notify patient when study resumes.    Recommendations:     Workup  New IDEAS study on hold. Will discuss with patient when hold is lifted.     Treatment  Increased Donepezil to 7.5 mg x2 week then 10 mg daily.     Safety-related  We recommend that a medication management system be put in place to avoid errors in taking  medication. Helpful services include PillPack (pillpack.com; free service) and MedMinder (medminder.com; paid subscription service).    Health maintenance   Continue to follow-up with primary care doctor to monitor and treat vascular risk factors.  Recommend performing moderate-intensity cardiovascular exercise as able, ideally 30 minutes per day, 5 days per week.  Recommend staying socially and cognitively active.  Recommend eating a healthy and balanced diet (Mediterranean or DASH diet).    Follow-up: Follow up in about 6 months (around 4/10/2023). I provided Ms. Aldridge and her significant other, Marzena, with our contact information should they have any questions or concerns.    Subjective:      Chief complaint:  Memory Deficits    History of present illness:  Ms. Aldridge is a 77 y.o. right handed female with a history of colon adenocarcinoma, HTN, HLD, PAD, prediabetes, hypothyroidism osteoporosis, SNHL who presents today to the [facility] due to concerns regarding memory deficits. Ms. Aldridge is accompanied by her significant other, Marzena, who participates in providing history. Additional information is obtained by reviewing available medical records.     HPI by Dr. Gates from May 2021:  This 75 year old woman, a retired , reports that she has experienced two years of what she describes as memory disturbance/forgetfulness.  She will, at times, not remember where she has left her keys or hearing aids.  The patient reports that she has not experienced becoming lost when driving.  She reports no serious errors in decisions made at home.  The patient points out that with Covid, she has been living in relative isolation from friends from her past. I have reviewed the notes from 3-29-21,from  Dr. Salguero. Noted is a history of osteoporosis and peripheral artery disease.  The patient has spoken of  problems of pain, with exercise, in the left upper thigh.  Clinic notes  from Dr. Stahl, from Interventional Cardiology, have been reviewed from 4-5-21.  He has described the patient's problems with varying degrees of claudication at the left leg when walking.  He has prescribed cilostazol and a regimen of exercise. The patient had an MRI scan of the lumbosacral spine, dated 3-31-21.  I have reviewed the study.  Noted are multiple disc bulges, and mild  spondylosis, however, I note no evidence of severe disc protrusion that might explain the symptoms related to pain with exercise. The patient reports neither dizziness nor vertigo.  There have been no episodes of weakness, long-standing or transient, weakness or numbness of the extremities.  There is no reported chest pain or shortness of breath.       Interval history (01/03/22):  Patient began experiencing trouble with memory around early 2019 which has progressively worsened since onset. Symptoms have become more obvious over time and her friends began noticing that she was having trouble in early 2021. Patient reports difficulty remembering recent events, upcoming events, conversations. She is able to remember to take medications because they sit out on a table that she sits at every morning. She reports sometimes walking into the kitchen and forgetting which cabinet a particular dish is in despite living in the same place for many years. She often forgets why she walked into a room. Patient reports that she recently she and friends went to a restaurant, paid the bill, and had no recollection of paying by the time she got home. She often loses objects around her home such as her keys or hearing aids. She continues to be able to live independently.     Interval history (2/21/22):  Patient has not experienced worsening of memory symptoms since last appointment on 1/3/22.  Purpose of today's visit is to discuss results of MRI and next steps.  MRI notable for volume loss of the frontal and parietal lobes at the vertex, as well as mild  "hippocampal volume loss and mild chronic microvascular disease.  Discussed starting donepezil with patient.  Patient would like to defer medication until diagnosis is confirmed.  Discussed amyloid PET verses CSF biomarkers for the diagnosis of Alzheimer's disease.  Patient would like to undergo amyloid PET when New IDEAS study resumes.    Interval history (05/31/22):  Colon surgery on Feb 4 for cancer. No chemo. Fell on May 5. She had a CT today and she still has a hematoma. No ICH. Patient has been doing relatively well.  Both patient and her significant other agree, she has not experienced any changes in cognition since her last appointment. Her cognition generally worsens some during the evening, but overall this has not changed since her initial evaluation.    Interval history (10/10/22):  Patient presents with . There have no been any significant changes since her last appointment in May 2022. She continues to have some trouble with memory in the evening. She describes this as "brain fog" and states that she does not have any overt confusion or changes in behavior. Donepezil was started by PCP and patient remains on 5 mg daily. Discussed risks/benefits of increasing to 10 mg daily; patient agreeable to increase. She continues to have a good appetite and sleeps well. She remains independent in all IADLs.     Review of systems:  Negative except as noted in the HPI    Functional status:   iADLs:  Household chores: independent  Meal preparation: independent  Medication management: independent  Shopping: independent  Managing money: independent  Transportation: independent  Telephoning/communication: independent    ADLs:  Transferring: independent  Feeding: independent  Hygiene: independent  Toileting: independent  Bathing: independent  Dressing: independent    Other functional status and safety issues:  She does drive.     Past Medical History:   Diagnosis Date    Actinic keratosis     Aortic valve disorders "     Atherosclerosis of aorta     Atherosclerosis of native arteries of the extremities with intermittent claudication     Carcinoma in situ, vulva     Hypertension 2/21/2022    Left bundle branch hemiblock     Other and unspecified hyperlipidemia     Postinflammatory pulmonary fibrosis     Senile nuclear sclerosis      Past Surgical History:   Procedure Laterality Date    ARTHROSCOPIC REPAIR OF ROTATOR CUFF OF SHOULDER Right 4/24/2019    Procedure: REPAIR, ROTATOR CUFF, ARTHROSCOPIC WITH REGENERATIN IMPLANT;  Surgeon: Walter Hernandez MD;  Location: [facility] OR;  Service: Orthopedics;  Laterality: Right;  regional w/o catheter     BTL      COLONOSCOPY N/A 12/9/2021    Procedure: COLONOSCOPY;  Surgeon: Juan Swain MD;  Location: [facility] ENDO (4TH FLR);  Service: Endoscopy;  Laterality: N/A;  fully vaccinated    DECOMPRESSION OF SUBACROMIAL SPACE Right 4/24/2019    Procedure: DECOMPRESSION, SUBACROMIAL SPACE WITH DISTAL CLAVICLE EXCISION (DCE);  Surgeon: Walter Hernandez MD;  Location: [facility];  Service: Orthopedics;  Laterality: Right;    FLEXIBLE SIGMOIDOSCOPY N/A 12/20/2021    Procedure: SIGMOIDOSCOPY-FLEXIBLE;  Surgeon: Juan Swain MD;  Location: [facility] ENDO (2ND FLR);  Service: Endoscopy;  Laterality: N/A;  please schedule with me Monday 12/20 AM. Urgent for marking for colon cancer  spot held 2/20/21 12/16 fully vaccinated; instructions to portal-st    FLEXIBLE SIGMOIDOSCOPY N/A 2/4/2022    Procedure: SIGMOIDOSCOPY, FLEXIBLE;  Surgeon: GUILLERMO Olguin MD;  Location: [facility] OR 2ND FLR;  Service: Colon and Rectal;  Laterality: N/A;    LAPAROSCOPIC LEFT COLECTOMY N/A 2/4/2022    Procedure: COLECTOMY, LEFT, LAPAROSCOPIC;  Surgeon: GUILLERMO Olguin MD;  Location: [facility] OR 2ND FLR;  Service: Colon and Rectal;  Laterality: N/A;  Extended    MOBILIZATION OF SPLENIC FLEXURE N/A 2/4/2022    Procedure: MOBILIZATION, SPLENIC FLEXURE;  Surgeon: GUILLERMO Olguin MD;  Location: [facility] OR 2ND FLR;  Service: Colon and Rectal;   Laterality: N/A;    RHYTIDECTOMY      SHOULDER ARTHROSCOPY Right 2019    Procedure: ARTHROSCOPY, SHOULDER WITH LABRAL DEBRIDEMENT;  Surgeon: Waletr Hernandez MD;  Location: [facility];  Service: Orthopedics;  Laterality: Right;    TONSILLECTOMY, ADENOIDECTOMY      VULVA SURGERY       Family History   Problem Relation Age of Onset    Bladder Cancer Father         d. 69    Alzheimer's disease Mother 96        d. 97    Bladder Cancer Paternal Uncle     Lung cancer Paternal Uncle     Breast cancer Paternal Grandmother     Pancreatic cancer Paternal Aunt     Other Brother         d. 45 o/d    Pneumonia Brother         d. 58, smoker, obese    No Known Problems Sister     No Known Problems Sister     No Known Problems Sister     Amblyopia Neg Hx     Blindness Neg Hx     Cataracts Neg Hx     Diabetes Neg Hx     Glaucoma Neg Hx     Hypertension Neg Hx     Macular degeneration Neg Hx     Retinal detachment Neg Hx     Strabismus Neg Hx     Stroke Neg Hx     Thyroid disease Neg Hx      Social History     Socioeconomic History    Marital status: Significant Other    Number of children: 0    Years of education: 18    Highest education level: Master's degree (e.g., MA, MS, Vinicio, MEd, MSW, AMBROCIO)   Occupational History    Occupation: CPA     Comment: Retired 2019   Tobacco Use    Smoking status: Former     Packs/day: 1.50     Years: 39.00     Pack years: 58.50     Types: Cigarettes     Start date:      Quit date: 2001     Years since quittin.0    Smokeless tobacco: Never   Substance and Sexual Activity    Alcohol use: Yes     Alcohol/week: 7.0 - 14.0 standard drinks     Types: 7 - 14 Standard drinks or equivalent per week    Drug use: No    Sexual activity: Yes   Social History Narrative    Significant other s/p colon cancer, now w/ bladder cancer;f/u imaging clear    + walking in [place], 3-4 /wk    + working CPA     Social Determinants of Health     Financial Resource Strain: Low Risk     Difficulty of Paying  Living Expenses: Not hard at all   Food Insecurity: No Food Insecurity    Worried About Running Out of Food in the Last Year: Never true    Ran Out of Food in the Last Year: Never true   Transportation Needs: No Transportation Needs    Lack of Transportation (Medical): No    Lack of Transportation (Non-Medical): No   Physical Activity: Insufficiently Active    Days of Exercise per Week: 3 days    Minutes of Exercise per Session: 30 min   Stress: No Stress Concern Present    Feeling of Stress : Not at all   Social Connections: Unknown    Frequency of Communication with Friends and Family: Three times a week    Frequency of Social Gatherings with Friends and Family: Twice a week    Active Member of Clubs or Organizations: Yes    Attends Club or Organization Meetings: More than 4 times per year    Marital Status: Living with partner   Housing Stability: Low Risk     Unable to Pay for Housing in the Last Year: No    Number of Places Lived in the Last Year: 1    Unstable Housing in the Last Year: No     Current Outpatient Medications:     amLODIPine (NORVASC) 10 MG tablet, Take 1 tablet (10 mg total) by mouth once daily., Disp: 90 tablet, Rfl: 3    aspirin 81 MG Chew, Take 81 mg by mouth once daily., Disp: , Rfl:     atorvastatin (LIPITOR) 40 MG tablet, TAKE 1 TABLET(40 MG) BY MOUTH EVERY DAY, Disp: 90 tablet, Rfl: 3    calcium-vitamin D3 (OS-MARYANA 500 + D3) 500 mg(1,250mg) -200 unit per tablet, Take 1 tablet by mouth once daily. , Disp: , Rfl:     cilostazoL (PLETAL) 100 MG Tab, Take 1 tablet (100 mg total) by mouth 2 (two) times daily., Disp: 180 tablet, Rfl: 3    cyanocobalamin, vitamin B-12, (VITAMIN B-12 ORAL), Take 1 tablet by mouth once daily., Disp: , Rfl:     donepeziL (ARICEPT) 5 MG tablet, Take 1 tablet (5 mg total) by mouth once daily., Disp: 90 tablet, Rfl: 3    levothyroxine (SYNTHROID) 50 MCG tablet, TAKE 1 TABLET(50 MCG) BY MOUTH BEFORE BREAKFAST, Disp: 90 tablet, Rfl: 3    multivitamin (THERAGRAN) per  "tablet, Take by mouth. 1 Tablet Oral Every day, Disp: , Rfl:     ketoconazole (NIZORAL) 2 % cream, Apply topically once daily., Disp: 60 g, Rfl: 2  No current facility-administered medications for this visit.    Facility-Administered Medications Ordered in Other Visits:     0.9%  NaCl infusion, , Intravenous, Continuous, Sanna Ann NP    gabapentin capsule 300 mg, 300 mg, Oral, TID, Sanna Ann NP, 300 mg at 02/05/22 0911    Allergies:  Codeine    Objective:     Vital signs:  Blood pressure (!) 145/78, pulse 79, height 5' 4" (1.626 m), weight 69.3 kg (152 lb 10.7 oz).    Neurological examination:  Mental status: Ms. Aldridge was appropriate and pleasant. She was alert and oriented to person, place, and situation. Attention and concentration were intact.  Language: Ms. Aldridge's speech was fluent, non-effortful, and grammatically intact. She did not have word-finding difficulty or make word-substitutions. Her comprehension was also intact to syntactically complex sentences.  Cranial nerves: Extraocular movements were intact. There was no eyelid dysfunction. Her facial movements were symmetric, no hypomimia. Hearing was intact to voice. The tongue protruded in the midline with intact movement bilaterally. She did not have slurring or other speech abnormalities.  Motor examination: Muscle bulk was normal in the upper and lower extremities. She moved all limbs symmetrically. Strength 5/5 throughout. She did not have resting tremor, postural tremor, or other involuntary movements.  Coordination/sensory:  Grossly normal.  Station/Gait:  Gait unremarkable.    Neuroimaging:  Results for orders placed or performed during the hospital encounter of 05/31/22   CT Head Without Contrast    Narrative    EXAMINATION:  CT HEAD WITHOUT CONTRAST    CLINICAL HISTORY:  Dizziness, persistent/recurrent, cardiac or vascular cause suspected;syncope w/ scalp hematoma;  Dizziness and giddiness    TECHNIQUE:  Multiple sequential 5 mm " axial images of the head without contrast.  Coronal and sagittal reformatted imaging from the axial acquisition.    COMPARISON:  MRI 01/20/2022    FINDINGS:  Induration right parietal soft tissues suggestive for subcutaneous hematoma without gross underlying calvarial fracture.  There is no evidence for acute intracranial hemorrhage or sulcal effacement to suggest large territory recent infarction.  Mild generalized cerebral volume loss.  Mild patchy hypoattenuation supratentorial white matter while nonspecific may represent chronic ischemic change.  No sulcal effacement to suggest large territory recent infarction.  Visualized paranasal sinuses and mastoid air cells are clear.      Impression    No acute intracranial findings specifically without evidence for acute intracranial hemorrhage or sulcal effacement to suggest large territory recent infarction    Subcutaneous hematoma overlies the right parietal calvarium without gross underlying calvarial fracture.    Further evaluation with MRI as warranted if patient compatible.      Electronically signed by: Wayne Houser DO  Date:    05/31/2022  Time:    07:59   Results for orders placed or performed during the hospital encounter of 01/20/22   MRI Brain Without Contrast    Narrative    EXAMINATION:  MRI BRAIN WITHOUT CONTRAST    CLINICAL HISTORY:  Memory loss;  Other amnesia    TECHNIQUE:  Sagittal and axial T1, axial T2, axial FLAIR, axial gradient, 3D MP rage and axial diffusion imaging of the whole brain without contrast.    COMPARISON:  None    FINDINGS:  Generalized cerebral volume loss most prominent in the frontal and parietal lobes at the vertex.  Compensatory enlargement ventricles sulci and cisterns without hydrocephalus.  Several scattered punctate size foci of T2 FLAIR signal hyperintensity supratentorial white matter additional clustered foci in the demario without corresponding diffusion signal abnormality which are nonspecific and may represent mild  moderate degree of chronic microvascular ischemic change.  No restricted diffusion to suggest acute or recent infarction.  Major intracranial T2 flow voids are present.  No significant advanced temporal lobe volume loss.    No abnormal parenchymal susceptibility to suggest parenchymal hemorrhage.      Impression    Cerebral volume loss with scattered foci of T2 FLAIR signal hyperintensity supratentorial white matter and demario which is nonspecific and may be sequela of mild moderate degree of chronic ischemic change.  No evidence for acute infarction or hydrocephalus    Please note volume loss most pronounced in the frontal and parietal lobes at the vertex, no significant age advanced temporal lobe volume loss, underlying neuro degenerative process not excluded and clinical correlation advised.      Electronically signed by: Wayne Houser DO  Date:    01/20/2022  Time:    08:39     Laboratory studies:  No visits with results within 6 Week(s) from this visit.   Latest known visit with results is:   Lab Visit on 07/06/2022   Component Date Value Ref Range Status    CEA 07/06/2022 <1.7  0.0 - 5.0 ng/mL Final    Comment: CEA Normal Range:  Non-Smokers: 0-3.0 ng/mL  Smokers:     0-5.0 ng/mL    The testing method is a chemiluminescent microparticle immunoassay   manufactured by [employer] and performed on the    or   [employer] system. Values obtained with different assay manufacturers   for   methods may be different and cannot be used interchangeably.       I performed a total of 25 minutes on Ms. Aldridge's care on the day of their visit excluding time spent related to any billed procedures. This time includes time spent with the patient as well as time spent documenting in the medical record, reviewing patient's records and tests, obtaining history, placing orders, communicating with other healthcare professionals, counseling the patient, family, or caregiver, and/or care coordination for the diagnoses  above.    This note was dictated using [employer] speech recognition software. Please excuse any spelling or grammatical errors. Word recognition mistakes are occasionally missed on review.      David Ferrari MD   Behavioral Neurology & Neuropsychiatry  [facility]

## 2022-10-14 NOTE — PROGRESS NOTES
CC:       77 y.o. yo female w/ HTN, HLD, ASPVD, Pre-DM,  thyroid ds, and memory disturbance  Presents for f/up on above  Reviewed Neurology Consult w/ Dr. Ferrari  Rec for optimization of CV risks  HTN, tx amlodipine 10mg  Denied HA or dz at this time ( no recent falls/vertigo)  BP==> 110/78    HLD, tx atorvastatin 40mg  Denied chest pain or SOB  LDL==>68.6    PVD, tx s/p  Pletal  Denied claudication    Hypothyroidism, tx levothyroxine 50mcg  Denied cold or heat intolerance  TSH==> 3.752    Pre-DM, tx diet  Denied increased thirst or urination  A1C==>5.4    MEDCARD:Reviewed  ROS:  No fever, chills , or night sweats  No visual disturbance or itchy/watery eyes  No ear or sinus pain/pressure  No dysphagia or early satiety  No chest pain or palpitations  No cough or wheezing  No GERD or abdominal pain  No change in bowel or bladder function  No blood in stool or urine  No dysuria or nocturia  No joint swelling or redness  No skin rashes or blisters  No cold or heat intolerance  No increased thirst or urination    Remainder of review negative except as previously noted    PMHX: Reviewed  PSHX: Reviewed  SHX: Reviewed  FHX: Reviewed    PE:  VSS  GEN: WDWN, A&O, NAD, conversant and co-operative, pleasant as always  EYES: Conj/lids unremarkable, sclera anicteric   NECK: Supple w/o lymphadenopathy or thyromegaly  RESP: Efforts unlabored, lungs CTAP  CV: Heart RRR, no carotid bruits, 1+ pedal pulses, no LE edema  GI:BS+. Soft, NT/ND, -HSM noted  MSK: Gait normal. No CCE noted  NEURO: BARROW. No tremor noted     SKIN: warm and dry      IMPRESSION:   HTN, stable on amlodipine  HLD, LDL @ goal on atorvastatin  Aortic atherosclerosis, asx   PVD, stable on [place]  Pre-DM, asx w/ normal A1C at last check  Hypothyroidism, stable on levothyroxine 50mcg    Memory disturbance , exacerbated-       PLAN:  Reviewed note from Dr. Ferrari, 10/2022  Continue present meds  Caution w/ activities  Keep well hydrated  Low salt, low fat diet, avoid  sugars  DEXA  Mammo  Call prn  RTC 6 mos

## 2022-10-18 ENCOUNTER — OFFICE VISIT (OUTPATIENT)
Dept: INTERNAL MEDICINE | Facility: CLINIC | Age: 77
End: 2022-10-18
Payer: MEDICARE

## 2022-10-18 VITALS
BODY MASS INDEX: 26 KG/M2 | HEART RATE: 87 BPM | SYSTOLIC BLOOD PRESSURE: 110 MMHG | TEMPERATURE: 98 F | DIASTOLIC BLOOD PRESSURE: 78 MMHG | WEIGHT: 152.31 LBS | OXYGEN SATURATION: 96 % | HEIGHT: 64 IN

## 2022-10-18 DIAGNOSIS — I10 HYPERTENSION, UNSPECIFIED TYPE: Primary | ICD-10-CM

## 2022-10-18 DIAGNOSIS — Z12.31 ENCOUNTER FOR SCREENING MAMMOGRAM FOR BREAST CANCER: ICD-10-CM

## 2022-10-18 DIAGNOSIS — I73.9 PAD (PERIPHERAL ARTERY DISEASE): ICD-10-CM

## 2022-10-18 DIAGNOSIS — E78.00 PURE HYPERCHOLESTEROLEMIA: ICD-10-CM

## 2022-10-18 DIAGNOSIS — Z78.0 POSTMENOPAUSAL ESTROGEN DEFICIENCY: ICD-10-CM

## 2022-10-18 DIAGNOSIS — R73.03 PRE-DIABETES: ICD-10-CM

## 2022-10-18 DIAGNOSIS — E03.9 HYPOTHYROIDISM, UNSPECIFIED TYPE: ICD-10-CM

## 2022-10-18 DIAGNOSIS — I70.0 ATHEROSCLEROSIS OF AORTA: ICD-10-CM

## 2022-10-18 PROCEDURE — 99214 PR OFFICE/OUTPT VISIT, EST, LEVL IV, 30-39 MIN: ICD-10-PCS | Mod: S$PBB,,, | Performed by: INTERNAL MEDICINE

## 2022-10-18 PROCEDURE — 99214 OFFICE O/P EST MOD 30 MIN: CPT | Mod: S$PBB,,, | Performed by: INTERNAL MEDICINE

## 2022-10-18 PROCEDURE — 99999 PR PBB SHADOW E&M-EST. PATIENT-LVL IV: ICD-10-PCS | Mod: PBBFAC,,, | Performed by: INTERNAL MEDICINE

## 2022-10-18 PROCEDURE — 99214 OFFICE O/P EST MOD 30 MIN: CPT | Mod: PBBFAC,PO | Performed by: INTERNAL MEDICINE

## 2022-10-18 PROCEDURE — 99999 PR PBB SHADOW E&M-EST. PATIENT-LVL IV: CPT | Mod: PBBFAC,,, | Performed by: INTERNAL MEDICINE

## 2022-10-18 RX ORDER — AMLODIPINE BESYLATE 5 MG/1
TABLET ORAL
COMMUNITY
Start: 2022-09-22 | End: 2022-10-18 | Stop reason: DRUGHIGH

## 2022-10-20 DIAGNOSIS — R15.0 INCOMPLETE DEFECATION: Primary | ICD-10-CM

## 2022-10-26 ENCOUNTER — OFFICE VISIT (OUTPATIENT)
Dept: SURGERY | Facility: CLINIC | Age: 77
DRG: 025 | End: 2022-10-26
Payer: MEDICARE

## 2022-10-26 VITALS
SYSTOLIC BLOOD PRESSURE: 125 MMHG | BODY MASS INDEX: 25.65 KG/M2 | HEIGHT: 64 IN | HEART RATE: 94 BPM | DIASTOLIC BLOOD PRESSURE: 60 MMHG | WEIGHT: 150.25 LBS

## 2022-10-26 DIAGNOSIS — Z08 ENCOUNTER FOR FOLLOW-UP SURVEILLANCE OF COLON CANCER: Primary | ICD-10-CM

## 2022-10-26 DIAGNOSIS — Z85.038 ENCOUNTER FOR FOLLOW-UP SURVEILLANCE OF COLON CANCER: Primary | ICD-10-CM

## 2022-10-26 PROCEDURE — 99999 PR PBB SHADOW E&M-EST. PATIENT-LVL III: ICD-10-PCS | Mod: PBBFAC,,, | Performed by: COLON & RECTAL SURGERY

## 2022-10-26 PROCEDURE — 99999 PR PBB SHADOW E&M-EST. PATIENT-LVL III: CPT | Mod: PBBFAC,,, | Performed by: COLON & RECTAL SURGERY

## 2022-10-26 PROCEDURE — 99214 OFFICE O/P EST MOD 30 MIN: CPT | Mod: S$PBB,,, | Performed by: COLON & RECTAL SURGERY

## 2022-10-26 PROCEDURE — 99214 PR OFFICE/OUTPT VISIT, EST, LEVL IV, 30-39 MIN: ICD-10-PCS | Mod: S$PBB,,, | Performed by: COLON & RECTAL SURGERY

## 2022-10-26 PROCEDURE — 99213 OFFICE O/P EST LOW 20 MIN: CPT | Mod: PBBFAC | Performed by: COLON & RECTAL SURGERY

## 2022-10-26 NOTE — PROGRESS NOTES
HPI:  Eugenio Aldridge is a 77 y.o. 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        Bowel irregularity  Only taking metamucil when she needs it  Moving bowels 6 small stools per day.  Can't leave house.  No blood.  No abd pain    10/26/2022  Continues to have bowel irregularity with soft stools and intermittent watery diarrhea 3-4 times a week. She reports having 5-6 bowel movements a day and is unable to be away from a bathroom for more than 3 hours. Better when she takes Metamucil but NOT taking on regular basis.  She is continent. Otherwise doing well, with adequate appetite and no abdominal pain. Denies any bleeding.           Past Medical History:   Diagnosis Date    Actinic keratosis      Aortic valve disorders      Atherosclerosis of aorta      Atherosclerosis of native arteries of the extremities with intermittent claudication      Carcinoma in situ, vulva      Hypertension 2/21/2022    Left bundle branch hemiblock      Other and unspecified hyperlipidemia      Postinflammatory pulmonary fibrosis      Senile nuclear sclerosis                 Past Surgical History:   Procedure Laterality Date    ARTHROSCOPIC REPAIR OF ROTATOR CUFF OF SHOULDER Right 4/24/2019     Procedure: REPAIR, ROTATOR CUFF, ARTHROSCOPIC WITH REGENERATIN IMPLANT;  Surgeon: Walter Hernandez MD;  Location: [facility];  Service: Orthopedics;  Laterality: Right;  regional w/o catheter     BTL        COLONOSCOPY N/A 12/9/2021     Procedure: COLONOSCOPY;  Surgeon: Juan Swain MD;  Location: [facility] ([address] [place] [address]);  Service: Endoscopy;  Laterality: N/A;  fully vaccinated    DECOMPRESSION OF SUBACROMIAL SPACE Right 4/24/2019     Procedure: DECOMPRESSION, SUBACROMIAL SPACE WITH DISTAL CLAVICLE EXCISION (DCE);  Surgeon: Walter Hernandez MD;  Location: [facility] OR;  Service: Orthopedics;  Laterality: Right;    FLEXIBLE SIGMOIDOSCOPY N/A 12/20/2021     Procedure: SIGMOIDOSCOPY-FLEXIBLE;  Surgeon: Juan Swain MD;  Location:  [facility] (2ND FLR);  Service: Endoscopy;  Laterality: N/A;  please schedule with me Monday 12/20 AM. Urgent for marking for colon cancer  spot held 2/20/21 12/16 fully vaccinated; instructions to portal-st    FLEXIBLE SIGMOIDOSCOPY N/A 2/4/2022     Procedure: SIGMOIDOSCOPY, FLEXIBLE;  Surgeon: GUILLERMO Olguin MD;  Location: [facility] OR 2ND FLR;  Service: Colon and Rectal;  Laterality: N/A;    LAPAROSCOPIC LEFT COLECTOMY N/A 2/4/2022     Procedure: COLECTOMY, LEFT, LAPAROSCOPIC;  Surgeon: GUILLERMO Olguin MD;  Location: [facility] OR 2ND FLR;  Service: Colon and Rectal;  Laterality: N/A;  Extended    MOBILIZATION OF SPLENIC FLEXURE N/A 2/4/2022     Procedure: MOBILIZATION, SPLENIC FLEXURE;  Surgeon: GUILLERMO Olguin MD;  Location: [facility] OR 2ND FLR;  Service: Colon and Rectal;  Laterality: N/A;    RHYTIDECTOMY        SHOULDER ARTHROSCOPY Right 4/24/2019     Procedure: ARTHROSCOPY, SHOULDER WITH LABRAL DEBRIDEMENT;  Surgeon: Walter Hernandez MD;  Location: [facility];  Service: Orthopedics;  Laterality: Right;    TONSILLECTOMY, ADENOIDECTOMY        VULVA SURGERY                  Review of patient's allergies indicates:   Allergen Reactions    Codeine Nausea And Vomiting               Family History   Problem Relation Age of Onset    Bladder Cancer Father           d. 69    Alzheimer's disease Mother 96         d. 97    Bladder Cancer Paternal Uncle      Lung cancer Paternal Uncle      Breast cancer Paternal Grandmother      Pancreatic cancer Paternal Aunt      Other Brother           d. 45 o/d    Pneumonia Brother           d. 58, smoker, obese    No Known Problems Sister      No Known Problems Sister      No Known Problems Sister      Amblyopia Neg Hx      Blindness Neg Hx      Cataracts Neg Hx      Diabetes Neg Hx      Glaucoma Neg Hx      Hypertension Neg Hx      Macular degeneration Neg Hx      Retinal detachment Neg Hx      Strabismus Neg Hx      Stroke Neg Hx      Thyroid disease Neg Hx           Social History             Socioeconomic History    Marital status: Significant Other    Number of children: 0    Years of education: 18    Highest education level: Master's degree (e.g., MA, MS, Vinicio, MEd, MSW, AMBROCIO)   Occupational History    Occupation: CPA       Comment: Retired 2019   Tobacco Use    Smoking status: Former       Packs/day: 1.50       Years: 39.00       Pack years: 58.50       Types: Cigarettes       Start date:        Quit date: 2001       Years since quittin.0    Smokeless tobacco: Never   Substance and Sexual Activity    Alcohol use: Yes       Alcohol/week: 7.0 - 14.0 standard drinks       Types: 7 - 14 Standard drinks or equivalent per week    Drug use: No    Sexual activity: Yes   Social History Narrative     Significant other s/p colon cancer, now w/ bladder cancer;f/u imaging clear     + [place] in [place], 3-4 /wk     + working CPA      Social Determinants of Health          Financial Resource Strain: Low Risk     Difficulty of Paying Living Expenses: Not hard at all   Food Insecurity: No Food Insecurity    Worried About Running Out of Food in the Last Year: Never true    Ran Out of Food in the Last Year: Never true   Transportation Needs: No Transportation Needs    Lack of Transportation (Medical): No    Lack of Transportation (Non-Medical): No   Physical Activity: Insufficiently Active    Days of Exercise per Week: 3 days    Minutes of Exercise per Session: 30 min   Stress: No Stress Concern Present    Feeling of Stress : Not at all   Social Connections: Unknown    Frequency of Communication with Friends and Family: Three times a week    Frequency of Social Gatherings with Friends and Family: Twice a week    Active Member of Clubs or Organizations: Yes    Attends Club or Organization Meetings: More than 4 times per year    Marital Status: Living with partner   Housing Stability: Low Risk     Unable to Pay for Housing in the Last Year: No    Number of Places Lived in the Last Year: 1    Unstable Housing  "in the Last Year: No         ROS:  GENERAL: No fever, chills, fatigability or weight loss.  Integument: No rashes, redness, icterus  CHEST: Denies VOGEL, cyanosis, wheezing, cough and sputum production.  CARDIOVASCULAR: Denies chest pain, PND, orthopnea or reduced exercise tolerance.  GI: Denies abd pain, dysphagia, nausea, vomiting, no hematemesis   : Denies burning on urination, no hematuria, no bacteriuria  MSK: No deformities, swelling, joint pain swelling  Neurologic: No HAs, seizures, weakness, paresthesias, gait problems     PE:  General appearance well  /61 (BP Location: Right arm, Patient Position: Sitting, BP Method: Large (Automatic))   Pulse 98   Ht 5' 4" (1.626 m)   Wt 70.3 kg (154 lb 15.7 oz)   BMI 26.60 kg/m²      Sclera/ Skin anicteric  LN none palpable  AT NC EOMI  Neck supple trachea midline   Chest symmetric, nl excursion, no retractions, breathing comfortably  Abdomen  ND soft NT.  no masses, no organomegaly  EXT - no CCE  Neuro:  Mood/ affect nl, alert and oriented x 3, moves all ext's, gait nl     Assessment:  Bowel irregularity with intermittent diarrhea, which has been affecting the patient's lifestyle.      Plan:  High fiber diet - 25 grams per day.  Emphasis on whole grain breads such as double fiber wheat bread and high fiber cereals and bars.    Drink 8 glasses of water per day 64 - 96 oz per day  Daily Fiber supplements -METAMUCIL can be taken 1-2 x per day  Regular exercise - 30 min brisk walking 5 days per week  May start imodium if not improved with increased fiber       I have personally obtained a history and performed a physical exam with and independent to my resident and discussed the findings and plan with the patient.  I agree with the above findings and plan with the following exceptions:  None    H, Jonathan Olguin MD, FACS, FASCRS  Staff Surgeon  Dept of Colon and Rectal Surgery  [facility] [place], [place]    "

## 2022-10-29 ENCOUNTER — HOSPITAL ENCOUNTER (INPATIENT)
Facility: HOSPITAL | Age: 77
LOS: 10 days | Discharge: NURSING, REHAB OR LONG-TERM CARE | DRG: 025 | End: 2022-11-08
Attending: EMERGENCY MEDICINE | Admitting: INTERNAL MEDICINE
Payer: MEDICARE

## 2022-10-29 ENCOUNTER — ANESTHESIA EVENT (OUTPATIENT)
Dept: SURGERY | Facility: HOSPITAL | Age: 77
DRG: 025 | End: 2022-10-29
Payer: MEDICARE

## 2022-10-29 ENCOUNTER — ANESTHESIA (OUTPATIENT)
Dept: SURGERY | Facility: HOSPITAL | Age: 77
DRG: 025 | End: 2022-10-29
Payer: MEDICARE

## 2022-10-29 DIAGNOSIS — S06.5XAA SUBDURAL HEMATOMA: Primary | ICD-10-CM

## 2022-10-29 DIAGNOSIS — S06.5XAA SDH (SUBDURAL HEMATOMA): ICD-10-CM

## 2022-10-29 PROBLEM — G93.5 BRAIN COMPRESSION: Status: ACTIVE | Noted: 2022-10-29

## 2022-10-29 PROBLEM — Z29.89 SEIZURE PROPHYLAXIS: Status: ACTIVE | Noted: 2022-10-29

## 2022-10-29 LAB
ABO + RH BLD: NORMAL
ALBUMIN SERPL BCP-MCNC: 4.2 G/DL (ref 3.5–5.2)
ALP SERPL-CCNC: 76 U/L (ref 55–135)
ALT SERPL W/O P-5'-P-CCNC: 20 U/L (ref 10–44)
ANION GAP SERPL CALC-SCNC: 12 MMOL/L (ref 8–16)
AST SERPL-CCNC: 22 U/L (ref 10–40)
BASOPHILS # BLD AUTO: 0.04 K/UL (ref 0–0.2)
BASOPHILS NFR BLD: 0.5 % (ref 0–1.9)
BILIRUB SERPL-MCNC: 0.6 MG/DL (ref 0.1–1)
BLD GP AB SCN CELLS X3 SERPL QL: NORMAL
BLD PROD TYP BPU: NORMAL
BLOOD UNIT EXPIRATION DATE: NORMAL
BLOOD UNIT TYPE CODE: 7300
BLOOD UNIT TYPE: NORMAL
BUN SERPL-MCNC: 12 MG/DL (ref 8–23)
CALCIUM SERPL-MCNC: 10.2 MG/DL (ref 8.7–10.5)
CHLORIDE SERPL-SCNC: 106 MMOL/L (ref 95–110)
CHOLEST SERPL-MCNC: 156 MG/DL (ref 120–199)
CHOLEST/HDLC SERPL: 1.7 {RATIO} (ref 2–5)
CO2 SERPL-SCNC: 26 MMOL/L (ref 23–29)
CODING SYSTEM: NORMAL
CREAT SERPL-MCNC: 0.7 MG/DL (ref 0.5–1.4)
DIFFERENTIAL METHOD: ABNORMAL
DISPENSE STATUS: NORMAL
EOSINOPHIL # BLD AUTO: 0 K/UL (ref 0–0.5)
EOSINOPHIL NFR BLD: 0.2 % (ref 0–8)
ERYTHROCYTE [DISTWIDTH] IN BLOOD BY AUTOMATED COUNT: 13.7 % (ref 11.5–14.5)
EST. GFR  (NO RACE VARIABLE): >60 ML/MIN/1.73 M^2
ESTIMATED AVG GLUCOSE: 105 MG/DL (ref 68–131)
GLUCOSE SERPL-MCNC: 122 MG/DL (ref 70–110)
HBA1C MFR BLD: 5.3 % (ref 4–5.6)
HCT VFR BLD AUTO: 46.4 % (ref 37–48.5)
HDLC SERPL-MCNC: 90 MG/DL (ref 40–75)
HDLC SERPL: 57.7 % (ref 20–50)
HGB BLD-MCNC: 15.1 G/DL (ref 12–16)
IMM GRANULOCYTES # BLD AUTO: 0.03 K/UL (ref 0–0.04)
IMM GRANULOCYTES NFR BLD AUTO: 0.4 % (ref 0–0.5)
INFLUENZA A, MOLECULAR: NEGATIVE
INFLUENZA B, MOLECULAR: NEGATIVE
LDLC SERPL CALC-MCNC: 57.4 MG/DL (ref 63–159)
LYMPHOCYTES # BLD AUTO: 0.8 K/UL (ref 1–4.8)
LYMPHOCYTES NFR BLD: 9.6 % (ref 18–48)
MCH RBC QN AUTO: 31.5 PG (ref 27–31)
MCHC RBC AUTO-ENTMCNC: 32.5 G/DL (ref 32–36)
MCV RBC AUTO: 97 FL (ref 82–98)
MONOCYTES # BLD AUTO: 0.7 K/UL (ref 0.3–1)
MONOCYTES NFR BLD: 8.5 % (ref 4–15)
NEUTROPHILS # BLD AUTO: 6.5 K/UL (ref 1.8–7.7)
NEUTROPHILS NFR BLD: 80.8 % (ref 38–73)
NONHDLC SERPL-MCNC: 66 MG/DL
NRBC BLD-RTO: 0 /100 WBC
PLATELET # BLD AUTO: 343 K/UL (ref 150–450)
PMV BLD AUTO: 9.2 FL (ref 9.2–12.9)
POCT GLUCOSE: 104 MG/DL (ref 70–110)
POTASSIUM SERPL-SCNC: 4.6 MMOL/L (ref 3.5–5.1)
PROT SERPL-MCNC: 7.6 G/DL (ref 6–8.4)
RBC # BLD AUTO: 4.8 M/UL (ref 4–5.4)
SARS-COV-2 RDRP RESP QL NAA+PROBE: POSITIVE
SODIUM SERPL-SCNC: 144 MMOL/L (ref 136–145)
SPECIMEN SOURCE: NORMAL
TRIGL SERPL-MCNC: 43 MG/DL (ref 30–150)
TSH SERPL DL<=0.005 MIU/L-ACNC: 2.16 UIU/ML (ref 0.4–4)
UNIT NUMBER: NORMAL
WBC # BLD AUTO: 8.02 K/UL (ref 3.9–12.7)

## 2022-10-29 PROCEDURE — 63600175 PHARM REV CODE 636 W HCPCS: Performed by: NURSE PRACTITIONER

## 2022-10-29 PROCEDURE — 25000003 PHARM REV CODE 250

## 2022-10-29 PROCEDURE — D9220A PRA ANESTHESIA: Mod: CRNA,,, | Performed by: NURSE ANESTHETIST, CERTIFIED REGISTERED

## 2022-10-29 PROCEDURE — 61312 PR OPEN SKULL EVAC HEMATOMA, SUPRATENTORIAL, SUB/ EXTRADURAL: ICD-10-PCS | Mod: ,,, | Performed by: NEUROLOGICAL SURGERY

## 2022-10-29 PROCEDURE — 86850 RBC ANTIBODY SCREEN: CPT | Performed by: NURSE PRACTITIONER

## 2022-10-29 PROCEDURE — 80061 LIPID PANEL: CPT | Performed by: NURSE PRACTITIONER

## 2022-10-29 PROCEDURE — 80053 COMPREHEN METABOLIC PANEL: CPT

## 2022-10-29 PROCEDURE — 51798 US URINE CAPACITY MEASURE: CPT

## 2022-10-29 PROCEDURE — 27201423 OPTIME MED/SURG SUP & DEVICES STERILE SUPPLY: Performed by: NEUROLOGICAL SURGERY

## 2022-10-29 PROCEDURE — D9220A PRA ANESTHESIA: ICD-10-PCS | Mod: CRNA,,, | Performed by: NURSE ANESTHETIST, CERTIFIED REGISTERED

## 2022-10-29 PROCEDURE — 87502 INFLUENZA DNA AMP PROBE: CPT | Performed by: EMERGENCY MEDICINE

## 2022-10-29 PROCEDURE — 85025 COMPLETE CBC W/AUTO DIFF WBC: CPT

## 2022-10-29 PROCEDURE — 99291 CRITICAL CARE FIRST HOUR: CPT | Mod: CR,,, | Performed by: NURSE PRACTITIONER

## 2022-10-29 PROCEDURE — 37000008 HC ANESTHESIA 1ST 15 MINUTES: Performed by: NEUROLOGICAL SURGERY

## 2022-10-29 PROCEDURE — 96374 THER/PROPH/DIAG INJ IV PUSH: CPT

## 2022-10-29 PROCEDURE — 27000207 HC ISOLATION

## 2022-10-29 PROCEDURE — 88304 TISSUE EXAM BY PATHOLOGIST: CPT | Performed by: STUDENT IN AN ORGANIZED HEALTH CARE EDUCATION/TRAINING PROGRAM

## 2022-10-29 PROCEDURE — 88304 PR  SURG PATH,LEVEL III: ICD-10-PCS | Mod: 26,,, | Performed by: STUDENT IN AN ORGANIZED HEALTH CARE EDUCATION/TRAINING PROGRAM

## 2022-10-29 PROCEDURE — 88305 TISSUE EXAM BY PATHOLOGIST: ICD-10-PCS | Mod: 26,,, | Performed by: STUDENT IN AN ORGANIZED HEALTH CARE EDUCATION/TRAINING PROGRAM

## 2022-10-29 PROCEDURE — 25000003 PHARM REV CODE 250: Performed by: NURSE PRACTITIONER

## 2022-10-29 PROCEDURE — 93010 EKG 12-LEAD: ICD-10-PCS | Mod: ,,, | Performed by: INTERNAL MEDICINE

## 2022-10-29 PROCEDURE — 20000000 HC ICU ROOM

## 2022-10-29 PROCEDURE — 84443 ASSAY THYROID STIM HORMONE: CPT | Performed by: NURSE PRACTITIONER

## 2022-10-29 PROCEDURE — 94761 N-INVAS EAR/PLS OXIMETRY MLT: CPT

## 2022-10-29 PROCEDURE — 99291 PR CRITICAL CARE, E/M 30-74 MINUTES: ICD-10-PCS | Mod: CR,GC,CS, | Performed by: EMERGENCY MEDICINE

## 2022-10-29 PROCEDURE — 99291 CRITICAL CARE FIRST HOUR: CPT | Mod: CR,GC,CS, | Performed by: EMERGENCY MEDICINE

## 2022-10-29 PROCEDURE — P9035 PLATELET PHERES LEUKOREDUCED: HCPCS | Performed by: PSYCHIATRY & NEUROLOGY

## 2022-10-29 PROCEDURE — 63600175 PHARM REV CODE 636 W HCPCS: Performed by: NEUROLOGICAL SURGERY

## 2022-10-29 PROCEDURE — 63600175 PHARM REV CODE 636 W HCPCS

## 2022-10-29 PROCEDURE — 37000009 HC ANESTHESIA EA ADD 15 MINS: Performed by: NEUROLOGICAL SURGERY

## 2022-10-29 PROCEDURE — 36000710: Performed by: NEUROLOGICAL SURGERY

## 2022-10-29 PROCEDURE — 61312 CRNEC/CRNOT STTL XDRL/SDRL: CPT | Mod: ,,, | Performed by: NEUROLOGICAL SURGERY

## 2022-10-29 PROCEDURE — 25000003 PHARM REV CODE 250: Performed by: PSYCHIATRY & NEUROLOGY

## 2022-10-29 PROCEDURE — C1713 ANCHOR/SCREW BN/BN,TIS/BN: HCPCS | Performed by: NEUROLOGICAL SURGERY

## 2022-10-29 PROCEDURE — 83036 HEMOGLOBIN GLYCOSYLATED A1C: CPT | Performed by: NURSE PRACTITIONER

## 2022-10-29 PROCEDURE — 93010 ELECTROCARDIOGRAM REPORT: CPT | Mod: ,,, | Performed by: INTERNAL MEDICINE

## 2022-10-29 PROCEDURE — U0002 COVID-19 LAB TEST NON-CDC: HCPCS | Performed by: EMERGENCY MEDICINE

## 2022-10-29 PROCEDURE — 36620 PR INSERT CATH,ART,PERCUT,SHORTTERM: ICD-10-PCS | Mod: 59,,, | Performed by: ANESTHESIOLOGY

## 2022-10-29 PROCEDURE — 88304 TISSUE EXAM BY PATHOLOGIST: CPT | Mod: 26,,, | Performed by: STUDENT IN AN ORGANIZED HEALTH CARE EDUCATION/TRAINING PROGRAM

## 2022-10-29 PROCEDURE — 88305 TISSUE EXAM BY PATHOLOGIST: CPT | Performed by: STUDENT IN AN ORGANIZED HEALTH CARE EDUCATION/TRAINING PROGRAM

## 2022-10-29 PROCEDURE — 63600175 PHARM REV CODE 636 W HCPCS: Performed by: NURSE ANESTHETIST, CERTIFIED REGISTERED

## 2022-10-29 PROCEDURE — 25000003 PHARM REV CODE 250: Performed by: NURSE ANESTHETIST, CERTIFIED REGISTERED

## 2022-10-29 PROCEDURE — D9220A PRA ANESTHESIA: ICD-10-PCS | Mod: ANES,,, | Performed by: ANESTHESIOLOGY

## 2022-10-29 PROCEDURE — 27800903 OPTIME MED/SURG SUP & DEVICES OTHER IMPLANTS: Performed by: NEUROLOGICAL SURGERY

## 2022-10-29 PROCEDURE — C9254 INJECTION, LACOSAMIDE: HCPCS

## 2022-10-29 PROCEDURE — 63600175 PHARM REV CODE 636 W HCPCS: Mod: JG | Performed by: PSYCHIATRY & NEUROLOGY

## 2022-10-29 PROCEDURE — 93005 ELECTROCARDIOGRAM TRACING: CPT

## 2022-10-29 PROCEDURE — 36000711: Performed by: NEUROLOGICAL SURGERY

## 2022-10-29 PROCEDURE — 99291 PR CRITICAL CARE, E/M 30-74 MINUTES: ICD-10-PCS | Mod: CR,,, | Performed by: NURSE PRACTITIONER

## 2022-10-29 PROCEDURE — 36620 INSERTION CATHETER ARTERY: CPT | Mod: 59,,, | Performed by: ANESTHESIOLOGY

## 2022-10-29 PROCEDURE — 25000003 PHARM REV CODE 250: Performed by: NEUROLOGICAL SURGERY

## 2022-10-29 PROCEDURE — 99285 EMERGENCY DEPT VISIT HI MDM: CPT | Mod: 25

## 2022-10-29 PROCEDURE — 88305 TISSUE EXAM BY PATHOLOGIST: CPT | Mod: 26,,, | Performed by: STUDENT IN AN ORGANIZED HEALTH CARE EDUCATION/TRAINING PROGRAM

## 2022-10-29 PROCEDURE — D9220A PRA ANESTHESIA: Mod: ANES,,, | Performed by: ANESTHESIOLOGY

## 2022-10-29 DEVICE — GRAFT DURAGEN PLUS DURAL 3X3IN: Type: IMPLANTABLE DEVICE | Site: BRAIN | Status: FUNCTIONAL

## 2022-10-29 DEVICE — SCREW UN3 AXS SD 1.5X4MM: Type: IMPLANTABLE DEVICE | Site: CRANIAL | Status: FUNCTIONAL

## 2022-10-29 DEVICE — PLATE BONE 2X2 HOLE SM BOX: Type: IMPLANTABLE DEVICE | Site: CRANIAL | Status: FUNCTIONAL

## 2022-10-29 RX ORDER — ACETAMINOPHEN 500 MG
1000 TABLET ORAL
Status: COMPLETED | OUTPATIENT
Start: 2022-10-29 | End: 2022-10-29

## 2022-10-29 RX ORDER — LABETALOL HCL 20 MG/4 ML
10 SYRINGE (ML) INTRAVENOUS EVERY 4 HOURS PRN
Status: DISCONTINUED | OUTPATIENT
Start: 2022-10-29 | End: 2022-11-08 | Stop reason: HOSPADM

## 2022-10-29 RX ORDER — PROPOFOL 10 MG/ML
VIAL (ML) INTRAVENOUS
Status: DISCONTINUED | OUTPATIENT
Start: 2022-10-29 | End: 2022-10-29

## 2022-10-29 RX ORDER — CEFTRIAXONE 1 G/1
INJECTION, POWDER, FOR SOLUTION INTRAMUSCULAR; INTRAVENOUS
Status: DISCONTINUED | OUTPATIENT
Start: 2022-10-29 | End: 2022-10-29 | Stop reason: HOSPADM

## 2022-10-29 RX ORDER — ACETAMINOPHEN 10 MG/ML
INJECTION, SOLUTION INTRAVENOUS
Status: DISCONTINUED | OUTPATIENT
Start: 2022-10-29 | End: 2022-10-29

## 2022-10-29 RX ORDER — FENTANYL CITRATE 50 UG/ML
INJECTION, SOLUTION INTRAMUSCULAR; INTRAVENOUS
Status: DISCONTINUED | OUTPATIENT
Start: 2022-10-29 | End: 2022-10-29

## 2022-10-29 RX ORDER — BACITRACIN ZINC 500 UNIT/G
OINTMENT (GRAM) TOPICAL
Status: DISCONTINUED | OUTPATIENT
Start: 2022-10-29 | End: 2022-10-29 | Stop reason: HOSPADM

## 2022-10-29 RX ORDER — LIDOCAINE HYDROCHLORIDE AND EPINEPHRINE 10; 10 MG/ML; UG/ML
INJECTION, SOLUTION INFILTRATION; PERINEURAL
Status: DISCONTINUED | OUTPATIENT
Start: 2022-10-29 | End: 2022-10-29 | Stop reason: HOSPADM

## 2022-10-29 RX ORDER — ONDANSETRON 2 MG/ML
INJECTION INTRAMUSCULAR; INTRAVENOUS
Status: DISCONTINUED | OUTPATIENT
Start: 2022-10-29 | End: 2022-10-29

## 2022-10-29 RX ORDER — SODIUM CHLORIDE 9 MG/ML
INJECTION, SOLUTION INTRAVENOUS CONTINUOUS
Status: DISCONTINUED | OUTPATIENT
Start: 2022-10-29 | End: 2022-10-30

## 2022-10-29 RX ORDER — ONDANSETRON 2 MG/ML
4 INJECTION INTRAMUSCULAR; INTRAVENOUS EVERY 8 HOURS PRN
Status: DISCONTINUED | OUTPATIENT
Start: 2022-10-29 | End: 2022-11-08 | Stop reason: HOSPADM

## 2022-10-29 RX ORDER — PHENYLEPHRINE HYDROCHLORIDE 10 MG/ML
INJECTION INTRAVENOUS
Status: DISCONTINUED | OUTPATIENT
Start: 2022-10-29 | End: 2022-10-29

## 2022-10-29 RX ORDER — PROCHLORPERAZINE EDISYLATE 5 MG/ML
5 INJECTION INTRAMUSCULAR; INTRAVENOUS
Status: COMPLETED | OUTPATIENT
Start: 2022-10-29 | End: 2022-10-29

## 2022-10-29 RX ORDER — HYDROMORPHONE HYDROCHLORIDE 1 MG/ML
0.2 INJECTION, SOLUTION INTRAMUSCULAR; INTRAVENOUS; SUBCUTANEOUS EVERY 5 MIN PRN
Status: CANCELLED | OUTPATIENT
Start: 2022-10-29

## 2022-10-29 RX ORDER — DEXAMETHASONE SODIUM PHOSPHATE 4 MG/ML
INJECTION, SOLUTION INTRA-ARTICULAR; INTRALESIONAL; INTRAMUSCULAR; INTRAVENOUS; SOFT TISSUE
Status: DISCONTINUED | OUTPATIENT
Start: 2022-10-29 | End: 2022-10-29

## 2022-10-29 RX ORDER — FENTANYL CITRATE 50 UG/ML
25 INJECTION, SOLUTION INTRAMUSCULAR; INTRAVENOUS
Status: DISCONTINUED | OUTPATIENT
Start: 2022-10-29 | End: 2022-11-08 | Stop reason: HOSPADM

## 2022-10-29 RX ORDER — LEVOTHYROXINE SODIUM 50 UG/1
50 TABLET ORAL
Status: DISCONTINUED | OUTPATIENT
Start: 2022-10-29 | End: 2022-11-08 | Stop reason: HOSPADM

## 2022-10-29 RX ORDER — SODIUM CHLORIDE 0.9 % (FLUSH) 0.9 %
10 SYRINGE (ML) INJECTION
Status: DISCONTINUED | OUTPATIENT
Start: 2022-10-29 | End: 2022-11-08 | Stop reason: HOSPADM

## 2022-10-29 RX ORDER — FENTANYL CITRATE 50 UG/ML
25 INJECTION, SOLUTION INTRAMUSCULAR; INTRAVENOUS EVERY 5 MIN PRN
Status: CANCELLED | OUTPATIENT
Start: 2022-10-29

## 2022-10-29 RX ORDER — METOPROLOL TARTRATE 1 MG/ML
INJECTION, SOLUTION INTRAVENOUS
Status: DISCONTINUED | OUTPATIENT
Start: 2022-10-29 | End: 2022-10-29

## 2022-10-29 RX ORDER — ROCURONIUM BROMIDE 10 MG/ML
INJECTION, SOLUTION INTRAVENOUS
Status: DISCONTINUED | OUTPATIENT
Start: 2022-10-29 | End: 2022-10-29

## 2022-10-29 RX ORDER — HYDROCODONE BITARTRATE AND ACETAMINOPHEN 500; 5 MG/1; MG/1
TABLET ORAL
Status: DISCONTINUED | OUTPATIENT
Start: 2022-10-29 | End: 2022-10-29

## 2022-10-29 RX ORDER — DEXMEDETOMIDINE HYDROCHLORIDE 4 UG/ML
0-1.4 INJECTION, SOLUTION INTRAVENOUS CONTINUOUS
Status: DISCONTINUED | OUTPATIENT
Start: 2022-10-29 | End: 2022-11-01

## 2022-10-29 RX ORDER — LEVETIRACETAM 500 MG/5ML
500 INJECTION, SOLUTION, CONCENTRATE INTRAVENOUS EVERY 12 HOURS
Status: DISCONTINUED | OUTPATIENT
Start: 2022-10-29 | End: 2022-10-29

## 2022-10-29 RX ORDER — ONDANSETRON 2 MG/ML
4 INJECTION INTRAMUSCULAR; INTRAVENOUS ONCE AS NEEDED
Status: CANCELLED | OUTPATIENT
Start: 2022-10-29 | End: 2034-03-27

## 2022-10-29 RX ADMIN — FENTANYL CITRATE 25 MCG: 50 INJECTION, SOLUTION INTRAMUSCULAR; INTRAVENOUS at 11:10

## 2022-10-29 RX ADMIN — CEFTRIAXONE SODIUM 2 G: 1 INJECTION, SOLUTION INTRAVENOUS at 01:10

## 2022-10-29 RX ADMIN — SODIUM CHLORIDE: 0.9 INJECTION, SOLUTION INTRAVENOUS at 06:10

## 2022-10-29 RX ADMIN — PROCHLORPERAZINE EDISYLATE 5 MG: 5 INJECTION INTRAMUSCULAR; INTRAVENOUS at 02:10

## 2022-10-29 RX ADMIN — PROPOFOL 100 MG: 10 INJECTION, EMULSION INTRAVENOUS at 12:10

## 2022-10-29 RX ADMIN — LACOSAMIDE 100 MG: 10 INJECTION, SOLUTION INTRAVENOUS at 05:10

## 2022-10-29 RX ADMIN — ACETAMINOPHEN 1000 MG: 10 INJECTION, SOLUTION INTRAVENOUS at 01:10

## 2022-10-29 RX ADMIN — SODIUM CHLORIDE, SODIUM GLUCONATE, SODIUM ACETATE, POTASSIUM CHLORIDE, MAGNESIUM CHLORIDE, SODIUM PHOSPHATE, DIBASIC, AND POTASSIUM PHOSPHATE: .53; .5; .37; .037; .03; .012; .00082 INJECTION, SOLUTION INTRAVENOUS at 12:10

## 2022-10-29 RX ADMIN — METOPROLOL TARTRATE 2 MG: 5 INJECTION, SOLUTION INTRAVENOUS at 02:10

## 2022-10-29 RX ADMIN — ONDANSETRON 4 MG: 2 INJECTION INTRAMUSCULAR; INTRAVENOUS at 03:10

## 2022-10-29 RX ADMIN — ACETAMINOPHEN 1000 MG: 500 TABLET ORAL at 02:10

## 2022-10-29 RX ADMIN — FENTANYL CITRATE 50 MCG: 50 INJECTION, SOLUTION INTRAMUSCULAR; INTRAVENOUS at 12:10

## 2022-10-29 RX ADMIN — LEVOTHYROXINE SODIUM 50 MCG: 50 TABLET ORAL at 06:10

## 2022-10-29 RX ADMIN — ROCURONIUM BROMIDE 50 MG: 10 INJECTION INTRAVENOUS at 12:10

## 2022-10-29 RX ADMIN — SODIUM CHLORIDE 0.25 MCG/KG/MIN: 9 INJECTION, SOLUTION INTRAVENOUS at 02:10

## 2022-10-29 RX ADMIN — LEVETIRACETAM 500 MG: 100 INJECTION, SOLUTION INTRAVENOUS at 10:10

## 2022-10-29 RX ADMIN — DEXMEDETOMIDINE HYDROCHLORIDE 0.2 MCG/KG/HR: 4 INJECTION INTRAVENOUS at 04:10

## 2022-10-29 RX ADMIN — DEXAMETHASONE SODIUM PHOSPHATE 8 MG: 4 INJECTION, SOLUTION INTRAMUSCULAR; INTRAVENOUS at 01:10

## 2022-10-29 RX ADMIN — LABETALOL HYDROCHLORIDE 10 MG: 5 INJECTION, SOLUTION INTRAVENOUS at 10:10

## 2022-10-29 RX ADMIN — DESMOPRESSIN ACETATE 20.4 MCG: 4 SOLUTION INTRAVENOUS at 10:10

## 2022-10-29 RX ADMIN — FENTANYL CITRATE 50 MCG: 50 INJECTION, SOLUTION INTRAMUSCULAR; INTRAVENOUS at 01:10

## 2022-10-29 RX ADMIN — PHENYLEPHRINE HYDROCHLORIDE 200 MCG: 10 INJECTION INTRAVENOUS at 01:10

## 2022-10-29 RX ADMIN — SUGAMMADEX 200 MG: 100 INJECTION, SOLUTION INTRAVENOUS at 03:10

## 2022-10-29 RX ADMIN — METOPROLOL TARTRATE 1 MG: 5 INJECTION, SOLUTION INTRAVENOUS at 02:10

## 2022-10-29 NOTE — BRIEF OP NOTE
[facility] - Neuro Critical Care  Brief Operative Note    SUMMARY     Surgery Date: 10/29/2022     Surgeon(s) and Role:     * Ion Souza MD - Primary     * Gerda Dyer MD - Resident - Assisting        Pre-op Diagnosis:  Subdural hematoma [S06.5XAA]    Post-op Diagnosis:  Post-Op Diagnosis Codes:     * Subdural hematoma [S06.5XAA]    Procedure(s) (LRB):  CRANIOTOMY, FOR SUBDURAL HEMATOMA EVACUATION (Left)    Anesthesia: General    Operative Findings: large left SDH. SG HV drain left in place    Estimated Blood Loss: * No values recorded between 10/29/2022  1:46 PM and 10/29/2022  3:54 PM *    Estimated Blood Loss has been documented.         Specimens:   Specimen (24h ago, onward)       Start     Ordered    10/29/22 1419  Specimen to Pathology, Surgery Neurosurgery  Once        Comments: Pre-op Diagnosis: Subdural hematoma [S06.5XAA]Procedure(s):CRANIOTOMY, FOR SUBDURAL HEMATOMA EVACUATION Number of specimens: 2Name of specimens: 1.  Subdural Membrane (Perm) 2.  Inner Membrane ( Perm)     References:    Click here for ordering Quick Tip   Question Answer Comment   Procedure Type: Neurosurgery    Specimen Class: Routine/Screening    Which provider would you like to cc? ION SOUZA    Release to patient Immediate        10/29/22 1512                    [id number]

## 2022-10-29 NOTE — NURSING TRANSFER
Transfer Note      10/29/2022     Reason patient is being transferred: surgery    Transfer To: OR    Transfer via bed    Transfer with cardiac monitoring    Transported by OR staff x 2    Medicines sent: na     Any special needs or follow-up needed: na    Chart send with patient: Yes    Notified: Marzena Salas (significant other)

## 2022-10-29 NOTE — CONSULTS
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  Patient reports that tonight around 6:00 p.m. she had a acute onset headache while she was lying down in bed.  She reports that it is the worse headache of her life and she does not regularly get headaches. Denies fever/chills, vision/hearing changes, dysphagia, dysarthria, nausea/vomiting, new-onset weakness or sensory change, bowel/bladder changes. Denies AC/AP. CTH with 1.3cm aSDH with approximately 5mm MLS.         (Not in a hospital admission)      Review of patient's allergies indicates:   Allergen Reactions    Codeine Nausea And Vomiting       Past Medical History:   Diagnosis Date    Actinic keratosis     Aortic valve disorders     Atherosclerosis of aorta     Atherosclerosis of native arteries of the extremities with intermittent claudication     Carcinoma in situ, vulva     Hypertension 2/21/2022    Left bundle branch hemiblock     Other and unspecified hyperlipidemia     Postinflammatory pulmonary fibrosis     Senile nuclear sclerosis      Past Surgical History:   Procedure Laterality Date    ARTHROSCOPIC REPAIR OF ROTATOR CUFF OF SHOULDER Right 4/24/2019    Procedure: REPAIR, ROTATOR CUFF, ARTHROSCOPIC WITH REGENERATIN IMPLANT;  Surgeon: Walter Hernandez MD;  Location: [facility];  Service: Orthopedics;  Laterality: Right;  regional w/o catheter     BTL      COLONOSCOPY N/A 12/9/2021    Procedure: COLONOSCOPY;  Surgeon: Juan Swain MD;  Location: [facility]O [address]);  Service: Endoscopy;  Laterality: N/A;  fully vaccinated    DECOMPRESSION OF SUBACROMIAL SPACE  Right 2019    Procedure: DECOMPRESSION, SUBACROMIAL SPACE WITH DISTAL CLAVICLE EXCISION (DCE);  Surgeon: Walter Hernandez MD;  Location: [facility] OR;  Service: Orthopedics;  Laterality: Right;    FLEXIBLE SIGMOIDOSCOPY N/A 2021    Procedure: SIGMOIDOSCOPY-FLEXIBLE;  Surgeon: Juan Swain MD;  Location: [facility] ENDO (2ND FLR);  Service: Endoscopy;  Laterality: N/A;  please schedule with me  AM. Urgent for marking for colon cancer  spot held 21 fully vaccinated; instructions to portal-st    FLEXIBLE SIGMOIDOSCOPY N/A 2022    Procedure: SIGMOIDOSCOPY, FLEXIBLE;  Surgeon: GUILLERMO Olguin MD;  Location: [facility] OR 2ND FLR;  Service: Colon and Rectal;  Laterality: N/A;    LAPAROSCOPIC LEFT COLECTOMY N/A 2022    Procedure: COLECTOMY, LEFT, LAPAROSCOPIC;  Surgeon: GUILLERMO Olguin MD;  Location: [facility] OR 2ND FLR;  Service: Colon and Rectal;  Laterality: N/A;  Extended    MOBILIZATION OF SPLENIC FLEXURE N/A 2022    Procedure: MOBILIZATION, SPLENIC FLEXURE;  Surgeon: GUILLERMO Olguin MD;  Location: [facility] OR 2ND FLR;  Service: Colon and Rectal;  Laterality: N/A;    RHYTIDECTOMY      SHOULDER ARTHROSCOPY Right 2019    Procedure: ARTHROSCOPY, SHOULDER WITH LABRAL DEBRIDEMENT;  Surgeon: Walter Hernandez MD;  Location: [facility] OR;  Service: Orthopedics;  Laterality: Right;    TONSILLECTOMY, ADENOIDECTOMY      VULVA SURGERY       Family History       Problem Relation (Age of Onset)    Alzheimer's disease Mother (96)    Bladder Cancer Father, Paternal Uncle    Breast cancer Paternal Grandmother    Lung cancer Paternal Uncle    No Known Problems Sister, Sister, Sister    Other Brother    Pancreatic cancer Paternal Aunt    Pneumonia Brother          Tobacco Use    Smoking status: Former     Packs/day: 1.50     Years: 39.00     Pack years: 58.50     Types: Cigarettes     Start date:      Quit date: 2001     Years since quittin.1    Smokeless tobacco: Never   Substance  and Sexual Activity    Alcohol use: Yes     Alcohol/week: 7.0 - 14.0 standard drinks     Types: 7 - 14 Standard drinks or equivalent per week    Drug use: No    Sexual activity: Yes     Review of Systems: see HPI for pertinent positives and negatives.  Objective:     Weight: 68 kg (150 lb)  Body mass index is 25.75 kg/m².  Vital Signs (Most Recent):  Temp: 98.6 °F (37 °C) (10/29/22 0056)  Pulse: 93 (10/29/22 0440)  Resp: 16 (10/29/22 0440)  BP: (!) 147/59 (10/29/22 0440)  SpO2: 95 % (10/29/22 0440)   Vital Signs (24h Range):  Temp:  [98.6 °F (37 °C)] 98.6 °F (37 °C)  Pulse:  [] 93  Resp:  [16-20] 16  SpO2:  [95 %-100 %] 95 %  BP: (132-147)/(59-62) 147/59                          Physical Exam    Neurosurgery Physical Exam    GENERAL: resting comfortably  HEENT: NCAT, PERRL, mucous membranes moist  NECK: supple, trachea midline  CV: normal capillary refill  PULM: aerating well, symmetric expansion, no distress  ABD: soft, NT, ND  EXT: no c/c/e    NEURO:    AAO x 3  CN II-XII grossly intact  Fc x 4 antigravity  SILT    +R drift      Significant Labs:  Recent Labs   Lab 10/27/22  0837 10/29/22  0128    122*    144   K 4.3 4.6    106   CO2 26 26   BUN 15 12   CREATININE 0.7 0.7   CALCIUM 9.6 10.2     Recent Labs   Lab 10/29/22  0128   WBC 8.02   HGB 15.1   HCT 46.4        No results for input(s): LABPT, INR, APTT in the last 48 hours.  Microbiology Results (last 7 days)       Procedure Component Value Units Date/Time    Influenza A & B by Molecular [[id number]] Collected: 10/29/22 0146    Order Status: Completed Specimen: Nasopharyngeal Swab Updated: 10/29/22 0254     Influenza A, Molecular Negative     Influenza B, Molecular Negative     Flu A & B Source Nasal swab          All pertinent labs from the last 24 hours have been reviewed.    Significant Diagnostics:  I have reviewed all pertinent imaging results/findings within the past 24 hours.  CT Head Without Contrast    Result Date:  10/29/2022  New large left mixed density extra-axial collection along the left cerebral convexity concerning for acute on chronic or subacute with acute component subdural hemorrhage. The collection measures 2.2 cm with significant mass effect on the left hemicerebrum with 5 mm rightward midline shift, subfalcine herniation and effacement of the left lateral ventricle. Cerebral volume loss with diffuse white matter hypoattenuation, nonspecific likely sequel of chronic microvascular ischemic changes. This report was flagged in Epic as abnormal. COMMUNICATION This critical result was discovered/received at 02:40.  The critical information above was relayed directly by Izzy Sal MD by telephone to Dae Toledo MD at 02:43. 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     --Patient admitted to [facility] on telemetry      -q1h neurochecks in ICU, q2h neurochecks in stepdown, q4h neurochecks on floor  --All labs and diagnostics reviewed  --CTH in 4h for stability.  --Hold anti-plt/coag medications  --SBP <140  --Na >135  --Keppra 500 BID  --HOB >30  --Follow-up full pre-op labs (CBC/CMP/PT-INR/PTT/T&S)  --NPO at this time for possible operative intervention  --Continue to monitor clinically, notify NSGY immediately with any changes in neuro status    Dispo: [facility]        Thank you for your consult. I will follow-up with patient. Please contact us if you have any additional questions.    Joey Tavarez MD  Neurosurgery  [facility] - Emergency Dept

## 2022-10-29 NOTE — PT/OT/SLP PROGRESS
Occupational Therapy      Patient Name:  Eugenio Aldridge   MRN:  [id number]    Patient not seen today secondary to MD hold (Comment), Off the floor for procedure/surgery. Will follow-up once appropriate for therapy.    10/29/2022

## 2022-10-29 NOTE — SUBJECTIVE & OBJECTIVE
Past Medical History:   Diagnosis Date    Actinic keratosis     Aortic valve disorders     Atherosclerosis of aorta     Atherosclerosis of native arteries of the extremities with intermittent claudication     Carcinoma in situ, vulva     Hypertension 2/21/2022    Left bundle branch hemiblock     Other and unspecified hyperlipidemia     Postinflammatory pulmonary fibrosis     Senile nuclear sclerosis      Past Surgical History:   Procedure Laterality Date    ARTHROSCOPIC REPAIR OF ROTATOR CUFF OF SHOULDER Right 4/24/2019    Procedure: REPAIR, ROTATOR CUFF, ARTHROSCOPIC WITH REGENERATIN IMPLANT;  Surgeon: Walter Hernandez MD;  Location: [facility] OR;  Service: Orthopedics;  Laterality: Right;  regional w/o catheter     BTL      COLONOSCOPY N/A 12/9/2021    Procedure: COLONOSCOPY;  Surgeon: Juan Swain MD;  Location: [facility] ENDO (4TH FLR);  Service: Endoscopy;  Laterality: N/A;  fully vaccinated    DECOMPRESSION OF SUBACROMIAL SPACE Right 4/24/2019    Procedure: DECOMPRESSION, SUBACROMIAL SPACE WITH DISTAL CLAVICLE EXCISION (DCE);  Surgeon: Walter Hernandez MD;  Location: [facility] OR;  Service: Orthopedics;  Laterality: Right;    FLEXIBLE SIGMOIDOSCOPY N/A 12/20/2021    Procedure: SIGMOIDOSCOPY-FLEXIBLE;  Surgeon: Juan Swain MD;  Location: [facility] ENDO (2ND FLR);  Service: Endoscopy;  Laterality: N/A;  please schedule with me Monday 12/20 AM. Urgent for marking for colon cancer  spot held 2/20/21 12/16 fully vaccinated; instructions to portal-st    FLEXIBLE SIGMOIDOSCOPY N/A 2/4/2022    Procedure: SIGMOIDOSCOPY, FLEXIBLE;  Surgeon: GUILLERMO Olguin MD;  Location: [facility] OR 2ND FLR;  Service: Colon and Rectal;  Laterality: N/A;    LAPAROSCOPIC LEFT COLECTOMY N/A 2/4/2022    Procedure: COLECTOMY, LEFT, LAPAROSCOPIC;  Surgeon: GUILLERMO Olguin MD;  Location: [facility] OR 2ND FLR;  Service: Colon and Rectal;  Laterality: N/A;  Extended    MOBILIZATION OF SPLENIC FLEXURE N/A 2/4/2022    Procedure: MOBILIZATION, SPLENIC FLEXURE;   Surgeon: GUILLERMO Olguin MD;  [address];  Service: Colon and Rectal;  Laterality: N/A;    RHYTIDECTOMY      SHOULDER ARTHROSCOPY Right 4/24/2019    Procedure: ARTHROSCOPY, SHOULDER WITH LABRAL DEBRIDEMENT;  Surgeon: Walter Hernandez MD;  Location: [facility];  Service: Orthopedics;  Laterality: Right;    TONSILLECTOMY, ADENOIDECTOMY      VULVA SURGERY        Current Facility-Administered Medications on File Prior to Encounter   Medication Dose Route Frequency Provider Last Rate Last Admin    0.9%  NaCl infusion   Intravenous Continuous Sanna Ann NP        gabapentin capsule 300 mg  300 mg Oral TID Sanna Ann NP   300 mg at 02/05/22 0911     Current Outpatient Medications on File Prior to Encounter   Medication Sig Dispense Refill    amLODIPine (NORVASC) 10 MG tablet Take 1 tablet (10 mg total) by mouth once daily. 90 tablet 3    aspirin 81 MG Chew Take 81 mg by mouth once daily.      atorvastatin (LIPITOR) 40 MG tablet TAKE 1 TABLET(40 MG) BY MOUTH EVERY DAY 90 tablet 3    calcium-vitamin D3 (OS-MARYANA 500 + D3) 500 mg(1,250mg) -200 unit per tablet Take 1 tablet by mouth once daily.       cilostazoL (PLETAL) 100 MG Tab Take 1 tablet (100 mg total) by mouth 2 (two) times daily. 180 tablet 3    cyanocobalamin, vitamin B-12, (VITAMIN B-12 ORAL) Take 1 tablet by mouth once daily.      donepeziL (ARICEPT) 10 MG tablet Take 1 tablet (10 mg total) by mouth once daily. 90 tablet 3    levothyroxine (SYNTHROID) 50 MCG tablet TAKE 1 TABLET(50 MCG) BY MOUTH BEFORE BREAKFAST 90 tablet 3    multivitamin (THERAGRAN) per tablet Take by mouth. 1 Tablet Oral Every day        Allergies: Codeine    Family History   Problem Relation Age of Onset    Bladder Cancer Father         d. 69    Alzheimer's disease Mother 96        d. 97    Bladder Cancer Paternal Uncle     Lung cancer Paternal Uncle     Breast cancer Paternal Grandmother     Pancreatic cancer Paternal Aunt     Other Brother         d. 45 o/d     Pneumonia Brother         d. 58, smoker, obese    No Known Problems Sister     No Known Problems Sister     No Known Problems Sister     Amblyopia Neg Hx     Blindness Neg Hx     Cataracts Neg Hx     Diabetes Neg Hx     Glaucoma Neg Hx     Hypertension Neg Hx     Macular degeneration Neg Hx     Retinal detachment Neg Hx     Strabismus Neg Hx     Stroke Neg Hx     Thyroid disease Neg Hx      Social History     Tobacco Use    Smoking status: Former     Packs/day: 1.50     Years: 39.00     Pack years: 58.50     Types: Cigarettes     Start date:      Quit date: 2001     Years since quittin.1    Smokeless tobacco: Never   Substance Use Topics    Alcohol use: Yes     Alcohol/week: 7.0 - 14.0 standard drinks     Types: 7 - 14 Standard drinks or equivalent per week    Drug use: No     Review of Systems    Review of Symptoms:  Constitutional: Denies fevers, weight loss, chills, or weakness.  Eyes: Denies changes in vision.  ENT: Denies dysphagia, nasal discharge, ear pain or discharge.  Cardiovascular: Denies chest pain, palpitations, orthopnea, or claudication.  Respiratory: Denies shortness of breath, cough, hemoptysis, or wheezing.  GI: Denies nausea/vomitting, hematochezia, melena, abd pain, or changes in appetite.  : Denies dysuria, incontinence, or hematuria.  Musculoskeletal: Denies joint pain or myalgias.  Skin/breast: Denies rashes, lumps, lesions, or discharge.  Neurologic: Denies , dizziness, vertigo, or paresthesias. +headache  Psychiatric: Denies changes in mood or hallucinations.  Endocrine: Denies polyuria, polydipsia, heat/cold intolerance.  Hematologic/Lymph: Denies lymphadenopathy, easy bruising or easy bleeding.  Allergic/Immunologic: Denies rash, rhinitis.      Objective:     Vitals:    Temp: 98.2 °F (36.8 °C)  Pulse: 94  BP: (!) 136/58  MAP (mmHg): 85  Resp: 16  SpO2: 95 %  O2 Device (Oxygen Therapy): room air    Temp  Min: 98.2 °F (36.8 °C)  Max: 98.6 °F (37 °C)  Pulse  Min: 93  Max:  111  BP  Min: 132/60  Max: 147/59  MAP (mmHg)  Min: 85  Max: 85  Resp  Min: 16  Max: 20  SpO2  Min: 95 %  Max: 100 %    No intake/output data recorded.           Physical Exam      Physical Exam:  GA: Alert, comfortable, no acute distress.   HEENT: No scleral icterus or JVD.   Pulmonary: Clear to auscultation A/L.   Cardiac: RRR S1 & S2 w/o rubs/murmurs/gallops.   Abdominal: Bowel sounds present x 4.   Skin: No jaundice, rashes, or visible lesions.  Neuro:  --GCS: E4 V5 M6  --Mental Status:  alert oriented follows  --CN II-XII grossly intact.   --Pupils 3mm, PERRL.   --Corneal reflex, gag, cough intact.  --LUE strength: 5/5  --RUE strength: 5/5  --LLE strength: 5/5  --RLE strength: 5/5     Unable to test gait due to level of consciousness.    Today I personally reviewed pertinent medications, lines/drains/airways, imaging, cardiology results, laboratory results, notably: [facility]

## 2022-10-29 NOTE — PLAN OF CARE
[facility] Care Plan    POC reviewed with Eugenio Aldridge and significant other at 1600. Significant other verbalizes understanding. Patient is confused and combative.  No evidence of understanding noted.  Questions and concerns addressed. Pt not  progressing toward goals. See below and flowsheets for full assessment and VS info.     SP L crani for hematoma evacuation  Precedex initiated for agitation      Is this a stroke patient? no    Neuro:  Sukumar Coma Scale  Best Eye Response: 3-->(E3) to speech  Best Motor Response: 6-->(M6) obeys commands  Best Verbal Response: 4-->(V4) confused  Sukumar Coma Scale Score: 13  Assessment Qualifiers: patient not sedated/intubated, no eye obstruction present  Pupil PERRLA: yes     24 hr Temp:  [97.9 °F (36.6 °C)-98.6 °F (37 °C)]     CV:   Rhythm: normal sinus rhythm  BP goals:   SBP < 160  MAP > 65    Resp:   O2 Device (Oxygen Therapy): nasal cannula at 2L       Plan:  na    GI/:     Diet/Nutrition Received: NPO  Last Bowel Movement: 10/28/22  Voiding Characteristics: ureteral catheter    Intake/Output Summary (Last 24 hours) at 10/29/2022 1821  Last data filed at 10/29/2022 1814  Gross per 24 hour   Intake 2524.68 ml   Output 645 ml   Net 1879.68 ml          Labs/Accuchecks:  Recent Labs   Lab 10/29/22  0128   WBC 8.02   RBC 4.80   HGB 15.1   HCT 46.4         Recent Labs   Lab 10/29/22  0128      K 4.6   CO2 26      BUN 12   CREATININE 0.7   ALKPHOS 76   ALT 20   AST 22   BILITOT 0.6    No results for input(s): PROTIME, INR, APTT, HEPANTIXA in the last 168 hours. No results for input(s): CPK, CPKMB, TROPONINI, MB in the last 168 hours.    Electrolytes: N/A - electrolytes WDL  Accuchecks: none    Gtts:   sodium chloride 0.9% 50 mL/hr at 10/29/22 1814    dexmedetomidine (PRECEDEX) infusion 0.1 mcg/kg/hr (10/29/22 1814)       LDA/Wounds:  Lines/Drains/Airways       Drain  Duration                  Closed/Suction Drain 10/29/22 1452 Left Scalp Accordion 10 Fr. <1 day          Urethral Catheter 10/29/22 1316 Double-lumen;Latex 16 Fr. <1 day              Arterial Line  Duration             Arterial Line 10/29/22 1314 Left Radial <1 day              Peripheral Intravenous Line  Duration                  Peripheral IV - Single Lumen 10/29/22 0440 22 G Right Forearm <1 day         Peripheral IV - Single Lumen 10/29/22 20 G Left Antecubital <1 day                  Wounds: Yes  Wound care consulted: No

## 2022-10-29 NOTE — ANESTHESIA PROCEDURE NOTES
Intubation    Date/Time: 10/29/2022 1:01 PM  Performed by: Jake Navarrete CRNA  Authorized by: Yohannes Lopez MD     Intubation:     Induction:  Intravenous    Intubated:  Postinduction    Mask Ventilation:  Easy mask    Attempts:  1    Attempted By:  Staff anesthesiologist    Method of Intubation:  Direct    Blade:  Murali 3    Laryngeal View Grade: Grade I - full view of cords      Difficult Airway Encountered?: No      Complications:  None    Airway Device:  Oral endotracheal tube    Airway Device Size:  7.5    Style/Cuff Inflation:  Cuffed    Inflation Amount (mL):  5    Tube secured:  20    Secured at:  The lips    Placement Verified By:  Capnometry and Colorimetric ETCO2 device    Complicating Factors:  None    Findings Post-Intubation:  BS equal bilateral and atraumatic/condition of teeth unchanged

## 2022-10-29 NOTE — ANESTHESIA PROCEDURE NOTES
Arterial    Diagnosis: subdural hematoma    Patient location during procedure: done in OR  Procedure start time: 10/29/2022 1:14 PM  Timeout: 10/29/2022 1:12 PM  Procedure end time: 10/29/2022 1:05 PM    Staffing  Authorizing Provider: Yohannes Lopez MD  Performing Provider: Jake Navarrete CRNA    Anesthesiologist was present at the time of the procedure.    Preanesthetic Checklist  Completed: patient identified, IV checked, site marked, risks and benefits discussed, surgical consent, monitors and equipment checked, pre-op evaluation, timeout performed and anesthesia consent givenArterial  Skin Prep: chlorhexidine gluconate  Orientation: left  Location: radial    Catheter Size: 20 G  Catheter placement by Anatomical landmarks. Heme positive aspiration all ports. Insertion Attempts: 1  Assessment  Dressing: secured with tape and tegaderm  Patient: Tolerated well

## 2022-10-29 NOTE — NURSING
Patient [place] [facility] [place] [facility] [place]    Report received from: ED RNJonathan.     Type of stroke/diagnosis: SDH    Current symptoms:   None     Skin assessment done: Y  Wounds noted:    *If wounds noted, was Wound Care consulted? Y/N    Ismael Completed? Y    Patient Belongings on Admit: clothing (black jeans, white underwear, pink slippers) sent home with pt S/O    NCC notified: pt accompanied by significant other, O.J. Number in chart

## 2022-10-29 NOTE — ASSESSMENT & PLAN NOTE
- NSGY  following or plan pending   - SBP <160  - CTH: New large left mixed density extra-axial collection along the left cerebral convexity concerning for acute on chronic or subacute with acute component subdural hemorrhage. The collection measures 2.2 cm with significant mass effect on the left hemicerebrum with 5 mm rightward midline shift, subfalcine herniation and effacement of the left lateral ventricle  - Anticoagulation status: none   - Q 1 vitals   - Q 1 neuro checks   - TSH, A1c, lipid, echo and EKG  -  Pt/OT/SLP eval and treat  - NPO

## 2022-10-29 NOTE — ANESTHESIA RELEASE NOTE
"Anesthesia Release from PACU Note    Patient: Becker B Nix    Procedure(s) Performed: Procedure(s) (LRB):  CRANIOTOMY, FOR SUBDURAL HEMATOMA EVACUATION (Left)    Anesthesia type: general    Post pain: Adequate analgesia    Post assessment: no apparent anesthetic complications, tolerated procedure well and no evidence of recall    Last Vitals:   Visit Vitals  BP (!) 148/67   Pulse 89   Temp 36.8 °C (98.2 °F) (Oral)   Resp (!) 28   Ht 5' 5" (1.651 m)   Wt 66.5 kg (146 lb 9.7 oz)   SpO2 (!) 94%   BMI 24.40 kg/m²       Post vital signs: stable    Level of consciousness: awake, alert  and oriented    Nausea/Vomiting: no nausea/no vomiting    Complications: none    Airway Patency: patent    Respiratory: unassisted, spontaneous ventilation, room air    Cardiovascular: stable and blood pressure at baseline    Hydration: euvolemic  "

## 2022-10-29 NOTE — ASSESSMENT & PLAN NOTE
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     --Patient admitted to [facility] on telemetry      -q1h neurochecks in ICU, q2h neurochecks in stepdown, q4h neurochecks on floor  --All labs and diagnostics reviewed  --CTH in 4h for stability.  --Hold anti-plt/coag medications  --SBP <140  --Na >135  --Keppra 500 BID  --HOB >30  --Follow-up full pre-op labs (CBC/CMP/PT-INR/PTT/T&S)  --NPO at this time for possible operative intervention  --Continue to monitor clinically, notify NSGY immediately with any changes in neuro status    Dispo: [facility]

## 2022-10-29 NOTE — TRANSFER OF CARE
"Anesthesia Transfer of Care Note    Patient: Becker B Nix    Procedure(s) Performed: Procedure(s) (LRB):  CRANIOTOMY, FOR SUBDURAL HEMATOMA EVACUATION (Left)    Patient location: ICU    Anesthesia Type: general    Transport from OR: Transported from OR on room air with adequate spontaneous ventilation    Post pain: adequate analgesia    Post assessment: no apparent anesthetic complications    Post vital signs: stable    Level of consciousness: awake and alert    Nausea/Vomiting: no nausea/vomiting    Complications: none    Transfer of care protocol was followed      Last vitals:   Visit Vitals  BP (!) 148/67   Pulse 89   Temp 36.8 °C (98.2 °F) (Oral)   Resp (!) 28   Ht 5' 5" (1.651 m)   Wt 66.5 kg (146 lb 9.7 oz)   SpO2 (!) 94%   BMI 24.40 kg/m²     "

## 2022-10-29 NOTE — SUBJECTIVE & OBJECTIVE
(Not in a hospital admission)      Review of patient's allergies indicates:   Allergen Reactions    Codeine Nausea And Vomiting       Past Medical History:   Diagnosis Date    Actinic keratosis     Aortic valve disorders     Atherosclerosis of aorta     Atherosclerosis of native arteries of the extremities with intermittent claudication     Carcinoma in situ, vulva     Hypertension 2/21/2022    Left bundle branch hemiblock     Other and unspecified hyperlipidemia     Postinflammatory pulmonary fibrosis     Senile nuclear sclerosis      Past Surgical History:   Procedure Laterality Date    ARTHROSCOPIC REPAIR OF ROTATOR CUFF OF SHOULDER Right 4/24/2019    Procedure: REPAIR, ROTATOR CUFF, ARTHROSCOPIC WITH REGENERATIN IMPLANT;  Surgeon: Walter Hernandez MD;  Location: [facility];  Service: Orthopedics;  Laterality: Right;  regional w/o catheter     BTL      COLONOSCOPY N/A 12/9/2021    Procedure: COLONOSCOPY;  Surgeon: Juan Swain MD;  Location: [facility] (4TH FLR);  Service: Endoscopy;  Laterality: N/A;  fully vaccinated    DECOMPRESSION OF SUBACROMIAL SPACE Right 4/24/2019    Procedure: DECOMPRESSION, SUBACROMIAL SPACE WITH DISTAL CLAVICLE EXCISION (DCE);  Surgeon: Walter Hernandez MD;  Location: [facility] OR;  Service: Orthopedics;  Laterality: Right;    FLEXIBLE SIGMOIDOSCOPY N/A 12/20/2021    Procedure: SIGMOIDOSCOPY-FLEXIBLE;  Surgeon: Juan Swain MD;  Location: [facility] ENDO (2ND FLR);  Service: Endoscopy;  Laterality: N/A;  please schedule with me Monday 12/20 AM. Urgent for marking for colon cancer  spot held 2/20/21 12/16 fully vaccinated; instructions to portal-st    FLEXIBLE SIGMOIDOSCOPY N/A 2/4/2022    Procedure: SIGMOIDOSCOPY, FLEXIBLE;  Surgeon: GUILLERMO Olguin MD;  Location: [facility] OR 2ND FLR;  Service: Colon and Rectal;  Laterality: N/A;    LAPAROSCOPIC LEFT COLECTOMY N/A 2/4/2022    Procedure: COLECTOMY, LEFT, LAPAROSCOPIC;  Surgeon: GUILLERMO Olguin MD;  Location: [facility] OR 2ND FLR;  Service: Colon and  Rectal;  Laterality: N/A;  Extended    MOBILIZATION OF SPLENIC FLEXURE N/A 2022    Procedure: MOBILIZATION, SPLENIC FLEXURE;  Surgeon: GUILLERMO Olguin MD;  [facility];  Service: Colon and Rectal;  Laterality: N/A;    RHYTIDECTOMY      SHOULDER ARTHROSCOPY Right 2019    Procedure: ARTHROSCOPY, SHOULDER WITH LABRAL DEBRIDEMENT;  Surgeon: Walter Hernandez MD;  Location: [facility];  Service: Orthopedics;  Laterality: Right;    TONSILLECTOMY, ADENOIDECTOMY      VULVA SURGERY       Family History       Problem Relation (Age of Onset)    Alzheimer's disease Mother (96)    Bladder Cancer Father, Paternal Uncle    Breast cancer Paternal Grandmother    Lung cancer Paternal Uncle    No Known Problems Sister, Sister, Sister    Other Brother    Pancreatic cancer Paternal Aunt    Pneumonia Brother          Tobacco Use    Smoking status: Former     Packs/day: 1.50     Years: 39.00     Pack years: 58.50     Types: Cigarettes     Start date:      Quit date: 2001     Years since quittin.1    Smokeless tobacco: Never   Substance and Sexual Activity    Alcohol use: Yes     Alcohol/week: 7.0 - 14.0 standard drinks     Types: 7 - 14 Standard drinks or equivalent per week    Drug use: No    Sexual activity: Yes     Review of Systems: see HPI for pertinent positives and negatives.  Objective:     Weight: 68 kg (150 lb)  Body mass index is 25.75 kg/m².  Vital Signs (Most Recent):  Temp: 98.6 °F (37 °C) (10/29/22 0056)  Pulse: 93 (10/29/22 0440)  Resp: 16 (10/29/22 0440)  BP: (!) 147/59 (10/29/22 0440)  SpO2: 95 % (10/29/22 0440)   Vital Signs (24h Range):  Temp:  [98.6 °F (37 °C)] 98.6 °F (37 °C)  Pulse:  [] 93  Resp:  [16-20] 16  SpO2:  [95 %-100 %] 95 %  BP: (132-147)/(59-62) 147/59                          Physical Exam    Neurosurgery Physical Exam    GENERAL: resting comfortably  HEENT: NCAT, PERRL, mucous membranes moist  NECK: supple, trachea midline  CV: normal capillary refill  PULM:  aerating well, symmetric expansion, no distress  ABD: soft, NT, ND  EXT: no c/c/e    NEURO:    AAO x 3  CN II-XII grossly intact  Fc x 4 antigravity  SILT    +R drift      Significant Labs:  Recent Labs   Lab 10/27/22  0837 10/29/22  0128    122*    144   K 4.3 4.6    106   CO2 26 26   BUN 15 12   CREATININE 0.7 0.7   CALCIUM 9.6 10.2     Recent Labs   Lab 10/29/22  0128   WBC 8.02   HGB 15.1   HCT 46.4        No results for input(s): LABPT, INR, APTT in the last 48 hours.  Microbiology Results (last 7 days)       Procedure Component Value Units Date/Time    Influenza A & B by Molecular [[id number]] Collected: 10/29/22 0146    Order Status: Completed Specimen: Nasopharyngeal Swab Updated: 10/29/22 0254     Influenza A, Molecular Negative     Influenza B, Molecular Negative     Flu A & B Source Nasal swab          All pertinent labs from the last 24 hours have been reviewed.    Significant Diagnostics:  I have reviewed all pertinent imaging results/findings within the past 24 hours.  CT Head Without Contrast    Result Date: 10/29/2022  New large left mixed density extra-axial collection along the left cerebral convexity concerning for acute on chronic or subacute with acute component subdural hemorrhage. The collection measures 2.2 cm with significant mass effect on the left hemicerebrum with 5 mm rightward midline shift, subfalcine herniation and effacement of the left lateral ventricle. Cerebral volume loss with diffuse white matter hypoattenuation, nonspecific likely sequel of chronic microvascular ischemic changes. This report was flagged in Epic as abnormal. COMMUNICATION This critical result was discovered/received at 02:40.  The critical information above was relayed directly by Izzy Sal MD by telephone to Dae Toledo MD at 02:43. Electronically signed by resident: Izzy Sal Date:    10/29/2022 Time:    03:57 Electronically signed by: Lai Morocho Date:    10/29/2022  Time:    04:31

## 2022-10-29 NOTE — ANESTHESIA PREPROCEDURE EVALUATION
[facility]  Anesthesia Pre-Operative Evaluation         Patient Name: Eugenio Aldridge  YOB: 1945  MRN: [id number]    SUBJECTIVE:     Pre-operative evaluation for Procedure(s) (LRB):  CRANIOTOMY, FOR SUBDURAL HEMATOMA EVACUATION (Left)     10/29/2022    Eugenio Aldridge is a 77 y.o. female w/ a significant PMHx of splenic flexure cancer s/p extended left colectomy, HTN, PAD, prediabetes, HLD, and hypothyroidism who presents to [facility] for SDH. She presented to ED with a new onset 1 day history of headache. Pt having short term memory problems per [facility] team so consent was obtained from Significant other over the phone who has POA.     Of note, pts hx of ASA use in question by [facility] team so pt being given 1 unit of platelets and dose of DDAVP prior to surgery.     Patient now presents for the above procedure(s).      TTE: 6*9/22   The left ventricle is normal in size with normal systolic function.   Indeterminate left ventricular diastolic function.   Normal right ventricular size with normal right ventricular systolic function.   Normal central venous pressure (3 mmHg).   The estimated PA systolic pressure is 38 mmHg.   Trivial anterior pericardial effusion.           LDA:        Peripheral IV - Single Lumen 10/29/22 0440 22 G Right Wrist (Active)   Number of days: 0            Peripheral IV - Single Lumen 10/29/22 20 G Left Antecubital (Active)   Number of days: 0       Female External Urinary Catheter 10/29/22 0530 (Active)   Output (mL) 0 mL 10/29/22 0530   Number of days: 0       Prev airway: 2/4/22  Intubation:     Induction:  Intravenous    Intubated:  Postinduction    Mask Ventilation:  Easy mask    Attempts:  1    Attempted By:  Staff anesthesiologist    Method of Intubation:  Direct    Blade:  Newton 2    Laryngeal View Grade: Grade IIA - cords partially seen      Difficult Airway Encountered?: No      Complications:  None    Airway Device:  Oral endotracheal tube    Airway Device Size:   7.5    Style/Cuff Inflation:  Cuffed    Inflation Amount (mL):  8    Tube secured:  20    Secured at:  The lips    Placement Verified By:  Capnometry    Complicating Factors:  None    Findings Post-Intubation:  BS equal bilateral    Drips: None documented.    Patient Active Problem List   Diagnosis    Hypothyroidism    Pure hypercholesterolemia    PAD (peripheral artery disease)    Nuclear sclerosis - Both Eyes    Postinflammatory pulmonary fibrosis    Cervical radiculopathy    Atherosclerosis of aorta    Other osteoporosis without current pathological fracture    Nontraumatic tear of right rotator cuff    Sensorineural hearing loss (SNHL) of both ears    Overweight (BMI 25.0-29.9)    Claudication of left lower extremity    Lumbar disc disease    Memory disturbance    Hydronephrosis    Pre-diabetes    Malignant neoplasm of splenic flexure    Hypertension    SDH (subdural hematoma)    Seizure prophylaxis    Brain compression       Review of patient's allergies indicates:   Allergen Reactions    Codeine Nausea And Vomiting       Current Outpatient Medications:    Current Facility-Administered Medications:     0.9%  NaCl infusion (for blood administration), , Intravenous, Q24H PRN, King Don MD    0.9%  NaCl infusion, , Intravenous, Continuous, Marko Lopez NP, Last Rate: 50 mL/hr at 10/29/22 0645, Rate Verify at 10/29/22 0645    desmopressin (DDAVP) 20.4 mcg in sodium chloride 0.9% 50 mL IVPB, 0.3 mcg/kg (Dosing Weight), Intravenous, Once, King Don MD    labetalol 20 mg/4 mL (5 mg/mL) IV syring, 10 mg, Intravenous, Q4H PRN, Marko Lopez NP    levETIRAcetam injection 500 mg, 500 mg, Intravenous, Q12H, Marko Lopez NP    levothyroxine tablet 50 mcg, 50 mcg, Oral, Before breakfast, Marko Lopez NP, 50 mcg at 10/29/22 0620    ondansetron injection 4 mg, 4 mg, Intravenous, Q8H PRN, Marko Lopez NP    sodium chloride 0.9% flush 10 mL, 10 mL, Intravenous, PRN,  Marko Lopez NP    Facility-Administered Medications Ordered in Other Encounters:     0.9%  NaCl infusion, , Intravenous, Continuous, Sanna Ann NP    gabapentin capsule 300 mg, 300 mg, Oral, TID, Sanna Ann NP, 300 mg at 02/05/22 0911    Past Surgical History:   Procedure Laterality Date    ARTHROSCOPIC REPAIR OF ROTATOR CUFF OF SHOULDER Right 4/24/2019    Procedure: REPAIR, ROTATOR CUFF, ARTHROSCOPIC WITH REGENERATIN IMPLANT;  Surgeon: Walter Hernandez MD;  Location: [facility] [place], operated by [facility] OR;  Service: Orthopedics;  Laterality: Right;  regional w/o catheter     BTL      COLONOSCOPY N/A 12/9/2021    Procedure: COLONOSCOPY;  Surgeon: Juan Swain MD;  Location: [facility] ENDO (4TH FLR);  Service: Endoscopy;  Laterality: N/A;  fully vaccinated    DECOMPRESSION OF SUBACROMIAL SPACE Right 4/24/2019    Procedure: DECOMPRESSION, SUBACROMIAL SPACE WITH DISTAL CLAVICLE EXCISION (DCE);  Surgeon: Walter Hernandez MD;  Location: [facility], [place], operated by [facility] OR;  Service: Orthopedics;  Laterality: Right;    FLEXIBLE SIGMOIDOSCOPY N/A 12/20/2021    Procedure: SIGMOIDOSCOPY-FLEXIBLE;  Surgeon: Juan Swain MD;  Location: [facility] ENDO (2ND FLR);  Service: Endoscopy;  Laterality: N/A;  please schedule with me Monday 12/20 AM. Urgent for marking for colon cancer  spot held 2/20/21 12/16 fully vaccinated; instructions to portal-st    FLEXIBLE SIGMOIDOSCOPY N/A 2/4/2022    Procedure: SIGMOIDOSCOPY, FLEXIBLE;  Surgeon: GUILLERMO Olguin MD;  Location: [facility] OR 2ND FLR;  Service: Colon and Rectal;  Laterality: N/A;    LAPAROSCOPIC LEFT COLECTOMY N/A 2/4/2022    Procedure: COLECTOMY, LEFT, LAPAROSCOPIC;  Surgeon: GUILLERMO Olguin MD;  Location: [facility] OR 2ND FLR;  Service: Colon and Rectal;  Laterality: N/A;  Extended    MOBILIZATION OF SPLENIC FLEXURE N/A 2/4/2022    Procedure: MOBILIZATION, SPLENIC FLEXURE;  Surgeon: GUILLERMO Olguin MD;  Location: [facility] OR 2ND FLR;  Service: Colon and Rectal;  Laterality: N/A;    RHYTIDECTOMY      SHOULDER  ARTHROSCOPY Right 2019    Procedure: ARTHROSCOPY, SHOULDER WITH LABRAL DEBRIDEMENT;  Surgeon: Walter Hernandez MD;  Location: [facility];  Service: Orthopedics;  Laterality: Right;    TONSILLECTOMY, ADENOIDECTOMY      VULVA SURGERY         Social History     Socioeconomic History    Marital status: Significant Other    Number of children: 0    Years of education: 18    Highest education level: Master's degree (e.g., MA, MS, Vinicio, MEd, MSW, AMBROCIO)   Occupational History    Occupation: CPA     Comment: Retired 2019   Tobacco Use    Smoking status: Former     Packs/day: 1.50     Years: 39.00     Pack years: 58.50     Types: Cigarettes     Start date:      Quit date: 2001     Years since quittin.1    Smokeless tobacco: Never   Substance and Sexual Activity    Alcohol use: Yes     Alcohol/week: 7.0 - 14.0 standard drinks     Types: 7 - 14 Standard drinks or equivalent per week    Drug use: No    Sexual activity: Yes   Social History Narrative    Significant other s/p colon cancer, now w/ bladder cancer;f/u imaging clear    + walking in [place], 3-4 /wk    + working CPA     Social Determinants of Health     Financial Resource Strain: Low Risk     Difficulty of Paying Living Expenses: Not hard at all   Food Insecurity: No Food Insecurity    Worried About Running Out of Food in the Last Year: Never true    Ran Out of Food in the Last Year: Never true   Transportation Needs: No Transportation Needs    Lack of Transportation (Medical): No    Lack of Transportation (Non-Medical): No   Physical Activity: Insufficiently Active    Days of Exercise per Week: 3 days    Minutes of Exercise per Session: 30 min   Stress: No Stress Concern Present    Feeling of Stress : Not at all   Social Connections: Unknown    Frequency of Communication with Friends and Family: Twice a week    Frequency of Social Gatherings with Friends and Family: Once a week    Active Member of Clubs or Organizations: Yes     Attends Club or Organization Meetings: More than 4 times per year    Marital Status: Living with partner   Housing Stability: Low Risk     Unable to Pay for Housing in the Last Year: No    Number of Places Lived in the Last Year: 1    Unstable Housing in the Last Year: No       OBJECTIVE:     Vital Signs Range (Last 24H):  Temp:  [36.6 °C (97.9 °F)-37 °C (98.6 °F)]   Pulse:  []   Resp:  [16-40]   BP: (132-147)/(58-69)   SpO2:  [95 %-100 %]       Significant Labs:  Lab Results   Component Value Date    WBC 8.02 10/29/2022    HGB 15.1 10/29/2022    HCT 46.4 10/29/2022     10/29/2022    CHOL 156 10/29/2022    TRIG 43 10/29/2022    HDL 90 (H) 10/29/2022    ALT 20 10/29/2022    AST 22 10/29/2022     10/29/2022    K 4.6 10/29/2022     10/29/2022    CREATININE 0.7 10/29/2022    BUN 12 10/29/2022    CO2 26 10/29/2022    TSH 2.160 10/29/2022    INR 0.9 01/26/2009    HGBA1C 5.3 10/29/2022       Diagnostic Studies: No relevant studies.    EKG:   Results for orders placed or performed in visit on 06/29/22   EKG 12-lead    Collection Time: 06/29/22  2:12 PM    Narrative    Test Reason : Z91.89    Vent. Rate : 072 BPM     Atrial Rate : 072 BPM     P-R Int : 138 ms          QRS Dur : 078 ms      QT Int : 402 ms       P-R-T Axes : 063 -18 065 degrees     QTc Int : 440 ms    Normal sinus rhythm  Abnormal R wave progression  Cannot rule out Septal infarct (cited on or before 17-MAY-2022)  Abnormal ECG  When compared with ECG of 17-MAY-2022 10:07,  No significant change was found  Confirmed by KIARA LEONARDO MD ([id number]) on 6/29/2022 2:48:15 PM    Referred By:  [id number]           Confirmed By:KIARA LEONARDO MD       2D ECHO:  TTE:  Results for orders placed or performed during the hospital encounter of 06/09/22   Echo   Result Value Ref Range    Ascending aorta 3.34 cm    STJ 2.87 cm    AV mean gradient 13 mmHg    Ao peak carla 2.37 m/s    Ao VTI 39.11 cm    IVRT 82.78 msec    IVS 0.90 0.6 - 1.1 cm    LA size 2.99  cm    Left Atrium Major Axis 3.87 cm    Left Atrium Minor Axis 4.04 cm    LVIDd 4.47 3.5 - 6.0 cm    LVIDs 2.55 2.1 - 4.0 cm    LVOT diameter 2.09 cm    LVOT peak VTI 27.89 cm    Posterior Wall 0.92 0.6 - 1.1 cm    MV Peak A Robert 1.24 m/s    E wave deceleration time 231.71 msec    MV Peak E Robert 0.81 m/s    PV Peak D Robert 0.46 m/s    PV Peak S Robert 0.78 m/s    RA Major Axis 4.23 cm    RA Width 2.44 cm    RVDD 3.04 cm    Sinus 3.36 cm    TAPSE 1.99 cm    TR Max Robert 2.94 m/s    TDI LATERAL 0.06 m/s    TDI SEPTAL 0.08 m/s    LA WIDTH 3.54 cm    MV stenosis pressure 1/2 time 67.20 ms    LV Diastolic Volume 91.23 mL    LV Systolic Volume 23.38 mL    LVOT peak robert 1.46 m/s    LA volume (mod) 32.27 cm3    LV LATERAL E/E' RATIO 13.50 m/s    LV SEPTAL E/E' RATIO 10.13 m/s    FS 43 %    LA volume 35.57 cm3    LV mass 133.34 g    Left Ventricle Relative Wall Thickness 0.41 cm    AV valve area 2.45 cm2    AV Velocity Ratio 0.62     AV index (prosthetic) 0.71     MV valve area p 1/2 method 3.27 cm2    E/A ratio 0.65     Mean e' 0.07 m/s    Pulm vein S/D ratio 1.70     LVOT area 3.4 cm2    LVOT stroke volume 95.63 cm3    AV peak gradient 22 mmHg    E/E' ratio 11.57 m/s    Triscuspid Valve Regurgitation Peak Gradient 35 mmHg    BSA 1.79 m2    LV Systolic Volume Index 13.3 mL/m2    LV Diastolic Volume Index 51.84 mL/m2    LA Volume Index 20.2 mL/m2    LV Mass Index 76 g/m2    LA Volume Index (Mod) 18.3 mL/m2    Right Atrial Pressure (from IVC) 3 mmHg    TV rest pulmonary artery pressure 38 mmHg    Narrative    · The left ventricle is normal in size with normal systolic function.  · Indeterminate left ventricular diastolic function.  · Normal right ventricular size with normal right ventricular systolic   function.  · Normal central venous pressure (3 mmHg).  · The estimated PA systolic pressure is 38 mmHg.  · Trivial anterior pericardial effusion.          BOGDAN:  No results found for this or any previous visit.    ASSESSMENT/PLAN:                                                                                                                   10/29/2022  Eugenio Aldridge is a 77 y.o., female.      Pre-op Assessment    I have reviewed the Patient Summary Reports.     I have reviewed the Nursing Notes. I have reviewed the NPO Status.   I have reviewed the Medications.     Review of Systems  Anesthesia Hx:  No problems with previous Anesthesia  History of prior surgery of interest to airway management or planning: Denies Family Hx of Anesthesia complications.   Denies Personal Hx of Anesthesia complications.   Cardiovascular:   Hypertension Denies MI.  Denies CAD.    PVD hyperlipidemia    Pulmonary:   Denies COPD.  Denies Asthma.  Denies Shortness of breath.    Renal/:   Chronic Renal Disease    Neurological:   Neuromuscular Disease,    Endocrine:   Hypothyroidism  Denies Obesity / BMI > 30      Physical Exam  General: Well nourished, Cooperative, Alert and Oriented  Short term memory loss  Airway:  Mallampati: II   Mouth Opening: Normal  TM Distance: Normal  Neck ROM: Normal ROM    Dental:  Intact    Chest/Lungs:  Clear to auscultation, Normal Respiratory Rate    Heart:  Rate: Normal  Rhythm: Regular Rhythm        Anesthesia Plan  Type of Anesthesia, risks & benefits discussed:    Anesthesia Type: Gen ETT  Intra-op Monitoring Plan: Standard ASA Monitors and Art Line  Post Op Pain Control Plan: multimodal analgesia and IV/PO Opioids PRN  Induction:  IV  Airway Plan: Direct, Post-Induction  Informed Consent: Informed consent signed with the Patient representative and all parties understand the risks and agree with anesthesia plan.  All questions answered.   ASA Score: 4 Emergent  Day of Surgery Review of History & Physical: H&P Update referred to the surgeon/provider.    Ready For Surgery From Anesthesia Perspective.     .

## 2022-10-29 NOTE — H&P
[facility] - Emergency Dept  Neurocritical Care  History & Physical    Admit Date: 10/29/2022  Service Date: 10/29/2022  Length of Stay: 0    Subjective:     Chief Complaint: SDH (subdural hematoma)    History of Present Illness: Eugenio Aldridge is a 77-year-old female with a past medical history of splenic flexure cancer s/p extended left colectomy, HTN, and hypothyroidism who presents to [facility] for SDH. She presented to ED with a new onset 1 day history of headache.  Patient reports that tonight around 6:00 p.m. she had a acute onset headache while she was lying down in bed.  She reports that it is the worse headache of her life and she does not regularly get headaches. Denies fever/chills, vision/hearing changes, dysphagia, dysarthria, nausea/vomiting, new-onset weakness or sensory change, bowel/bladder changes. Denies AC/AP. CTH with 1.3cm aSDH with approximately 5mm MLS. OR plan pending per nsgy. She is being admitted to [facility] for a higher level of care.       Past Medical History:   Diagnosis Date    Actinic keratosis     Aortic valve disorders     Atherosclerosis of aorta     Atherosclerosis of native arteries of the extremities with intermittent claudication     Carcinoma in situ, vulva     Hypertension 2/21/2022    Left bundle branch hemiblock     Other and unspecified hyperlipidemia     Postinflammatory pulmonary fibrosis     Senile nuclear sclerosis      Past Surgical History:   Procedure Laterality Date    ARTHROSCOPIC REPAIR OF ROTATOR CUFF OF SHOULDER Right 4/24/2019    Procedure: REPAIR, ROTATOR CUFF, ARTHROSCOPIC WITH REGENERATIN IMPLANT;  Surgeon: Walter Hernandez MD;  Location: [facility];  Service: Orthopedics;  Laterality: Right;  regional w/o catheter     BTL      COLONOSCOPY N/A 12/9/2021    Procedure: COLONOSCOPY;  Surgeon: Juan Swain MD;  Location: [facility] ENDO ([address] [place] [address]);  Service: Endoscopy;  Laterality: N/A;  fully vaccinated    DECOMPRESSION OF SUBACROMIAL SPACE Right 4/24/2019    Procedure:  DECOMPRESSION, SUBACROMIAL SPACE WITH DISTAL CLAVICLE EXCISION (DCE);  Surgeon: Walter Hernandez MD;  Location: [facility] OR;  Service: Orthopedics;  Laterality: Right;    FLEXIBLE SIGMOIDOSCOPY N/A 12/20/2021    Procedure: SIGMOIDOSCOPY-FLEXIBLE;  Surgeon: Juan Swain MD;  Location: [facility] ENDO (2ND FLR);  Service: Endoscopy;  Laterality: N/A;  please schedule with me Monday 12/20 AM. Urgent for marking for colon cancer  spot held 2/20/21 12/16 fully vaccinated; instructions to portal-st    FLEXIBLE SIGMOIDOSCOPY N/A 2/4/2022    Procedure: SIGMOIDOSCOPY, FLEXIBLE;  Surgeon: GUILLERMO Olguin MD;  Location: [facility] OR 2ND FLR;  Service: Colon and Rectal;  Laterality: N/A;    LAPAROSCOPIC LEFT COLECTOMY N/A 2/4/2022    Procedure: COLECTOMY, LEFT, LAPAROSCOPIC;  Surgeon: GUILLERMO Olguin MD;  Location: [facility] OR 2ND FLR;  Service: Colon and Rectal;  Laterality: N/A;  Extended    MOBILIZATION OF SPLENIC FLEXURE N/A 2/4/2022    Procedure: MOBILIZATION, SPLENIC FLEXURE;  Surgeon: GUILLERMO Olguin MD;  Location: [facility] OR 2ND FLR;  Service: Colon and Rectal;  Laterality: N/A;    RHYTIDECTOMY      SHOULDER ARTHROSCOPY Right 4/24/2019    Procedure: ARTHROSCOPY, SHOULDER WITH LABRAL DEBRIDEMENT;  Surgeon: Walter Hernandez MD;  Location: [facility];  Service: Orthopedics;  Laterality: Right;    TONSILLECTOMY, ADENOIDECTOMY      VULVA SURGERY        Current Facility-Administered Medications on File Prior to Encounter   Medication Dose Route Frequency Provider Last Rate Last Admin    0.9%  NaCl infusion   Intravenous Continuous Sanna Ann NP        gabapentin capsule 300 mg  300 mg Oral TID Sanna Ann NP   300 mg at 02/05/22 0911     Current Outpatient Medications on File Prior to Encounter   Medication Sig Dispense Refill    amLODIPine (NORVASC) 10 MG tablet Take 1 tablet (10 mg total) by mouth once daily. 90 tablet 3    aspirin 81 MG Chew Take 81 mg by mouth once daily.      atorvastatin (LIPITOR) 40 MG  tablet TAKE 1 TABLET(40 MG) BY MOUTH EVERY DAY 90 tablet 3    calcium-vitamin D3 (OS-MARYANA 500 + D3) 500 mg(1,250mg) -200 unit per tablet Take 1 tablet by mouth once daily.       cilostazoL (PLETAL) 100 MG Tab Take 1 tablet (100 mg total) by mouth 2 (two) times daily. 180 tablet 3    cyanocobalamin, vitamin B-12, (VITAMIN B-12 ORAL) Take 1 tablet by mouth once daily.      donepeziL (ARICEPT) 10 MG tablet Take 1 tablet (10 mg total) by mouth once daily. 90 tablet 3    levothyroxine (SYNTHROID) 50 MCG tablet TAKE 1 TABLET(50 MCG) BY MOUTH BEFORE BREAKFAST 90 tablet 3    multivitamin (THERAGRAN) per tablet Take by mouth. 1 Tablet Oral Every day        Allergies: Codeine    Family History   Problem Relation Age of Onset    Bladder Cancer Father         d. 69    Alzheimer's disease Mother 96        d. 97    Bladder Cancer Paternal Uncle     Lung cancer Paternal Uncle     Breast cancer Paternal Grandmother     Pancreatic cancer Paternal Aunt     Other Brother         d. 45 o/d    Pneumonia Brother         d. 58, smoker, obese    No Known Problems Sister     No Known Problems Sister     No Known Problems Sister     Amblyopia Neg Hx     Blindness Neg Hx     Cataracts Neg Hx     Diabetes Neg Hx     Glaucoma Neg Hx     Hypertension Neg Hx     Macular degeneration Neg Hx     Retinal detachment Neg Hx     Strabismus Neg Hx     Stroke Neg Hx     Thyroid disease Neg Hx      Social History     Tobacco Use    Smoking status: Former     Packs/day: 1.50     Years: 39.00     Pack years: 58.50     Types: Cigarettes     Start date:      Quit date: 2001     Years since quittin.1    Smokeless tobacco: Never   Substance Use Topics    Alcohol use: Yes     Alcohol/week: 7.0 - 14.0 standard drinks     Types: 7 - 14 Standard drinks or equivalent per week    Drug use: No     Review of Systems    Review of Symptoms:  Constitutional: Denies fevers, weight loss, chills, or weakness.  Eyes: Denies changes  in vision.  ENT: Denies dysphagia, nasal discharge, ear pain or discharge.  Cardiovascular: Denies chest pain, palpitations, orthopnea, or claudication.  Respiratory: Denies shortness of breath, cough, hemoptysis, or wheezing.  GI: Denies nausea/vomitting, hematochezia, melena, abd pain, or changes in appetite.  : Denies dysuria, incontinence, or hematuria.  Musculoskeletal: Denies joint pain or myalgias.  Skin/breast: Denies rashes, lumps, lesions, or discharge.  Neurologic: Denies , dizziness, vertigo, or paresthesias. +headache  Psychiatric: Denies changes in mood or hallucinations.  Endocrine: Denies polyuria, polydipsia, heat/cold intolerance.  Hematologic/Lymph: Denies lymphadenopathy, easy bruising or easy bleeding.  Allergic/Immunologic: Denies rash, rhinitis.      Objective:     Vitals:    Temp: 98.2 °F (36.8 °C)  Pulse: 94  BP: (!) 136/58  MAP (mmHg): 85  Resp: 16  SpO2: 95 %  O2 Device (Oxygen Therapy): room air    Temp  Min: 98.2 °F (36.8 °C)  Max: 98.6 °F (37 °C)  Pulse  Min: 93  Max: 111  BP  Min: 132/60  Max: 147/59  MAP (mmHg)  Min: 85  Max: 85  Resp  Min: 16  Max: 20  SpO2  Min: 95 %  Max: 100 %    No intake/output data recorded.           Physical Exam      Physical Exam:  GA: Alert, comfortable, no acute distress.   HEENT: No scleral icterus or JVD.   Pulmonary: Clear to auscultation A/L.   Cardiac: RRR S1 & S2 w/o rubs/murmurs/gallops.   Abdominal: Bowel sounds present x 4.   Skin: No jaundice, rashes, or visible lesions.  Neuro:  --GCS: E4 V5 M6  --Mental Status:  alert oriented follows  --CN II-XII grossly intact.   --Pupils 3mm, PERRL.   --Corneal reflex, gag, cough intact.  --LUE strength: 5/5  --RUE strength: 5/5  --LLE strength: 5/5  --RLE strength: 5/5     Unable to test gait due to level of consciousness.    Today I personally reviewed pertinent medications, lines/drains/airways, imaging, cardiology results, laboratory results, notably: CTH      Assessment/Plan:     Neuro  * SDH  (subdural hematoma)  - NSGY  following or plan pending   - SBP <160  - CTH: New large left mixed density extra-axial collection along the left cerebral convexity concerning for acute on chronic or subacute with acute component subdural hemorrhage. The collection measures 2.2 cm with significant mass effect on the left hemicerebrum with 5 mm rightward midline shift, subfalcine herniation and effacement of the left lateral ventricle  - Anticoagulation status: none   - Q 1 vitals   - Q 1 neuro checks   - TSH, A1c, lipid, echo and EKG  -  Pt/OT/SLP eval and treat  - NPO       Brain compression  - see sdh     Seizure prophylaxis  - keppra 500 q 12    Cardiac/Vascular  Hypertension  - SBP <160   -echo and ekg     Endocrine  Hypothyroidism  - TSH   - continue synthroid           The patient is being Prophylaxed for:  Venous Thromboembolism with: Mechanical  Stress Ulcer with: Not Applicable   Ventilator Pneumonia with: not applicable    Activity Orders          Turn patient starting at 10/29 0600    Elevate HOB starting at 10/29 0409    Diet NPO: NPO starting at 10/29 0409        Full Code     Critical condition in that Patient has a condition that poses threat to life and bodily function: SDH, brain compression, htn,hypothyroidism      35 minutes of Critical care time was spent personally by me on the following activities: development of treatment plan with patient or surrogate and bedside caregivers, discussions with consultants, evaluation of patient's response to treatment, examination of patient, ordering and performing treatments and interventions, ordering and review of laboratory studies, ordering and review of radiographic studies, pulse oximetry, antibiotic titration if applicable, vasopressor titration if applicable, re-evaluation of patient's condition. This critical care time did not overlap with that of any other provider or involve time for any procedures. There is high probability for acute neurological  change leading to clinical and possibly life-threatening deterioration requiring highest level of physician preparedness for urgent intervention.      Marko Lopez NP  Neurocritical Care  [facility] - Emergency Dept

## 2022-10-29 NOTE — ED PROVIDER NOTES
Encounter Date: 10/29/2022       History     Chief Complaint   Patient presents with    Headache     Pt c/o L-sided HA that started tonight. +dizziness. Denies n/v, blurry vision. States she is compliant with BP meds.      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  Patient presents with a new onset 1 day history of headache.  Patient reports that tonight around 6:00 p.m. she had a acute onset headache while she was lying down on the bed.  She denies the headache was like a thunderclap, however she reports that it is the worse headache of her life and she does not regularly get headaches.  Patient endorses that she was feeling dizzy when the headache started but denies that the room was spinning.  Patient denies any visual changes, nausea, vomiting, chest pain, shortness of breath, weakness, or bladder or bowel incontinence.    The history is provided by the patient and a relative.   Review of patient's allergies indicates:   Allergen Reactions    Codeine Nausea And Vomiting     Past Medical History:   Diagnosis Date    Actinic keratosis     Aortic valve disorders     Atherosclerosis of aorta     Atherosclerosis of native arteries of the extremities with intermittent claudication     Carcinoma in situ, vulva     Hypertension 2/21/2022    Left bundle branch hemiblock     Other and unspecified hyperlipidemia     Postinflammatory pulmonary fibrosis     Senile nuclear sclerosis      Past Surgical History:   Procedure Laterality Date    ARTHROSCOPIC REPAIR OF ROTATOR CUFF OF SHOULDER Right 4/24/2019    Procedure: REPAIR, ROTATOR CUFF, ARTHROSCOPIC WITH REGENERATIN IMPLANT;  Surgeon: Walter Hernandez MD;  Location: [facility];  Service: Orthopedics;  Laterality: Right;  regional w/o catheter     BTL      COLONOSCOPY N/A 12/9/2021    Procedure: COLONOSCOPY;  Surgeon: Juan Swain MD;  Location: [facility] ENDO ([address] [place] [address]);  Service: Endoscopy;  Laterality: N/A;  fully  vaccinated    DECOMPRESSION OF SUBACROMIAL SPACE Right 4/24/2019    Procedure: DECOMPRESSION, SUBACROMIAL SPACE WITH DISTAL CLAVICLE EXCISION (DCE);  Surgeon: Walter Hernandez MD;  Location: [facility] OR;  Service: Orthopedics;  Laterality: Right;    FLEXIBLE SIGMOIDOSCOPY N/A 12/20/2021    Procedure: SIGMOIDOSCOPY-FLEXIBLE;  Surgeon: Juan Swain MD;  Location: [facility] ENDO (2ND FLR);  Service: Endoscopy;  Laterality: N/A;  please schedule with me Monday 12/20 AM. Urgent for marking for colon cancer  spot held 2/20/21 12/16 fully vaccinated; instructions to portal-st    FLEXIBLE SIGMOIDOSCOPY N/A 2/4/2022    Procedure: SIGMOIDOSCOPY, FLEXIBLE;  Surgeon: GUILLERMO Olguin MD;  Location: [facility] OR 2ND FLR;  Service: Colon and Rectal;  Laterality: N/A;    LAPAROSCOPIC LEFT COLECTOMY N/A 2/4/2022    Procedure: COLECTOMY, LEFT, LAPAROSCOPIC;  Surgeon: GUILLERMO Olguin MD;  Location: [facility] OR 2ND FLR;  Service: Colon and Rectal;  Laterality: N/A;  Extended    MOBILIZATION OF SPLENIC FLEXURE N/A 2/4/2022    Procedure: MOBILIZATION, SPLENIC FLEXURE;  Surgeon: GUILLERMO Olguin MD;  Location: [facility] OR 2ND FLR;  Service: Colon and Rectal;  Laterality: N/A;    RHYTIDECTOMY      SHOULDER ARTHROSCOPY Right 4/24/2019    Procedure: ARTHROSCOPY, SHOULDER WITH LABRAL DEBRIDEMENT;  Surgeon: Walter Hernandez MD;  Location: [facility];  Service: Orthopedics;  Laterality: Right;    TONSILLECTOMY, ADENOIDECTOMY      VULVA SURGERY       Family History   Problem Relation Age of Onset    Bladder Cancer Father         d. 69    Alzheimer's disease Mother 96        d. 97    Bladder Cancer Paternal Uncle     Lung cancer Paternal Uncle     Breast cancer Paternal Grandmother     Pancreatic cancer Paternal Aunt     Other Brother         d. 45 o/d    Pneumonia Brother         d. 58, smoker, obese    No Known Problems Sister     No Known Problems Sister     No Known Problems Sister     Amblyopia Neg Hx     Blindness Neg Hx     Cataracts Neg Hx     Diabetes  Neg Hx     Glaucoma Neg Hx     Hypertension Neg Hx     Macular degeneration Neg Hx     Retinal detachment Neg Hx     Strabismus Neg Hx     Stroke Neg Hx     Thyroid disease Neg Hx      Social History     Tobacco Use    Smoking status: Former     Packs/day: 1.50     Years: 39.00     Pack years: 58.50     Types: Cigarettes     Start date:      Quit date: 2001     Years since quittin.1    Smokeless tobacco: Never   Substance Use Topics    Alcohol use: Yes     Alcohol/week: 7.0 - 14.0 standard drinks     Types: 7 - 14 Standard drinks or equivalent per week    Drug use: No     Review of Systems   Constitutional:  Negative for activity change, chills and fever.   HENT:  Negative for congestion, ear pain and sore throat.    Eyes:  Negative for photophobia and visual disturbance.   Respiratory:  Negative for shortness of breath and stridor.    Cardiovascular:  Negative for chest pain and palpitations.   Gastrointestinal:  Negative for abdominal pain, diarrhea, nausea and vomiting.   Genitourinary:  Negative for dysuria, frequency and urgency.   Musculoskeletal:  Negative for back pain, gait problem, neck pain and neck stiffness.   Neurological:  Positive for dizziness and headaches. Negative for tremors, seizures, syncope, facial asymmetry, speech difficulty, weakness, light-headedness and numbness.   Psychiatric/Behavioral:  Negative for confusion and decreased concentration.      Physical Exam     Initial Vitals [10/29/22 0056]   BP Pulse Resp Temp SpO2   132/62 (!) 111 20 98.6 °F (37 °C) 95 %      MAP       --         Physical Exam    Nursing note and vitals reviewed.    Gen: AxOx3, NAD, well nourished, appears stated age  Eye: EOMI, no scleral icterus, no periorbital edema or ecchymosis  Head: normocephalic, atraumatic, no lesions, scalp appears normal  ENT: neck supple, no stridor, no masses, no drooling or voice changes  CVS: RRR, no m/r/g, distal pulses intact/symmetric  Pulm: CTAB, no wheezes, rales or  rhonchi, no increased work of breathing  Abd: soft, nontender, nondistended, no organomegaly, no CVAT  Ext: no edema, no lesions, rashes, or deformity  Neuro: GCS15, moving all extremities, gait intact, face grossly symmetric  Psych: normal affect, cooperative, well groomed, makes good eye contact      ED Course   Procedures  Labs Reviewed   CBC W/ AUTO DIFFERENTIAL - Abnormal; Notable for the following components:       Result Value    MCH 31.5 (*)     Lymph # 0.8 (*)     Gran % 80.8 (*)     Lymph % 9.6 (*)     All other components within normal limits   COMPREHENSIVE METABOLIC PANEL - Abnormal; Notable for the following components:    Glucose 122 (*)     All other components within normal limits   SARS-COV-2 RNA AMPLIFICATION, QUAL - Abnormal; Notable for the following components:    SARS-CoV-2 RNA, Amplification, Qual Positive (*)     All other components within normal limits   LIPID PANEL - Abnormal; Notable for the following components:    HDL 90 (*)     LDL Cholesterol 57.4 (*)     HDL/Cholesterol Ratio 57.7 (*)     Total Cholesterol/HDL Ratio 1.7 (*)     All other components within normal limits   INFLUENZA A & B BY MOLECULAR   HEMOGLOBIN A1C   URINALYSIS, REFLEX TO URINE CULTURE     EKG Readings: (Independently Interpreted)   Initial Reading: No STEMI.   Rate 72  Rhythm regular with narrow QRS preceded by P wave  Intervals normal  ST no depressions or elevations     Imaging Results               CT Head Without Contrast (Final result)  Result time 10/29/22 04:31:33      Final result by Lai Morocho MD (10/29/22 04:31:33)                   Impression:      New large left mixed density extra-axial collection along the left cerebral convexity concerning for acute on chronic or subacute with acute component subdural hemorrhage. The collection measures 2.2 cm with significant mass effect on the left hemicerebrum with 5 mm rightward midline shift, subfalcine herniation and effacement of the left lateral  ventricle.    Cerebral volume loss with diffuse white matter hypoattenuation, nonspecific likely sequel of chronic microvascular ischemic changes.    This report was flagged in Epic as abnormal.    COMMUNICATION  This critical result was discovered/received at 02:40.  The critical information above was relayed directly by Izzy Sal MD by telephone to Dae Toledo MD at 02:43.    Electronically signed by resident: Izzy Sal  Date:    10/29/2022  Time:    03:57    Electronically signed by: Lai Morocho  Date:    10/29/2022  Time:    04:31               Narrative:    EXAMINATION:  CT HEAD WITHOUT CONTRAST    CLINICAL HISTORY:  Headache, new or worsening (Age >= 50y);    TECHNIQUE:  Low dose axial CT images obtained throughout the head without the use of intravenous contrast.  Axial, sagittal and coronal reconstructions were performed.    COMPARISON:  CT head 05/31/2022    FINDINGS:  There is new large mix density, including hyperdensity, extra-axial collection along the left cerebral convexity measures 2.2 cm with significant mass effect on the frontal and parietotemporal lobes resulting in 5 mm rightward midline shift and mild rightward subfalcine herniation and severe effacement of the occipital horn and mild effacement of the frontal horn of the left lateral ventricle.  No right extra-axial collection.  No hydrocephalus.    Cerebral volume loss with diffuse supratentorial white matter hypoattenuation, nonspecific likely sequel of chronic microvascular ischemic changes. No parenchymal mass, hemorrhage, edema or major vascular distribution infarct.    No extra-axial blood or fluid collections.    Skull/extracranial contents (limited evaluation): Intact calvarium with no fracture.    Mastoid air cells are clear.  Mild mucosal thickening of the maxillary and ethmoid air cells.                                    X-Rays:   Independently Interpreted Readings:   Other Readings:  CT head shows new on old  subdural hemorrhage measuring 13 mm with right midline shift 5 mm  Medications   sodium chloride 0.9% flush 10 mL (has no administration in time range)   labetalol 20 mg/4 mL (5 mg/mL) IV syring (10 mg Intravenous Given 10/29/22 1006)   ondansetron injection 4 mg (has no administration in time range)   levothyroxine tablet 50 mcg (50 mcg Oral Given 10/29/22 0620)   0.9%  NaCl infusion ( Intravenous Verify Only 10/29/22 2205)   dexmedetomidine (PRECEDEX) 400mcg/100mL 0.9% NaCL infusion (0.3 mcg/kg/hr × 66.5 kg Intravenous Verify Only 10/29/22 2205)   fentaNYL 50 mcg/mL injection 25 mcg (has no administration in time range)   lacosamide (VIMPAT) 100 mg in sodium chloride 0.9% 100 mL IVPB (0 mg Intravenous Stopped 10/29/22 1757)   prochlorperazine injection Soln 5 mg (5 mg Intravenous Given 10/29/22 0218)   acetaminophen tablet 1,000 mg (1,000 mg Oral Given 10/29/22 0214)   desmopressin (DDAVP) 20.4 mcg in sodium chloride 0.9% 50 mL IVPB (0 mcg Intravenous Stopped 10/29/22 1057)     Medical Decision Making:   Initial Assessment:   77-year-old female with new onset headache which started acutely today.  Patient is able to speak, breathing spontaneously, hemodynamically stable, oriented, moving all 4 limbs spontaneously.  Patient examines well with no neurological changes.  CT scan shows new atraumatic subdural hemorrhage.   Differential Diagnosis:   Initial impression is concerning for tension headache, migraine, subarachnoid hemorrhage, ICH.  Doubt meningitis  ED Management:  See ED course          Attending Attestation:             Attending ED Notes:   Attending Note:  I have seen the patient, have repeated the key portions of the history and physical, reviewed and agree with the medical documentation, and supervised and managed the medical care of the patient. Additionally, I was present for the critical portion of any procedure(s) performed.      77 F hx above here for generalized fatigue since yesterday, now  headache.  No hx of migraines or headaches.  No infectious sx.   VSS  CT head concerning for acute on chronic SDH w/ mildine shift.  Not on anticoagulation.  Neuro intact except for mild right upper extremity drift.  NSY consulted, plan for evacuation.   Covid +    Critical Care time:35 minutes inclusive of direct patient care, review of previous records, interpretation of labs, imaging and ekg, as well as discussion of my impression and plan of care with the patient, family and other clinicians/consultants. This time is exclusive of any separate billable procedures and of treating other patients.      CARINA Cruz MD  Staff ED Physician  10/29/2022 10:48 PM         ED Course as of 10/29/22 2248   Sat Oct 29, 2022   0200 CBC auto differential(!)  Grossly normal [PM]   0200 Comprehensive metabolic panel(!)  Grossly normal [PM]   0251 Radiology phoned to notify the patient has a large mixed density within new on old subdural hemorrhage that measures 13 mm with right midline shift 5 mm [PM]   0400 SARS-CoV-2 RNA, Amplification, Qual(!): Positive [PM]   0458 Patient being admitted by neuro surgery [PM]      ED Course User Index  [PM] Dae Toledo MD                 Clinical Impression:   Final diagnoses:  [S06.5XAA] Subdural hematoma (Primary)      ED Disposition Condition    Admit Stable                Dae Toledo MD  Resident  10/29/22 0830       Rosa Cruz MD  10/29/22 2248

## 2022-10-29 NOTE — HPI
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 She presented to ED with a new onset 1 day history of headache.  Patient reports that tonight around 6:00 p.m. she had a acute onset headache while she was lying down in bed.  She reports that it is the worse headache of her life and she does not regularly get headaches. Denies fever/chills, vision/hearing changes, dysphagia, dysarthria, nausea/vomiting, new-onset weakness or sensory change, bowel/bladder changes. Denies AC/AP. CTH with 1.3cm aSDH with approximately 5mm MLS. OR plan pending per nsgy. She is being admitted to [facility] for a higher level of care.

## 2022-10-29 NOTE — HPI
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  Patient reports that tonight around 6:00 p.m. she had a acute onset headache while she was lying down in bed.  She reports that it is the worse headache of her life and she does not regularly get headaches. Denies fever/chills, vision/hearing changes, dysphagia, dysarthria, nausea/vomiting, new-onset weakness or sensory change, bowel/bladder changes. Denies AC/AP. CTH with 1.3cm aSDH with approximately 5mm MLS.

## 2022-10-29 NOTE — PT/OT/SLP PROGRESS
Physical Therapy      Patient Name:  Eugenio Aldridge   MRN:  [id number]    Patient not seen today secondary to  (pt to OR for craniotomy). PT orders discontinued at this time 2* surgery. Will require new PT orders when pt medically appropriate for PT evaluation and treatment.    10/29/2022

## 2022-10-29 NOTE — ED NOTES
Pt complaint of HA lasting the past 4 hours. Pt has not taken anything for it. Pt states she feels dizzy or the room is spinning. No hx of migraines. Pt denies any injury or LOC yesterday/today.

## 2022-10-30 LAB
ALBUMIN SERPL BCP-MCNC: 3 G/DL (ref 3.5–5.2)
ALP SERPL-CCNC: 57 U/L (ref 55–135)
ALT SERPL W/O P-5'-P-CCNC: 13 U/L (ref 10–44)
ANION GAP SERPL CALC-SCNC: 12 MMOL/L (ref 8–16)
APTT BLDCRRT: 25 SEC (ref 21–32)
ASCENDING AORTA: 3.05 CM
AST SERPL-CCNC: 18 U/L (ref 10–40)
AV INDEX (PROSTH): 0.58
AV MEAN GRADIENT: 4 MMHG
AV PEAK GRADIENT: 9 MMHG
AV VALVE AREA: 2.21 CM2
AV VELOCITY RATIO: 0.58
BASOPHILS # BLD AUTO: 0.01 K/UL (ref 0–0.2)
BASOPHILS NFR BLD: 0.1 % (ref 0–1.9)
BILIRUB SERPL-MCNC: 0.4 MG/DL (ref 0.1–1)
BSA FOR ECHO PROCEDURE: 1.74 M2
BUN SERPL-MCNC: 10 MG/DL (ref 8–23)
CALCIUM SERPL-MCNC: 8.7 MG/DL (ref 8.7–10.5)
CHLORIDE SERPL-SCNC: 109 MMOL/L (ref 95–110)
CO2 SERPL-SCNC: 18 MMOL/L (ref 23–29)
CREAT SERPL-MCNC: 0.6 MG/DL (ref 0.5–1.4)
CV ECHO LV RWT: 0.4 CM
DIFFERENTIAL METHOD: ABNORMAL
DOP CALC AO PEAK VEL: 1.49 M/S
DOP CALC AO VTI: 31.55 CM
DOP CALC LVOT AREA: 3.8 CM2
DOP CALC LVOT DIAMETER: 2.2 CM
DOP CALC LVOT PEAK VEL: 0.86 M/S
DOP CALC LVOT STROKE VOLUME: 69.79 CM3
DOP CALCLVOT PEAK VEL VTI: 18.37 CM
E WAVE DECELERATION TIME: 174.64 MSEC
E/A RATIO: 0.95
ECHO LV POSTERIOR WALL: 0.82 CM (ref 0.6–1.1)
EJECTION FRACTION: 60 %
EOSINOPHIL # BLD AUTO: 0 K/UL (ref 0–0.5)
EOSINOPHIL NFR BLD: 0 % (ref 0–8)
ERYTHROCYTE [DISTWIDTH] IN BLOOD BY AUTOMATED COUNT: 14.7 % (ref 11.5–14.5)
EST. GFR  (NO RACE VARIABLE): >60 ML/MIN/1.73 M^2
FRACTIONAL SHORTENING: 32 % (ref 28–44)
GLUCOSE SERPL-MCNC: 112 MG/DL (ref 70–110)
HCT VFR BLD AUTO: 37.6 % (ref 37–48.5)
HGB BLD-MCNC: 12.5 G/DL (ref 12–16)
IMM GRANULOCYTES # BLD AUTO: 0.04 K/UL (ref 0–0.04)
IMM GRANULOCYTES NFR BLD AUTO: 0.4 % (ref 0–0.5)
INR PPP: 1 (ref 0.8–1.2)
INTERVENTRICULAR SEPTUM: 0.82 CM (ref 0.6–1.1)
IVRT: 74.22 MSEC
LA MAJOR: 4.62 CM
LA MINOR: 4.49 CM
LA WIDTH: 4.31 CM
LEFT ATRIUM SIZE: 3.11 CM
LEFT ATRIUM VOLUME INDEX: 30 ML/M2
LEFT ATRIUM VOLUME: 51.89 CM3
LEFT INTERNAL DIMENSION IN SYSTOLE: 2.79 CM (ref 2.1–4)
LEFT VENTRICLE DIASTOLIC VOLUME INDEX: 43.6 ML/M2
LEFT VENTRICLE DIASTOLIC VOLUME: 75.43 ML
LEFT VENTRICLE MASS INDEX: 59 G/M2
LEFT VENTRICLE SYSTOLIC VOLUME INDEX: 16.9 ML/M2
LEFT VENTRICLE SYSTOLIC VOLUME: 29.21 ML
LEFT VENTRICULAR INTERNAL DIMENSION IN DIASTOLE: 4.13 CM (ref 3.5–6)
LEFT VENTRICULAR MASS: 101.82 G
LYMPHOCYTES # BLD AUTO: 0.5 K/UL (ref 1–4.8)
LYMPHOCYTES NFR BLD: 5.5 % (ref 18–48)
MAGNESIUM SERPL-MCNC: 2.2 MG/DL (ref 1.6–2.6)
MCH RBC QN AUTO: 31.9 PG (ref 27–31)
MCHC RBC AUTO-ENTMCNC: 33.2 G/DL (ref 32–36)
MCV RBC AUTO: 96 FL (ref 82–98)
MONOCYTES # BLD AUTO: 0.5 K/UL (ref 0.3–1)
MONOCYTES NFR BLD: 5.3 % (ref 4–15)
MV A" WAVE DURATION": 8.56 MSEC
MV PEAK A VEL: 1.04 M/S
MV PEAK E VEL: 0.99 M/S
MV STENOSIS PRESSURE HALF TIME: 50.65 MS
MV VALVE AREA P 1/2 METHOD: 4.34 CM2
NEUTROPHILS # BLD AUTO: 8.4 K/UL (ref 1.8–7.7)
NEUTROPHILS NFR BLD: 88.7 % (ref 38–73)
NRBC BLD-RTO: 0 /100 WBC
PHOSPHATE SERPL-MCNC: 4 MG/DL (ref 2.7–4.5)
PLATELET # BLD AUTO: 236 K/UL (ref 150–450)
PMV BLD AUTO: 10.6 FL (ref 9.2–12.9)
POTASSIUM SERPL-SCNC: 4.6 MMOL/L (ref 3.5–5.1)
PROT SERPL-MCNC: 5.9 G/DL (ref 6–8.4)
PROTHROMBIN TIME: 9.9 SEC (ref 9–12.5)
PULM VEIN S/D RATIO: 1.47
PV PEAK D VEL: 0.49 M/S
PV PEAK S VEL: 0.72 M/S
RA MAJOR: 4.43 CM
RA PRESSURE: 3 MMHG
RA WIDTH: 3.49 CM
RBC # BLD AUTO: 3.92 M/UL (ref 4–5.4)
RIGHT VENTRICULAR END-DIASTOLIC DIMENSION: 3.36 CM
RV TISSUE DOPPLER FREE WALL SYSTOLIC VELOCITY 1 (APICAL 4 CHAMBER VIEW): 9.97 CM/S
SINUS: 3.6 CM
SODIUM SERPL-SCNC: 139 MMOL/L (ref 136–145)
STJ: 2.87 CM
TRICUSPID ANNULAR PLANE SYSTOLIC EXCURSION: 2.08 CM
WBC # BLD AUTO: 9.43 K/UL (ref 3.9–12.7)

## 2022-10-30 PROCEDURE — 25000003 PHARM REV CODE 250

## 2022-10-30 PROCEDURE — 84100 ASSAY OF PHOSPHORUS: CPT

## 2022-10-30 PROCEDURE — 83735 ASSAY OF MAGNESIUM: CPT

## 2022-10-30 PROCEDURE — C9254 INJECTION, LACOSAMIDE: HCPCS

## 2022-10-30 PROCEDURE — 80053 COMPREHEN METABOLIC PANEL: CPT

## 2022-10-30 PROCEDURE — 20000000 HC ICU ROOM

## 2022-10-30 PROCEDURE — 99233 SBSQ HOSP IP/OBS HIGH 50: CPT | Mod: GC,,, | Performed by: PSYCHIATRY & NEUROLOGY

## 2022-10-30 PROCEDURE — 25000003 PHARM REV CODE 250: Performed by: PSYCHIATRY & NEUROLOGY

## 2022-10-30 PROCEDURE — 63600175 PHARM REV CODE 636 W HCPCS

## 2022-10-30 PROCEDURE — 27000207 HC ISOLATION

## 2022-10-30 PROCEDURE — 85730 THROMBOPLASTIN TIME PARTIAL: CPT

## 2022-10-30 PROCEDURE — 99900035 HC TECH TIME PER 15 MIN (STAT)

## 2022-10-30 PROCEDURE — 99233 PR SUBSEQUENT HOSPITAL CARE,LEVL III: ICD-10-PCS | Mod: GC,,, | Performed by: PSYCHIATRY & NEUROLOGY

## 2022-10-30 PROCEDURE — 27000221 HC OXYGEN, UP TO 24 HOURS

## 2022-10-30 PROCEDURE — 94761 N-INVAS EAR/PLS OXIMETRY MLT: CPT

## 2022-10-30 PROCEDURE — 25000003 PHARM REV CODE 250: Performed by: NURSE PRACTITIONER

## 2022-10-30 PROCEDURE — 85610 PROTHROMBIN TIME: CPT

## 2022-10-30 PROCEDURE — 85025 COMPLETE CBC W/AUTO DIFF WBC: CPT | Performed by: NURSE PRACTITIONER

## 2022-10-30 PROCEDURE — S0166 INJ OLANZAPINE 2.5MG: HCPCS

## 2022-10-30 RX ORDER — FOLIC ACID 1 MG/1
1 TABLET ORAL DAILY
Status: DISCONTINUED | OUTPATIENT
Start: 2022-10-30 | End: 2022-11-08 | Stop reason: HOSPADM

## 2022-10-30 RX ORDER — SODIUM CHLORIDE 9 MG/ML
INJECTION, SOLUTION INTRAVENOUS
Status: DISCONTINUED | OUTPATIENT
Start: 2022-10-30 | End: 2022-11-08 | Stop reason: HOSPADM

## 2022-10-30 RX ORDER — OLANZAPINE 10 MG/2ML
2.5 INJECTION, POWDER, FOR SOLUTION INTRAMUSCULAR ONCE AS NEEDED
Status: COMPLETED | OUTPATIENT
Start: 2022-10-30 | End: 2022-10-30

## 2022-10-30 RX ORDER — THIAMINE HCL 100 MG
100 TABLET ORAL DAILY
Status: DISCONTINUED | OUTPATIENT
Start: 2022-10-30 | End: 2022-11-08 | Stop reason: HOSPADM

## 2022-10-30 RX ADMIN — FENTANYL CITRATE 25 MCG: 50 INJECTION, SOLUTION INTRAMUSCULAR; INTRAVENOUS at 01:10

## 2022-10-30 RX ADMIN — SODIUM CHLORIDE: 0.9 INJECTION, SOLUTION INTRAVENOUS at 12:10

## 2022-10-30 RX ADMIN — DEXMEDETOMIDINE HYDROCHLORIDE 0.1 MCG/KG/HR: 4 INJECTION INTRAVENOUS at 02:10

## 2022-10-30 RX ADMIN — FENTANYL CITRATE 25 MCG: 50 INJECTION, SOLUTION INTRAMUSCULAR; INTRAVENOUS at 09:10

## 2022-10-30 RX ADMIN — Medication 100 MG: at 11:10

## 2022-10-30 RX ADMIN — SODIUM CHLORIDE: 0.9 INJECTION, SOLUTION INTRAVENOUS at 02:10

## 2022-10-30 RX ADMIN — OLANZAPINE 2.5 MG: 10 INJECTION, POWDER, LYOPHILIZED, FOR SOLUTION INTRAMUSCULAR at 11:10

## 2022-10-30 RX ADMIN — FENTANYL CITRATE 25 MCG: 50 INJECTION, SOLUTION INTRAMUSCULAR; INTRAVENOUS at 11:10

## 2022-10-30 RX ADMIN — LEVOTHYROXINE SODIUM 50 MCG: 50 TABLET ORAL at 08:10

## 2022-10-30 RX ADMIN — FOLIC ACID 1 MG: 1 TABLET ORAL at 11:10

## 2022-10-30 RX ADMIN — LACOSAMIDE 100 MG: 10 INJECTION, SOLUTION INTRAVENOUS at 05:10

## 2022-10-30 RX ADMIN — THERA TABS 1 TABLET: TAB at 11:10

## 2022-10-30 NOTE — PT/OT/SLP PROGRESS
Speech Language Pathology      Eugenio Aldridge  MRN: [id number]    SLP Evaluation orders received and reviewed. Patient not seen this service day and ST orders discontinued 2/2 Patient to the OR for procedure (craniotomy) following placement of orders.  Patient will require new orders when appropriate.      10/30/2022

## 2022-10-30 NOTE — PLAN OF CARE
Recommendations     When/if able, ADAT to Regular (texture per SLP).  If unable to advance diet, place NGT & initiate TFs. Rec'd Isosource 1.5 @ 40 mL/hr - 1440 kcals, 65 g of protein, 733 mL fluid.  RD to monitor & follow-up.     Goals: Meet % EEN, EPN by RD f/u date  Nutrition Goal Status: new  Communication of RD Recs: reviewed with RN

## 2022-10-30 NOTE — CONSULTS
"  [facility] - Neuro Critical Care  Adult Nutrition  Consult Note    SUMMARY     Recommendations    When/if able, ADAT to Regular (texture per SLP).  If unable to advance diet, place NGT & initiate TFs. Rec'd Isosource 1.5 @ 40 mL/hr - 1440 kcals, 65 g of protein, 733 mL fluid.  RD to monitor & follow-up.    Goals: Meet % EEN, EPN by RD f/u date  Nutrition Goal Status: new  Communication of RD Recs: reviewed with RN    Assessment and Plan    Nutrition Problem:  Inadequate energy intake    Related to (etiology):   Inability to consume sufficient energy    Signs and Symptoms (as evidenced by):   NPO    Interventions(treatment strategy):  Collaboration of nutrition care w/ other providers    Nutrition Diagnosis Status:   New     Reason for Assessment    Reason For Assessment: consult  Diagnosis: other (see comments) (SDH)  Relevant Medical History: Ca, L. colectomy, HTN  Interdisciplinary Rounds: did not attend    General Information Comments: Currently NPO; EMERY for craniotomy this AM. +COVID-19. Unsure of PO intake PTA; UBW: 155-160# per chart review. RD unable to assess for malnutrition - will monitor energy intake & weight changes.  Nutrition Discharge Planning: Adequate nutrition    Nutrition/Diet History    Factors Affecting Nutritional Intake: NPO    Anthropometrics    Temp: 97.4 °F (36.3 °C)  Height Method: Stated  Height: 5' 5" (165.1 cm)  Height (inches): 65 in  Weight Method: Bed Scale  Weight: 66.5 kg (146 lb 9.7 oz)  Weight (lb): 146.61 lb  Ideal Body Weight (IBW), Female: 125 lb  % Ideal Body Weight, Female (lb): 117.29 %  BMI (Calculated): 24.4  BMI Grade: 18.5-24.9 - normal    Lab/Procedures/Meds    Pertinent Labs Reviewed: reviewed  Pertinent Medications Reviewed: reviewed  Pertinent Medications Comments: Precedex    Estimated/Assessed Needs    Weight Used For Calorie Calculations: 68 kg (149 lb 14.6 oz) (Dosing weight)    Energy Calorie Requirements (kcal): 1463 kcal/d  Energy Need Method: " Kristine Cheek (1.25 PAL)    Protein Requirements: 68-82 g/d (1-1.2 g/kg)  Weight Used For Protein Calculations: 68 kg (149 lb 14.6 oz)    Estimated Fluid Requirement Method: other (see comments) (Per MD or 1 mL/kcal)  RDA Method (mL): 1463    Nutrition Prescription Ordered    Current Diet Order: NPO    Evaluation of Received Nutrient/Fluid Intake    I/O: +1.7L since admit    Comments: LBM: 10/28    Nutrition Risk    Level of Risk/Frequency of Follow-up:  (1x/week)     Monitor and Evaluation    Food and Nutrient Intake: food and beverage intake, energy intake, enteral nutrition intake  Food and Nutrient Adminstration: diet order, enteral and parenteral nutrition administration  Physical Activity and Function: nutrition-related ADLs and IADLs  Anthropometric Measurements: weight, weight change  Biochemical Data, Medical Tests and Procedures: glucose/endocrine profile, inflammatory profile, lipid profile, gastrointestinal profile, electrolyte and renal panel  Nutrition-Focused Physical Findings: overall appearance     Nutrition Follow-Up    RD Follow-up?: Yes

## 2022-10-30 NOTE — ANESTHESIA POSTPROCEDURE EVALUATION
Anesthesia Post Evaluation    Patient: Becker B Nix    Procedure(s) Performed: Procedure(s) (LRB):  CRANIOTOMY, FOR SUBDURAL HEMATOMA EVACUATION (Left)    Final Anesthesia Type: general      Patient location during evaluation: PACU  Patient participation: Yes- Able to Participate  Level of consciousness: awake  Post-procedure vital signs: reviewed and stable  Pain management: adequate  Airway patency: patent    PONV status at discharge: No PONV  Anesthetic complications: no      Cardiovascular status: blood pressure returned to baseline  Respiratory status: unassisted  Hydration status: euvolemic  Follow-up not needed.          Vitals Value Taken Time   /61 10/30/22 0632   Temp 36.6 °C (97.9 °F) 10/30/22 0305   Pulse 55 10/30/22 0715   Resp 15 10/30/22 0715   SpO2 100 % 10/30/22 0715   Vitals shown include unvalidated device data.      No case tracking events are documented in the log.      Pain/Octaviano Score: Pain Rating Prior to Med Admin: 7 (10/29/2022 11:36 PM)  Pain Rating Post Med Admin: 0 (10/29/2022  4:29 AM)

## 2022-10-30 NOTE — SUBJECTIVE & OBJECTIVE
Interval History: 10/30: POD 1 s/p L crani for SDH evac. NAEON. AFVSS. Exam stable. Pain controlled. Tolerating PO. Voiding.     Medications:  Continuous Infusions:   dexmedetomidine (PRECEDEX) infusion 0.6 mcg/kg/hr (10/30/22 1800)     Scheduled Meds:   folic acid  1 mg Oral Daily    lacosamide (VIMPAT) IVPB  100 mg Intravenous Q12H    levothyroxine  50 mcg Oral Before breakfast    multivitamin  1 tablet Oral Daily    thiamine  100 mg Oral Daily     PRN Meds:sodium chloride 0.9%, fentaNYL, labetalol, ondansetron, sodium chloride 0.9%     Review of Systems  Objective:     Weight: 66.2 kg (146 lb)  Body mass index is 24.3 kg/m².  Vital Signs (Most Recent):  Temp: 98 °F (36.7 °C) (10/30/22 1500)  Pulse: 75 (10/30/22 1814)  Resp: (!) 25 (10/30/22 1814)  BP: (!) 129/59 (10/30/22 1800)  SpO2: (!) 87 % (10/30/22 1814)   Vital Signs (24h Range):  Temp:  [97.4 °F (36.3 °C)-98 °F (36.7 °C)] 98 °F (36.7 °C)  Pulse:  [53-92] 75  Resp:  [13-35] 25  SpO2:  [87 %-100 %] 87 %  BP: (101-160)/(57-72) 129/59  Arterial Line BP: ()/() 95/83     Date 10/30/22 0700 - 10/31/22 0659   Shift 3285-6154 8392-7188 1094-2437 24 Hour Total   INTAKE   P.O. 220   220   I.V.(mL/kg) 258.9(3.9) 34.5(0.5)  293.5(4.4)   IV Piggyback  26.2  26.2   Shift Total(mL/kg) 478.9(7.2) 60.8(0.9)  539.7(8.1)   OUTPUT   Urine(mL/kg/hr) 580(1.1) 250  830   Drains 40 0  40   Shift Total(mL/kg) 620(9.4) 250(3.8)  870(13.1)   Weight (kg) 66.2 66.2 66.2 66.2                        Closed/Suction Drain 10/29/22 1452 Left Scalp Accordion 10 Fr. (Active)   Site Description Healing 10/30/22 1501   Drainage Serosanguineous 10/30/22 1501   Status To bulb suction 10/30/22 1501   Output (mL) 0 mL 10/30/22 1500       Female External Urinary Catheter 10/30/22 1615 (Active)   Skin no redness;no breakdown;perineum cleansed w/ soap and water 10/30/22 1615   Tolerance no signs/symptoms of discomfort 10/30/22 1615   Suction Continuous suction at 70 mmHg 10/30/22 1615    Date of last wick change 10/30/22 10/30/22 1615   Time of last wick change 1615 10/30/22 1615       Physical Exam    Neurosurgery Physical Exam    GENERAL: resting comfortably  HEENT: NC, PERRL, mucous membranes moist  NECK: supple, trachea midline  CV: normal capillary refill  PULM: aerating well, symmetric expansion, no distress  ABD: soft, NT, ND  EXT: no c/c/e    NEURO:    AAO x 3  CN II-XII grossly intact  Fc x 4 antigravity  SILT    No drift or dysmetria    Cranial dressing c/d/I    SG HV with SS output      Significant Labs:  Recent Labs   Lab 10/29/22  0128 10/30/22  0623   * 112*    139   K 4.6 4.6    109   CO2 26 18*   BUN 12 10   CREATININE 0.7 0.6   CALCIUM 10.2 8.7   MG  --  2.2     Recent Labs   Lab 10/29/22  0128 10/30/22  0131   WBC 8.02 9.43   HGB 15.1 12.5   HCT 46.4 37.6    236     Recent Labs   Lab 10/30/22  0818   INR 1.0   APTT 25.0     Microbiology Results (last 7 days)       Procedure Component Value Units Date/Time    Influenza A & B by Molecular [[id number]] Collected: 10/29/22 0146    Order Status: Completed Specimen: Nasopharyngeal Swab Updated: 10/29/22 0254     Influenza A, Molecular Negative     Influenza B, Molecular Negative     Flu A & B Source Nasal swab          All pertinent labs from the last 24 hours have been reviewed.    Significant Diagnostics:  I have reviewed all pertinent imaging results/findings within the past 24 hours.  CT Head Without Contrast    Result Date: 10/30/2022  Expected postsurgical changes status post interval left-sided craniotomy and evacuation of the subdural hematoma.  Only mild residual blood products are identified with very mild residual midline shift.  Small new low-density subdural collection along the left side of the falx Electronically signed by: Joshua Sparrow Date:    10/30/2022 Time:    09:24

## 2022-10-30 NOTE — ASSESSMENT & PLAN NOTE
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     --Patient admitted to [facility] on telemetry      -q1h neurochecks in ICU, q2h neurochecks in stepdown, q4h neurochecks on floor  --All labs and diagnostics reviewed  --CTH interval stable.  --CTH postop with expected changes and only minor residual blood products.   --SG HV to full suction; record hourly outputs and empty once per shift.   --SBP <140  --Na >135  --Keppra 500 BID  --HOB >30  --ADAT  --ANGELO/SCD/SQH  --Continue to monitor clinically, notify NSGY immediately with any changes in neuro status    Dispo: [facility]

## 2022-10-30 NOTE — PLAN OF CARE
[facility] Care Plan    POC reviewed with Eugenio Aldridge at 0300. Pt verbalized understanding. Questions and concerns addressed. No acute events overnight. Normal saline Gtt  50  ml . Precedex titrated to to keep her calm. Her A. Line not draw back. Notified Toshia HERNANDEZ. We has to use blood pressure by the cuff.  Pt progressing toward goals. Pt got CT scan.Will continue to monitor. See below and flowsheets for full assessment and VS info.           Is this a stroke patient? ,No   Neuro:  Welcome Coma Scale  Best Eye Response: 4-->(E4) spontaneous  Best Motor Response: 6-->(M6) obeys commands  Best Verbal Response: 5-->(V5) oriented  Welcome Coma Scale Score: 15  Assessment Qualifiers: patient chemically sedated or paralyzed  Pupil PERRLA: yes     24hr Temp:  [97.6 °F (36.4 °C)-98.4 °F (36.9 °C)]     CV:   Rhythm: normal sinus rhythm  BP goals:   SBP < 160  MAP > 65    Resp:   O2 Device (Oxygen Therapy): nasal cannula         GI/:     Diet/Nutrition Received: NPO  Last Bowel Movement: 10/28/22  Voiding Characteristics: ureteral catheter    Intake/Output Summary (Last 24 hours) at 10/30/2022 0415  Last data filed at 10/30/2022 0305  Gross per 24 hour   Intake 2994.87 ml   Output 970 ml   Net 2024.87 ml          Labs/Accuchecks:  Recent Labs   Lab 10/30/22  0131   WBC 9.43   RBC 3.92*   HGB 12.5   HCT 37.6         Recent Labs   Lab 10/29/22  0128      K 4.6   CO2 26      BUN 12   CREATININE 0.7   ALKPHOS 76   ALT 20   AST 22   BILITOT 0.6    No results for input(s): PROTIME, INR, APTT, HEPANTIXA in the last 168 hours. No results for input(s): CPK, CPKMB, TROPONINI, MB in the last 168 hours.    Electrolytes: pending on lab result.    Accuchecks: none    Gtts:   sodium chloride 0.9% 50 mL/hr at 10/30/22 0305    dexmedetomidine (PRECEDEX) infusion 0.4 mcg/kg/hr (10/30/22 0305)       LDA/Wounds:  Lines/Drains/Airways       Drain  Duration                  Closed/Suction Drain 10/29/22 9187 Left Scalp Accordion 10  Fr. <1 day         Urethral Catheter 10/29/22 1316 Double-lumen;Latex 16 Fr. <1 day              Arterial Line  Duration             Arterial Line 10/29/22 1314 Left Radial <1 day              Peripheral Intravenous Line  Duration                  Peripheral IV - Single Lumen 10/29/22 20 G Left Antecubital 1 day         Peripheral IV - Single Lumen 10/29/22 0440 22 G Right Forearm <1 day                  Wounds: Yes  Wound care consulted: No

## 2022-10-30 NOTE — OP NOTE
Date of Operation:  10/29/2022.    Preoperative Diagnosis:  Left subdural hematoma.    Postoperative Diagnosis:  Left subdural hematoma.    Procedure:  Left frontotemporoparietal craniotomy, evacuation of subdural hematoma.    Surgeon:  Ion Souza MD    Assistant:  Gerda Dyer MD    Anesthesia:  General Endotracheal.    Estimated Blood Loss:  300 ml.    Drains:  Hemovac Subgaleal.    Condition at End of Procedure:  Satisfactory.    Brief History:  This 77-year-old lady has complained of worsening headache and was seen in the emergency room where CT scan showed a large left subacute subdural hematoma with compression of the left hemisphere and shift of midline.  She has a history of colon cancer.  She takes aspirin but no other anticoagulants.    Procedure in Detail:  The patient was brought to the operating room and in the supine position under premedication on the operating table intubated and induced with general anesthesia.  A cannula was started in the right radial artery for continuous blood pressure monitoring, a Douglas catheter inserted, sequential compression devices applied to the legs, and various intravenous line started.  The head was turned to the right and immobilized with the Grier 3 point skeletal fixation device and the table tilted somewhat toward Anesthesia to bring the temporoparietal area horizontal to the floor.  The scalp on the left side was shaved, prepped with Betadine and draped in a sterile fashion.  A Dandy type question-chago incision was outlined above her ear coming back toward the occiput and then somewhat off of midline to the hairline and this was injected with 1% xylocaine and epinephrine.  The incision was carried through the skin and galea bleeding controlled on the skin margin and skin flap with Martin clips.  The scalp was dissected a short distance and the pericranium temporalis muscle and fascia incised with the Bovie cutting current and the soft tissue lifted as a  single unit and retracted inferiorly with fishhook retractors.  Bur holes were made near the zygomatic process of the frontal bone and on the temporal squamous and a generous frontotemporoparietal craniotomy flap cut with the high-speed drill elevated and removed from the operative field.  Drill holes were made in the bone margin the dura tacked up to these with 4-0 Nurolon sutures.  Clean towels were placed around the craniotomy opening.    The dura was opened with a curvilinear incision hinged on the superior sagittal sinus and retracted medially with 4-0 Nurolon sutures.  A well-developed subdural membrane was present.  This was incized and then cut out and submitted for pathologic examination.  Partially clotted and partially liquified blood was removed with suction and irrigation.  There was also a fairly well-developed inner membrane and this was lifted off of the arachnoid and removed also and submitted for pathologic evaluation.  Thorough irrigation was then carried out until no further bleeding was seen.  The brain did come up with irrigation partially closing the subdural space.  The dura was closed with interrupted 4-0 Nurolon sutures and a piece of DuraGen placed over this.  The bone flap returned in place using the Return Path microplate system with 3 for sided boxes.  A Hemovac drain was placed in the subgaleal space and brought out through a separate stab incision posterior to the primary incision.  The galea was closed with inverted interrupted 3-0 Vicryl sutures and the skin closed with skin staples.  A Telfa bacitracin dressing was placed over the wound held in place with tape.  The patient's head was released from the 3 point skeletal fixation device, she was returned to full supine position, allowed to awaken from anesthesia, extubated and brought to the neuro intensive care unit in satisfactory condition.  She received 2 g of Rocephin and 1000 mg of Keppra at the beginning of the procedure and  Rocephin containing antibiotic irrigating solutions were used throughout.

## 2022-10-30 NOTE — NURSING
Pt went down for CT, neuro status and VS stabled, Precedex gtt at 0.6 to keep pt calm. Tolerated fine

## 2022-10-30 NOTE — PT/OT/SLP PROGRESS
Occupational Therapy      Patient Name:  Eugenio Aldridge   MRN:  [id number]    Patient not seen today secondary to (HOB restrictions, no OOB activity 2/2 craniotomy on 10/29). OT orders discontinued at this time, will require new OT orders when pt medically appropriate for OT evaluation and treatment.    10/30/2022

## 2022-10-30 NOTE — PLAN OF CARE
[facility] Care Plan    POC reviewed with Eugenio Aldridge and BABATUNDE (significant other) at 1100. Significant other needs reinforcement.  Patient has problems with short term memory.  Patient needs reinforcement.  Questions and concerns addressed. Pt not progressing toward goals. Will continue to monitor. See below and flowsheets for full assessment and VS info.     Patient remains confused and agitation.  Zyprexa admin x 1 dose.  Unsuccessful.   Precedex restarted for agitation  Fentanyl admin for pain  Subgaleal drain remain in place with 50 ml serosanguineous output    Is this a stroke patient? no    Neuro:  Sukumar Coma Scale  Best Eye Response: 4-->(E4) spontaneous  Best Motor Response: 6-->(M6) obeys commands  Best Verbal Response: 4-->(V4) confused  Sukumar Coma Scale Score: 14  Assessment Qualifiers: patient not sedated/intubated, no eye obstruction present  Pupil PERRLA: yes     24 hr Temp:  [97.4 °F (36.3 °C)-98 °F (36.7 °C)]     CV:   Rhythm: normal sinus rhythm  BP goals:   SBP < 160  MAP > 65    Resp:   O2 Device (Oxygen Therapy): nasal cannula       Plan:  O2 @ 2L per NC    GI/:     Diet/Nutrition Received: low saturated fat/low cholesterol, 2 gram sodium  Last Bowel Movement: 10/28/22  Voiding Characteristics: ureteral catheter    Intake/Output Summary (Last 24 hours) at 10/30/2022 1711  Last data filed at 10/30/2022 1600  Gross per 24 hour   Intake 1321.36 ml   Output 1595 ml   Net -273.64 ml          Labs/Accuchecks:  Recent Labs   Lab 10/30/22  0131   WBC 9.43   RBC 3.92*   HGB 12.5   HCT 37.6         Recent Labs   Lab 10/30/22  0623      K 4.6   CO2 18*      BUN 10   CREATININE 0.6   ALKPHOS 57   ALT 13   AST 18   BILITOT 0.4      Recent Labs   Lab 10/30/22  0818   INR 1.0   APTT 25.0    No results for input(s): CPK, CPKMB, TROPONINI, MB in the last 168 hours.    Electrolytes: No replacement orders  Accuchecks: none    Gtts:   dexmedetomidine (PRECEDEX) infusion 0.4 mcg/kg/hr (10/30/22 1634)        LDA/Wounds:  Lines/Drains/Airways       Drain  Duration                  Closed/Suction Drain 10/29/22 1452 Left Scalp Accordion 10 Fr. 1 day    Female External Urinary Catheter 10/30/22 1615 <1 day              Peripheral Intravenous Line  Duration                  Peripheral IV - Single Lumen 10/30/22 1400 20 G Right Upper Arm <1 day         Peripheral IV - Single Lumen 10/30/22 1425 20 G Left Upper Arm <1 day                  Wounds: Yes  Wound care consulted: No

## 2022-10-30 NOTE — PROGRESS NOTES
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  Patient reports that tonight around 6:00 p.m. she had a acute onset headache while she was lying down in bed.  She reports that it is the worse headache of her life and she does not regularly get headaches. Denies fever/chills, vision/hearing changes, dysphagia, dysarthria, nausea/vomiting, new-onset weakness or sensory change, bowel/bladder changes. Denies AC/AP. CTH with 1.3cm aSDH with approximately 5mm MLS.         Post-Op Info:  Procedure(s) (LRB):  CRANIOTOMY, FOR SUBDURAL HEMATOMA EVACUATION (Left)   1 Day Post-Op     Interval History: 10/30: POD 1 s/p L crani for SDH evac. NAEON. AFVSS. Exam stable. Pain controlled. Tolerating PO. Voiding.     Medications:  Continuous Infusions:   dexmedetomidine (PRECEDEX) infusion 0.6 mcg/kg/hr (10/30/22 1800)     Scheduled Meds:   folic acid  1 mg Oral Daily    lacosamide (VIMPAT) IVPB  100 mg Intravenous Q12H    levothyroxine  50 mcg Oral Before breakfast    multivitamin  1 tablet Oral Daily    thiamine  100 mg Oral Daily     PRN Meds:sodium chloride 0.9%, fentaNYL, labetalol, ondansetron, sodium chloride 0.9%     Review of Systems  Objective:     Weight: 66.2 kg (146 lb)  Body mass index is 24.3 kg/m².  Vital Signs (Most Recent):  Temp: 98 °F (36.7 °C) (10/30/22 1500)  Pulse: 75 (10/30/22 1814)  Resp: (!) 25 (10/30/22 1814)  BP: (!) 129/59 (10/30/22 1800)  SpO2: (!) 87 % (10/30/22 1814)   Vital Signs (24h Range):  Temp:  [97.4 °F (36.3 °C)-98 °F (36.7 °C)] 98 °F (36.7 °C)  Pulse:  [53-92] 75  Resp:  [13-35] 25  SpO2:  [87 %-100 %] 87 %  BP: (101-160)/(57-72) 129/59  Arterial Line BP: ()/() 95/83     Date 10/30/22 0700 - 10/31/22 0659   Shift 9382-9246 1723-0384 2655-8151 24 Hour Total    INTAKE   P.O. 220   220   I.V.(mL/kg) 258.9(3.9) 34.5(0.5)  293.5(4.4)   IV Piggyback  26.2  26.2   Shift Total(mL/kg) 478.9(7.2) 60.8(0.9)  539.7(8.1)   OUTPUT   Urine(mL/kg/hr) 580(1.1) 250  830   Drains 40 0  40   Shift Total(mL/kg) 620(9.4) 250(3.8)  870(13.1)   Weight (kg) 66.2 66.2 66.2 66.2                        Closed/Suction Drain 10/29/22 1452 Left Scalp Accordion 10 Fr. (Active)   Site Description Healing 10/30/22 1501   Drainage Serosanguineous 10/30/22 1501   Status To bulb suction 10/30/22 1501   Output (mL) 0 mL 10/30/22 1500       Female External Urinary Catheter 10/30/22 1615 (Active)   Skin no redness;no breakdown;perineum cleansed w/ soap and water 10/30/22 1615   Tolerance no signs/symptoms of discomfort 10/30/22 1615   Suction Continuous suction at 70 mmHg 10/30/22 1615   Date of last wick change 10/30/22 10/30/22 1615   Time of last wick change 1615 10/30/22 1615       Physical Exam    Neurosurgery Physical Exam    GENERAL: resting comfortably  HEENT: NC, PERRL, mucous membranes moist  NECK: supple, trachea midline  CV: normal capillary refill  PULM: aerating well, symmetric expansion, no distress  ABD: soft, NT, ND  EXT: no c/c/e    NEURO:    AAO x 3  CN II-XII grossly intact  Fc x 4 antigravity  SILT    No drift or dysmetria    Cranial dressing c/d/I    SG HV with SS output      Significant Labs:  Recent Labs   Lab 10/29/22  0128 10/30/22  0623   * 112*    139   K 4.6 4.6    109   CO2 26 18*   BUN 12 10   CREATININE 0.7 0.6   CALCIUM 10.2 8.7   MG  --  2.2     Recent Labs   Lab 10/29/22  0128 10/30/22  0131   WBC 8.02 9.43   HGB 15.1 12.5   HCT 46.4 37.6    236     Recent Labs   Lab 10/30/22  0818   INR 1.0   APTT 25.0     Microbiology Results (last 7 days)       Procedure Component Value Units Date/Time    Influenza A & B by Molecular [[id number]] Collected: 10/29/22 0146    Order Status: Completed Specimen: Nasopharyngeal Swab Updated: 10/29/22 0254     Influenza  A, Molecular Negative     Influenza B, Molecular Negative     Flu A & B Source Nasal swab          All pertinent labs from the last 24 hours have been reviewed.    Significant Diagnostics:  I have reviewed all pertinent imaging results/findings within the past 24 hours.  CT Head Without Contrast    Result Date: 10/30/2022  Expected postsurgical changes status post interval left-sided craniotomy and evacuation of the subdural hematoma.  Only mild residual blood products are identified with very mild residual midline shift.  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     --Patient admitted to [facility] on telemetry      -q1h neurochecks in ICU, q2h neurochecks in stepdown, q4h neurochecks on floor  --All labs and diagnostics reviewed  --CTH interval stable.  --CTH postop with expected changes and only minor residual blood products.   --SG HV to full suction; record hourly outputs and empty once per shift.   --SBP <140  --Na >135  --Keppra 500 BID  --HOB >30  --ADAT  --ANGELO/SCD/SQH  --Continue to monitor clinically, notify NSGY immediately with any changes in neuro status    Dispo: [facility]        Joey Tavarez MD  Neurosurgery  [facility]

## 2022-10-31 LAB
ALBUMIN SERPL BCP-MCNC: 3.2 G/DL (ref 3.5–5.2)
ALP SERPL-CCNC: 61 U/L (ref 55–135)
ALT SERPL W/O P-5'-P-CCNC: 13 U/L (ref 10–44)
ANION GAP SERPL CALC-SCNC: 10 MMOL/L (ref 8–16)
AST SERPL-CCNC: 29 U/L (ref 10–40)
BASOPHILS # BLD AUTO: 0.03 K/UL (ref 0–0.2)
BASOPHILS NFR BLD: 0.3 % (ref 0–1.9)
BILIRUB SERPL-MCNC: 0.5 MG/DL (ref 0.1–1)
BUN SERPL-MCNC: 11 MG/DL (ref 8–23)
CALCIUM SERPL-MCNC: 9.5 MG/DL (ref 8.7–10.5)
CHLORIDE SERPL-SCNC: 107 MMOL/L (ref 95–110)
CO2 SERPL-SCNC: 24 MMOL/L (ref 23–29)
CREAT SERPL-MCNC: 0.6 MG/DL (ref 0.5–1.4)
DIFFERENTIAL METHOD: ABNORMAL
EOSINOPHIL # BLD AUTO: 0 K/UL (ref 0–0.5)
EOSINOPHIL NFR BLD: 0.2 % (ref 0–8)
ERYTHROCYTE [DISTWIDTH] IN BLOOD BY AUTOMATED COUNT: 14 % (ref 11.5–14.5)
EST. GFR  (NO RACE VARIABLE): >60 ML/MIN/1.73 M^2
GLUCOSE SERPL-MCNC: 119 MG/DL (ref 70–110)
HCT VFR BLD AUTO: 36 % (ref 37–48.5)
HGB BLD-MCNC: 12 G/DL (ref 12–16)
IMM GRANULOCYTES # BLD AUTO: 0.03 K/UL (ref 0–0.04)
IMM GRANULOCYTES NFR BLD AUTO: 0.3 % (ref 0–0.5)
LYMPHOCYTES # BLD AUTO: 0.7 K/UL (ref 1–4.8)
LYMPHOCYTES NFR BLD: 6.6 % (ref 18–48)
MAGNESIUM SERPL-MCNC: 2.1 MG/DL (ref 1.6–2.6)
MCH RBC QN AUTO: 32.3 PG (ref 27–31)
MCHC RBC AUTO-ENTMCNC: 33.3 G/DL (ref 32–36)
MCV RBC AUTO: 97 FL (ref 82–98)
MONOCYTES # BLD AUTO: 1 K/UL (ref 0.3–1)
MONOCYTES NFR BLD: 9.6 % (ref 4–15)
NEUTROPHILS # BLD AUTO: 8.3 K/UL (ref 1.8–7.7)
NEUTROPHILS NFR BLD: 83 % (ref 38–73)
NRBC BLD-RTO: 0 /100 WBC
PHOSPHATE SERPL-MCNC: 3.1 MG/DL (ref 2.7–4.5)
PLATELET # BLD AUTO: 257 K/UL (ref 150–450)
PLATELET BLD QL SMEAR: ABNORMAL
PMV BLD AUTO: 10 FL (ref 9.2–12.9)
POTASSIUM SERPL-SCNC: 4.6 MMOL/L (ref 3.5–5.1)
PROT SERPL-MCNC: 6.7 G/DL (ref 6–8.4)
RBC # BLD AUTO: 3.72 M/UL (ref 4–5.4)
SODIUM SERPL-SCNC: 141 MMOL/L (ref 136–145)
WBC # BLD AUTO: 9.96 K/UL (ref 3.9–12.7)

## 2022-10-31 PROCEDURE — 83735 ASSAY OF MAGNESIUM: CPT | Performed by: PSYCHIATRY & NEUROLOGY

## 2022-10-31 PROCEDURE — 25000003 PHARM REV CODE 250

## 2022-10-31 PROCEDURE — 85025 COMPLETE CBC W/AUTO DIFF WBC: CPT | Performed by: NURSE PRACTITIONER

## 2022-10-31 PROCEDURE — 27000207 HC ISOLATION

## 2022-10-31 PROCEDURE — 99233 PR SUBSEQUENT HOSPITAL CARE,LEVL III: ICD-10-PCS | Mod: CR,,, | Performed by: PSYCHIATRY & NEUROLOGY

## 2022-10-31 PROCEDURE — 63600175 PHARM REV CODE 636 W HCPCS

## 2022-10-31 PROCEDURE — 25000003 PHARM REV CODE 250: Performed by: NURSE PRACTITIONER

## 2022-10-31 PROCEDURE — 99233 SBSQ HOSP IP/OBS HIGH 50: CPT | Mod: CR,,, | Performed by: PSYCHIATRY & NEUROLOGY

## 2022-10-31 PROCEDURE — C9254 INJECTION, LACOSAMIDE: HCPCS

## 2022-10-31 PROCEDURE — 80053 COMPREHEN METABOLIC PANEL: CPT | Performed by: NURSE PRACTITIONER

## 2022-10-31 PROCEDURE — 25000003 PHARM REV CODE 250: Performed by: PSYCHIATRY & NEUROLOGY

## 2022-10-31 PROCEDURE — 20000000 HC ICU ROOM

## 2022-10-31 PROCEDURE — 84100 ASSAY OF PHOSPHORUS: CPT | Performed by: PSYCHIATRY & NEUROLOGY

## 2022-10-31 RX ORDER — AMOXICILLIN 250 MG
1 CAPSULE ORAL DAILY PRN
Status: DISCONTINUED | OUTPATIENT
Start: 2022-10-31 | End: 2022-11-08 | Stop reason: HOSPADM

## 2022-10-31 RX ORDER — LACOSAMIDE 50 MG/1
100 TABLET ORAL EVERY 12 HOURS
Status: COMPLETED | OUTPATIENT
Start: 2022-10-31 | End: 2022-11-05

## 2022-10-31 RX ORDER — POLYETHYLENE GLYCOL 3350 17 G/17G
17 POWDER, FOR SOLUTION ORAL DAILY
Status: DISCONTINUED | OUTPATIENT
Start: 2022-10-31 | End: 2022-11-08 | Stop reason: HOSPADM

## 2022-10-31 RX ORDER — ACETAMINOPHEN 325 MG/1
650 TABLET ORAL EVERY 6 HOURS PRN
Status: DISCONTINUED | OUTPATIENT
Start: 2022-10-31 | End: 2022-10-31

## 2022-10-31 RX ORDER — DONEPEZIL HYDROCHLORIDE 5 MG/1
10 TABLET, FILM COATED ORAL NIGHTLY
Status: DISCONTINUED | OUTPATIENT
Start: 2022-10-31 | End: 2022-11-08 | Stop reason: HOSPADM

## 2022-10-31 RX ADMIN — FOLIC ACID 1 MG: 1 TABLET ORAL at 09:10

## 2022-10-31 RX ADMIN — THERA TABS 1 TABLET: TAB at 09:10

## 2022-10-31 RX ADMIN — LEVOTHYROXINE SODIUM 50 MCG: 50 TABLET ORAL at 06:10

## 2022-10-31 RX ADMIN — Medication 100 MG: at 09:10

## 2022-10-31 RX ADMIN — DONEPEZIL HYDROCHLORIDE 10 MG: 5 TABLET, FILM COATED ORAL at 08:10

## 2022-10-31 RX ADMIN — LACOSAMIDE 100 MG: 50 TABLET, FILM COATED ORAL at 08:10

## 2022-10-31 RX ADMIN — ACETAMINOPHEN 650 MG: 325 TABLET ORAL at 05:10

## 2022-10-31 RX ADMIN — LACOSAMIDE 100 MG: 10 INJECTION, SOLUTION INTRAVENOUS at 06:10

## 2022-10-31 RX ADMIN — DEXMEDETOMIDINE HYDROCHLORIDE 0.3 MCG/KG/HR: 4 INJECTION INTRAVENOUS at 06:10

## 2022-10-31 RX ADMIN — POLYETHYLENE GLYCOL 3350 17 G: 17 POWDER, FOR SOLUTION ORAL at 02:10

## 2022-10-31 NOTE — ASSESSMENT & PLAN NOTE
-SBP <140  -echo with EF 60 with normal ventricle size, systolic and diastolic function; CVP 3 mmHg

## 2022-10-31 NOTE — SUBJECTIVE & OBJECTIVE
Interval History:  NAEO    Review of Systems   Unable to perform ROS: Dementia     Objective:     Vitals:  Temp: 99.6 °F (37.6 °C)  Pulse: 69  Rhythm: sinus bradycardia  BP: (!) 146/68  MAP (mmHg): 98  Resp: 18  SpO2: 99 %  O2 Device (Oxygen Therapy): nasal cannula    Temp  Min: 98.9 °F (37.2 °C)  Max: 99.6 °F (37.6 °C)  Pulse  Min: 52  Max: 82  BP  Min: 99/54  Max: 146/68  MAP (mmHg)  Min: 74  Max: 98  Resp  Min: 18  Max: 32  SpO2  Min: 87 %  Max: 100 %  Oxygen Concentration (%)  Min: 3  Max: 3    10/30 0701 - 10/31 0700  In: 759.6 [P.O.:220; I.V.:440]  Out: 1278 [Urine:1230; Drains:48]   Unmeasured Output  Urine Occurrence: 1  Stool Occurrence: 0  Emesis Occurrence: 0  Pad Count: 1       Physical Exam  Constitutional:       Appearance: Normal appearance. She is normal weight.   HENT:      Head: Normocephalic and atraumatic.      Right Ear: External ear normal.      Left Ear: External ear normal.      Nose: Nose normal.      Mouth/Throat:      Mouth: Mucous membranes are moist.      Pharynx: Oropharynx is clear.   Eyes:      Extraocular Movements: Extraocular movements intact.      Conjunctiva/sclera: Conjunctivae normal.      Pupils: Pupils are equal, round, and reactive to light.   Cardiovascular:      Rate and Rhythm: Normal rate and regular rhythm.      Heart sounds: Normal heart sounds.   Pulmonary:      Effort: Pulmonary effort is normal. No respiratory distress.      Breath sounds: Normal breath sounds. No wheezing.   Abdominal:      General: Abdomen is flat. Bowel sounds are normal.      Palpations: Abdomen is soft.   Musculoskeletal:      Cervical back: Neck supple.   Neurological:      Mental Status: She is alert.      Comments: Exam stable from prior; AAOx4 with short term memory loss   Psychiatric:         Mood and Affect: Mood normal.         Behavior: Behavior normal.         Medications:  Continuousdexmedetomidine (PRECEDEX) infusion, Last Rate: 0.3 mcg/kg/hr (10/31/22 1103)    ScheduleddonepeziL, 10  mg, QHS  folic acid, 1 mg, Daily  lacosamide, 100 mg, Q12H  levothyroxine, 50 mcg, Before breakfast  multivitamin, 1 tablet, Daily  polyethylene glycol, 17 g, Daily  thiamine, 100 mg, Daily    PRNsodium chloride 0.9%, , PRN  acetaminophen, 480 mg, Q6H PRN  fentaNYL, 25 mcg, Q1H PRN  labetalol, 10 mg, Q4H PRN  ondansetron, 4 mg, Q8H PRN  senna-docusate 8.6-50 mg, 1 tablet, Daily PRN  sodium chloride 0.9%, 10 mL, PRN        Diet  Diet Adult Regular (IDDSI Level 7) [facility]; Cardiac (Low Na/Chol); Isolation Tray - [facility]

## 2022-10-31 NOTE — PLAN OF CARE
[facility] Care Plan    POC reviewed with Eugenio Aldridge at 0300. Pt verbalized understanding. Questions and concerns addressed. No acute events overnight. Precedex gtt titrated to keep her comfortable.  Pt progressing toward goals. Will continue to monitor. See below and flowsheets for full assessment and VS info.           Is this a stroke patient? no    Neuro:  Sukumar Coma Scale  Best Eye Response: 4-->(E4) spontaneous  Best Motor Response: 6-->(M6) obeys commands  Best Verbal Response: 4-->(V4) confused  Sukumar Coma Scale Score: 14  Assessment Qualifiers: patient chemically sedated or paralyzed  Pupil PERRLA: yes     24hr Temp:  [97.4 °F (36.3 °C)-99.6 °F (37.6 °C)]     CV:   Rhythm: normal sinus rhythm  BP goals:   SBP < 160  MAP > 65    Resp:   O2 Device (Oxygen Therapy): nasal cannula         GI/:     Diet/Nutrition Received: low saturated fat/low cholesterol  Last Bowel Movement: 10/28/22  Voiding Characteristics: external catheter    Intake/Output Summary (Last 24 hours) at 10/31/2022 0433  Last data filed at 10/31/2022 0305  Gross per 24 hour   Intake 932.88 ml   Output 1160 ml   Net -227.12 ml     Unmeasured Output  Urine Occurrence: 1  Stool Occurrence: 0  Emesis Occurrence: 0  Pad Count: 1    Labs/Accuchecks:  Recent Labs   Lab 10/31/22  0235   WBC 9.96   RBC 3.72*   HGB 12.0   HCT 36.0*         Recent Labs   Lab 10/31/22  0235      K 4.6   CO2 24      BUN 11   CREATININE 0.6   ALKPHOS 61   ALT 13   AST 29   BILITOT 0.5      Recent Labs   Lab 10/30/22  0818   INR 1.0   APTT 25.0    No results for input(s): CPK, CPKMB, TROPONINI, MB in the last 168 hours.    Electrolytes: N/A - electrolytes WDL  Accuchecks: none    Gtts:   dexmedetomidine (PRECEDEX) infusion 0.5 mcg/kg/hr (10/31/22 0305)       LDA/Wounds:  Lines/Drains/Airways       Drain  Duration                  Closed/Suction Drain 10/29/22 1452 Left Scalp Accordion 10 Fr. 1 day    Female External Urinary Catheter 10/30/22 1615 <1 day               Peripheral Intravenous Line  Duration                  Peripheral IV - Single Lumen 10/30/22 1400 20 G Right Upper Arm <1 day         Peripheral IV - Single Lumen 10/30/22 1425 20 G Left Upper Arm <1 day                  Wounds: Yes  Wound care consulted: No

## 2022-10-31 NOTE — HOSPITAL COURSE
10/30/2022 Pt s/p left frontotemporoparietal craniotomy, evacuation of subdural hematoma; with improved/stable post-op imaging.  10/31/2022: Weaning precedex gtt. Neurologically intact; mentation stable from prior exam. Donepezil and VIMPAT  11/01/2022 Pt stepped down to NSGY, neurologically intact.

## 2022-10-31 NOTE — PROGRESS NOTES
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  Patient reports that tonight around 6:00 p.m. she had a acute onset headache while she was lying down in bed.  She reports that it is the worse headache of her life and she does not regularly get headaches. Denies fever/chills, vision/hearing changes, dysphagia, dysarthria, nausea/vomiting, new-onset weakness or sensory change, bowel/bladder changes. Denies AC/AP. CTH with 1.3cm aSDH with approximately 5mm MLS.         Post-Op Info:  Procedure(s) (LRB):  CRANIOTOMY, FOR SUBDURAL HEMATOMA EVACUATION (Left)   2 Days Post-Op     Interval History: 10/31: POD 2. NAEON. AFVSS. Exam stable. HV in place.     Medications:  Continuous Infusions:   dexmedetomidine (PRECEDEX) infusion 0.5 mcg/kg/hr (10/31/22 0405)     Scheduled Meds:   folic acid  1 mg Oral Daily    lacosamide (VIMPAT) IVPB  100 mg Intravenous Q12H    levothyroxine  50 mcg Oral Before breakfast    multivitamin  1 tablet Oral Daily    thiamine  100 mg Oral Daily     PRN Meds:sodium chloride 0.9%, fentaNYL, labetalol, ondansetron, sodium chloride 0.9%     Review of Systems  Objective:     Weight: 66.2 kg (146 lb)  Body mass index is 24.3 kg/m².  Vital Signs (Most Recent):  Temp: 99.6 °F (37.6 °C) (10/31/22 0305)  Pulse: 60 (10/31/22 0305)  Resp: (!) 22 (10/31/22 0305)  BP: 129/60 (10/31/22 0305)  SpO2: 99 % (10/31/22 0305)   Vital Signs (24h Range):  Temp:  [97.4 °F (36.3 °C)-99.6 °F (37.6 °C)] 99.6 °F (37.6 °C)  Pulse:  [58-92] 60  Resp:  [15-35] 22  SpO2:  [87 %-100 %] 99 %  BP: ()/(54-72) 129/60  Arterial Line BP: ()/(70-83) 95/83                            Closed/Suction Drain 10/29/22 1452 Left Scalp Accordion 10 Fr. (Active)   Site Description Healing 10/30/22 1501   Drainage  Serosanguineous 10/30/22 1501   Status To bulb suction 10/30/22 1501   Output (mL) 0 mL 10/30/22 1500       Female External Urinary Catheter 10/30/22 1615 (Active)   Skin no redness;no breakdown;perineum cleansed w/ soap and water 10/30/22 1615   Tolerance no signs/symptoms of discomfort 10/30/22 1615   Suction Continuous suction at 70 mmHg 10/30/22 1615   Date of last wick change 10/30/22 10/30/22 1615   Time of last wick change 1615 10/30/22 1615       Physical Exam    Neurosurgery Physical Exam    GENERAL: resting comfortably  HEENT: NC, PERRL, mucous membranes moist  NECK: supple, trachea midline  CV: normal capillary refill  PULM: aerating well, symmetric expansion, no distress  ABD: soft, NT, ND  EXT: no c/c/e    NEURO:    AAO x 3  CN II-XII grossly intact  Fc x 4 antigravity  SILT    No drift or dysmetria    Cranial dressing c/d/I    SG HV with SS output      Significant Labs:  Recent Labs   Lab 10/30/22  0623 10/31/22  0235   * 119*    141   K 4.6 4.6    107   CO2 18* 24   BUN 10 11   CREATININE 0.6 0.6   CALCIUM 8.7 9.5   MG 2.2 2.1       Recent Labs   Lab 10/30/22  0131 10/31/22  0235   WBC 9.43 9.96   HGB 12.5 12.0   HCT 37.6 36.0*    257       Recent Labs   Lab 10/30/22  0818   INR 1.0   APTT 25.0       Microbiology Results (last 7 days)       Procedure Component Value Units Date/Time    Influenza A & B by Molecular [[id number]] Collected: 10/29/22 0146    Order Status: Completed Specimen: Nasopharyngeal Swab Updated: 10/29/22 0254     Influenza A, Molecular Negative     Influenza B, Molecular Negative     Flu A & B Source Nasal swab          All pertinent labs from the last 24 hours have been reviewed.    Significant Diagnostics:  I have reviewed all pertinent imaging results/findings within the past 24 hours.  No results found in the last 24 hours.    Assessment/Plan:     * SDH (subdural hematoma)  77-year-old female with a past medical history of splenic flexure cancer s/p  extended left colectomy, who presents with a new onset 1 day history of headache. 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     --Patient admitted to [facility] on telemetry      -q1h neurochecks in ICU, q2h neurochecks in stepdown, q4h neurochecks on floor  --All labs and diagnostics reviewed  --CTH interval stable.  --CTH postop with expected changes and only minor residual blood products.   --SG HV to full suction; record hourly outputs and empty once per shift.   --SBP <140  --Na >135  --Keppra 500 BID  --HOB >30  --ADAT  --ANGELO/SCD/SQH  --Continue to monitor clinically, notify NSGY immediately with any changes in neuro status    Dispo: Possible drain removal today. C/w ICU care.         Joey Tavarez MD  Neurosurgery  [facility]

## 2022-10-31 NOTE — ASSESSMENT & PLAN NOTE
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     --Patient admitted to [facility] on telemetry      -q1h neurochecks in ICU, q2h neurochecks in stepdown, q4h neurochecks on floor  --All labs and diagnostics reviewed  --CTH interval stable.  --CTH postop with expected changes and only minor residual blood products.   --SG HV to full suction; record hourly outputs and empty once per shift.   --SBP <140  --Na >135  --Keppra 500 BID  --HOB >30  --ADAT  --ANGELO/SCD/SQH  --Continue to monitor clinically, notify NSGY immediately with any changes in neuro status    Dispo: Possible drain removal today. C/w ICU care.

## 2022-10-31 NOTE — ASSESSMENT & PLAN NOTE
76 yo Fwith a admitted to [facility] with a new onset 1 day history of headache. CTH with 2.2cm aSDH with approximately 5mm MLS, now s/p craniotomy per NSGY. Initial CTH: New large left mixed density extra-axial collection along the left cerebral convexity concerning for acute on chronic or subacute with acute component subdural hemorrhage. The collection measures 2.2 cm with significant mass effect on the left hemicerebrum with 5 mm rightward midline shift, subfalcine herniation and effacement of the left lateral ventricle    Follow up CTH with expected post craniotomy changes, mild residual blood products & mild residual midline shift, with small new low-density subdural collection along left side of falx    - not on AC or AP  - NSGY  Following: CTH interval stable; SG HV to full suction; record hourly outputs and empty once per shift.   - Na >135  - SBP <140  - Q 1 vitals   - Q 1 neuro checks   -  Pt/OT/SLP eval and treat  - VIMPAT 100mg BID

## 2022-10-31 NOTE — ASSESSMENT & PLAN NOTE
76 yo Fwith a admitted to [facility] with a new onset 1 day history of headache. CTH with 2.2cm aSDH with approximately 5mm MLS, now s/p craniotomy per NSGY. Initial CTH: New large left mixed density extra-axial collection along the left cerebral convexity concerning for acute on chronic or subacute with acute component subdural hemorrhage. The collection measures 2.2 cm with significant mass effect on the left hemicerebrum with 5 mm rightward midline shift, subfalcine herniation and effacement of the left lateral ventricle    Follow up CTH with expected post craniotomy changes, mild residual blood products & mild residual midline shift, with small new low-density subdural collection along left side of falx    - Anticoagulation status: none   - NSGY  Following, recs appreciated  - SBP <140  - Q 1 vitals   - Q 1 neuro checks   -  Pt/OT/SLP eval and treat  - Keppra 500 mg BID

## 2022-10-31 NOTE — SUBJECTIVE & OBJECTIVE
Interval History:  See hospital course    Review of Systems   Constitutional:  Negative for chills, fever and unexpected weight change.   HENT:  Negative for hearing loss.    Eyes:  Negative for visual disturbance.   Respiratory:  Negative for chest tightness and shortness of breath.    Gastrointestinal:  Negative for abdominal pain.   Genitourinary:  Negative for dysuria.   Musculoskeletal:  Negative for arthralgias.   Neurological:  Negative for dizziness.   Psychiatric/Behavioral:  Negative for hallucinations.      Objective:     Vitals:  Temp: 99.6 °F (37.6 °C)  Pulse: 71  Rhythm: normal sinus rhythm  BP: (!) 107/55  MAP (mmHg): 76  Resp: 20  SpO2: 100 %  O2 Device (Oxygen Therapy): nasal cannula    Temp  Min: 97.4 °F (36.3 °C)  Max: 99.6 °F (37.6 °C)  Pulse  Min: 53  Max: 92  BP  Min: 107/55  Max: 160/72  MAP (mmHg)  Min: 76  Max: 104  Resp  Min: 13  Max: 35  SpO2  Min: 87 %  Max: 100 %    10/29 0701 - 10/30 0700  In: 3157.1 [I.V.:896.6]  Out: 1370 [Urine:1220; Drains:150]   Unmeasured Output  Stool Occurrence: (P) 0       Physical Exam  Vitals and nursing note reviewed.   HENT:      Nose: No congestion.      Mouth/Throat:      Mouth: Mucous membranes are moist.   Eyes:      Pupils: Pupils are equal, round, and reactive to light.   Cardiovascular:      Rate and Rhythm: Normal rate.   Pulmonary:      Effort: No respiratory distress.   Abdominal:      General: There is no distension.   Musculoskeletal:      Cervical back: Normal range of motion.      Right lower leg: No edema.      Left lower leg: No edema.   Neurological:      Mental Status: She is alert.      Cranial Nerves: Cranial nerve deficit present.     Neuro exam    GCS E4 V5 M 6  Alert, comfortable; AAO x 3  CN II-XII intact  PERRL  Corneal reflex intact  LUE strength 4/5  RUE strength 4/5  LLE strength 4/5  RLE strength 4/5  SILT    No drift    Medications:  Continuousdexmedetomidine (PRECEDEX) infusion, Last Rate: 0.6 mcg/kg/hr (10/30/22  1905)    Scheduledfolic acid, 1 mg, Daily  lacosamide (VIMPAT) IVPB, 100 mg, Q12H  levothyroxine, 50 mcg, Before breakfast  multivitamin, 1 tablet, Daily  thiamine, 100 mg, Daily    PRNsodium chloride 0.9%, , PRN  fentaNYL, 25 mcg, Q1H PRN  labetalol, 10 mg, Q4H PRN  ondansetron, 4 mg, Q8H PRN  sodium chloride 0.9%, 10 mL, PRN      Today I personally reviewed pertinent medications, lines/drains/airways, imaging, cardiology results, laboratory results, microbiology results, notably:    Diet  Diet Adult Regular (IDDSI Level 7) [facility]; Cardiac (Low Na/Chol); Isolation Tray - Styrofoam  Diet Adult Regular (IDDSI Level 7) [facility]; Cardiac (Low Na/Chol); Isolation Tray - Styrofoam

## 2022-10-31 NOTE — ASSESSMENT & PLAN NOTE
- SBP <160   -echo with EF 60 with normal ventricle size, systolic and diastolic function; CVP 3 mmHg

## 2022-10-31 NOTE — PROGRESS NOTES
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 She presented to ED with a new onset 1 day history of headache.  Patient reports that tonight around 6:00 p.m. she had a acute onset headache while she was lying down in bed.  She reports that it is the worse headache of her life and she does not regularly get headaches. Denies fever/chills, vision/hearing changes, dysphagia, dysarthria, nausea/vomiting, new-onset weakness or sensory change, bowel/bladder changes. Denies AC/AP. CTH with 1.3cm aSDH with approximately 5mm MLS. OR plan pending per nsgy. She is being admitted to [facility] for a higher level of care.       Hospital Course: 10/30/2022 Pt s/p left frontotemporoparietal craniotomy, evacuation of subdural hematoma; with improved/stable post-op imaging.  10/31/2022: Weaning precedex gtt. Neurologically intact; mentation stable from prior exam. Donepezil and VIMPAT      Interval History:  NAEO    Review of Systems   Unable to perform ROS: Dementia     Objective:     Vitals:  Temp: 99.6 °F (37.6 °C)  Pulse: 69  Rhythm: sinus bradycardia  BP: (!) 146/68  MAP (mmHg): 98  Resp: 18  SpO2: 99 %  O2 Device (Oxygen Therapy): nasal cannula    Temp  Min: 98.9 °F (37.2 °C)  Max: 99.6 °F (37.6 °C)  Pulse  Min: 52  Max: 82  BP  Min: 99/54  Max: 146/68  MAP (mmHg)  Min: 74  Max: 98  Resp  Min: 18  Max: 32  SpO2  Min: 87 %  Max: 100 %  Oxygen Concentration (%)  Min: 3  Max: 3    10/30 0701 - 10/31 0700  In: 759.6 [P.O.:220; I.V.:440]  Out: 1278 [Urine:1230; Drains:48]   Unmeasured Output  Urine Occurrence: 1  Stool Occurrence: 0  Emesis Occurrence: 0  Pad Count: 1       Physical  Exam  Constitutional:       Appearance: Normal appearance. She is normal weight.   HENT:      Head: Normocephalic and atraumatic.      Right Ear: External ear normal.      Left Ear: External ear normal.      Nose: Nose normal.      Mouth/Throat:      Mouth: Mucous membranes are moist.      Pharynx: Oropharynx is clear.   Eyes:      Extraocular Movements: Extraocular movements intact.      Conjunctiva/sclera: Conjunctivae normal.      Pupils: Pupils are equal, round, and reactive to light.   Cardiovascular:      Rate and Rhythm: Normal rate and regular rhythm.      Heart sounds: Normal heart sounds.   Pulmonary:      Effort: Pulmonary effort is normal. No respiratory distress.      Breath sounds: Normal breath sounds. No wheezing.   Abdominal:      General: Abdomen is flat. Bowel sounds are normal.      Palpations: Abdomen is soft.   Musculoskeletal:      Cervical back: Neck supple.   Neurological:      Mental Status: She is alert.      Comments: Exam stable from prior; AAOx4 with short term memory loss   Psychiatric:         Mood and Affect: Mood normal.         Behavior: Behavior normal.         Medications:  Continuousdexmedetomidine (PRECEDEX) infusion, Last Rate: 0.3 mcg/kg/hr (10/31/22 1103)    ScheduleddonepeziL, 10 mg, QHS  folic acid, 1 mg, Daily  lacosamide, 100 mg, Q12H  levothyroxine, 50 mcg, Before breakfast  multivitamin, 1 tablet, Daily  polyethylene glycol, 17 g, Daily  thiamine, 100 mg, Daily    PRNsodium chloride 0.9%, , PRN  acetaminophen, 480 mg, Q6H PRN  fentaNYL, 25 mcg, Q1H PRN  labetalol, 10 mg, Q4H PRN  ondansetron, 4 mg, Q8H PRN  senna-docusate 8.6-50 mg, 1 tablet, Daily PRN  sodium chloride 0.9%, 10 mL, PRN        Diet  Diet Adult Regular (IDDSI Level 7) [facility]; Cardiac (Low Na/Chol); Isolation Tray - [facility]        Assessment/Plan:     Neuro  * SDH (subdural hematoma)  76 yo Fwith a admitted to [facility] with a new onset 1 day history of headache. CTH with 2.2cm aSDH with  approximately 5mm MLS, now s/p craniotomy per NSGY. Initial CTH: New large left mixed density extra-axial collection along the left cerebral convexity concerning for acute on chronic or subacute with acute component subdural hemorrhage. The collection measures 2.2 cm with significant mass effect on the left hemicerebrum with 5 mm rightward midline shift, subfalcine herniation and effacement of the left lateral ventricle    Follow up CTH with expected post craniotomy changes, mild residual blood products & mild residual midline shift, with small new low-density subdural collection along left side of falx    - not on AC or AP  - NSGY  Following: CTH interval stable; SG HV to full suction; record hourly outputs and empty once per shift.   - Na >135  - SBP <140  - Q 1 vitals   - Q 1 neuro checks   -  Pt/OT/SLP eval and treat  - VIMPAT 100mg BID         Brain compression  - see sdh     Seizure prophylaxis  - VIMPAT 100 q 12    Cardiac/Vascular  Hypertension  -SBP <140  -echo with EF 60 with normal ventricle size, systolic and diastolic function; CVP 3 mmHg    Endocrine  Hypothyroidism  - TSH   - continue synthroid           The patient is being Prophylaxed for:  Venous Thromboembolism with: None - holding   Stress Ulcer with: None  Ventilator Pneumonia with: not applicable    Activity Orders          Diet Adult Regular (IDDSI Level 7) [facility]; Cardiac (Low Na/Chol); Isolation Tray - Styrofoam: Regular starting at 10/30 1052    Turn patient starting at 10/29 0600    Elevate HOB starting at 10/29 0409        Full Code    Nicole Garibay MD  Neurocritical Care  [facility]

## 2022-10-31 NOTE — PLAN OF CARE
[facility] Care Plan    POC reviewed with Eugenio Aldridge and family at 1400. Pt verbalized understanding. Questions and concerns addressed. No acute events today. Pt progressing toward goals. Will continue to monitor. See below and flowsheets for full assessment and VS info.     Precedex gtt infusion per MAR.  Neuro exam unchanged.      Is this a stroke patient? no    Neuro:  [place] Coma Scale  Best Eye Response: 4-->(E4) spontaneous  Best Motor Response: 6-->(M6) obeys commands  Best Verbal Response: 5-->(V5) oriented  Sukumar Coma Scale Score: 15  Assessment Qualifiers: patient chemically sedated or paralyzed  Pupil PERRLA: yes     24 hr Temp:  [98.9 °F (37.2 °C)-99.6 °F (37.6 °C)]     CV:   Rhythm: sinus bradycardia  BP goals:   SBP < 160  MAP > 65    Resp:   O2 Device (Oxygen Therapy): nasal cannula  Oxygen Concentration (%): 3    Plan: N/A    GI/:     Diet/Nutrition Received: low saturated fat/low cholesterol  Last Bowel Movement: 10/28/22  Voiding Characteristics: external catheter    Intake/Output Summary (Last 24 hours) at 10/31/2022 1715  Last data filed at 10/31/2022 0605  Gross per 24 hour   Intake 260.27 ml   Output 408 ml   Net -147.73 ml     Unmeasured Output  Urine Occurrence: 1  Stool Occurrence: 0  Emesis Occurrence: 0  Pad Count: 1    Labs/Accuchecks:  Recent Labs   Lab 10/31/22  0235   WBC 9.96   RBC 3.72*   HGB 12.0   HCT 36.0*         Recent Labs   Lab 10/31/22  0235      K 4.6   CO2 24      BUN 11   CREATININE 0.6   ALKPHOS 61   ALT 13   AST 29   BILITOT 0.5      Recent Labs   Lab 10/30/22  0818   INR 1.0   APTT 25.0    No results for input(s): CPK, CPKMB, TROPONINI, MB in the last 168 hours.    Electrolytes: N/A - electrolytes WDL  Accuchecks: none    Gtts:   dexmedetomidine (PRECEDEX) infusion 0.3 mcg/kg/hr (10/31/22 1103)       LDA/Wounds:  Lines/Drains/Airways       Drain  Duration                  Closed/Suction Drain 10/29/22 1452 Left Scalp Accordion 10 Fr. 2 days    Female  External Urinary Catheter 10/30/22 1615 1 day              Peripheral Intravenous Line  Duration                  Peripheral IV - Single Lumen 10/30/22 1400 20 G Right Upper Arm 1 day         Peripheral IV - Single Lumen 10/30/22 1425 20 G Left Upper Arm 1 day                  Wounds: No  Wound care consulted: No

## 2022-10-31 NOTE — SUBJECTIVE & OBJECTIVE
Interval History: 10/31: POD 2. NAEON. AFVSS. Exam stable. HV in place.     Medications:  Continuous Infusions:   dexmedetomidine (PRECEDEX) infusion 0.5 mcg/kg/hr (10/31/22 0405)     Scheduled Meds:   folic acid  1 mg Oral Daily    lacosamide (VIMPAT) IVPB  100 mg Intravenous Q12H    levothyroxine  50 mcg Oral Before breakfast    multivitamin  1 tablet Oral Daily    thiamine  100 mg Oral Daily     PRN Meds:sodium chloride 0.9%, fentaNYL, labetalol, ondansetron, sodium chloride 0.9%     Review of Systems  Objective:     Weight: 66.2 kg (146 lb)  Body mass index is 24.3 kg/m².  Vital Signs (Most Recent):  Temp: 99.6 °F (37.6 °C) (10/31/22 0305)  Pulse: 60 (10/31/22 0305)  Resp: (!) 22 (10/31/22 0305)  BP: 129/60 (10/31/22 0305)  SpO2: 99 % (10/31/22 0305)   Vital Signs (24h Range):  Temp:  [97.4 °F (36.3 °C)-99.6 °F (37.6 °C)] 99.6 °F (37.6 °C)  Pulse:  [58-92] 60  Resp:  [15-35] 22  SpO2:  [87 %-100 %] 99 %  BP: ()/(54-72) 129/60  Arterial Line BP: ()/(70-83) 95/83                            Closed/Suction Drain 10/29/22 1452 Left Scalp Accordion 10 Fr. (Active)   Site Description Healing 10/30/22 1501   Drainage Serosanguineous 10/30/22 1501   Status To bulb suction 10/30/22 1501   Output (mL) 0 mL 10/30/22 1500       Female External Urinary Catheter 10/30/22 1615 (Active)   Skin no redness;no breakdown;perineum cleansed w/ soap and water 10/30/22 1615   Tolerance no signs/symptoms of discomfort 10/30/22 1615   Suction Continuous suction at 70 mmHg 10/30/22 1615   Date of last wick change 10/30/22 10/30/22 1615   Time of last wick change 1615 10/30/22 1615       Physical Exam    Neurosurgery Physical Exam    GENERAL: resting comfortably  HEENT: NC, PERRL, mucous membranes moist  NECK: supple, trachea midline  CV: normal capillary refill  PULM: aerating well, symmetric expansion, no distress  ABD: soft, NT, ND  EXT: no c/c/e    NEURO:    AAO x 3  CN II-XII grossly intact  Fc x 4 antigravity  SILT    No  drift or dysmetria    Cranial dressing c/d/I    SG HV with SS output      Significant Labs:  Recent Labs   Lab 10/30/22  0623 10/31/22  0235   * 119*    141   K 4.6 4.6    107   CO2 18* 24   BUN 10 11   CREATININE 0.6 0.6   CALCIUM 8.7 9.5   MG 2.2 2.1       Recent Labs   Lab 10/30/22  0131 10/31/22  0235   WBC 9.43 9.96   HGB 12.5 12.0   HCT 37.6 36.0*    257       Recent Labs   Lab 10/30/22  0818   INR 1.0   APTT 25.0       Microbiology Results (last 7 days)       Procedure Component Value Units Date/Time    Influenza A & B by Molecular [[id number]] Collected: 10/29/22 0146    Order Status: Completed Specimen: Nasopharyngeal Swab Updated: 10/29/22 0254     Influenza A, Molecular Negative     Influenza B, Molecular Negative     Flu A & B Source Nasal swab          All pertinent labs from the last 24 hours have been reviewed.    Significant Diagnostics:  I have reviewed all pertinent imaging results/findings within the past 24 hours.  No results found in the last 24 hours.

## 2022-10-31 NOTE — PROGRESS NOTES
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 She presented to ED with a new onset 1 day history of headache.  Patient reports that tonight around 6:00 p.m. she had a acute onset headache while she was lying down in bed.  She reports that it is the worse headache of her life and she does not regularly get headaches. Denies fever/chills, vision/hearing changes, dysphagia, dysarthria, nausea/vomiting, new-onset weakness or sensory change, bowel/bladder changes. Denies AC/AP. CTH with 1.3cm aSDH with approximately 5mm MLS. OR plan pending per nsgy. She is being admitted to [facility] for a higher level of care.       Hospital Course: 10/30/2022 Pt s/p left frontotemporoparietal craniotomy, evacuation of subdural hematoma; with improved/stable post-op imaging.      Interval History:  See hospital course    Review of Systems   Constitutional:  Negative for chills, fever and unexpected weight change.   HENT:  Negative for hearing loss.    Eyes:  Negative for visual disturbance.   Respiratory:  Negative for chest tightness and shortness of breath.    Gastrointestinal:  Negative for abdominal pain.   Genitourinary:  Negative for dysuria.   Musculoskeletal:  Negative for arthralgias.   Neurological:  Negative for dizziness.   Psychiatric/Behavioral:  Negative for hallucinations.      Objective:     Vitals:  Temp: 99.6 °F (37.6 °C)  Pulse: 71  Rhythm: normal sinus rhythm  BP: (!) 107/55  MAP (mmHg): 76  Resp: 20  SpO2: 100 %  O2 Device (Oxygen Therapy): nasal cannula    Temp  Min: 97.4 °F (36.3 °C)  Max: 99.6 °F (37.6 °C)  Pulse  Min: 53  Max: 92  BP  Min: 107/55  Max: 160/72  MAP (mmHg)   Min: 76  Max: 104  Resp  Min: 13  Max: 35  SpO2  Min: 87 %  Max: 100 %    10/29 0701 - 10/30 0700  In: 3157.1 [I.V.:896.6]  Out: 1370 [Urine:1220; Drains:150]   Unmeasured Output  Stool Occurrence: (P) 0       Physical Exam  Vitals and nursing note reviewed.   HENT:      Nose: No congestion.      Mouth/Throat:      Mouth: Mucous membranes are moist.   Eyes:      Pupils: Pupils are equal, round, and reactive to light.   Cardiovascular:      Rate and Rhythm: Normal rate.   Pulmonary:      Effort: No respiratory distress.   Abdominal:      General: There is no distension.   Musculoskeletal:      Cervical back: Normal range of motion.      Right lower leg: No edema.      Left lower leg: No edema.   Neurological:      Mental Status: She is alert.      Cranial Nerves: Cranial nerve deficit present.     Neuro exam    GCS E4 V5 M 6  Alert, comfortable; AAO x 3  CN II-XII intact  PERRL  Corneal reflex intact  LUE strength 4/5  RUE strength 4/5  LLE strength 4/5  RLE strength 4/5  SILT    No drift    Medications:  Continuousdexmedetomidine (PRECEDEX) infusion, Last Rate: 0.6 mcg/kg/hr (10/30/22 1905)    Scheduledfolic acid, 1 mg, Daily  lacosamide (VIMPAT) IVPB, 100 mg, Q12H  levothyroxine, 50 mcg, Before breakfast  multivitamin, 1 tablet, Daily  thiamine, 100 mg, Daily    PRNsodium chloride 0.9%, , PRN  fentaNYL, 25 mcg, Q1H PRN  labetalol, 10 mg, Q4H PRN  ondansetron, 4 mg, Q8H PRN  sodium chloride 0.9%, 10 mL, PRN      Today I personally reviewed pertinent medications, lines/drains/airways, imaging, cardiology results, laboratory results, microbiology results, notably:    Diet  Diet Adult Regular (IDDSI Level 7) [facility]; Cardiac (Low Na/Chol); Isolation Tray - Styrofoam  Diet Adult Regular (IDDSI Level 7) [facility]; Cardiac (Low Na/Chol); Isolation Tray - Styrofoam      Assessment/Plan:     Neuro  * SDH (subdural hematoma)  76 yo Fwith a admitted to [facility] with a new onset 1 day history of headache. CTH with  2.2cm aSDH with approximately 5mm MLS, now s/p craniotomy per NSGY. Initial CTH: New large left mixed density extra-axial collection along the left cerebral convexity concerning for acute on chronic or subacute with acute component subdural hemorrhage. The collection measures 2.2 cm with significant mass effect on the left hemicerebrum with 5 mm rightward midline shift, subfalcine herniation and effacement of the left lateral ventricle    Follow up CTH with expected post craniotomy changes, mild residual blood products & mild residual midline shift, with small new low-density subdural collection along left side of falx    - Anticoagulation status: none   - NSGY  Following, recs appreciated  - SBP <140  - Q 1 vitals   - Q 1 neuro checks   -  Pt/OT/SLP eval and treat  - Keppra 500 mg BID      Brain compression  - see sdh     Seizure prophylaxis  - keppra 500 q 12    Cardiac/Vascular  Hypertension  - SBP <160   -echo with EF 60 with normal ventricle size, systolic and diastolic function; CVP 3 mmHg    Endocrine  Hypothyroidism  - TSH   - continue synthroid           The patient is being Prophylaxed for:  Venous Thromboembolism with: None  Stress Ulcer with: None  Ventilator Pneumonia with: none    Activity Orders          Diet Adult Regular (IDDSI Level 7) [facility]; Cardiac (Low Na/Chol); Isolation Tray - Styrofoam: Regular starting at 10/30 1052    Elevate HOB 30 Under 30 degrees at all times starting at 10/29 1618    Turn patient starting at 10/29 0600    Elevate HOB starting at 10/29 0409        Full Code    Sebastián Nicholas MD  Neurocritical Care  [facility]

## 2022-11-01 LAB
ALBUMIN SERPL BCP-MCNC: 2.9 G/DL (ref 3.5–5.2)
ALP SERPL-CCNC: 63 U/L (ref 55–135)
ALT SERPL W/O P-5'-P-CCNC: 14 U/L (ref 10–44)
ANION GAP SERPL CALC-SCNC: 10 MMOL/L (ref 8–16)
AST SERPL-CCNC: 20 U/L (ref 10–40)
BASOPHILS # BLD AUTO: 0.03 K/UL (ref 0–0.2)
BASOPHILS NFR BLD: 0.3 % (ref 0–1.9)
BILIRUB SERPL-MCNC: 0.6 MG/DL (ref 0.1–1)
BUN SERPL-MCNC: 10 MG/DL (ref 8–23)
CALCIUM SERPL-MCNC: 9.7 MG/DL (ref 8.7–10.5)
CHLORIDE SERPL-SCNC: 105 MMOL/L (ref 95–110)
CO2 SERPL-SCNC: 26 MMOL/L (ref 23–29)
CREAT SERPL-MCNC: 0.6 MG/DL (ref 0.5–1.4)
DIFFERENTIAL METHOD: ABNORMAL
EOSINOPHIL # BLD AUTO: 0.1 K/UL (ref 0–0.5)
EOSINOPHIL NFR BLD: 0.9 % (ref 0–8)
ERYTHROCYTE [DISTWIDTH] IN BLOOD BY AUTOMATED COUNT: 13.6 % (ref 11.5–14.5)
EST. GFR  (NO RACE VARIABLE): >60 ML/MIN/1.73 M^2
GLUCOSE SERPL-MCNC: 103 MG/DL (ref 70–110)
HCT VFR BLD AUTO: 37.9 % (ref 37–48.5)
HGB BLD-MCNC: 12.4 G/DL (ref 12–16)
IMM GRANULOCYTES # BLD AUTO: 0.03 K/UL (ref 0–0.04)
IMM GRANULOCYTES NFR BLD AUTO: 0.3 % (ref 0–0.5)
LYMPHOCYTES # BLD AUTO: 0.6 K/UL (ref 1–4.8)
LYMPHOCYTES NFR BLD: 6.7 % (ref 18–48)
MCH RBC QN AUTO: 32 PG (ref 27–31)
MCHC RBC AUTO-ENTMCNC: 32.7 G/DL (ref 32–36)
MCV RBC AUTO: 98 FL (ref 82–98)
MONOCYTES # BLD AUTO: 0.8 K/UL (ref 0.3–1)
MONOCYTES NFR BLD: 8.9 % (ref 4–15)
NEUTROPHILS # BLD AUTO: 7.8 K/UL (ref 1.8–7.7)
NEUTROPHILS NFR BLD: 82.9 % (ref 38–73)
NRBC BLD-RTO: 0 /100 WBC
PLATELET # BLD AUTO: 279 K/UL (ref 150–450)
PMV BLD AUTO: 9.5 FL (ref 9.2–12.9)
POTASSIUM SERPL-SCNC: 4 MMOL/L (ref 3.5–5.1)
PROT SERPL-MCNC: 6.6 G/DL (ref 6–8.4)
RBC # BLD AUTO: 3.88 M/UL (ref 4–5.4)
SODIUM SERPL-SCNC: 141 MMOL/L (ref 136–145)
WBC # BLD AUTO: 9.39 K/UL (ref 3.9–12.7)

## 2022-11-01 PROCEDURE — 25000003 PHARM REV CODE 250: Performed by: STUDENT IN AN ORGANIZED HEALTH CARE EDUCATION/TRAINING PROGRAM

## 2022-11-01 PROCEDURE — 25000003 PHARM REV CODE 250: Performed by: PSYCHIATRY & NEUROLOGY

## 2022-11-01 PROCEDURE — 20600001 HC STEP DOWN PRIVATE ROOM

## 2022-11-01 PROCEDURE — 25000003 PHARM REV CODE 250: Performed by: NURSE PRACTITIONER

## 2022-11-01 PROCEDURE — 25000003 PHARM REV CODE 250

## 2022-11-01 PROCEDURE — 27000207 HC ISOLATION

## 2022-11-01 PROCEDURE — 99233 PR SUBSEQUENT HOSPITAL CARE,LEVL III: ICD-10-PCS | Mod: CR,,, | Performed by: PSYCHIATRY & NEUROLOGY

## 2022-11-01 PROCEDURE — 80053 COMPREHEN METABOLIC PANEL: CPT | Performed by: NURSE PRACTITIONER

## 2022-11-01 PROCEDURE — 85025 COMPLETE CBC W/AUTO DIFF WBC: CPT | Performed by: NURSE PRACTITIONER

## 2022-11-01 PROCEDURE — 63600175 PHARM REV CODE 636 W HCPCS

## 2022-11-01 PROCEDURE — 99233 SBSQ HOSP IP/OBS HIGH 50: CPT | Mod: CR,,, | Performed by: PSYCHIATRY & NEUROLOGY

## 2022-11-01 RX ORDER — LORAZEPAM 1 MG/1
1 TABLET ORAL ONCE
Status: COMPLETED | OUTPATIENT
Start: 2022-11-01 | End: 2022-11-01

## 2022-11-01 RX ORDER — ACETAMINOPHEN 500 MG
500 TABLET ORAL EVERY 6 HOURS PRN
Status: DISCONTINUED | OUTPATIENT
Start: 2022-11-01 | End: 2022-11-08 | Stop reason: HOSPADM

## 2022-11-01 RX ORDER — QUETIAPINE FUMARATE 25 MG/1
50 TABLET, FILM COATED ORAL ONCE
Status: COMPLETED | OUTPATIENT
Start: 2022-11-01 | End: 2022-11-01

## 2022-11-01 RX ADMIN — FOLIC ACID 1 MG: 1 TABLET ORAL at 10:11

## 2022-11-01 RX ADMIN — LABETALOL HYDROCHLORIDE 10 MG: 5 INJECTION, SOLUTION INTRAVENOUS at 12:11

## 2022-11-01 RX ADMIN — LABETALOL HYDROCHLORIDE 10 MG: 5 INJECTION, SOLUTION INTRAVENOUS at 05:11

## 2022-11-01 RX ADMIN — FENTANYL CITRATE 25 MCG: 50 INJECTION, SOLUTION INTRAMUSCULAR; INTRAVENOUS at 05:11

## 2022-11-01 RX ADMIN — Medication 100 MG: at 10:11

## 2022-11-01 RX ADMIN — FENTANYL CITRATE 25 MCG: 50 INJECTION, SOLUTION INTRAMUSCULAR; INTRAVENOUS at 10:11

## 2022-11-01 RX ADMIN — LORAZEPAM 1 MG: 1 TABLET ORAL at 11:11

## 2022-11-01 RX ADMIN — LACOSAMIDE 100 MG: 50 TABLET, FILM COATED ORAL at 10:11

## 2022-11-01 RX ADMIN — POLYETHYLENE GLYCOL 3350 17 G: 17 POWDER, FOR SOLUTION ORAL at 10:11

## 2022-11-01 RX ADMIN — DONEPEZIL HYDROCHLORIDE 10 MG: 5 TABLET, FILM COATED ORAL at 08:11

## 2022-11-01 RX ADMIN — LACOSAMIDE 100 MG: 50 TABLET, FILM COATED ORAL at 08:11

## 2022-11-01 RX ADMIN — THERA TABS 1 TABLET: TAB at 10:11

## 2022-11-01 RX ADMIN — LEVOTHYROXINE SODIUM 50 MCG: 50 TABLET ORAL at 06:11

## 2022-11-01 RX ADMIN — QUETIAPINE FUMARATE 50 MG: 25 TABLET ORAL at 07:11

## 2022-11-01 NOTE — SUBJECTIVE & OBJECTIVE
Interval History:  NAEO, lumbar drain clamped, follow up CTH with minor bleeding below flap, NSGY aware, pt okay to step down.    Review of Systems   Unable to perform ROS: Dementia     Objective:     Vitals:  Temp: 98.9 °F (37.2 °C)  Pulse: 84  Rhythm: normal sinus rhythm  BP: (!) 141/65  MAP (mmHg): 94  Resp: (!) 22  SpO2: 96 %  O2 Device (Oxygen Therapy): nasal cannula    Temp  Min: 98.4 °F (36.9 °C)  Max: 99 °F (37.2 °C)  Pulse  Min: 67  Max: 97  BP  Min: 126/58  Max: 181/76  MAP (mmHg)  Min: 83  Max: 122  Resp  Min: 17  Max: 33  SpO2  Min: 93 %  Max: 100 %    10/31 0701 - 11/01 0700  In: 466.1 [P.O.:240; I.V.:191.4]  Out: 656 [Urine:650; Drains:6]   Unmeasured Output  Urine Occurrence: 1  Stool Occurrence: 0  Emesis Occurrence: 0  Pad Count: 1       Physical Exam  Constitutional:       Appearance: Normal appearance. She is normal weight.   HENT:      Head: Normocephalic and atraumatic.      Right Ear: External ear normal.      Left Ear: External ear normal.      Nose: Nose normal.      Mouth/Throat:      Mouth: Mucous membranes are moist.      Pharynx: Oropharynx is clear.   Eyes:      Extraocular Movements: Extraocular movements intact.      Conjunctiva/sclera: Conjunctivae normal.      Pupils: Pupils are equal, round, and reactive to light.   Cardiovascular:      Rate and Rhythm: Normal rate and regular rhythm.      Heart sounds: Normal heart sounds.   Pulmonary:      Effort: Pulmonary effort is normal. No respiratory distress.      Breath sounds: Normal breath sounds. No wheezing.   Abdominal:      General: Abdomen is flat. Bowel sounds are normal.      Palpations: Abdomen is soft.   Musculoskeletal:      Cervical back: Neck supple.   Neurological:      Mental Status: She is alert.      Comments: Exam stable from prior; AAOx4 with short term memory loss   Psychiatric:         Mood and Affect: Mood normal.         Behavior: Behavior normal.         Medications:  Continuous   ScheduleddonepeziL, 10 mg,  QHS  folic acid, 1 mg, Daily  lacosamide, 100 mg, Q12H  levothyroxine, 50 mcg, Before breakfast  multivitamin, 1 tablet, Daily  polyethylene glycol, 17 g, Daily  thiamine, 100 mg, Daily  PRNsodium chloride 0.9%, , PRN  acetaminophen, 500 mg, Q6H PRN  fentaNYL, 25 mcg, Q1H PRN  labetalol, 10 mg, Q4H PRN  ondansetron, 4 mg, Q8H PRN  senna-docusate 8.6-50 mg, 1 tablet, Daily PRN  sodium chloride 0.9%, 10 mL, PRN      Diet  Diet Adult Regular (IDDSI Level 7) [facility]; Cardiac (Low Na/Chol); Isolation Tray - [facility]

## 2022-11-01 NOTE — ASSESSMENT & PLAN NOTE
78 yo Fwith a admitted to [facility] with a new onset 1 day history of headache. CTH with 2.2cm aSDH with approximately 5mm MLS, now s/p craniotomy per NSGY. Initial CTH: New large left mixed density extra-axial collection along the left cerebral convexity concerning for acute on chronic or subacute with acute component subdural hemorrhage. The collection measures 2.2 cm with significant mass effect on the left hemicerebrum with 5 mm rightward midline shift, subfalcine herniation and effacement of the left lateral ventricle    Follow up CTH with expected post craniotomy changes, mild residual blood products & mild residual midline shift, with small new low-density subdural collection along left side of falx    - not on AC or AP  - NSGY  Following: CTH interval stable; lumbar drain clamped, additional CTH interval stable, pt to step down to NSGY  - Na >135  - SBP <140  - Q 1 vitals   - Q 1 neuro checks   -  Pt/OT/SLP eval and treat  - VIMPAT 100mg BID

## 2022-11-01 NOTE — SUBJECTIVE & OBJECTIVE
Interval History:  POD3, Stable overnight, HV pulled     Medications:  Continuous Infusions:      Scheduled Meds:   donepeziL  10 mg Oral QHS    folic acid  1 mg Oral Daily    lacosamide  100 mg Oral Q12H    levothyroxine  50 mcg Oral Before breakfast    multivitamin  1 tablet Oral Daily    polyethylene glycol  17 g Oral Daily    QUEtiapine  50 mg Oral Once    thiamine  100 mg Oral Daily     PRN Meds:sodium chloride 0.9%, acetaminophen, fentaNYL, labetalol, ondansetron, senna-docusate 8.6-50 mg, sodium chloride 0.9%     Review of Systems  Objective:     Weight: 66.2 kg (146 lb)  Body mass index is 24.3 kg/m².  Vital Signs (Most Recent):  Temp: 98.9 °F (37.2 °C) (11/01/22 1505)  Pulse: 84 (11/01/22 1705)  Resp: (!) 22 (11/01/22 1705)  BP: (!) 141/65 (11/01/22 1705)  SpO2: 96 % (11/01/22 1705)   Vital Signs (24h Range):  Temp:  [98.4 °F (36.9 °C)-99 °F (37.2 °C)] 98.9 °F (37.2 °C)  Pulse:  [67-97] 84  Resp:  [17-33] 22  SpO2:  [93 %-100 %] 96 %  BP: (126-181)/(58-99) 141/65     Date 11/01/22 0700 - 11/02/22 0659   Shift 6947-6363 8220-6961 5597-1919 24 Hour Total   INTAKE   P.O. 200 200  400   Shift Total(mL/kg) 200(3) 200(3)  400(6)   OUTPUT   Urine(mL/kg/hr)  700  700   Shift Total(mL/kg)  700(10.6)  700(10.6)   Weight (kg) 66.2 66.2 66.2 66.2                          Closed/Suction Drain 10/29/22 1452 Left Scalp Accordion 10 Fr. (Active)   Site Description Healing 10/30/22 1501   Drainage Serosanguineous 10/30/22 1501   Status To bulb suction 10/30/22 1501   Output (mL) 0 mL 10/30/22 1500       Female External Urinary Catheter 10/30/22 1615 (Active)   Skin no redness;no breakdown;perineum cleansed w/ soap and water 10/30/22 1615   Tolerance no signs/symptoms of discomfort 10/30/22 1615   Suction Continuous suction at 70 mmHg 10/30/22 1615   Date of last wick change 10/30/22 10/30/22 1615   Time of last wick change 1615 10/30/22 1615       Physical Exam    Neurosurgery Physical Exam    GENERAL: resting  comfortably  HEENT: NC, PERRL, mucous membranes moist  NECK: supple, trachea midline  CV: normal capillary refill  PULM: aerating well, symmetric expansion, no distress  ABD: soft, NT, ND  EXT: no c/c/e    NEURO:    AAO x 3  CN II-XII grossly intact  Fc x 4 antigravity  SILT    No drift or dysmetria    Cranial dressing c/d/I    SG HV with SS output      Significant Labs:  Recent Labs   Lab 10/31/22  0235 11/01/22  0309   * 103    141   K 4.6 4.0    105   CO2 24 26   BUN 11 10   CREATININE 0.6 0.6   CALCIUM 9.5 9.7   MG 2.1  --        Recent Labs   Lab 10/31/22  0235 11/01/22  0309   WBC 9.96 9.39   HGB 12.0 12.4   HCT 36.0* 37.9    279       No results for input(s): LABPT, INR, APTT in the last 48 hours.    Microbiology Results (last 7 days)       Procedure Component Value Units Date/Time    Influenza A & B by Molecular [[id number]] Collected: 10/29/22 0146    Order Status: Completed Specimen: Nasopharyngeal Swab Updated: 10/29/22 0254     Influenza A, Molecular Negative     Influenza B, Molecular Negative     Flu A & B Source Nasal swab          All pertinent labs from the last 24 hours have been reviewed.    Significant Diagnostics:  I have reviewed all pertinent imaging results/findings within the past 24 hours.  No results found in the last 24 hours.

## 2022-11-01 NOTE — PT/OT/SLP PROGRESS
Occupational Therapy      Patient Name:  Eugenio Aldridge   MRN:  [id number]    Patient not seen today secondary to Other (Comment) (patient awaiting stat C-scan. unable to return, remains an eval.). Will follow-up 11/02/22.    11/1/2022

## 2022-11-01 NOTE — PROGRESS NOTES
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 for SDH. She presented to ED with a new onset 1 day history of headache.  Patient reports that tonight around 6:00 p.m. she had a acute onset headache while she was lying down in bed.  She reports that it is the worse headache of her life and she does not regularly get headaches. Denies fever/chills, vision/hearing changes, dysphagia, dysarthria, nausea/vomiting, new-onset weakness or sensory change, bowel/bladder changes. Denies AC/AP. CTH with 1.3cm aSDH with approximately 5mm MLS. OR plan pending per nsgy. She is being admitted to [facility] for a higher level of care.       Hospital Course: 10/30/2022 Pt s/p left frontotemporoparietal craniotomy, evacuation of subdural hematoma; with improved/stable post-op imaging.  10/31/2022: Weaning precedex gtt. Neurologically intact; mentation stable from prior exam. Donepezil and VIMPAT  11/01/2022 Pt stepped down to NSGY, neurologically intact.      Interval History:  NAEO, follow up CTH with minor bleeding below flap, NSGY aware, pt okay to step down.    Review of Systems   Unable to perform ROS: Dementia     Objective:     Vitals:  Temp: 98.9 °F (37.2 °C)  Pulse: 84  Rhythm: normal sinus rhythm  BP: (!) 141/65  MAP (mmHg): 94  Resp: (!) 22  SpO2: 96 %  O2 Device (Oxygen Therapy): nasal cannula    Temp  Min: 98.4 °F (36.9 °C)  Max: 99 °F (37.2 °C)  Pulse  Min: 67  Max: 97  BP  Min: 126/58  Max: 181/76  MAP (mmHg)  Min: 83  Max: 122  Resp  Min: 17  Max: 33  SpO2  Min: 93 %  Max: 100 %    10/31 0701 - 11/01 0700  In: 466.1 [P.O.:240; I.V.:191.4]  Out: 656 [Urine:650; Drains:6]   Unmeasured  Output  Urine Occurrence: 1  Stool Occurrence: 0  Emesis Occurrence: 0  Pad Count: 1       Physical Exam  Constitutional:       Appearance: Normal appearance. She is normal weight.   HENT:      Head: Normocephalic and atraumatic.      Right Ear: External ear normal.      Left Ear: External ear normal.      Nose: Nose normal.      Mouth/Throat:      Mouth: Mucous membranes are moist.      Pharynx: Oropharynx is clear.   Eyes:      Extraocular Movements: Extraocular movements intact.      Conjunctiva/sclera: Conjunctivae normal.      Pupils: Pupils are equal, round, and reactive to light.   Cardiovascular:      Rate and Rhythm: Normal rate and regular rhythm.      Heart sounds: Normal heart sounds.   Pulmonary:      Effort: Pulmonary effort is normal. No respiratory distress.      Breath sounds: Normal breath sounds. No wheezing.   Abdominal:      General: Abdomen is flat. Bowel sounds are normal.      Palpations: Abdomen is soft.   Musculoskeletal:      Cervical back: Neck supple.   Neurological:      Mental Status: She is alert.      Comments: Exam stable from prior; AAOx4 with short term memory loss   Psychiatric:         Mood and Affect: Mood normal.         Behavior: Behavior normal.         Medications:  Continuous   ScheduleddonepeziL, 10 mg, QHS  folic acid, 1 mg, Daily  lacosamide, 100 mg, Q12H  levothyroxine, 50 mcg, Before breakfast  multivitamin, 1 tablet, Daily  polyethylene glycol, 17 g, Daily  thiamine, 100 mg, Daily  PRNsodium chloride 0.9%, , PRN  acetaminophen, 500 mg, Q6H PRN  fentaNYL, 25 mcg, Q1H PRN  labetalol, 10 mg, Q4H PRN  ondansetron, 4 mg, Q8H PRN  senna-docusate 8.6-50 mg, 1 tablet, Daily PRN  sodium chloride 0.9%, 10 mL, PRN      Diet  Diet Adult Regular (IDDSI Level 7) [facility]; Cardiac (Low Na/Chol); Isolation Tray - Styrofoam        Assessment/Plan:     Neuro  * SDH (subdural hematoma)  76 yo Fwith a admitted to [facility] with a new onset 1 day history of headache. CTH with 2.2cm  aSDH with approximately 5mm MLS, now s/p craniotomy per NSGY. Initial CTH: New large left mixed density extra-axial collection along the left cerebral convexity concerning for acute on chronic or subacute with acute component subdural hemorrhage. The collection measures 2.2 cm with significant mass effect on the left hemicerebrum with 5 mm rightward midline shift, subfalcine herniation and effacement of the left lateral ventricle    Follow up CTH with expected post craniotomy changes, mild residual blood products & mild residual midline shift, with small new low-density subdural collection along left side of falx    - not on AC or AP  - NSGY  Following: CTH interval stable; pt to step down to NSGY  - Na >135  - SBP <140  - Q 1 vitals   - Q 1 neuro checks   -  Pt/OT/SLP eval and treat  - VIMPAT 100mg BID         Brain compression  - see sdh     Seizure prophylaxis  - VIMPAT 100 q 12    Cardiac/Vascular  Hypertension  -SBP <140  -echo with EF 60 with normal ventricle size, systolic and diastolic function; CVP 3 mmHg    Endocrine  Hypothyroidism  - TSH   - continue synthroid         The patient is being Prophylaxed for:  Venous Thromboembolism with: None  Stress Ulcer with: Not Applicable   Ventilator Pneumonia with: not applicable    Activity Orders            Diet Adult Regular (IDDSI Level 7) [facility]; Cardiac (Low Na/Chol); Isolation Tray - Styrofoam: Regular starting at 10/30 1052    Turn patient starting at 10/29 0600    Elevate HOB starting at 10/29 0409          Full Code    Sebastián Nicholas MD  Neurocritical Care  [facility]

## 2022-11-01 NOTE — PROGRESS NOTES
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  Patient reports that tonight around 6:00 p.m. she had a acute onset headache while she was lying down in bed.  She reports that it is the worse headache of her life and she does not regularly get headaches. Denies fever/chills, vision/hearing changes, dysphagia, dysarthria, nausea/vomiting, new-onset weakness or sensory change, bowel/bladder changes. Denies AC/AP. CTH with 1.3cm aSDH with approximately 5mm MLS.         Post-Op Info:  Procedure(s) (LRB):  CRANIOTOMY, FOR SUBDURAL HEMATOMA EVACUATION (Left)   3 Days Post-Op     Interval History:  POD3, Stable overnight, HV pulled     Medications:  Continuous Infusions:      Scheduled Meds:   donepeziL  10 mg Oral QHS    folic acid  1 mg Oral Daily    lacosamide  100 mg Oral Q12H    levothyroxine  50 mcg Oral Before breakfast    multivitamin  1 tablet Oral Daily    polyethylene glycol  17 g Oral Daily    QUEtiapine  50 mg Oral Once    thiamine  100 mg Oral Daily     PRN Meds:sodium chloride 0.9%, acetaminophen, fentaNYL, labetalol, ondansetron, senna-docusate 8.6-50 mg, sodium chloride 0.9%     Review of Systems  Objective:     Weight: 66.2 kg (146 lb)  Body mass index is 24.3 kg/m².  Vital Signs (Most Recent):  Temp: 98.9 °F (37.2 °C) (11/01/22 1505)  Pulse: 84 (11/01/22 1705)  Resp: (!) 22 (11/01/22 1705)  BP: (!) 141/65 (11/01/22 1705)  SpO2: 96 % (11/01/22 1705)   Vital Signs (24h Range):  Temp:  [98.4 °F (36.9 °C)-99 °F (37.2 °C)] 98.9 °F (37.2 °C)  Pulse:  [67-97] 84  Resp:  [17-33] 22  SpO2:  [93 %-100 %] 96 %  BP: (126-181)/(58-99) 141/65     Date 11/01/22 0700 - 11/02/22 0659   Shift 1337-0938 7735-2450 8687-3896 24 Hour Total   INTAKE   P.O. 200 200  400   Shift Total(mL/kg) 200(3) 200(3)   400(6)   OUTPUT   Urine(mL/kg/hr)  700  700   Shift Total(mL/kg)  700(10.6)  700(10.6)   Weight (kg) 66.2 66.2 66.2 66.2                          Closed/Suction Drain 10/29/22 1452 Left Scalp Accordion 10 Fr. (Active)   Site Description Healing 10/30/22 1501   Drainage Serosanguineous 10/30/22 1501   Status To bulb suction 10/30/22 1501   Output (mL) 0 mL 10/30/22 1500       Female External Urinary Catheter 10/30/22 1615 (Active)   Skin no redness;no breakdown;perineum cleansed w/ soap and water 10/30/22 1615   Tolerance no signs/symptoms of discomfort 10/30/22 1615   Suction Continuous suction at 70 mmHg 10/30/22 1615   Date of last wick change 10/30/22 10/30/22 1615   Time of last wick change 1615 10/30/22 1615       Physical Exam    Neurosurgery Physical Exam    GENERAL: resting comfortably  HEENT: NC, PERRL, mucous membranes moist  NECK: supple, trachea midline  CV: normal capillary refill  PULM: aerating well, symmetric expansion, no distress  ABD: soft, NT, ND  EXT: no c/c/e    NEURO:    AAO x 3  CN II-XII grossly intact  Fc x 4 antigravity  SILT    No drift or dysmetria    Cranial dressing c/d/I    SG HV with SS output      Significant Labs:  Recent Labs   Lab 10/31/22  0235 11/01/22  0309   * 103    141   K 4.6 4.0    105   CO2 24 26   BUN 11 10   CREATININE 0.6 0.6   CALCIUM 9.5 9.7   MG 2.1  --        Recent Labs   Lab 10/31/22  0235 11/01/22  0309   WBC 9.96 9.39   HGB 12.0 12.4   HCT 36.0* 37.9    279       No results for input(s): LABPT, INR, APTT in the last 48 hours.    Microbiology Results (last 7 days)       Procedure Component Value Units Date/Time    Influenza A & B by Molecular [[id number]] Collected: 10/29/22 0146    Order Status: Completed Specimen: Nasopharyngeal Swab Updated: 10/29/22 0254     Influenza A, Molecular Negative     Influenza B, Molecular Negative     Flu A & B Source Nasal swab          All pertinent labs from the last 24 hours have been  reviewed.    Significant Diagnostics:  I have reviewed all pertinent imaging results/findings within the past 24 hours.  No results found in the last 24 hours.    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     --Patient admitted to [facility] on telemetry      -q1h neurochecks in ICU, q2h neurochecks in stepdown, q4h neurochecks on floor  --All labs and diagnostics reviewed  --CTH interval stable.  --CTH postop with expected changes and only minor residual blood products.   --SG HV pulled  --SBP <140  --Na >135  --Keppra 500 BID  --HOB >30  --ADAT  --ANGELO/SCD/SQH  --Continue to monitor clinically, notify NSGY immediately with any changes in neuro status    Dispo: TTF to neurosurgery        Yohannes Jimenez MD  Neurosurgery  [facility]

## 2022-11-01 NOTE — PT/OT/SLP PROGRESS
Physical Therapy      Patient Name:  Eugenio Aldridge   MRN:  [id number]    Patient not seen today secondary to  (hold at time of attempt 2* STAT CT. PT unable to return for later attempt). Will follow-up at next scheduled session as able.    11/1/2022

## 2022-11-01 NOTE — ASSESSMENT & PLAN NOTE
78 yo Fwith a admitted to [facility] with a new onset 1 day history of headache. CTH with 2.2cm aSDH with approximately 5mm MLS, now s/p craniotomy per NSGY. Initial CTH: New large left mixed density extra-axial collection along the left cerebral convexity concerning for acute on chronic or subacute with acute component subdural hemorrhage. The collection measures 2.2 cm with significant mass effect on the left hemicerebrum with 5 mm rightward midline shift, subfalcine herniation and effacement of the left lateral ventricle    Follow up CTH with expected post craniotomy changes, mild residual blood products & mild residual midline shift, with small new low-density subdural collection along left side of falx    - not on AC or AP  - NSGY  Following: CTH interval stable; pt to step down to NSGY  - Na >135  - SBP <140  - Q 1 vitals   - Q 1 neuro checks   -  Pt/OT/SLP eval and treat  - VIMPAT 100mg BID

## 2022-11-01 NOTE — PLAN OF CARE
[facility] Care Plan    POC reviewed with Eugenio Aldridge and family at 1400. Pt verbalized understanding. Questions and concerns addressed. No acute events today. Pt progressing toward goals. Will continue to monitor. See below and flowsheets for full assessment and VS info.     CT head completed.   Patient continues to be restless and attempting to get out of bed despite reorientation.    BP elevated during episodes of restlessness.  NCC team updated.  Neuro exam unchanged.      Is this a stroke patient? no    Neuro:  [place] Coma Scale  Best Eye Response: 4-->(E4) spontaneous  Best Motor Response: 6-->(M6) obeys commands  Best Verbal Response: 5-->(V5) oriented  [place] Coma Scale Score: 15  Assessment Qualifiers: no eye obstruction present  Pupil PERRLA: yes     24 hr Temp:  [98.4 °F (36.9 °C)-99 °F (37.2 °C)]     CV:   Rhythm: normal sinus rhythm  BP goals:   SBP < 160  MAP > 65    Resp:   O2 Device (Oxygen Therapy): nasal cannula  Oxygen Concentration (%): 3    Plan: N/A    GI/:     Diet/Nutrition Received: low saturated fat/low cholesterol  Last Bowel Movement: 10/28/22  Voiding Characteristics: external catheter    Intake/Output Summary (Last 24 hours) at 11/1/2022 1823  Last data filed at 11/1/2022 1805  Gross per 24 hour   Intake 698.56 ml   Output 700 ml   Net -1.44 ml     Unmeasured Output  Urine Occurrence: 1  Stool Occurrence: 0  Emesis Occurrence: 0  Pad Count: 1    Labs/Accuchecks:  Recent Labs   Lab 11/01/22  0309   WBC 9.39   RBC 3.88*   HGB 12.4   HCT 37.9         Recent Labs   Lab 11/01/22  0309      K 4.0   CO2 26      BUN 10   CREATININE 0.6   ALKPHOS 63   ALT 14   AST 20   BILITOT 0.6      Recent Labs   Lab 10/30/22  0818   INR 1.0   APTT 25.0    No results for input(s): CPK, CPKMB, TROPONINI, MB in the last 168 hours.    Electrolytes: N/A - electrolytes WDL  Accuchecks: none    Gtts:      LDA/Wounds:  Lines/Drains/Airways       Drain  Duration             Female External Urinary  Catheter 10/30/22 1615 2 days              Peripheral Intravenous Line  Duration                  Peripheral IV - Single Lumen 10/30/22 1400 20 G Right Upper Arm 2 days         Peripheral IV - Single Lumen 10/30/22 1425 20 G Left Upper Arm 2 days                  Wounds: No  Wound care consulted: No

## 2022-11-01 NOTE — ASSESSMENT & PLAN NOTE
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     --Patient admitted to [facility] on telemetry      -q1h neurochecks in ICU, q2h neurochecks in stepdown, q4h neurochecks on floor  --All labs and diagnostics reviewed  --CTH interval stable.  --CTH postop with expected changes and only minor residual blood products.   --SG HV pulled  --SBP <140  --Na >135  --Keppra 500 BID  --HOB >30  --ADAT  --ANGELO/SCD/SQH  --Continue to monitor clinically, notify NSGY immediately with any changes in neuro status    Dispo: TTF to neurosurgery

## 2022-11-01 NOTE — PLAN OF CARE
Patient is Covid-+ and on isolation. CM will attempt to reach significant other to complete discharge assessment.

## 2022-11-01 NOTE — PLAN OF CARE
[facility] Care Plan    POC reviewed with Eugenio Aldridge at 0300. Pt verbalized understanding. Questions and concerns addressed. No acute events overnight. Neurosurgery discontinued drain around 2000. Pt progressing toward goals. Will continue to monitor. See below and flowsheets for full assessment and VS info.           Is this a stroke patient? no    Neuro:  [place] Coma Scale  Best Eye Response: 4-->(E4) spontaneous  Best Motor Response: 6-->(M6) obeys commands  Best Verbal Response: 5-->(V5) oriented  [place] Coma Scale Score: 15  Assessment Qualifiers: no eye obstruction present  Pupil PERRLA: yes     24hr Temp:  [98.4 °F (36.9 °C)-99.6 °F (37.6 °C)]     CV:   Rhythm: sinus bradycardia  BP goals:   SBP < 160  MAP > 65    Resp:   O2 Device (Oxygen Therapy): nasal cannula  Oxygen Concentration (%): 3    Plan: N/A    GI/:     Diet/Nutrition Received: low saturated fat/low cholesterol  Last Bowel Movement: 10/28/22  Voiding Characteristics: external catheter    Intake/Output Summary (Last 24 hours) at 11/1/2022 0555  Last data filed at 10/31/2022 1805  Gross per 24 hour   Intake 181.43 ml   Output 659 ml   Net -477.57 ml     Unmeasured Output  Urine Occurrence: 1  Stool Occurrence: 0  Emesis Occurrence: 0  Pad Count: 1    Labs/Accuchecks:  Recent Labs   Lab 11/01/22  0309   WBC 9.39   RBC 3.88*   HGB 12.4   HCT 37.9         Recent Labs   Lab 11/01/22  0309      K 4.0   CO2 26      BUN 10   CREATININE 0.6   ALKPHOS 63   ALT 14   AST 20   BILITOT 0.6      Recent Labs   Lab 10/30/22  0818   INR 1.0   APTT 25.0    No results for input(s): CPK, CPKMB, TROPONINI, MB in the last 168 hours.    Electrolytes: N/A - electrolytes WDL  Accuchecks: none    Gtts:   dexmedetomidine (PRECEDEX) infusion Stopped (11/01/22 0553)       LDA/Wounds:  Lines/Drains/Airways       Drain  Duration                  Closed/Suction Drain 10/29/22 1452 Left Scalp Accordion 10 Fr. 2 days    Female External Urinary Catheter 10/30/22  1615 1 day              Peripheral Intravenous Line  Duration                  Peripheral IV - Single Lumen 10/30/22 1400 20 G Right Upper Arm 1 day         Peripheral IV - Single Lumen 10/30/22 1425 20 G Left Upper Arm 1 day                  Wounds: Yes  Wound care consulted: Yes

## 2022-11-02 LAB
ALBUMIN SERPL BCP-MCNC: 3.1 G/DL (ref 3.5–5.2)
ALP SERPL-CCNC: 73 U/L (ref 55–135)
ALT SERPL W/O P-5'-P-CCNC: 24 U/L (ref 10–44)
ANION GAP SERPL CALC-SCNC: 12 MMOL/L (ref 8–16)
AST SERPL-CCNC: 26 U/L (ref 10–40)
BASOPHILS # BLD AUTO: 0.03 K/UL (ref 0–0.2)
BASOPHILS NFR BLD: 0.3 % (ref 0–1.9)
BILIRUB SERPL-MCNC: 0.7 MG/DL (ref 0.1–1)
BUN SERPL-MCNC: 13 MG/DL (ref 8–23)
CALCIUM SERPL-MCNC: 10 MG/DL (ref 8.7–10.5)
CHLORIDE SERPL-SCNC: 103 MMOL/L (ref 95–110)
CO2 SERPL-SCNC: 27 MMOL/L (ref 23–29)
CREAT SERPL-MCNC: 0.6 MG/DL (ref 0.5–1.4)
DIFFERENTIAL METHOD: ABNORMAL
EOSINOPHIL # BLD AUTO: 0 K/UL (ref 0–0.5)
EOSINOPHIL NFR BLD: 0.2 % (ref 0–8)
ERYTHROCYTE [DISTWIDTH] IN BLOOD BY AUTOMATED COUNT: 13.2 % (ref 11.5–14.5)
EST. GFR  (NO RACE VARIABLE): >60 ML/MIN/1.73 M^2
GLUCOSE SERPL-MCNC: 113 MG/DL (ref 70–110)
HCT VFR BLD AUTO: 39.7 % (ref 37–48.5)
HGB BLD-MCNC: 13.1 G/DL (ref 12–16)
IMM GRANULOCYTES # BLD AUTO: 0.04 K/UL (ref 0–0.04)
IMM GRANULOCYTES NFR BLD AUTO: 0.5 % (ref 0–0.5)
LYMPHOCYTES # BLD AUTO: 0.8 K/UL (ref 1–4.8)
LYMPHOCYTES NFR BLD: 8.7 % (ref 18–48)
MCH RBC QN AUTO: 31.5 PG (ref 27–31)
MCHC RBC AUTO-ENTMCNC: 33 G/DL (ref 32–36)
MCV RBC AUTO: 95 FL (ref 82–98)
MONOCYTES # BLD AUTO: 0.8 K/UL (ref 0.3–1)
MONOCYTES NFR BLD: 9.4 % (ref 4–15)
NEUTROPHILS # BLD AUTO: 7.1 K/UL (ref 1.8–7.7)
NEUTROPHILS NFR BLD: 80.9 % (ref 38–73)
NRBC BLD-RTO: 0 /100 WBC
PLATELET # BLD AUTO: 364 K/UL (ref 150–450)
PMV BLD AUTO: 9.7 FL (ref 9.2–12.9)
POTASSIUM SERPL-SCNC: 3.5 MMOL/L (ref 3.5–5.1)
PROT SERPL-MCNC: 7 G/DL (ref 6–8.4)
RBC # BLD AUTO: 4.16 M/UL (ref 4–5.4)
SODIUM SERPL-SCNC: 142 MMOL/L (ref 136–145)
WBC # BLD AUTO: 8.72 K/UL (ref 3.9–12.7)

## 2022-11-02 PROCEDURE — 20600001 HC STEP DOWN PRIVATE ROOM

## 2022-11-02 PROCEDURE — 97116 GAIT TRAINING THERAPY: CPT

## 2022-11-02 PROCEDURE — 85025 COMPLETE CBC W/AUTO DIFF WBC: CPT | Performed by: NURSE PRACTITIONER

## 2022-11-02 PROCEDURE — 97535 SELF CARE MNGMENT TRAINING: CPT

## 2022-11-02 PROCEDURE — 25000003 PHARM REV CODE 250

## 2022-11-02 PROCEDURE — 25000003 PHARM REV CODE 250: Performed by: PSYCHIATRY & NEUROLOGY

## 2022-11-02 PROCEDURE — 25000003 PHARM REV CODE 250: Performed by: NEUROLOGICAL SURGERY

## 2022-11-02 PROCEDURE — 27000207 HC ISOLATION

## 2022-11-02 PROCEDURE — 97165 OT EVAL LOW COMPLEX 30 MIN: CPT

## 2022-11-02 PROCEDURE — 97162 PT EVAL MOD COMPLEX 30 MIN: CPT

## 2022-11-02 PROCEDURE — 25000003 PHARM REV CODE 250: Performed by: NURSE PRACTITIONER

## 2022-11-02 PROCEDURE — 36415 COLL VENOUS BLD VENIPUNCTURE: CPT | Performed by: NURSE PRACTITIONER

## 2022-11-02 PROCEDURE — 80053 COMPREHEN METABOLIC PANEL: CPT | Performed by: NURSE PRACTITIONER

## 2022-11-02 RX ORDER — BACITRACIN ZINC AND POLYMYXIN B SULFATE 500; 1000 [USP'U]/G; [USP'U]/G
OINTMENT TOPICAL 2 TIMES DAILY
Status: DISCONTINUED | OUTPATIENT
Start: 2022-11-02 | End: 2022-11-08 | Stop reason: HOSPADM

## 2022-11-02 RX ADMIN — LEVOTHYROXINE SODIUM 50 MCG: 50 TABLET ORAL at 05:11

## 2022-11-02 RX ADMIN — LACOSAMIDE 100 MG: 50 TABLET, FILM COATED ORAL at 09:11

## 2022-11-02 RX ADMIN — THERA TABS 1 TABLET: TAB at 10:11

## 2022-11-02 RX ADMIN — POLYETHYLENE GLYCOL 3350 17 G: 17 POWDER, FOR SOLUTION ORAL at 10:11

## 2022-11-02 RX ADMIN — LACOSAMIDE 100 MG: 50 TABLET, FILM COATED ORAL at 10:11

## 2022-11-02 RX ADMIN — Medication 100 MG: at 10:11

## 2022-11-02 RX ADMIN — FOLIC ACID 1 MG: 1 TABLET ORAL at 10:11

## 2022-11-02 RX ADMIN — DONEPEZIL HYDROCHLORIDE 10 MG: 5 TABLET, FILM COATED ORAL at 09:11

## 2022-11-02 RX ADMIN — Medication: at 09:11

## 2022-11-02 RX ADMIN — LABETALOL HYDROCHLORIDE 10 MG: 5 INJECTION, SOLUTION INTRAVENOUS at 01:11

## 2022-11-02 RX ADMIN — ACETAMINOPHEN 500 MG: 500 TABLET ORAL at 04:11

## 2022-11-02 NOTE — PLAN OF CARE
Problem: Occupational Therapy  Goal: Occupational Therapy Goal  Description: Goals to be met by: 11/16/2022     Patient will increase functional independence with ADLs by performing:    UE Dressing with Modified [place].  LE Dressing with Modified [place].  Grooming while standing at sink with Modified [place].  Toileting from toilet with Modified [place] for hygiene and clothing management.   Toilet transfer to toilet with Modified [place].    Outcome: Ongoing, Progressing

## 2022-11-02 NOTE — PT/OT/SLP EVAL
Occupational Therapy  CO Evaluation w PT  CoEval performed to optimize pt participation and assessment of full functional capacity.      Name: Eugenio Aldridge  MRN: [id number]  Admitting Diagnosis:  SDH (subdural hematoma)  Recent Surgery: Procedure(s) (LRB):  CRANIOTOMY, FOR SUBDURAL HEMATOMA EVACUATION (Left) 4 Days Post-Op    Recommendations:     Discharge Recommendations: rehabilitation facility  Discharge Equipment Recommendations:   (TBD)  Barriers to discharge:       Assessment:     Eugenio Aldridge is a 77 y.o. female with a medical diagnosis of SDH (subdural hematoma).  She presents with fair tolerance to session on this date performing grooming and LB dressing supine w Min A and func amb in room using RW requiring Min A 2/2 decreased safety. Pt w decreased orientation and cognition requiring increased vcs. Performance deficits affecting function: weakness, impaired endurance, impaired functional mobility, impaired self care skills, gait instability, impaired balance, impaired cognition, decreased coordination, decreased safety awareness.    Pt motivated to return home however not at baseline for ADL and functional mobility performance in addition to concerns of fall risk and would benefit from continued OT services at this time.    Rehab Prognosis: Fair; patient would benefit from acute skilled OT services to address these deficits and reach maximum level of function.       Plan:     Patient to be seen   to address the above listed problems via self-care/home management, therapeutic exercises, therapeutic activities, neuromuscular re-education  Plan of Care Expires: 12/02/22  Plan of Care Reviewed with: patient    Subjective     Chief Complaint: headaches  Patient/Family Comments/goals: When they leave me alone I can put on my socks    Occupational Profile:  Living Environment: lives w spouse in [place], no ISIAH  Previous level of function:  ind w ADLs  Roles and Routines:   Equipment Used at Home:  walker,  rolling  Assistance upon Discharge: spouse able to assist    Pain/Comfort:  Pain Rating 1: 0/10    Patients cultural, spiritual, Evangelical conflicts given the current situation: no    Objective:     Communicated with: RN prior to session.  Patient found supine with peripheral IV, restraints upon OT entry to room.    General Precautions: Standard, fall   Orthopedic Precautions:N/A   Braces: N/A  Respiratory Status: Room air    Occupational Performance:    Bed Mobility:    Patient completed Rolling/Turning to Left with  minimum assistance  Patient completed Scooting/Bridging with minimum assistance  Patient completed Supine to Sit with minimum assistance  Patient completed Sit to Supine with minimum assistance  CGA for EOB balance    Functional Mobility/Transfers:  Patient completed Sit <> Stand Transfer with minimum assistance  with  rolling walker   Functional Mobility: pt completed functional ambulation in room to simulate household mobility requiring Min A and RW w decreased safety awareness    Activities of Daily Living:  Grooming: supervision pt completed face hygiene while supine in bed  Lower Body Dressing: minimum assistance pt donned socks while supine in bed requiring task initiation via therapist placing socks over toes w pt able to pull socks up    Cognitive/Visual Perceptual:  Cognitive/Psychosocial Skills:     -       Oriented to: Person   -       Follows Commands/attention:Follows two-step commands  -       Communication: clear/fluent  -       Memory: Impaired STM and Impaired LTM  -       Safety awareness/insight to disability: impaired   -       Mood/Affect/Coping skills/emotional control: Appropriate to situation    Physical Exam:  Upper Extremity Range of Motion:     -       Right Upper Extremity: WFL  -       Left Upper Extremity: WFL  Upper Extremity Strength:    -       Right Upper Extremity: WFL  -       Left Upper Extremity: WFL    AMPAC 6 Click ADL:  AMPAC Total Score: 19    Treatment &  Education:  Pt educated on scope of practice and importance of daily functional mobility.   Pt educated on safety precautions during transfers  Pt updated on POC and discharge recc  White board updated to reflect pt status and visual reminder included on board instructing her to call for nurse as needed.      Patient left supine with all lines intact, call button in reach, bed alarm on, restraints reapplied at end of session, and RN notified    GOALS:   Multidisciplinary Problems       Occupational Therapy Goals          Problem: Occupational Therapy    Goal Priority Disciplines Outcome Interventions   Occupational Therapy Goal     OT, PT/OT Ongoing, Progressing    Description: Goals to be met by: 11/16/2022     Patient will increase functional independence with ADLs by performing:    UE Dressing with Modified [place].  LE Dressing with Modified [place].  Grooming while standing at sink with Modified [place].  Toileting from toilet with Modified [place] for hygiene and clothing management.   Toilet transfer to toilet with Modified [place].                         History:     Past Medical History:   Diagnosis Date    Actinic keratosis     Aortic valve disorders     Atherosclerosis of aorta     Atherosclerosis of native arteries of the extremities with intermittent claudication     Carcinoma in situ, vulva     Hypertension 2/21/2022    Left bundle branch hemiblock     Other and unspecified hyperlipidemia     Postinflammatory pulmonary fibrosis     Senile nuclear sclerosis          Past Surgical History:   Procedure Laterality Date    ARTHROSCOPIC REPAIR OF ROTATOR CUFF OF SHOULDER Right 4/24/2019    Procedure: REPAIR, ROTATOR CUFF, ARTHROSCOPIC WITH REGENERATIN IMPLANT;  Surgeon: Walter Hernandez MD;  Location: [facility];  Service: Orthopedics;  Laterality: Right;  regional w/o catheter     BTL      COLONOSCOPY N/A 12/9/2021    Procedure: COLONOSCOPY;  Surgeon: Juan Swain MD;  Location:  [facility] ENDO (4TH FLR);  Service: Endoscopy;  Laterality: N/A;  fully vaccinated    CRANIOTOMY FOR EVACUATION OF SUBDURAL HEMATOMA Left 10/29/2022    Procedure: CRANIOTOMY, FOR SUBDURAL HEMATOMA EVACUATION;  Surgeon: Ion Souza MD;  Location: [facility] OR 2ND FLR;  Service: Neurosurgery;  Laterality: Left;  ANN PINS    DECOMPRESSION OF SUBACROMIAL SPACE Right 4/24/2019    Procedure: DECOMPRESSION, SUBACROMIAL SPACE WITH DISTAL CLAVICLE EXCISION (DCE);  Surgeon: Walter Hernandez MD;  Location: [facility];  Service: Orthopedics;  Laterality: Right;    FLEXIBLE SIGMOIDOSCOPY N/A 12/20/2021    Procedure: SIGMOIDOSCOPY-FLEXIBLE;  Surgeon: Juan Swain MD;  Location: [facility] ENDO (2ND FLR);  Service: Endoscopy;  Laterality: N/A;  please schedule with me Monday 12/20 AM. Urgent for marking for colon cancer  spot held 2/20/21 12/16 fully vaccinated; instructions to portal-st    FLEXIBLE SIGMOIDOSCOPY N/A 2/4/2022    Procedure: SIGMOIDOSCOPY, FLEXIBLE;  Surgeon: GUILLERMO Olguin MD;  Location: [facility] OR 2ND FLR;  Service: Colon and Rectal;  Laterality: N/A;    LAPAROSCOPIC LEFT COLECTOMY N/A 2/4/2022    Procedure: COLECTOMY, LEFT, LAPAROSCOPIC;  Surgeon: GUILLERMO Olguin MD;  [facility];  Service: Colon and Rectal;  Laterality: N/A;  Extended    MOBILIZATION OF SPLENIC FLEXURE N/A 2/4/2022    Procedure: MOBILIZATION, SPLENIC FLEXURE;  Surgeon: GUILLERMO Olguin MD;  [address];  Service: Colon and Rectal;  Laterality: N/A;    RHYTIDECTOMY      SHOULDER ARTHROSCOPY Right 4/24/2019    Procedure: ARTHROSCOPY, SHOULDER WITH LABRAL DEBRIDEMENT;  Surgeon: Walter Hernandez MD;  Location: [facility];  Service: Orthopedics;  Laterality: Right;    TONSILLECTOMY, ADENOIDECTOMY      VULVA SURGERY         Time Tracking:     OT Date of Treatment: 11/02/22  OT Start Time: 1029  OT Stop Time: 1053  OT Total Time (min): 24 min    Billable Minutes:Evaluation 10  Self Care/Home Management 14    11/2/2022

## 2022-11-02 NOTE — NURSING TRANSFER
Nursing Transfer Note      11/1/2022     Reason patient is being transferred: stepped out of ICU    Transfer To: [id number]    Transfer via bed    Transfer with 2L to O2, cardiac monitoring    Transported by Washington Sellers, RN     Medicines sent: Motrin     Any special needs or follow-up needed:     Chart send with patient: Yes    Notified:     Patient reassessed at: 11/1/22 @ 0641 by Tennille Burris, RN    Upon arrival to floor: cardiac monitor applied, patient oriented to room, call bell in reach, and bed in lowest position

## 2022-11-02 NOTE — NURSING
"2145 Pt arrived to floor. Pt agitated, refusing to answer questions and only angrily stating, "I dont know" when asked any question. VSS. No signs of distress. Staples to head CHUNG. Oriented to room. Telemonitor applied. 2l NC.   2303 MD called and placed one time order for po ativan as pt was very agitated and confused, continuously referring to a "[facility] hospital," trying to get out of bed to "go to my appointment,"  and looking for her friend named OEDITH.   2326 PO ativan administered. Pt called friend (OJ) on personal cell phone. He too reoriented patient to situation. Pt still remains confused, oriented to self, but agrees to get some rest as she still thinks she has an appointment in the morning despite being reoriented.   0000 Pt now resting comfortably. Telesitter at bedside. Will continue to monitor.   "

## 2022-11-02 NOTE — PLAN OF CARE
Problem: Physical Therapy  Goal: Physical Therapy Goal  Description: Goals to be met by: 2022     Patient will increase functional independence with mobility by performin. Supine to sit with Set-up [place]  2. Sit to supine with Set-up [place]  3. Sit to stand transfer with Supervision  4. Bed to chair transfer with Supervision using Rolling Walker  5. Gait  x 150 feet with Supervision using Rolling Walker.   6. Lower extremity exercise program x15 reps per handout, with supervision    Outcome: Ongoing, Progressing

## 2022-11-02 NOTE — PT/OT/SLP EVAL
Physical Therapy Co-Evaluation    Patient Name:  Eugenio Aldridge   MRN:  [id number]    Co-evaluation and co-treatment performed for this visit due to suspected patient need for two skilled therapists to ensure patient and staff safety and to accommodate for patient activity tolerance/pain management     Recommendations:     Discharge Recommendations:  rehabilitation facility   Discharge Equipment Recommendations:  (TBD)   Barriers to discharge: None    Assessment:     Eugenio Aldridge is a 77 y.o. female admitted with a medical diagnosis of SDH (subdural hematoma).  She presents with the following impairments/functional limitations:  weakness, impaired endurance, impaired self care skills, impaired functional mobility, gait instability, impaired balance, impaired cognition, decreased safety awareness, decreased coordination Pt. cooperative and tolerated treatment well. Pt. progressing with mobility, but requires Min A for safety.    Rehab Prognosis: Good; patient would benefit from acute skilled PT services to address these deficits and reach maximum level of function.    Recent Surgery: Procedure(s) (LRB):  CRANIOTOMY, FOR SUBDURAL HEMATOMA EVACUATION (Left) 4 Days Post-Op    Plan:     During this hospitalization, patient to be seen 4 x/week to address the identified rehab impairments via gait training, therapeutic activities, therapeutic exercises, neuromuscular re-education and progress toward the following goals:    Plan of Care Expires:  12/02/22    Subjective     Chief Complaint: HA  Patient/Family Comments/goals: pt. Agreeable to PT  Pain/Comfort:  Pain Rating 1: 0/10  Pain Rating Post-Intervention 1: 0/10    Patients cultural, spiritual, Voodoo conflicts given the current situation: no    Living Environment:  Pt. Lives with significant other in [facility] with no ISIAH  Prior to admission, patients level of function was indep. with mobility.  Equipment used at home: walker, rolling.  Upon discharge, patient will have  assistance from significant other.    Objective:     Communicated with nursing prior to session.  Patient found supine with peripheral IV, restraints  upon PT entry to room.    General Precautions: Standard, fall   Orthopedic Precautions:N/A   Braces: N/A  Respiratory Status: Room air    Exams:  RLE ROM: WFL  RLE Strength: WFL  LLE ROM: WFL  LLE Strength: WFL    Functional Mobility:  Bed Mobility:     Rolling Left:  minimum assistance  Scooting: minimum assistance  Supine to Sit: minimum assistance  Sit to Supine: minimum assistance  Transfers:     Sit to Stand:  minimum assistance with rolling walker  Gait: 30' with RW and Min A for safety/balance/RW management. Pt. amb. with decreased step length/lam, posterior lean, and poor foot placement at times.  Balance: fair sitting, fair->poor standing      AM-PAC 6 CLICK MOBILITY  Total Score:17       Treatment & Education:  Discussed therapy needs, goals, and POC.    Patient left supine with all lines intact, call button in reach, and restraints reapplied at end of session.    GOALS:   Multidisciplinary Problems       Physical Therapy Goals          Problem: Physical Therapy    Goal Priority Disciplines Outcome Goal Variances Interventions   Physical Therapy Goal     PT, PT/OT Ongoing, Progressing     Description: Goals to be met by: 2022     Patient will increase functional independence with mobility by performin. Supine to sit with Set-up [place]  2. Sit to supine with Set-up [place]  3. Sit to stand transfer with Supervision  4. Bed to chair transfer with Supervision using Rolling Walker  5. Gait  x 150 feet with Supervision using Rolling Walker.   6. Lower extremity exercise program x15 reps per handout, with supervision                         History:     Past Medical History:   Diagnosis Date    Actinic keratosis     Aortic valve disorders     Atherosclerosis of aorta     Atherosclerosis of native arteries of the extremities with  intermittent claudication     Carcinoma in situ, vulva     Hypertension 2/21/2022    Left bundle branch hemiblock     Other and unspecified hyperlipidemia     Postinflammatory pulmonary fibrosis     Senile nuclear sclerosis        Past Surgical History:   Procedure Laterality Date    ARTHROSCOPIC REPAIR OF ROTATOR CUFF OF SHOULDER Right 4/24/2019    Procedure: REPAIR, ROTATOR CUFF, ARTHROSCOPIC WITH REGENERATIN IMPLANT;  Surgeon: Walter Hernandez MD;  Location: [facility] OR;  Service: Orthopedics;  Laterality: Right;  regional w/o catheter     BTL      COLONOSCOPY N/A 12/9/2021    Procedure: COLONOSCOPY;  Surgeon: Juan Swain MD;  Location: [facility] ENDO (4TH FLR);  Service: Endoscopy;  Laterality: N/A;  fully vaccinated    CRANIOTOMY FOR EVACUATION OF SUBDURAL HEMATOMA Left 10/29/2022    Procedure: CRANIOTOMY, FOR SUBDURAL HEMATOMA EVACUATION;  Surgeon: Ion Souza MD;  Location: [facility] OR 2ND FLR;  Service: Neurosurgery;  Laterality: Left;  ANN PINS    DECOMPRESSION OF SUBACROMIAL SPACE Right 4/24/2019    Procedure: DECOMPRESSION, SUBACROMIAL SPACE WITH DISTAL CLAVICLE EXCISION (DCE);  Surgeon: Walter Hernandez MD;  Location: [facility] OR;  Service: Orthopedics;  Laterality: Right;    FLEXIBLE SIGMOIDOSCOPY N/A 12/20/2021    Procedure: SIGMOIDOSCOPY-FLEXIBLE;  Surgeon: Juan Swain MD;  Location: [facility] ENDO (2ND FLR);  Service: Endoscopy;  Laterality: N/A;  please schedule with me Monday 12/20 AM. Urgent for marking for colon cancer  spot held 2/20/21  12/16 fully vaccinated; instructions to portal-st    FLEXIBLE SIGMOIDOSCOPY N/A 2/4/2022    Procedure: SIGMOIDOSCOPY, FLEXIBLE;  Surgeon: GUILLERMO Olguin MD;  Location: [facility] OR 2ND FLR;  Service: Colon and Rectal;  Laterality: N/A;    LAPAROSCOPIC LEFT COLECTOMY N/A 2/4/2022    Procedure: COLECTOMY, LEFT, LAPAROSCOPIC;  Surgeon: GUILLERMO Olguin MD;  Location: [facility] OR 2ND FLR;  Service: Colon and Rectal;  Laterality: N/A;  Extended    MOBILIZATION OF SPLENIC FLEXURE  N/A 2/4/2022    Procedure: MOBILIZATION, SPLENIC FLEXURE;  Surgeon: GUILLERMO Olguin MD;  Location: [address];  Service: Colon and Rectal;  Laterality: N/A;    RHYTIDECTOMY      SHOULDER ARTHROSCOPY Right 4/24/2019    Procedure: ARTHROSCOPY, SHOULDER WITH LABRAL DEBRIDEMENT;  Surgeon: Walter Hernandez MD;  Location: [facility];  Service: Orthopedics;  Laterality: Right;    TONSILLECTOMY, ADENOIDECTOMY      VULVA SURGERY         Time Tracking:     PT Received On: 11/02/22  PT Start Time: 1029     PT Stop Time: 1053  PT Total Time (min): 24 min     Billable Minutes: Evaluation 14 and Gait Training 10      11/02/2022

## 2022-11-02 NOTE — PLAN OF CARE
Problem: Adult Inpatient Plan of Care  Goal: Plan of Care Review  Outcome: Ongoing, Progressing  Goal: Patient-Specific Goal (Individualized)  Outcome: Ongoing, Progressing  Goal: Absence of Hospital-Acquired Illness or Injury  Outcome: Ongoing, Progressing  Goal: Optimal Comfort and Wellbeing  Outcome: Ongoing, Progressing  Goal: Readiness for Transition of Care  Outcome: Ongoing, Progressing     Problem: Skin Injury Risk Increased  Goal: Skin Health and Integrity  Outcome: Ongoing, Progressing     Problem: Fall Injury Risk  Goal: Absence of Fall and Fall-Related Injury  Outcome: Ongoing, Progressing     Problem: Infection  Goal: Absence of Infection Signs and Symptoms  Outcome: Ongoing, Progressing     Problem: Adjustment to Surgery (Craniotomy/Craniectomy/Cranioplasty)  Goal: Optimal Coping with Surgery  Outcome: Ongoing, Progressing     Problem: Bleeding (Craniotomy/Craniectomy/Cranioplasty)  Goal: Absence of Bleeding  Outcome: Ongoing, Progressing     Problem: Bowel Motility Impaired (Craniotomy/Craniectomy/Cranioplasty)  Goal: Effective Bowel Elimination  Outcome: Ongoing, Progressing     Problem: Cerebral Tissue Perfusion Risk (Craniotomy/Craniectomy/Cranioplasty)  Goal: Optimal Cerebral Tissue Perfusion  Outcome: Ongoing, Progressing     Problem: Fluid and Electrolyte Imbalance (Craniotomy/Craniectomy/Cranioplasty)  Goal: Fluid and Electrolyte Balance  Outcome: Ongoing, Progressing     Problem: Functional Ability Impaired (Craniotomy/Craniectomy/Cranioplasty)  Goal: Optimal Functional Ability  Outcome: Ongoing, Progressing     Problem: Infection (Craniotomy/Craniectomy/Cranioplasty)  Goal: Absence of Infection Signs and Symptoms  Outcome: Ongoing, Progressing     Problem: Ongoing Anesthesia Effects (Craniotomy/Craniectomy/Cranioplasty)  Goal: Anesthesia/Sedation Recovery  Outcome: Ongoing, Progressing     Problem: Pain (Craniotomy/Craniectomy/Cranioplasty)  Goal: Acceptable Pain Control  Outcome: Ongoing,  Progressing     Problem: Postoperative Nausea and Vomiting (Craniotomy/Craniectomy/Cranioplasty)  Goal: Nausea and Vomiting Relief  Outcome: Ongoing, Progressing     Problem: Postoperative Urinary Retention (Craniotomy/Craniectomy/Cranioplasty)  Goal: Effective Urinary Elimination  Outcome: Ongoing, Progressing     Problem: Respiratory Compromise (Craniotomy/Craniectomy/Cranioplasty)  Goal: Effective Oxygenation and Ventilation  Outcome: Ongoing, Progressing   Pt is still in restraints that are assessed every 2 hrs. Labetolol was given for BP. No other new changes.

## 2022-11-03 PROCEDURE — 25000003 PHARM REV CODE 250

## 2022-11-03 PROCEDURE — 27000207 HC ISOLATION

## 2022-11-03 PROCEDURE — 20600001 HC STEP DOWN PRIVATE ROOM

## 2022-11-03 PROCEDURE — 25000003 PHARM REV CODE 250: Performed by: PSYCHIATRY & NEUROLOGY

## 2022-11-03 PROCEDURE — 25000003 PHARM REV CODE 250: Performed by: NURSE PRACTITIONER

## 2022-11-03 PROCEDURE — 25000003 PHARM REV CODE 250: Performed by: NEUROLOGICAL SURGERY

## 2022-11-03 PROCEDURE — 36415 COLL VENOUS BLD VENIPUNCTURE: CPT | Performed by: NURSE PRACTITIONER

## 2022-11-03 RX ADMIN — LACOSAMIDE 100 MG: 50 TABLET, FILM COATED ORAL at 08:11

## 2022-11-03 RX ADMIN — LACOSAMIDE 100 MG: 50 TABLET, FILM COATED ORAL at 09:11

## 2022-11-03 RX ADMIN — LABETALOL HYDROCHLORIDE 10 MG: 5 INJECTION, SOLUTION INTRAVENOUS at 08:11

## 2022-11-03 RX ADMIN — SENNOSIDES AND DOCUSATE SODIUM 1 TABLET: 50; 8.6 TABLET ORAL at 05:11

## 2022-11-03 RX ADMIN — DONEPEZIL HYDROCHLORIDE 10 MG: 5 TABLET, FILM COATED ORAL at 08:11

## 2022-11-03 RX ADMIN — FOLIC ACID 1 MG: 1 TABLET ORAL at 09:11

## 2022-11-03 RX ADMIN — Medication: at 08:11

## 2022-11-03 RX ADMIN — LABETALOL HYDROCHLORIDE 10 MG: 5 INJECTION, SOLUTION INTRAVENOUS at 12:11

## 2022-11-03 RX ADMIN — Medication: at 05:11

## 2022-11-03 RX ADMIN — POLYETHYLENE GLYCOL 3350 17 G: 17 POWDER, FOR SOLUTION ORAL at 09:11

## 2022-11-03 RX ADMIN — LEVOTHYROXINE SODIUM 50 MCG: 50 TABLET ORAL at 05:11

## 2022-11-03 RX ADMIN — THERA TABS 1 TABLET: TAB at 09:11

## 2022-11-03 RX ADMIN — Medication 100 MG: at 09:11

## 2022-11-03 NOTE — PLAN OF CARE
CAMILA spoke to pt's significant other, Marzena [phone], to discuss discharge plan and recommendations for inpt rehab.  Marzena agreeable to inpt rehab.  [address] [place] [address]-[facility].  CAMILA sent additional referrals to [facility] and [facility].  Pt not medically ready for d/c at this time.      11/03/22 1439   Post-Acute Status   Post-Acute Authorization Placement   Post-Acute Placement Status Referrals Sent   Discharge Delays None known at this time   Discharge Plan   Discharge Plan A Rehab   Discharge Plan B Home;Home with family;Home Health     Libby Loyola LMSW  PRN-  [facility]  Ext. [phone]

## 2022-11-03 NOTE — PLAN OF CARE
[facility] - Telemetry Stepdown  Discharge Reassessment    Primary Care Provider: Bertha Salguero MD    Expected Discharge Date: 11/8/2022    SW in communication with pt's significant other, Antwan [phone], in reference to recommendations for inpt rehab.  Pt's significant other would like [facility] as first preference.  SW sent additionals referrals to [facility] and [facility].  PMR consult placed.     Pt not medically ready at this time.  SW will continue to follow.      Reassessment (most recent)       Discharge Reassessment - 11/03/22 1443          Discharge Reassessment    Assessment Type Discharge Planning Reassessment     Did the patient's condition or plan change since previous assessment? Yes     Discharge Plan discussed with: Spouse/sig other     Name(s) and Number(s) Marzena Salas, significant other, [phone]     Communicated SHANDA with patient/caregiver Yes     Discharge Plan A Rehab     Discharge Plan B Home;Home with family;Home Health     DME Needed Upon Discharge  other (see comments)   TBD    Discharge Barriers Identified None     Why the patient remains in the hospital Requires continued medical care        Post-Acute Status    Post-Acute Authorization Placement     Post-Acute Placement Status Referrals Sent     Discharge Delays None known at this time                   Libby Loyola LMSW  PRN-  [facility]  Ext. [phone]

## 2022-11-03 NOTE — PLAN OF CARE
Problem: Adult Inpatient Plan of Care  Goal: Plan of Care Review  Outcome: Ongoing, Progressing  Goal: Patient-Specific Goal (Individualized)  Outcome: Ongoing, Progressing  Goal: Absence of Hospital-Acquired Illness or Injury  Outcome: Ongoing, Progressing  Goal: Optimal Comfort and Wellbeing  Outcome: Ongoing, Progressing  Goal: Readiness for Transition of Care  Outcome: Ongoing, Progressing     Problem: Skin Injury Risk Increased  Goal: Skin Health and Integrity  Outcome: Ongoing, Progressing     Problem: Fall Injury Risk  Goal: Absence of Fall and Fall-Related Injury  Outcome: Ongoing, Progressing     Problem: Infection  Goal: Absence of Infection Signs and Symptoms  Outcome: Ongoing, Progressing     Problem: Adjustment to Surgery (Craniotomy/Craniectomy/Cranioplasty)  Goal: Optimal Coping with Surgery  Outcome: Ongoing, Progressing     Problem: Bleeding (Craniotomy/Craniectomy/Cranioplasty)  Goal: Absence of Bleeding  Outcome: Ongoing, Progressing     Problem: Bowel Motility Impaired (Craniotomy/Craniectomy/Cranioplasty)  Goal: Effective Bowel Elimination  Outcome: Ongoing, Progressing     Problem: Cerebral Tissue Perfusion Risk (Craniotomy/Craniectomy/Cranioplasty)  Goal: Optimal Cerebral Tissue Perfusion  Outcome: Ongoing, Progressing     Problem: Fluid and Electrolyte Imbalance (Craniotomy/Craniectomy/Cranioplasty)  Goal: Fluid and Electrolyte Balance  Outcome: Ongoing, Progressing     Problem: Functional Ability Impaired (Craniotomy/Craniectomy/Cranioplasty)  Goal: Optimal Functional Ability  Outcome: Ongoing, Progressing     Problem: Infection (Craniotomy/Craniectomy/Cranioplasty)  Goal: Absence of Infection Signs and Symptoms  Outcome: Ongoing, Progressing     Problem: Ongoing Anesthesia Effects (Craniotomy/Craniectomy/Cranioplasty)  Goal: Anesthesia/Sedation Recovery  Outcome: Ongoing, Progressing     Problem: Pain (Craniotomy/Craniectomy/Cranioplasty)  Goal: Acceptable Pain Control  Outcome: Ongoing,  Progressing     Problem: Postoperative Nausea and Vomiting (Craniotomy/Craniectomy/Cranioplasty)  Goal: Nausea and Vomiting Relief  Outcome: Ongoing, Progressing     Problem: Postoperative Urinary Retention (Craniotomy/Craniectomy/Cranioplasty)  Goal: Effective Urinary Elimination  Outcome: Ongoing, Progressing     Problem: Respiratory Compromise (Craniotomy/Craniectomy/Cranioplasty)  Goal: Effective Oxygenation and Ventilation  Outcome: Ongoing, Progressing   AOX2. 1 BM today. 50% of food eaten. All scheduled medications given. Pt in no apparent sign of distress. Will continue to monitor.

## 2022-11-03 NOTE — PT/OT/SLP PROGRESS
Physical Therapy      Patient Name:  Eugenio Aldridge   MRN:  [id number]    Patient not seen today secondary to  (patient politely declined to participate). Patient reporting fatigue limiting ability to participate in mobility. Provided multiple option for mobility including exercise, ambulation in room, and toileting, patient reiterated refusal. Will follow-up as appropriate.

## 2022-11-03 NOTE — PLAN OF CARE
Pt remains oriented x2 person, time. Bilateral wrist restraints removed. Prn labetalol administered x1. VSS. Telesitter at bedside. Call bell within reach. Care to be continued.

## 2022-11-04 LAB
ALBUMIN SERPL BCP-MCNC: 2.7 G/DL (ref 3.5–5.2)
ALP SERPL-CCNC: 70 U/L (ref 55–135)
ALT SERPL W/O P-5'-P-CCNC: 55 U/L (ref 10–44)
ANION GAP SERPL CALC-SCNC: 13 MMOL/L (ref 8–16)
AST SERPL-CCNC: 42 U/L (ref 10–40)
BASOPHILS # BLD AUTO: 0.06 K/UL (ref 0–0.2)
BASOPHILS NFR BLD: 0.8 % (ref 0–1.9)
BILIRUB SERPL-MCNC: 0.6 MG/DL (ref 0.1–1)
BUN SERPL-MCNC: 17 MG/DL (ref 8–23)
CALCIUM SERPL-MCNC: 9.7 MG/DL (ref 8.7–10.5)
CHLORIDE SERPL-SCNC: 105 MMOL/L (ref 95–110)
CO2 SERPL-SCNC: 27 MMOL/L (ref 23–29)
CREAT SERPL-MCNC: 0.6 MG/DL (ref 0.5–1.4)
DIFFERENTIAL METHOD: ABNORMAL
EOSINOPHIL # BLD AUTO: 0.1 K/UL (ref 0–0.5)
EOSINOPHIL NFR BLD: 1 % (ref 0–8)
ERYTHROCYTE [DISTWIDTH] IN BLOOD BY AUTOMATED COUNT: 13.2 % (ref 11.5–14.5)
EST. GFR  (NO RACE VARIABLE): >60 ML/MIN/1.73 M^2
FINAL PATHOLOGIC DIAGNOSIS: NORMAL
GLUCOSE SERPL-MCNC: 99 MG/DL (ref 70–110)
GROSS: NORMAL
HCT VFR BLD AUTO: 36.8 % (ref 37–48.5)
HGB BLD-MCNC: 12 G/DL (ref 12–16)
IMM GRANULOCYTES # BLD AUTO: 0.04 K/UL (ref 0–0.04)
IMM GRANULOCYTES NFR BLD AUTO: 0.5 % (ref 0–0.5)
LYMPHOCYTES # BLD AUTO: 0.7 K/UL (ref 1–4.8)
LYMPHOCYTES NFR BLD: 8.7 % (ref 18–48)
Lab: NORMAL
MCH RBC QN AUTO: 31.3 PG (ref 27–31)
MCHC RBC AUTO-ENTMCNC: 32.6 G/DL (ref 32–36)
MCV RBC AUTO: 96 FL (ref 82–98)
MICROSCOPIC EXAM: NORMAL
MONOCYTES # BLD AUTO: 0.9 K/UL (ref 0.3–1)
MONOCYTES NFR BLD: 11.3 % (ref 4–15)
NEUTROPHILS # BLD AUTO: 6.2 K/UL (ref 1.8–7.7)
NEUTROPHILS NFR BLD: 77.7 % (ref 38–73)
NRBC BLD-RTO: 0 /100 WBC
PLATELET # BLD AUTO: 369 K/UL (ref 150–450)
PMV BLD AUTO: 9.9 FL (ref 9.2–12.9)
POTASSIUM SERPL-SCNC: 3.6 MMOL/L (ref 3.5–5.1)
PROT SERPL-MCNC: 6.3 G/DL (ref 6–8.4)
RBC # BLD AUTO: 3.84 M/UL (ref 4–5.4)
SODIUM SERPL-SCNC: 145 MMOL/L (ref 136–145)
WBC # BLD AUTO: 7.91 K/UL (ref 3.9–12.7)

## 2022-11-04 PROCEDURE — 25000003 PHARM REV CODE 250: Performed by: NURSE PRACTITIONER

## 2022-11-04 PROCEDURE — 25000003 PHARM REV CODE 250: Performed by: PSYCHIATRY & NEUROLOGY

## 2022-11-04 PROCEDURE — 25000003 PHARM REV CODE 250

## 2022-11-04 PROCEDURE — 80053 COMPREHEN METABOLIC PANEL: CPT | Performed by: NURSE PRACTITIONER

## 2022-11-04 PROCEDURE — 27000207 HC ISOLATION

## 2022-11-04 PROCEDURE — 20600001 HC STEP DOWN PRIVATE ROOM

## 2022-11-04 PROCEDURE — 85025 COMPLETE CBC W/AUTO DIFF WBC: CPT | Performed by: NURSE PRACTITIONER

## 2022-11-04 PROCEDURE — 63600175 PHARM REV CODE 636 W HCPCS: Performed by: STUDENT IN AN ORGANIZED HEALTH CARE EDUCATION/TRAINING PROGRAM

## 2022-11-04 PROCEDURE — 36415 COLL VENOUS BLD VENIPUNCTURE: CPT | Performed by: NURSE PRACTITIONER

## 2022-11-04 RX ORDER — HEPARIN SODIUM 5000 [USP'U]/ML
5000 INJECTION, SOLUTION INTRAVENOUS; SUBCUTANEOUS EVERY 8 HOURS
Status: DISCONTINUED | OUTPATIENT
Start: 2022-11-04 | End: 2022-11-08 | Stop reason: HOSPADM

## 2022-11-04 RX ADMIN — POLYETHYLENE GLYCOL 3350 17 G: 17 POWDER, FOR SOLUTION ORAL at 08:11

## 2022-11-04 RX ADMIN — LACOSAMIDE 100 MG: 50 TABLET, FILM COATED ORAL at 08:11

## 2022-11-04 RX ADMIN — LACOSAMIDE 100 MG: 50 TABLET, FILM COATED ORAL at 09:11

## 2022-11-04 RX ADMIN — Medication: at 09:11

## 2022-11-04 RX ADMIN — THERA TABS 1 TABLET: TAB at 08:11

## 2022-11-04 RX ADMIN — DONEPEZIL HYDROCHLORIDE 10 MG: 5 TABLET, FILM COATED ORAL at 08:11

## 2022-11-04 RX ADMIN — FOLIC ACID 1 MG: 1 TABLET ORAL at 09:11

## 2022-11-04 RX ADMIN — HEPARIN SODIUM 5000 UNITS: 5000 INJECTION INTRAVENOUS; SUBCUTANEOUS at 08:11

## 2022-11-04 RX ADMIN — Medication: at 08:11

## 2022-11-04 RX ADMIN — LEVOTHYROXINE SODIUM 50 MCG: 50 TABLET ORAL at 06:11

## 2022-11-04 RX ADMIN — Medication 100 MG: at 08:11

## 2022-11-04 NOTE — CONSULTS
Inpatient consult to Physical Medicine Rehab  Consult performed by: Melinda Baeza NP  Consult ordered by: Ion Souza MD    Consult received.  Reviewed patient history and current admission.  PM&R following.  Pt remains on isolation. Per chart review, pt  had refused therapies. Will need to participate with therapies consistently in order to meet inpatient rehab criteria and for maximal benefit.     Melinda Baeza NP  Physical Medicine & Rehabilitation   11/04/2022

## 2022-11-04 NOTE — NURSING
Morning bedside handoff report complete. She is awake and pleasant.  No sign of distress noted.

## 2022-11-04 NOTE — CONSULTS
Inpatient consult to Physical Medicine Rehab  Consult performed by: Melinda Baeza NP  Consult ordered by: Melinda Baeza NP    Consult received.  Reviewed patient history and current admission.  PM&R following. Please see Previous consult note.     Melinda Baeza NP  Physical Medicine & Rehabilitation   11/04/2022

## 2022-11-04 NOTE — PT/OT/SLP PROGRESS
Physical Therapy      Patient Name:  Eugenio Aldridge   MRN:  [id number]    Patient not seen today secondary to pt nursing care x2 attempts. Will follow-up per PT POC.

## 2022-11-04 NOTE — PLAN OF CARE
SW ordered PMR not realizing they were already following. [facility] is willing to accept patient when medically ready. CAMILA following.    Pat Mcdonald LMSW  Case Management Department  [email or web address]

## 2022-11-04 NOTE — PLAN OF CARE
Problem: Adult Inpatient Plan of Care  Goal: Plan of Care Review  Outcome: Ongoing, Progressing  Goal: Patient-Specific Goal (Individualized)  Outcome: Ongoing, Progressing  Goal: Absence of Hospital-Acquired Illness or Injury  Outcome: Ongoing, Progressing  Goal: Optimal Comfort and Wellbeing  Outcome: Ongoing, Progressing  Goal: Readiness for Transition of Care  Outcome: Ongoing, Progressing     Problem: Skin Injury Risk Increased  Goal: Skin Health and Integrity  Outcome: Ongoing, Progressing     Problem: Fall Injury Risk  Goal: Absence of Fall and Fall-Related Injury  Outcome: Ongoing, Progressing     Problem: Infection  Goal: Absence of Infection Signs and Symptoms  Outcome: Ongoing, Progressing     Problem: Adjustment to Surgery (Craniotomy/Craniectomy/Cranioplasty)  Goal: Optimal Coping with Surgery  Outcome: Ongoing, Progressing     Problem: Bleeding (Craniotomy/Craniectomy/Cranioplasty)  Goal: Absence of Bleeding  Outcome: Ongoing, Progressing     Problem: Bowel Motility Impaired (Craniotomy/Craniectomy/Cranioplasty)  Goal: Effective Bowel Elimination  Outcome: Ongoing, Progressing     Problem: Cerebral Tissue Perfusion Risk (Craniotomy/Craniectomy/Cranioplasty)  Goal: Optimal Cerebral Tissue Perfusion  Outcome: Ongoing, Progressing     Problem: Fluid and Electrolyte Imbalance (Craniotomy/Craniectomy/Cranioplasty)  Goal: Fluid and Electrolyte Balance  Outcome: Ongoing, Progressing     Problem: Functional Ability Impaired (Craniotomy/Craniectomy/Cranioplasty)  Goal: Optimal Functional Ability  Outcome: Ongoing, Progressing     Problem: Infection (Craniotomy/Craniectomy/Cranioplasty)  Goal: Absence of Infection Signs and Symptoms  Outcome: Ongoing, Progressing     Problem: Ongoing Anesthesia Effects (Craniotomy/Craniectomy/Cranioplasty)  Goal: Anesthesia/Sedation Recovery  Outcome: Ongoing, Progressing     Problem: Pain (Craniotomy/Craniectomy/Cranioplasty)  Goal: Acceptable Pain Control  Outcome: Ongoing,  Progressing     Problem: Postoperative Nausea and Vomiting (Craniotomy/Craniectomy/Cranioplasty)  Goal: Nausea and Vomiting Relief  Outcome: Ongoing, Progressing     Problem: Postoperative Urinary Retention (Craniotomy/Craniectomy/Cranioplasty)  Goal: Effective Urinary Elimination  Outcome: Ongoing, Progressing     Problem: Respiratory Compromise (Craniotomy/Craniectomy/Cranioplasty)  Goal: Effective Oxygenation and Ventilation  Outcome: Ongoing, Progressing   Overall she has had a good day.  Her short term memory seems poor.  I reoriented her to what brought her here and events leading up to now and approx 1 hour later she acted as if we had not discussed her situation.  She is refusing her meals and drinking very little. No sign of distress noted at this time.

## 2022-11-05 LAB
ALBUMIN SERPL BCP-MCNC: 2.8 G/DL (ref 3.5–5.2)
ALP SERPL-CCNC: 71 U/L (ref 55–135)
ALT SERPL W/O P-5'-P-CCNC: 57 U/L (ref 10–44)
ANION GAP SERPL CALC-SCNC: 12 MMOL/L (ref 8–16)
AST SERPL-CCNC: 41 U/L (ref 10–40)
BASOPHILS # BLD AUTO: 0.06 K/UL (ref 0–0.2)
BASOPHILS NFR BLD: 0.8 % (ref 0–1.9)
BILIRUB SERPL-MCNC: 0.6 MG/DL (ref 0.1–1)
BUN SERPL-MCNC: 15 MG/DL (ref 8–23)
CALCIUM SERPL-MCNC: 9.8 MG/DL (ref 8.7–10.5)
CHLORIDE SERPL-SCNC: 105 MMOL/L (ref 95–110)
CO2 SERPL-SCNC: 26 MMOL/L (ref 23–29)
CREAT SERPL-MCNC: 0.6 MG/DL (ref 0.5–1.4)
DIFFERENTIAL METHOD: ABNORMAL
EOSINOPHIL # BLD AUTO: 0.1 K/UL (ref 0–0.5)
EOSINOPHIL NFR BLD: 1.3 % (ref 0–8)
ERYTHROCYTE [DISTWIDTH] IN BLOOD BY AUTOMATED COUNT: 13 % (ref 11.5–14.5)
EST. GFR  (NO RACE VARIABLE): >60 ML/MIN/1.73 M^2
GLUCOSE SERPL-MCNC: 95 MG/DL (ref 70–110)
HCT VFR BLD AUTO: 38.4 % (ref 37–48.5)
HGB BLD-MCNC: 12.5 G/DL (ref 12–16)
IMM GRANULOCYTES # BLD AUTO: 0.03 K/UL (ref 0–0.04)
IMM GRANULOCYTES NFR BLD AUTO: 0.4 % (ref 0–0.5)
LYMPHOCYTES # BLD AUTO: 1.1 K/UL (ref 1–4.8)
LYMPHOCYTES NFR BLD: 14 % (ref 18–48)
MCH RBC QN AUTO: 31.2 PG (ref 27–31)
MCHC RBC AUTO-ENTMCNC: 32.6 G/DL (ref 32–36)
MCV RBC AUTO: 96 FL (ref 82–98)
MONOCYTES # BLD AUTO: 0.8 K/UL (ref 0.3–1)
MONOCYTES NFR BLD: 10.7 % (ref 4–15)
NEUTROPHILS # BLD AUTO: 5.5 K/UL (ref 1.8–7.7)
NEUTROPHILS NFR BLD: 72.8 % (ref 38–73)
NRBC BLD-RTO: 0 /100 WBC
PLATELET # BLD AUTO: 354 K/UL (ref 150–450)
PMV BLD AUTO: 9.5 FL (ref 9.2–12.9)
POTASSIUM SERPL-SCNC: 3.7 MMOL/L (ref 3.5–5.1)
PROT SERPL-MCNC: 6.4 G/DL (ref 6–8.4)
RBC # BLD AUTO: 4.01 M/UL (ref 4–5.4)
SODIUM SERPL-SCNC: 143 MMOL/L (ref 136–145)
WBC # BLD AUTO: 7.56 K/UL (ref 3.9–12.7)

## 2022-11-05 PROCEDURE — 25000003 PHARM REV CODE 250: Performed by: NURSE PRACTITIONER

## 2022-11-05 PROCEDURE — 25000003 PHARM REV CODE 250

## 2022-11-05 PROCEDURE — 25000003 PHARM REV CODE 250: Performed by: STUDENT IN AN ORGANIZED HEALTH CARE EDUCATION/TRAINING PROGRAM

## 2022-11-05 PROCEDURE — 63600175 PHARM REV CODE 636 W HCPCS: Performed by: STUDENT IN AN ORGANIZED HEALTH CARE EDUCATION/TRAINING PROGRAM

## 2022-11-05 PROCEDURE — 36415 COLL VENOUS BLD VENIPUNCTURE: CPT | Performed by: NURSE PRACTITIONER

## 2022-11-05 PROCEDURE — 25000003 PHARM REV CODE 250: Performed by: PSYCHIATRY & NEUROLOGY

## 2022-11-05 PROCEDURE — 20600001 HC STEP DOWN PRIVATE ROOM

## 2022-11-05 PROCEDURE — 27000207 HC ISOLATION

## 2022-11-05 PROCEDURE — 80053 COMPREHEN METABOLIC PANEL: CPT | Performed by: NURSE PRACTITIONER

## 2022-11-05 PROCEDURE — 85025 COMPLETE CBC W/AUTO DIFF WBC: CPT | Performed by: NURSE PRACTITIONER

## 2022-11-05 RX ADMIN — DONEPEZIL HYDROCHLORIDE 10 MG: 5 TABLET, FILM COATED ORAL at 09:11

## 2022-11-05 RX ADMIN — FOLIC ACID 1 MG: 1 TABLET ORAL at 09:11

## 2022-11-05 RX ADMIN — LACOSAMIDE 100 MG: 50 TABLET, FILM COATED ORAL at 09:11

## 2022-11-05 RX ADMIN — LEVOTHYROXINE SODIUM 50 MCG: 50 TABLET ORAL at 05:11

## 2022-11-05 RX ADMIN — HEPARIN SODIUM 5000 UNITS: 5000 INJECTION INTRAVENOUS; SUBCUTANEOUS at 09:11

## 2022-11-05 RX ADMIN — HEPARIN SODIUM 5000 UNITS: 5000 INJECTION INTRAVENOUS; SUBCUTANEOUS at 03:11

## 2022-11-05 RX ADMIN — HEPARIN SODIUM 5000 UNITS: 5000 INJECTION INTRAVENOUS; SUBCUTANEOUS at 05:11

## 2022-11-05 RX ADMIN — SODIUM CHLORIDE 500 ML: 0.9 INJECTION, SOLUTION INTRAVENOUS at 06:11

## 2022-11-05 RX ADMIN — THERA TABS 1 TABLET: TAB at 09:11

## 2022-11-05 RX ADMIN — Medication: at 09:11

## 2022-11-05 RX ADMIN — Medication 100 MG: at 09:11

## 2022-11-05 RX ADMIN — POLYETHYLENE GLYCOL 3350 17 G: 17 POWDER, FOR SOLUTION ORAL at 09:11

## 2022-11-05 NOTE — PLAN OF CARE
Problem: Adult Inpatient Plan of Care  Goal: Plan of Care Review  Outcome: Ongoing, Progressing  Goal: Patient-Specific Goal (Individualized)  Outcome: Ongoing, Progressing  Goal: Absence of Hospital-Acquired Illness or Injury  Outcome: Ongoing, Progressing  Goal: Optimal Comfort and Wellbeing  Outcome: Ongoing, Progressing  Goal: Readiness for Transition of Care  Outcome: Ongoing, Progressing     Problem: Skin Injury Risk Increased  Goal: Skin Health and Integrity  Outcome: Ongoing, Progressing     Problem: Fall Injury Risk  Goal: Absence of Fall and Fall-Related Injury  Outcome: Ongoing, Progressing     Problem: Infection  Goal: Absence of Infection Signs and Symptoms  Outcome: Ongoing, Progressing     Problem: Adjustment to Surgery (Craniotomy/Craniectomy/Cranioplasty)  Goal: Optimal Coping with Surgery  Outcome: Ongoing, Progressing     Problem: Bleeding (Craniotomy/Craniectomy/Cranioplasty)  Goal: Absence of Bleeding  Outcome: Ongoing, Progressing     Problem: Bowel Motility Impaired (Craniotomy/Craniectomy/Cranioplasty)  Goal: Effective Bowel Elimination  Outcome: Ongoing, Progressing     Problem: Cerebral Tissue Perfusion Risk (Craniotomy/Craniectomy/Cranioplasty)  Goal: Optimal Cerebral Tissue Perfusion  Outcome: Ongoing, Progressing     Problem: Fluid and Electrolyte Imbalance (Craniotomy/Craniectomy/Cranioplasty)  Goal: Fluid and Electrolyte Balance  Outcome: Ongoing, Progressing     Problem: Functional Ability Impaired (Craniotomy/Craniectomy/Cranioplasty)  Goal: Optimal Functional Ability  Outcome: Ongoing, Progressing     Problem: Infection (Craniotomy/Craniectomy/Cranioplasty)  Goal: Absence of Infection Signs and Symptoms  Outcome: Ongoing, Progressing     Problem: Ongoing Anesthesia Effects (Craniotomy/Craniectomy/Cranioplasty)  Goal: Anesthesia/Sedation Recovery  Outcome: Ongoing, Progressing     Problem: Pain (Craniotomy/Craniectomy/Cranioplasty)  Goal: Acceptable Pain Control  Outcome: Ongoing,  Progressing     Problem: Postoperative Nausea and Vomiting (Craniotomy/Craniectomy/Cranioplasty)  Goal: Nausea and Vomiting Relief  Outcome: Ongoing, Progressing     Problem: Postoperative Urinary Retention (Craniotomy/Craniectomy/Cranioplasty)  Goal: Effective Urinary Elimination  Outcome: Ongoing, Progressing     Problem: Respiratory Compromise (Craniotomy/Craniectomy/Cranioplasty)  Goal: Effective Oxygenation and Ventilation  Outcome: Ongoing, Progressing   She is much better today.  She has eaten and drank much more today than yesterday.  Her gait remains unsteady but she ambulates with a one person assist to the restroom.  No sign of distress noted at this time.

## 2022-11-05 NOTE — SUBJECTIVE & OBJECTIVE
Interval History: 11/5: NAEON. AF, VSS. Taking minimal PO. Pending IPR    Medications:  Continuous Infusions:  Scheduled Meds:   bacitracin zinc-polymyxin B   Topical (Top) BID    donepeziL  10 mg Oral QHS    folic acid  1 mg Oral Daily    heparin (porcine)  5,000 Units Subcutaneous Q8H    lacosamide  100 mg Oral Q12H    levothyroxine  50 mcg Oral Before breakfast    multivitamin  1 tablet Oral Daily    polyethylene glycol  17 g Oral Daily    thiamine  100 mg Oral Daily     PRN Meds:sodium chloride 0.9%, acetaminophen, fentaNYL, labetalol, ondansetron, senna-docusate 8.6-50 mg, sodium chloride 0.9%     Review of Systems  Objective:     Weight: 66.2 kg (146 lb)  Body mass index is 24.3 kg/m².  Vital Signs (Most Recent):  Temp: 98.1 °F (36.7 °C) (11/05/22 0336)  Pulse: 78 (11/05/22 0351)  Resp: 18 (11/05/22 0336)  BP: 139/63 (11/05/22 0336)  SpO2: (!) 91 % (11/05/22 0336)   Vital Signs (24h Range):  Temp:  [97.4 °F (36.3 °C)-99 °F (37.2 °C)] 98.1 °F (36.7 °C)  Pulse:  [63-90] 78  Resp:  [18-20] 18  SpO2:  [90 %-95 %] 91 %  BP: (137-162)/(63-76) 139/63                     Female External Urinary Catheter 10/30/22 1615 (Active)   Skin no redness;no breakdown 11/04/22 2025   Tolerance no signs/symptoms of discomfort 11/04/22 2025   Suction Continuous suction at 70 mmHg 11/04/22 2025   Date of last wick change 11/03/22 11/03/22 2003   Time of last wick change 2003 11/03/22 2003   Output (mL) 300 mL 11/01/22 1901       Physical Exam    Neurosurgery Physical Exam  CN intact  PERRL, EOMI  Incision c/d/I  AAOx3  BARROW AG  5/5 strength BUE, BLE  SILT          Significant Labs:  Recent Labs   Lab 11/04/22  0241 11/05/22  0332   GLU 99 95    143   K 3.6 3.7    105   CO2 27 26   BUN 17 15   CREATININE 0.6 0.6   CALCIUM 9.7 9.8     Recent Labs   Lab 11/04/22  0242 11/05/22  0332   WBC 7.91 7.56   HGB 12.0 12.5   HCT 36.8* 38.4    354     No results for input(s): LABPT, INR, APTT in the last 48 hours.  Microbiology  Results (last 7 days)       ** No results found for the last 168 hours. **          All pertinent labs from the last 24 hours have been reviewed.    Significant Diagnostics:  I have reviewed all pertinent imaging results/findings within the past 24 hours.

## 2022-11-05 NOTE — ASSESSMENT & PLAN NOTE
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     --Patient admitted to [facility] on telemetry       -q4h neurochecks on floor  --All labs and diagnostics reviewed  --CTH interval stable.  --CTH postop with expected changes and only minor residual blood products.   --SBP <160  --Keppra 500 BID  --ADAT; keep IVF today  --ANGELO/SCD/SQH  -- PT/OT rec IPR; SW consulted.   --Continue to monitor clinically, notify NSGY immediately with any changes in neuro status    Dispo: Pending IPR

## 2022-11-05 NOTE — SUBJECTIVE & OBJECTIVE
Interval History: 11/4: NAEON. AFVSS. Exam stable. Pain controlled. Tolerating PO. Pending rehab.     Medications:  Continuous Infusions:  Scheduled Meds:   bacitracin zinc-polymyxin B   Topical (Top) BID    donepeziL  10 mg Oral QHS    folic acid  1 mg Oral Daily    lacosamide  100 mg Oral Q12H    levothyroxine  50 mcg Oral Before breakfast    multivitamin  1 tablet Oral Daily    polyethylene glycol  17 g Oral Daily    thiamine  100 mg Oral Daily     PRN Meds:sodium chloride 0.9%, acetaminophen, fentaNYL, labetalol, ondansetron, senna-docusate 8.6-50 mg, sodium chloride 0.9%     Review of Systems  Objective:     Weight: 66.2 kg (146 lb)  Body mass index is 24.3 kg/m².  Vital Signs (Most Recent):  Temp: 97.8 °F (36.6 °C) (11/04/22 1513)  Pulse: 90 (11/04/22 1531)  Resp: 19 (11/04/22 1513)  BP: (!) 144/76 (11/04/22 1513)  SpO2: (!) 93 % (11/04/22 1513)   Vital Signs (24h Range):  Temp:  [97.4 °F (36.3 °C)-99.1 °F (37.3 °C)] 97.8 °F (36.6 °C)  Pulse:  [63-90] 90  Resp:  [18-22] 19  SpO2:  [90 %-96 %] 93 %  BP: (133-172)/(61-76) 144/76     Date 11/04/22 0700 - 11/05/22 0659   Shift 0012-7749 0994-6378 5737-8476 24 Hour Total   INTAKE   P.O.  240  240   Shift Total(mL/kg)  240(3.6)  240(3.6)   OUTPUT   Urine(mL/kg/hr)  320  320   Shift Total(mL/kg)  320(4.8)  320(4.8)   Weight (kg) 66.2 66.2 66.2 66.2                   Female External Urinary Catheter 10/30/22 1615 (Active)   Skin no redness;no breakdown 11/03/22 2003   Tolerance no signs/symptoms of discomfort 11/03/22 2003   Suction Continuous suction at 70 mmHg 11/03/22 2003   Date of last wick change 11/03/22 11/03/22 2003   Time of last wick change 2003 11/03/22 2003   Output (mL) 300 mL 11/01/22 1901       Physical Exam    Neurosurgery Physical Exam    GENERAL: resting comfortably  HEENT: NCAT, PERRL, mucous membranes moist  NECK: supple, trachea midline  CV: normal capillary refill  PULM: aerating well, symmetric expansion, no distress  ABD: soft, NT, ND  EXT: no  c/c/e    NEURO:    AAO x 3  CN II-XII grossly intact  Fc x 4 antigravity  SILT    No drift or dysmetria    Cranial incision healing well; no erythema/induration/fluctuance    Significant Labs:  Recent Labs   Lab 11/04/22  0241   GLU 99      K 3.6      CO2 27   BUN 17   CREATININE 0.6   CALCIUM 9.7     Recent Labs   Lab 11/04/22 0242   WBC 7.91   HGB 12.0   HCT 36.8*        No results for input(s): LABPT, INR, APTT in the last 48 hours.  Microbiology Results (last 7 days)       Procedure Component Value Units Date/Time    Influenza A & B by Molecular [[id number]] Collected: 10/29/22 0146    Order Status: Completed Specimen: Nasopharyngeal Swab Updated: 10/29/22 0254     Influenza A, Molecular Negative     Influenza B, Molecular Negative     Flu A & B Source Nasal swab          All pertinent labs from the last 24 hours have been reviewed.    Significant Diagnostics:  I have reviewed all pertinent imaging results/findings within the past 24 hours.  No results found in the last 24 hours.

## 2022-11-05 NOTE — PROGRESS NOTES
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  Patient reports that tonight around 6:00 p.m. she had a acute onset headache while she was lying down in bed.  She reports that it is the worse headache of her life and she does not regularly get headaches. Denies fever/chills, vision/hearing changes, dysphagia, dysarthria, nausea/vomiting, new-onset weakness or sensory change, bowel/bladder changes. Denies AC/AP. CTH with 1.3cm aSDH with approximately 5mm MLS.            Post-Op Info:  Procedure(s) (LRB):  CRANIOTOMY, FOR SUBDURAL HEMATOMA EVACUATION (Left)   6 Days Post-Op      Interval History: 11/3: NAEON. AFVSS. Exam stable. Pain controlled. Tolerating PO. Pending rehab.      Medications:  Continuous Infusions:  Scheduled Meds:   bacitracin zinc-polymyxin B   Topical (Top) BID    donepeziL  10 mg Oral QHS    folic acid  1 mg Oral Daily    lacosamide  100 mg Oral Q12H    levothyroxine  50 mcg Oral Before breakfast    multivitamin  1 tablet Oral Daily    polyethylene glycol  17 g Oral Daily    thiamine  100 mg Oral Daily      PRN Meds:sodium chloride 0.9%, acetaminophen, fentaNYL, labetalol, ondansetron, senna-docusate 8.6-50 mg, sodium chloride 0.9%      Review of Systems  Objective:      Weight: 66.2 kg (146 lb)  Body mass index is 24.3 kg/m².  Vital signs reviewed  Ins and Outs reviewed                    Female External Urinary Catheter 10/30/22 1615 (Active)   Skin no redness;no breakdown 11/03/22 2003   Tolerance no signs/symptoms of discomfort 11/03/22 2003   Suction Continuous suction at 70 mmHg 11/03/22 2003   Date of last wick change 11/03/22 11/03/22 2003   Time of last wick change 2003 11/03/22 2003   Output (mL) 300 mL 11/01/22 1901         Physical Exam     Neurosurgery Physical Exam      GENERAL: resting comfortably  HEENT: NCAT, PERRL, mucous membranes moist  NECK: supple, trachea midline  CV: normal capillary refill  PULM: aerating well, symmetric expansion, no distress  ABD: soft, NT, ND  EXT: no c/c/e     NEURO:     AAO x 3  CN II-XII grossly intact  Fc x 4 antigravity  SILT     No drift or dysmetria     Cranial incision healing well; no erythema/induration/fluctuance     Significant Labs:  Pertinent labs reviewed     No results for input(s): LABPT, INR, APTT in the last 48 hours.  Microbiology Results (last 7 days)         Procedure Component Value Units Date/Time     Influenza A & B by Molecular [[id number]] Collected: 10/29/22 0146     Order Status: Completed Specimen: Nasopharyngeal Swab Updated: 10/29/22 0254       Influenza A, Molecular Negative       Influenza B, Molecular Negative       Flu A & B Source Nasal swab             All pertinent labs from the last 24 hours have been reviewed.     Significant Diagnostics:  I have reviewed all pertinent imaging results/findings within the past 24 hours.  No results found in the last 24 hours.     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      --Patient admitted to [facility] on telemetry      -q1h neurochecks in ICU, q2h neurochecks in stepdown, q4h neurochecks on floor  --All labs and diagnostics reviewed  --CTH interval stable.  --CTH postop with expected changes and only minor residual blood products.   --SG HV pulled  --SBP <140  --Na >135  --Keppra 500 BID  --HOB >30  --ADAT  --ANGELO/SCD/SQH  -- PT/OT rec IPR; SW consulted.   --Continue to monitor clinically, notify NSGY immediately with any changes in neuro status     Dispo: Pending IPR           Joey Tavarez MD  Neurosurgery  [facility] - Telemetry Stepdown

## 2022-11-05 NOTE — ASSESSMENT & PLAN NOTE
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     --Patient admitted to [facility] on telemetry      -q1h neurochecks in ICU, q2h neurochecks in stepdown, q4h neurochecks on floor  --All labs and diagnostics reviewed  --CTH interval stable.  --CTH postop with expected changes and only minor residual blood products.   --SG HV pulled  --SBP <140  --Na >135  --Keppra 500 BID  --HOB >30  --ADAT  --ANGELO/SCD/SQH  -- PT/OT rec IPR; SW consulted.   --Continue to monitor clinically, notify NSGY immediately with any changes in neuro status    Dispo: Pending IPR

## 2022-11-05 NOTE — PROGRESS NOTES
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  Patient reports that tonight around 6:00 p.m. she had a acute onset headache while she was lying down in bed.  She reports that it is the worse headache of her life and she does not regularly get headaches. Denies fever/chills, vision/hearing changes, dysphagia, dysarthria, nausea/vomiting, new-onset weakness or sensory change, bowel/bladder changes. Denies AC/AP. CTH with 1.3cm aSDH with approximately 5mm MLS.         Post-Op Info:  Procedure(s) (LRB):  CRANIOTOMY, FOR SUBDURAL HEMATOMA EVACUATION (Left)   7 Days Post-Op     Interval History: 11/5: NAEON. AF, VSS. Taking minimal PO. Pending IPR    Medications:  Continuous Infusions:  Scheduled Meds:   bacitracin zinc-polymyxin B   Topical (Top) BID    donepeziL  10 mg Oral QHS    folic acid  1 mg Oral Daily    heparin (porcine)  5,000 Units Subcutaneous Q8H    lacosamide  100 mg Oral Q12H    levothyroxine  50 mcg Oral Before breakfast    multivitamin  1 tablet Oral Daily    polyethylene glycol  17 g Oral Daily    thiamine  100 mg Oral Daily     PRN Meds:sodium chloride 0.9%, acetaminophen, fentaNYL, labetalol, ondansetron, senna-docusate 8.6-50 mg, sodium chloride 0.9%     Review of Systems  Objective:     Weight: 66.2 kg (146 lb)  Body mass index is 24.3 kg/m².  Vital Signs (Most Recent):  Temp: 98.1 °F (36.7 °C) (11/05/22 0336)  Pulse: 78 (11/05/22 0351)  Resp: 18 (11/05/22 0336)  BP: 139/63 (11/05/22 0336)  SpO2: (!) 91 % (11/05/22 0336)   Vital Signs (24h Range):  Temp:  [97.4 °F (36.3 °C)-99 °F (37.2 °C)] 98.1 °F (36.7 °C)  Pulse:  [63-90] 78  Resp:  [18-20] 18  SpO2:  [90 %-95 %] 91 %  BP: (137-162)/(63-76) 139/63                     Female External Urinary Catheter 10/30/22 1615 (Active)    Skin no redness;no breakdown 11/04/22 2025   Tolerance no signs/symptoms of discomfort 11/04/22 2025   Suction Continuous suction at 70 mmHg 11/04/22 2025   Date of last wick change 11/03/22 11/03/22 2003   Time of last wick change 2003 11/03/22 2003   Output (mL) 300 mL 11/01/22 1901       Physical Exam    Neurosurgery Physical Exam  CN intact  PERRL, EOMI  Incision c/d/I  AAOx3  BARROW AG  5/5 strength BUE, BLE  SILT          Significant Labs:  Recent Labs   Lab 11/04/22 0241 11/05/22  0332   GLU 99 95    143   K 3.6 3.7    105   CO2 27 26   BUN 17 15   CREATININE 0.6 0.6   CALCIUM 9.7 9.8     Recent Labs   Lab 11/04/22 0242 11/05/22 0332   WBC 7.91 7.56   HGB 12.0 12.5   HCT 36.8* 38.4    354     No results for input(s): LABPT, INR, APTT in the last 48 hours.  Microbiology Results (last 7 days)       ** No results found for the last 168 hours. **          All pertinent labs from the last 24 hours have been reviewed.    Significant Diagnostics:  I have reviewed all pertinent imaging results/findings within the past 24 hours.    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     --Patient admitted to [facility] on telemetry       -q4h neurochecks on floor  --All labs and diagnostics reviewed  --CTH interval stable.  --CTH postop with expected changes and only minor residual blood products.   --SBP <160  --Keppra 500 BID  --ADAT; keep IVF today  --ANGELO/SCD/SQH  -- PT/OT rec IPR; SW consulted.   --Continue to monitor clinically, notify NSGY immediately with any changes in neuro status    Dispo: Pending IPR        Satnam Guzman MD  Neurosurgery  [facility] - Telemetry Stepdown

## 2022-11-05 NOTE — PROGRESS NOTES
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  Patient reports that tonight around 6:00 p.m. she had a acute onset headache while she was lying down in bed.  She reports that it is the worse headache of her life and she does not regularly get headaches. Denies fever/chills, vision/hearing changes, dysphagia, dysarthria, nausea/vomiting, new-onset weakness or sensory change, bowel/bladder changes. Denies AC/AP. CTH with 1.3cm aSDH with approximately 5mm MLS.            Post-Op Info:  Procedure(s) (LRB):  CRANIOTOMY, FOR SUBDURAL HEMATOMA EVACUATION (Left)   6 Days Post-Op      Interval History: 11/2: NAEON. AFVSS. Exam stable. Pain controlled. Tolerating PO. Pending rehab.      Medications:  Continuous Infusions:  Scheduled Meds:   bacitracin zinc-polymyxin B   Topical (Top) BID    donepeziL  10 mg Oral QHS    folic acid  1 mg Oral Daily    lacosamide  100 mg Oral Q12H    levothyroxine  50 mcg Oral Before breakfast    multivitamin  1 tablet Oral Daily    polyethylene glycol  17 g Oral Daily    thiamine  100 mg Oral Daily      PRN Meds:sodium chloride 0.9%, acetaminophen, fentaNYL, labetalol, ondansetron, senna-docusate 8.6-50 mg, sodium chloride 0.9%      Review of Systems  Objective:      Weight: 66.2 kg (146 lb)  Body mass index is 24.3 kg/m².  Vital signs reviewed  Ins and Outs reviewed                      Physical Exam     Neurosurgery Physical Exam     GENERAL: resting comfortably  HEENT: NCAT, PERRL, mucous membranes moist  NECK: supple, trachea midline  CV: normal capillary refill  PULM: aerating well, symmetric expansion, no distress  ABD: soft, NT, ND  EXT: no c/c/e     NEURO:     AAO x 3  CN II-XII grossly intact  Fc x 4 antigravity  SILT     No drift or dysmetria     Cranial incision  healing well; no erythema/induration/fluctuance     Significant Labs:  Pertinent labs reviewed     No results for input(s): LABPT, INR, APTT in the last 48 hours.  Microbiology Results (last 7 days)         Procedure Component Value Units Date/Time     Influenza A & B by Molecular [[id number]] Collected: 10/29/22 0146     Order Status: Completed Specimen: Nasopharyngeal Swab Updated: 10/29/22 0254       Influenza A, Molecular Negative       Influenza B, Molecular Negative       Flu A & B Source Nasal swab             All pertinent labs from the last 24 hours have been reviewed.     Significant Diagnostics:  I have reviewed all pertinent imaging results/findings within the past 24 hours.  No results found in the last 24 hours.     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      --Patient admitted to [facility] on telemetry      -q1h neurochecks in ICU, q2h neurochecks in stepdown, q4h neurochecks on floor  --All labs and diagnostics reviewed  --CTH interval stable.  --CTH postop with expected changes and only minor residual blood products.   --SG HV pulled  --SBP <140  --Na >135  --Keppra 500 BID  --HOB >30  --ADAT  --ANGELO/SCD/SQH  -- PT/OT rec IPR; SW consulted.   --Continue to monitor clinically, notify NSGY immediately with any changes in neuro status     Dispo: Pending IPR           Joey Tavarez MD  Neurosurgery  [facility] - Telemetry Stepdown

## 2022-11-05 NOTE — NURSING
Morning bedside handoff report complete. She is awake and pleasant.  No sign of distress.  I did reorient her to situation on rounds.  She denies headache at this time.

## 2022-11-05 NOTE — PROGRESS NOTES
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  Patient reports that tonight around 6:00 p.m. she had a acute onset headache while she was lying down in bed.  She reports that it is the worse headache of her life and she does not regularly get headaches. Denies fever/chills, vision/hearing changes, dysphagia, dysarthria, nausea/vomiting, new-onset weakness or sensory change, bowel/bladder changes. Denies AC/AP. CTH with 1.3cm aSDH with approximately 5mm MLS.         Post-Op Info:  Procedure(s) (LRB):  CRANIOTOMY, FOR SUBDURAL HEMATOMA EVACUATION (Left)   6 Days Post-Op     Interval History: 11/4: NAEON. AFVSS. Exam stable. Pain controlled. Tolerating PO. Pending rehab.     Medications:  Continuous Infusions:  Scheduled Meds:   bacitracin zinc-polymyxin B   Topical (Top) BID    donepeziL  10 mg Oral QHS    folic acid  1 mg Oral Daily    lacosamide  100 mg Oral Q12H    levothyroxine  50 mcg Oral Before breakfast    multivitamin  1 tablet Oral Daily    polyethylene glycol  17 g Oral Daily    thiamine  100 mg Oral Daily     PRN Meds:sodium chloride 0.9%, acetaminophen, fentaNYL, labetalol, ondansetron, senna-docusate 8.6-50 mg, sodium chloride 0.9%     Review of Systems  Objective:     Weight: 66.2 kg (146 lb)  Body mass index is 24.3 kg/m².  Vital Signs (Most Recent):  Temp: 97.8 °F (36.6 °C) (11/04/22 1513)  Pulse: 90 (11/04/22 1531)  Resp: 19 (11/04/22 1513)  BP: (!) 144/76 (11/04/22 1513)  SpO2: (!) 93 % (11/04/22 1513)   Vital Signs (24h Range):  Temp:  [97.4 °F (36.3 °C)-99.1 °F (37.3 °C)] 97.8 °F (36.6 °C)  Pulse:  [63-90] 90  Resp:  [18-22] 19  SpO2:  [90 %-96 %] 93 %  BP: (133-172)/(61-76) 144/76     Date 11/04/22 0700 - 11/05/22 0659   Shift 6206-1901 7157-0437 5715-5187 24 Hour Total    INTAKE   P.O.  240  240   Shift Total(mL/kg)  240(3.6)  240(3.6)   OUTPUT   Urine(mL/kg/hr)  320  320   Shift Total(mL/kg)  320(4.8)  320(4.8)   Weight (kg) 66.2 66.2 66.2 66.2                   Female External Urinary Catheter 10/30/22 1615 (Active)   Skin no redness;no breakdown 11/03/22 2003   Tolerance no signs/symptoms of discomfort 11/03/22 2003   Suction Continuous suction at 70 mmHg 11/03/22 2003   Date of last wick change 11/03/22 11/03/22 2003   Time of last wick change 2003 11/03/22 2003   Output (mL) 300 mL 11/01/22 1901       Physical Exam    Neurosurgery Physical Exam    GENERAL: resting comfortably  HEENT: NCAT, PERRL, mucous membranes moist  NECK: supple, trachea midline  CV: normal capillary refill  PULM: aerating well, symmetric expansion, no distress  ABD: soft, NT, ND  EXT: no c/c/e    NEURO:    AAO x 3  CN II-XII grossly intact  Fc x 4 antigravity  SILT    No drift or dysmetria    Cranial incision healing well; no erythema/induration/fluctuance    Significant Labs:  Recent Labs   Lab 11/04/22  0241   GLU 99      K 3.6      CO2 27   BUN 17   CREATININE 0.6   CALCIUM 9.7     Recent Labs   Lab 11/04/22  0242   WBC 7.91   HGB 12.0   HCT 36.8*        No results for input(s): LABPT, INR, APTT in the last 48 hours.  Microbiology Results (last 7 days)       Procedure Component Value Units Date/Time    Influenza A & B by Molecular [[id number]] Collected: 10/29/22 0146    Order Status: Completed Specimen: Nasopharyngeal Swab Updated: 10/29/22 0254     Influenza A, Molecular Negative     Influenza B, Molecular Negative     Flu A & B Source Nasal swab          All pertinent labs from the last 24 hours have been reviewed.    Significant Diagnostics:  I have reviewed all pertinent imaging results/findings within the past 24 hours.  No results found in the last 24 hours.    Assessment/Plan:     * SDH (subdural hematoma)  77-year-old female with a past medical history of splenic flexure  cancer s/p extended left colectomy, who presents with a new onset 1 day history of headache. 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     --Patient admitted to [facility] on telemetry      -q1h neurochecks in ICU, q2h neurochecks in stepdown, q4h neurochecks on floor  --All labs and diagnostics reviewed  --CTH interval stable.  --CTH postop with expected changes and only minor residual blood products.   --SG HV pulled  --SBP <140  --Na >135  --Keppra 500 BID  --HOB >30  --ADAT  --ANGELO/SCD/SQH  -- PT/OT rec IPR; SW consulted.   --Continue to monitor clinically, notify NSGY immediately with any changes in neuro status    Dispo: Pending IPR        Joey Tavarez MD  Neurosurgery  [facility] - Telemetry Stepdown

## 2022-11-06 LAB
ALBUMIN SERPL BCP-MCNC: 2.7 G/DL (ref 3.5–5.2)
ALP SERPL-CCNC: 68 U/L (ref 55–135)
ALT SERPL W/O P-5'-P-CCNC: 48 U/L (ref 10–44)
ANION GAP SERPL CALC-SCNC: 11 MMOL/L (ref 8–16)
AST SERPL-CCNC: 31 U/L (ref 10–40)
BASOPHILS # BLD AUTO: 0.05 K/UL (ref 0–0.2)
BASOPHILS NFR BLD: 0.8 % (ref 0–1.9)
BILIRUB SERPL-MCNC: 0.5 MG/DL (ref 0.1–1)
BUN SERPL-MCNC: 9 MG/DL (ref 8–23)
CALCIUM SERPL-MCNC: 9.4 MG/DL (ref 8.7–10.5)
CHLORIDE SERPL-SCNC: 103 MMOL/L (ref 95–110)
CO2 SERPL-SCNC: 27 MMOL/L (ref 23–29)
CREAT SERPL-MCNC: 0.6 MG/DL (ref 0.5–1.4)
DIFFERENTIAL METHOD: ABNORMAL
EOSINOPHIL # BLD AUTO: 0.2 K/UL (ref 0–0.5)
EOSINOPHIL NFR BLD: 2.4 % (ref 0–8)
ERYTHROCYTE [DISTWIDTH] IN BLOOD BY AUTOMATED COUNT: 12.7 % (ref 11.5–14.5)
EST. GFR  (NO RACE VARIABLE): >60 ML/MIN/1.73 M^2
GLUCOSE SERPL-MCNC: 95 MG/DL (ref 70–110)
HCT VFR BLD AUTO: 36.8 % (ref 37–48.5)
HGB BLD-MCNC: 12.1 G/DL (ref 12–16)
IMM GRANULOCYTES # BLD AUTO: 0.02 K/UL (ref 0–0.04)
IMM GRANULOCYTES NFR BLD AUTO: 0.3 % (ref 0–0.5)
LYMPHOCYTES # BLD AUTO: 1 K/UL (ref 1–4.8)
LYMPHOCYTES NFR BLD: 16.2 % (ref 18–48)
MCH RBC QN AUTO: 31.1 PG (ref 27–31)
MCHC RBC AUTO-ENTMCNC: 32.9 G/DL (ref 32–36)
MCV RBC AUTO: 95 FL (ref 82–98)
MONOCYTES # BLD AUTO: 0.7 K/UL (ref 0.3–1)
MONOCYTES NFR BLD: 10.4 % (ref 4–15)
NEUTROPHILS # BLD AUTO: 4.5 K/UL (ref 1.8–7.7)
NEUTROPHILS NFR BLD: 69.9 % (ref 38–73)
NRBC BLD-RTO: 0 /100 WBC
PLATELET # BLD AUTO: 384 K/UL (ref 150–450)
PMV BLD AUTO: 10 FL (ref 9.2–12.9)
POTASSIUM SERPL-SCNC: 3.5 MMOL/L (ref 3.5–5.1)
PROT SERPL-MCNC: 6.2 G/DL (ref 6–8.4)
RBC # BLD AUTO: 3.89 M/UL (ref 4–5.4)
SODIUM SERPL-SCNC: 141 MMOL/L (ref 136–145)
WBC # BLD AUTO: 6.37 K/UL (ref 3.9–12.7)

## 2022-11-06 PROCEDURE — 27000207 HC ISOLATION

## 2022-11-06 PROCEDURE — 80053 COMPREHEN METABOLIC PANEL: CPT | Performed by: NURSE PRACTITIONER

## 2022-11-06 PROCEDURE — 25000003 PHARM REV CODE 250: Performed by: NURSE PRACTITIONER

## 2022-11-06 PROCEDURE — 20600001 HC STEP DOWN PRIVATE ROOM

## 2022-11-06 PROCEDURE — 63600175 PHARM REV CODE 636 W HCPCS: Performed by: STUDENT IN AN ORGANIZED HEALTH CARE EDUCATION/TRAINING PROGRAM

## 2022-11-06 PROCEDURE — 25000003 PHARM REV CODE 250

## 2022-11-06 PROCEDURE — 85025 COMPLETE CBC W/AUTO DIFF WBC: CPT | Performed by: NURSE PRACTITIONER

## 2022-11-06 PROCEDURE — 36415 COLL VENOUS BLD VENIPUNCTURE: CPT | Performed by: NURSE PRACTITIONER

## 2022-11-06 RX ADMIN — Medication: at 08:11

## 2022-11-06 RX ADMIN — HEPARIN SODIUM 5000 UNITS: 5000 INJECTION INTRAVENOUS; SUBCUTANEOUS at 02:11

## 2022-11-06 RX ADMIN — POLYETHYLENE GLYCOL 3350 17 G: 17 POWDER, FOR SOLUTION ORAL at 08:11

## 2022-11-06 RX ADMIN — Medication: at 09:11

## 2022-11-06 RX ADMIN — LEVOTHYROXINE SODIUM 50 MCG: 50 TABLET ORAL at 05:11

## 2022-11-06 RX ADMIN — HEPARIN SODIUM 5000 UNITS: 5000 INJECTION INTRAVENOUS; SUBCUTANEOUS at 05:11

## 2022-11-06 RX ADMIN — THERA TABS 1 TABLET: TAB at 08:11

## 2022-11-06 RX ADMIN — HEPARIN SODIUM 5000 UNITS: 5000 INJECTION INTRAVENOUS; SUBCUTANEOUS at 09:11

## 2022-11-06 RX ADMIN — DONEPEZIL HYDROCHLORIDE 10 MG: 5 TABLET, FILM COATED ORAL at 09:11

## 2022-11-06 RX ADMIN — Medication 100 MG: at 08:11

## 2022-11-06 RX ADMIN — FOLIC ACID 1 MG: 1 TABLET ORAL at 08:11

## 2022-11-06 NOTE — SUBJECTIVE & OBJECTIVE
Interval History: 11/6: NAEON. AFVSS. Exam stable. Off MIVF. Medically ready for [facility] IPR.     Medications:  Continuous Infusions:  Scheduled Meds:   bacitracin zinc-polymyxin B   Topical (Top) BID    donepeziL  10 mg Oral QHS    folic acid  1 mg Oral Daily    heparin (porcine)  5,000 Units Subcutaneous Q8H    levothyroxine  50 mcg Oral Before breakfast    multivitamin  1 tablet Oral Daily    polyethylene glycol  17 g Oral Daily    thiamine  100 mg Oral Daily     PRN Meds:sodium chloride 0.9%, acetaminophen, fentaNYL, labetalol, ondansetron, senna-docusate 8.6-50 mg, sodium chloride 0.9%     Review of Systems  Objective:     Weight: 66.2 kg (146 lb)  Body mass index is 24.3 kg/m².  Vital Signs (Most Recent):  Temp: 97.6 °F (36.4 °C) (11/06/22 0337)  Pulse: 73 (11/06/22 0338)  Resp: 18 (11/06/22 0337)  BP: (!) 153/70 (11/06/22 0338)  SpO2: 95 % (11/06/22 0337)   Vital Signs (24h Range):  Temp:  [97 °F (36.1 °C)-98.9 °F (37.2 °C)] 97.6 °F (36.4 °C)  Pulse:  [67-98] 73  Resp:  [18-20] 18  SpO2:  [91 %-95 %] 95 %  BP: (119-167)/(60-71) 153/70                     Female External Urinary Catheter 10/30/22 1615 (Active)   Skin no redness;no breakdown 11/04/22 2025   Tolerance no signs/symptoms of discomfort 11/04/22 2025   Suction Continuous suction at 70 mmHg 11/04/22 2025   Date of last wick change 11/03/22 11/03/22 2003   Time of last wick change 2003 11/03/22 2003   Output (mL) 300 mL 11/01/22 1901       Physical Exam    Neurosurgery Physical Exam  CN intact  PERRL, EOMI  Incision c/d/I  AAOx3  BARROW AG  5/5 strength BUE, BLE  SILT          Significant Labs:  Recent Labs   Lab 11/05/22 0332   GLU 95      K 3.7      CO2 26   BUN 15   CREATININE 0.6   CALCIUM 9.8       Recent Labs   Lab 11/05/22  0332   WBC 7.56   HGB 12.5   HCT 38.4          No results for input(s): LABPT, INR, APTT in the last 48 hours.  Microbiology Results (last 7 days)       ** No results found for the last 168 hours. **           All pertinent labs from the last 24 hours have been reviewed.    Significant Diagnostics:  I have reviewed all pertinent imaging results/findings within the past 24 hours.  No results found in the last 24 hours.

## 2022-11-06 NOTE — ASSESSMENT & PLAN NOTE
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     --Patient admitted to floor on telemetry       -q4h neurochecks on floor  --All labs and diagnostics reviewed  --CTH interval stable.  --CTH postop with expected changes and only minor residual blood products.   --SBP <160  --Keppra 500 BID  --ADAT; d/c MIVF  --ANGELO/SCD/SQH  -- PT/OT rec IPR; SW consulted.   --Continue to monitor clinically, notify NSGY immediately with any changes in neuro status    Dispo: Pending IPR

## 2022-11-06 NOTE — PROGRESS NOTES
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  Patient reports that tonight around 6:00 p.m. she had a acute onset headache while she was lying down in bed.  She reports that it is the worse headache of her life and she does not regularly get headaches. Denies fever/chills, vision/hearing changes, dysphagia, dysarthria, nausea/vomiting, new-onset weakness or sensory change, bowel/bladder changes. Denies AC/AP. CTH with 1.3cm aSDH with approximately 5mm MLS.         Post-Op Info:  Procedure(s) (LRB):  CRANIOTOMY, FOR SUBDURAL HEMATOMA EVACUATION (Left)   8 Days Post-Op     Interval History: 11/6: NAEON. AFVSS. Exam stable. Off MIVF. Medically ready for [facility] IPR.     Medications:  Continuous Infusions:  Scheduled Meds:   bacitracin zinc-polymyxin B   Topical (Top) BID    donepeziL  10 mg Oral QHS    folic acid  1 mg Oral Daily    heparin (porcine)  5,000 Units Subcutaneous Q8H    levothyroxine  50 mcg Oral Before breakfast    multivitamin  1 tablet Oral Daily    polyethylene glycol  17 g Oral Daily    thiamine  100 mg Oral Daily     PRN Meds:sodium chloride 0.9%, acetaminophen, fentaNYL, labetalol, ondansetron, senna-docusate 8.6-50 mg, sodium chloride 0.9%     Review of Systems  Objective:     Weight: 66.2 kg (146 lb)  Body mass index is 24.3 kg/m².  Vital Signs (Most Recent):  Temp: 97.6 °F (36.4 °C) (11/06/22 0337)  Pulse: 73 (11/06/22 0338)  Resp: 18 (11/06/22 0337)  BP: (!) 153/70 (11/06/22 0338)  SpO2: 95 % (11/06/22 0337)   Vital Signs (24h Range):  Temp:  [97 °F (36.1 °C)-98.9 °F (37.2 °C)] 97.6 °F (36.4 °C)  Pulse:  [67-98] 73  Resp:  [18-20] 18  SpO2:  [91 %-95 %] 95 %  BP: (119-167)/(60-71) 153/70                     Female External Urinary Catheter 10/30/22 1615 (Active)   Skin no  redness;no breakdown 11/04/22 2025   Tolerance no signs/symptoms of discomfort 11/04/22 2025   Suction Continuous suction at 70 mmHg 11/04/22 2025   Date of last wick change 11/03/22 11/03/22 2003   Time of last wick change 2003 11/03/22 2003   Output (mL) 300 mL 11/01/22 1901       Physical Exam    Neurosurgery Physical Exam  CN intact  PERRL, EOMI  Incision c/d/I  AAOx3  BARROW AG  5/5 strength BUE, BLE  SILT          Significant Labs:  Recent Labs   Lab 11/05/22  0332   GLU 95      K 3.7      CO2 26   BUN 15   CREATININE 0.6   CALCIUM 9.8       Recent Labs   Lab 11/05/22 0332   WBC 7.56   HGB 12.5   HCT 38.4          No results for input(s): LABPT, INR, APTT in the last 48 hours.  Microbiology Results (last 7 days)       ** No results found for the last 168 hours. **          All pertinent labs from the last 24 hours have been reviewed.    Significant Diagnostics:  I have reviewed all pertinent imaging results/findings within the past 24 hours.  No results found in the last 24 hours.    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     --Patient admitted to floor on telemetry       -q4h neurochecks on floor  --All labs and diagnostics reviewed  --CTH interval stable.  --CTH postop with expected changes and only minor residual blood products.   --SBP <160  --Keppra 500 BID  --ADAT; d/c MIVF  --ANGELO/SCD/SQH  -- PT/OT rec IPR; SW consulted.   --Continue to monitor clinically, notify NSGY immediately with any changes in neuro status    Dispo: Pending IPR        Joey Tavarez MD  Neurosurgery  [facility] - Telemetry Stepdown

## 2022-11-07 PROBLEM — E44.0 MODERATE MALNUTRITION: Status: ACTIVE | Noted: 2022-11-07

## 2022-11-07 LAB
ALBUMIN SERPL BCP-MCNC: 2.8 G/DL (ref 3.5–5.2)
ALP SERPL-CCNC: 73 U/L (ref 55–135)
ALT SERPL W/O P-5'-P-CCNC: 41 U/L (ref 10–44)
ANION GAP SERPL CALC-SCNC: 13 MMOL/L (ref 8–16)
AST SERPL-CCNC: 23 U/L (ref 10–40)
BASOPHILS # BLD AUTO: 0.07 K/UL (ref 0–0.2)
BASOPHILS NFR BLD: 1 % (ref 0–1.9)
BILIRUB SERPL-MCNC: 0.5 MG/DL (ref 0.1–1)
BUN SERPL-MCNC: 11 MG/DL (ref 8–23)
CALCIUM SERPL-MCNC: 9.7 MG/DL (ref 8.7–10.5)
CHLORIDE SERPL-SCNC: 104 MMOL/L (ref 95–110)
CO2 SERPL-SCNC: 25 MMOL/L (ref 23–29)
CREAT SERPL-MCNC: 0.6 MG/DL (ref 0.5–1.4)
DIFFERENTIAL METHOD: ABNORMAL
EOSINOPHIL # BLD AUTO: 0.2 K/UL (ref 0–0.5)
EOSINOPHIL NFR BLD: 2.8 % (ref 0–8)
ERYTHROCYTE [DISTWIDTH] IN BLOOD BY AUTOMATED COUNT: 12.8 % (ref 11.5–14.5)
EST. GFR  (NO RACE VARIABLE): >60 ML/MIN/1.73 M^2
GLUCOSE SERPL-MCNC: 97 MG/DL (ref 70–110)
HCT VFR BLD AUTO: 39 % (ref 37–48.5)
HGB BLD-MCNC: 13 G/DL (ref 12–16)
IMM GRANULOCYTES # BLD AUTO: 0.02 K/UL (ref 0–0.04)
IMM GRANULOCYTES NFR BLD AUTO: 0.3 % (ref 0–0.5)
LYMPHOCYTES # BLD AUTO: 1.1 K/UL (ref 1–4.8)
LYMPHOCYTES NFR BLD: 16.1 % (ref 18–48)
MCH RBC QN AUTO: 31.9 PG (ref 27–31)
MCHC RBC AUTO-ENTMCNC: 33.3 G/DL (ref 32–36)
MCV RBC AUTO: 96 FL (ref 82–98)
MONOCYTES # BLD AUTO: 0.7 K/UL (ref 0.3–1)
MONOCYTES NFR BLD: 10.9 % (ref 4–15)
NEUTROPHILS # BLD AUTO: 4.6 K/UL (ref 1.8–7.7)
NEUTROPHILS NFR BLD: 68.9 % (ref 38–73)
NRBC BLD-RTO: 0 /100 WBC
PLATELET # BLD AUTO: 424 K/UL (ref 150–450)
PMV BLD AUTO: 10 FL (ref 9.2–12.9)
POTASSIUM SERPL-SCNC: 3.6 MMOL/L (ref 3.5–5.1)
PROT SERPL-MCNC: 6.5 G/DL (ref 6–8.4)
RBC # BLD AUTO: 4.08 M/UL (ref 4–5.4)
SODIUM SERPL-SCNC: 142 MMOL/L (ref 136–145)
WBC # BLD AUTO: 6.71 K/UL (ref 3.9–12.7)

## 2022-11-07 PROCEDURE — 97530 THERAPEUTIC ACTIVITIES: CPT | Mod: CQ

## 2022-11-07 PROCEDURE — 20600001 HC STEP DOWN PRIVATE ROOM

## 2022-11-07 PROCEDURE — 25000003 PHARM REV CODE 250: Performed by: NURSE PRACTITIONER

## 2022-11-07 PROCEDURE — 97535 SELF CARE MNGMENT TRAINING: CPT

## 2022-11-07 PROCEDURE — 97530 THERAPEUTIC ACTIVITIES: CPT

## 2022-11-07 PROCEDURE — 25000003 PHARM REV CODE 250

## 2022-11-07 PROCEDURE — 63600175 PHARM REV CODE 636 W HCPCS: Performed by: STUDENT IN AN ORGANIZED HEALTH CARE EDUCATION/TRAINING PROGRAM

## 2022-11-07 PROCEDURE — 97116 GAIT TRAINING THERAPY: CPT | Mod: CQ

## 2022-11-07 PROCEDURE — 36415 COLL VENOUS BLD VENIPUNCTURE: CPT | Performed by: NURSE PRACTITIONER

## 2022-11-07 PROCEDURE — 27000207 HC ISOLATION

## 2022-11-07 PROCEDURE — 80053 COMPREHEN METABOLIC PANEL: CPT | Performed by: NURSE PRACTITIONER

## 2022-11-07 PROCEDURE — 85025 COMPLETE CBC W/AUTO DIFF WBC: CPT | Performed by: NURSE PRACTITIONER

## 2022-11-07 RX ADMIN — POLYETHYLENE GLYCOL 3350 17 G: 17 POWDER, FOR SOLUTION ORAL at 10:11

## 2022-11-07 RX ADMIN — HEPARIN SODIUM 5000 UNITS: 5000 INJECTION INTRAVENOUS; SUBCUTANEOUS at 03:11

## 2022-11-07 RX ADMIN — DONEPEZIL HYDROCHLORIDE 10 MG: 5 TABLET, FILM COATED ORAL at 09:11

## 2022-11-07 RX ADMIN — Medication: at 10:11

## 2022-11-07 RX ADMIN — FOLIC ACID 1 MG: 1 TABLET ORAL at 10:11

## 2022-11-07 RX ADMIN — HEPARIN SODIUM 5000 UNITS: 5000 INJECTION INTRAVENOUS; SUBCUTANEOUS at 05:11

## 2022-11-07 RX ADMIN — HEPARIN SODIUM 5000 UNITS: 5000 INJECTION INTRAVENOUS; SUBCUTANEOUS at 09:11

## 2022-11-07 RX ADMIN — Medication 100 MG: at 10:11

## 2022-11-07 RX ADMIN — THERA TABS 1 TABLET: TAB at 10:11

## 2022-11-07 RX ADMIN — Medication: at 09:11

## 2022-11-07 RX ADMIN — LEVOTHYROXINE SODIUM 50 MCG: 50 TABLET ORAL at 05:11

## 2022-11-07 NOTE — PT/OT/SLP PROGRESS
Occupational Therapy   CoTreatment w PT  CoTx performed to optimize pt participation and assessment of full functional capacity.       Name: Eugenio Aldridge  MRN: [id number]  Admitting Diagnosis:  SDH (subdural hematoma)  9 Days Post-Op    Recommendations:     Discharge Recommendations: rehabilitation facility  Discharge Equipment Recommendations:   (TBD)  Barriers to discharge:       Assessment:     Eugenio Aldridge is a 77 y.o. female with a medical diagnosis of SDH (subdural hematoma).  She presents with good tolerance to session on this date completing func amb to restroom to complete ADLs standing at sink requiring CGA for safety and balance and no AD w HHA. Pt not oriented to place or situation and continues to require Mod vcs to adhere to safety precautions. Performance deficits affecting function are weakness, impaired endurance, impaired functional mobility, gait instability, impaired balance, impaired cognition, decreased coordination, decreased safety awareness.   Pt motivated to return home however not at baseline for ADL and functional mobility performance in addition to concerns of fall risk and would benefit from continued OT services at this time.    Rehab Prognosis:  Good; patient would benefit from acute skilled OT services to address these deficits and reach maximum level of function.       Plan:     Patient to be seen 4 x/week to address the above listed problems via self-care/home management, therapeutic activities, therapeutic exercises, neuromuscular re-education  Plan of Care Expires: 12/02/22  Plan of Care Reviewed with: patient    Subjective     Pain/Comfort:  Pain Rating 1:  (did not rate)    Objective:     Communicated with: RN prior to session.  Patient found sitting edge of bed with  (no active lines, avys camera) upon OT entry to room.    General Precautions: Standard, fall, airborne, contact, droplet   Orthopedic Precautions:N/A   Braces: N/A  Respiratory Status: Room air     Occupational  Performance:     Bed Mobility:    Patient completed Sit to Supine with stand by assistance     Functional Mobility/Transfers:  Patient completed Sit <> Stand Transfer with contact guard assistance  with  hand-held assist   Functional Mobility: pt completed functional ambulation to restroom to simulate household mobility requiring CGA and HHA w decreased balance    Activities of Daily Living:  Grooming: contact guard assistance pt completed oral hygiene standing at sink requiring CGA for balance and decreased endurance. Pt stood at sink ~ 10 min      [facility] 6 Click ADL: 20    Treatment & Education:  Pt educated on scope of practice and importance of daily functional mobility.   Pt educated on safety precautions during transfers  Pt updated on POC and discharge recc  White board updated to reflect pt status and visual reminder included on board instructing her to call for nurse as needed.      Patient left supine with all lines intact, call button in reach, bed alarm on, RN notified, and family present    GOALS:   Multidisciplinary Problems       Occupational Therapy Goals          Problem: Occupational Therapy    Goal Priority Disciplines Outcome Interventions   Occupational Therapy Goal     OT, PT/OT Ongoing, Progressing    Description: Goals to be met by: 11/16/2022     Patient will increase functional independence with ADLs by performing:    UE Dressing with Modified Waller.  LE Dressing with Modified Waller.  Grooming while standing at sink with Modified Waller.  Toileting from toilet with Modified Waller for hygiene and clothing management.   Toilet transfer to toilet with Modified Waller.                         Time Tracking:     OT Date of Treatment: 11/07/22  OT Start Time: 1420  OT Stop Time: 1443  OT Total Time (min): 23 min    Billable Minutes:Self Care/Home Management 10  Therapeutic Activity 13    OT/CHUNG: OT          11/7/2022

## 2022-11-07 NOTE — PLAN OF CARE
[facility] - Telemetry Stepdown  Discharge Reassessment    Primary Care Provider: Bertha Salguero MD    Expected Discharge Date: 11/8/2022    Reassessment (most recent)       Discharge Reassessment - 11/07/22 1713          Discharge Reassessment    Assessment Type Discharge Planning Reassessment     Did the patient's condition or plan change since previous assessment? No     Discharge Plan discussed with: Sibling;Patient     Communicated SHANDA with patient/caregiver Yes     Discharge Plan A Rehab     Discharge Plan B Skilled Nursing Facility     DME Needed Upon Discharge  none     Discharge Barriers Identified None     Why the patient remains in the hospital Requires continued medical care        Post-Acute Status    Post-Acute Authorization Placement     Post-Acute Placement Status Pending medical clearance/testing   [facility]- accepted pending medical stability    Discharge Delays None known at this time                 Updated patient and sister at bedside of acceptance to [facility] pending medical stability, possible dc tomorrow 11/8.    Luly David, MSW, LCSW  [facility]  Ext. [phone]

## 2022-11-07 NOTE — PHYSICIAN QUERY
PT Name: Eugenio Aldridge  MR #: [id number]    DOCUMENTATION CLARIFICATION     CDS/: Nandini Bermeo RN BSN             Contact information: [email or web address]  Withdrawn. Query answered in D/C Summary.     Nandini Bermeo CDI    This form is a permanent document in the medical record.     Query Date: November 7, 2022    By submitting this query, we are merely seeking further clarification of documentation.. Please utilize your independent clinical judgment when addressing the question(s) below.    The medical record contains the following:   Indicators  Supporting Clinical Findings Location in Medical Record   x Energy Intake Energy Intake: <75% of estimated energy requirement for 7 days   11/7/22 RD progress note   x Weight Loss Weight Loss: 5% x 1 month  11/7/22 RD progress note   x Fat Loss Body Fat Depletion: moderate depletion of orbitals  11/7/22 RD progress note   x Muscle Loss Muscle Mass Depletion: moderate depletion of temples and clavicle region 11/7/22 RD progress note    Edema/Fluid Accumulation      Reduced  Strength (by dynamometer)     x Weight, BMI, Usual Body Weight BMI (Calculated): 24.3 11/7/22 RD progress note    Delayed Wound Healing     x Registered Dietician Diagnosis Moderate Protein-Calorie Malnutrition  Malnutrition in the context of Acute Illness/Injury 11/7/22 RD progress note   x Acute or Chronic Illness SDH (subdural hematoma)    Brain compression  Hypertension    10/29/22 H&P    Social or Environmental Circumstances     x Treatment 10/29 Consult Registered Dietician      10/30 Multivitamin tablet daily  10/30 Folic Acid tablet daily  10/30 Thiamine tablet daily     Liberalize diet to Regular; add Boost Plus ONS to aid in caloric intake.  10/29/22 Orders      10/30/22 MAR        11/7/22 RD progress note    Other       Academy of Nutrition and Dietetics (Academy) and the American Society for Parenteral and Enteral Nutrition (A.S.P.E.N.) Clinical Characteristics to support  Malnutrition   Malnutrition in the Context of Acute Illness or Injury Malnutrition in the Context of Chronic Illness or Injury Malnutrition in the Context of Social or Environmental Circumstances   Malnutrition Level Moderate Severe Moderate Severe   Moderate   Severe   Energy Intake <75%                   >7 days <50%                 >5 days <75%           >1 month <75%                      >1 month   <75% for >3 months   <50% for >1 month   Weight Loss   1-2% in 1 week >2% in 1 week 5% in 1 month >5% in 1 month 5% in 1 month >5% in 1 month    5% in 1 month >5% in 1 month 7.5% in 3 months >7.5% in 3 months 7.5% in 3 months >7.5% in 3 months    7.5% in 3 months >7.5% in 3 months 10% in 6 months >10% in 6 months 10% in 6 months >10% in 6 months        20% in 1 year                    >20% in 1 year                                                                  20% in 1 year                            >20% in 1 year                                                  Subcutaneous Fat Loss Mild  Moderate  Mild  Severe    Mild   Severe   Muscle Loss Mild depletion Moderate depletion  Mild depletion Severe Depletion   Mild   Severe   Edema/Fluid Accumulation Mild Moderate to severe  Mild  Severe   Mild   Severe   Reduced  Strength         (based on standards supplied by  of dynamometer) N/A Measurably reduced N/A Measurably reduced N/A Measurably reduced     Criteria for mild malnutrition is defined as 1 characteristic outlined above within the established moderate or severe parameters.  A minimum of 2 out of the 6 characteristics noted above are recommended for a diagnosis of moderate or severe malnutrition.  Chronic illness/injury is a disease/condition lasting 3 months or longer.    The noted clinical guidelines are only system guidelines and do not replace the providers clinical judgment.    Provider, please specify diagnosis or diagnoses associated with above clinical findings.    [  ] Mild  Malnutrition - 1 characteristic outlined above within the established moderate or severe parameters   [  ] Moderate Malnutrition - a minimum of 2 of the 6 moderate malnutrition characteristics noted above    [  ] Severe Malnutrition - a minimum of 2 of the 6 severe malnutrition characteristics noted above    [  ] Malnutrition, Unspecified degree   [  ] Other Nutritional Diagnosis (please specify): _______   [  ] Malnutrition ruled out   [  ] Clinically Undetermined       Please document in your progress notes daily for the duration of treatment until resolved and  include in your discharge summary.      References:    WARREN Varela, & LACEY Lindsay (2022, April). Assessment and management of anorexia and cachexia in palliative care. Retrieved May 23, 2022, from [email or web address]-[email or web address]-management-[email or web address]-[email or web address]-cachexia-[email or web address]-[email or web address]-care?[email or web address]_link     ERIC Velez, PhD, RD, [place] MCGRAW P., PhD, RN, CARINA Pantoja MD, PhD, Frank SUAZO A., MS, RD, [facility], PERFECTO Blair, MS, RD, The Academy Malnutrition Work Group, The A.S.P.E.N. Board of Directors. (2012). Consensus Statement: Academy of Nutrition and Dietetics and American Society for Parenteral and Enteral Nutrition: Characteristics Recommended for the Identification and Documentation of Adult Malnutrition (Undernutrition). Journal of Parenteral and Enteral Nutrition, 36(3), 275-283. doi:10.1177/6869685017986635     Form No. 84887

## 2022-11-07 NOTE — PROGRESS NOTES
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  Patient reports that tonight around 6:00 p.m. she had a acute onset headache while she was lying down in bed.  She reports that it is the worse headache of her life and she does not regularly get headaches. Denies fever/chills, vision/hearing changes, dysphagia, dysarthria, nausea/vomiting, new-onset weakness or sensory change, bowel/bladder changes. Denies AC/AP. CTH with 1.3cm aSDH with approximately 5mm MLS.         Post-Op Info:  Procedure(s) (LRB):  CRANIOTOMY, FOR SUBDURAL HEMATOMA EVACUATION (Left)   9 Days Post-Op     Interval History: 11/7: NAEON. AFVSS. Ambulating well to bathroom. Pending IPR. CTH tmr am.    Medications:  Continuous Infusions:  Scheduled Meds:   bacitracin zinc-polymyxin B   Topical (Top) BID    donepeziL  10 mg Oral QHS    folic acid  1 mg Oral Daily    heparin (porcine)  5,000 Units Subcutaneous Q8H    levothyroxine  50 mcg Oral Before breakfast    multivitamin  1 tablet Oral Daily    polyethylene glycol  17 g Oral Daily    thiamine  100 mg Oral Daily     PRN Meds:sodium chloride 0.9%, acetaminophen, fentaNYL, labetalol, ondansetron, senna-docusate 8.6-50 mg, sodium chloride 0.9%     Review of Systems  Objective:     Weight: 66.2 kg (145 lb 15.1 oz)  Body mass index is 24.29 kg/m².  Vital Signs (Most Recent):  Temp: 97.7 °F (36.5 °C) (11/07/22 1527)  Pulse: 92 (11/07/22 1527)  Resp: 18 (11/07/22 1527)  BP: 135/62 (11/07/22 1527)  SpO2: 95 % (11/07/22 1527)   Vital Signs (24h Range):  Temp:  [97.4 °F (36.3 °C)-99.1 °F (37.3 °C)] 97.7 °F (36.5 °C)  Pulse:  [60-92] 92  Resp:  [15-19] 18  SpO2:  [89 %-95 %] 95 %  BP: (112-154)/(53-67) 135/62                     Female External Urinary Catheter 10/30/22 1615 (Active)    Skin no redness;no breakdown 11/04/22 2025   Tolerance no signs/symptoms of discomfort 11/04/22 2025   Suction Continuous suction at 70 mmHg 11/04/22 2025   Date of last wick change 11/03/22 11/03/22 2003   Time of last wick change 2003 11/03/22 2003   Output (mL) 300 mL 11/01/22 1901       Physical Exam    Neurosurgery Physical Exam  AAOx3  CN intact  PERRL, EOMI  Occasional paraphasic errors  Incision c/d/i  BARROW AG  5/5 strength BUE, BLE  SILT  No drift      Significant Labs:  Recent Labs   Lab 11/06/22  0616 11/07/22  0526   GLU 95 97    142   K 3.5 3.6    104   CO2 27 25   BUN 9 11   CREATININE 0.6 0.6   CALCIUM 9.4 9.7       Recent Labs   Lab 11/06/22 0616 11/07/22  0526   WBC 6.37 6.71   HGB 12.1 13.0   HCT 36.8* 39.0    424       No results for input(s): LABPT, INR, APTT in the last 48 hours.  Microbiology Results (last 7 days)       ** No results found for the last 168 hours. **          All pertinent labs from the last 24 hours have been reviewed.    Significant Diagnostics:  I have reviewed all pertinent imaging results/findings within the past 24 hours.  No results found in the last 24 hours.    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     --Patient admitted to floor on telemetry       -q4h neurochecks on floor  --All labs and diagnostics reviewed  --CTH interval stable.  --CTH postop with expected changes and only minor residual blood products.   --Follow up CTH tmr am for stability before discharge  --SBP <160  --Keppra 500 BID  --ADAT; d/c MIVF  --ANGELO/SCD/SQH  -- PT/OT rec IPR; SW consulted.   --Continue to monitor clinically, notify NSGY immediately with any changes in neuro status    Dispo: Pending IPR        Renetta Devi MD  Neurosurgery  [facility] - Telemetry  Stepdown

## 2022-11-07 NOTE — PROGRESS NOTES
"[facility] - Telemetry Stepdown  Adult Nutrition  Consult Note    SUMMARY     Recommendations    1. Liberalize diet to Regular; add Boost Plus ONS to aid in caloric intake.   2. RD to monitor & follow-up.    Goals: Meet % EEN, EPN by RD f/u date  Nutrition Goal Status: goal not met  Communication of RD Recs: reviewed with RN    Assessment and Plan    Moderate malnutrition    Nutrition Problem:  Moderate Protein-Calorie Malnutrition  Malnutrition in the context of Acute Illness/Injury    Related to (etiology):  Inability to consume sufficient energy    Signs and Symptoms (as evidenced by):  Energy Intake: <75% of estimated energy requirement for 7 days  Body Fat Depletion: moderate depletion of orbitals   Muscle Mass Depletion: moderate depletion of temples and clavicle region   Weight Loss: 5% x 1 month     Interventions(treatment strategy):  Collaboration of nutrition care w/ other providers  ONS    Nutrition Diagnosis Status:  New    Malnutrition Assessment    Weight Loss (Malnutrition): 5% in 1 month  Energy Intake (Malnutrition): less than 75% for greater than 7 days  Subcutaneous Fat (Malnutrition): moderate depletion  Muscle Mass (Malnutrition): moderate depletion     Reason for Assessment    Reason For Assessment: RD follow-up  Diagnosis: other (see comments) (SDH)  Relevant Medical History: Ca, L. colectomy, HTN  Interdisciplinary Rounds: did not attend    General Information Comments: S/p craniotomy. Pt tolerating diet, however + poor PO intake. UBW: 155-160# per chart review. RD feels pt meets criteria for moderate malnutrition. Please see PES statement for details. +COVID-19; visual NFPE complete today.  Nutrition Discharge Planning: Adequate nutrition    Nutrition/Diet History    Spiritual, Cultural Beliefs, Worship Practices, Values that Affect Care: no  Factors Affecting Nutritional Intake: decreased appetite    Anthropometrics    Temp: 97.4 °F (36.3 °C)  Height Method: Stated  Height: 5' 5" " (165.1 cm)  Height (inches): 65 in  Weight Method: Bed Scale  Weight: 66.2 kg (145 lb 15.1 oz)  Weight (lb): 145.95 lb  Ideal Body Weight (IBW), Female: 125 lb  % Ideal Body Weight, Female (lb): 116.76 %  BMI (Calculated): 24.3  BMI Grade: 18.5-24.9 - normal  Usual Body Weight (UBW), k kg  % Usual Body Weight: 94.77  % Weight Change From Usual Weight: -5.43 %    Lab/Procedures/Meds    Pertinent Labs Reviewed: reviewed  Pertinent Medications Reviewed: reviewed  Pertinent Medications Comments: Folic acid, Thiamine, MVI    Estimated/Assessed Needs    Weight Used For Calorie Calculations: 68 kg (149 lb 14.6 oz) (Dosing weight)    Energy Calorie Requirements (kcal): 1463 kcal/d  Energy Need Method: [place]-St Jeor (1.25 PAL)    Protein Requirements: 68-82 g/d (1-1.2 g/kg)  Weight Used For Protein Calculations: 68 kg (149 lb 14.6 oz)    Estimated Fluid Requirement Method: other (see comments) (Per MD or 1 mL/kcal)  RDA Method (mL): 1463    Nutrition Prescription Ordered    Current Diet Order: Cardiac    Evaluation of Received Nutrient/Fluid Intake    I/O: +1.3L since admit    Comments: LBM:     Tolerance: tolerating    Nutrition Risk    Level of Risk/Frequency of Follow-up:  (1x/week)     Monitor and Evaluation    Food and Nutrient Intake: food and beverage intake, energy intake, enteral nutrition intake  Food and Nutrient Adminstration: diet order, enteral and parenteral nutrition administration  Physical Activity and Function: nutrition-related ADLs and IADLs  Anthropometric Measurements: weight, weight change  Biochemical Data, Medical Tests and Procedures: glucose/endocrine profile, inflammatory profile, lipid profile, gastrointestinal profile, electrolyte and renal panel  Nutrition-Focused Physical Findings: overall appearance     Nutrition Follow-Up    RD Follow-up?: Yes

## 2022-11-07 NOTE — PLAN OF CARE
Problem: Adult Inpatient Plan of Care  Goal: Plan of Care Review  Outcome: Ongoing, Progressing  Goal: Patient-Specific Goal (Individualized)  Outcome: Ongoing, Progressing  Goal: Absence of Hospital-Acquired Illness or Injury  Outcome: Ongoing, Progressing  Goal: Optimal Comfort and Wellbeing  Outcome: Ongoing, Progressing   Pt rested well. VS WNL. No ss of pain or distress. Call light within reach. Bed in low position. Intermittent confusion upon waking but quickly orients. Ambulates to bathroom with minimal assistance. Will continue to monitor and pass on care to oncoming nurse.

## 2022-11-07 NOTE — PLAN OF CARE
Problem: Adult Inpatient Plan of Care  Goal: Plan of Care Review  Outcome: Ongoing, Progressing  Goal: Patient-Specific Goal (Individualized)  Outcome: Ongoing, Progressing  Goal: Absence of Hospital-Acquired Illness or Injury  Outcome: Ongoing, Progressing  Goal: Optimal Comfort and Wellbeing  Outcome: Ongoing, Progressing  Goal: Readiness for Transition of Care  Outcome: Ongoing, Progressing     Problem: Adjustment to Surgery (Craniotomy/Craniectomy/Cranioplasty)  Goal: Optimal Coping with Surgery  Outcome: Ongoing, Progressing     Problem: Bleeding (Craniotomy/Craniectomy/Cranioplasty)  Goal: Absence of Bleeding  Outcome: Ongoing, Progressing     Problem: Bowel Motility Impaired (Craniotomy/Craniectomy/Cranioplasty)  Goal: Effective Bowel Elimination  Outcome: Ongoing, Progressing     Problem: Cerebral Tissue Perfusion Risk (Craniotomy/Craniectomy/Cranioplasty)  Goal: Optimal Cerebral Tissue Perfusion  Outcome: Ongoing, Progressing     Problem: Fluid and Electrolyte Imbalance (Craniotomy/Craniectomy/Cranioplasty)  Goal: Fluid and Electrolyte Balance  Outcome: Ongoing, Progressing     Problem: Functional Ability Impaired (Craniotomy/Craniectomy/Cranioplasty)  Goal: Optimal Functional Ability  Outcome: Ongoing, Progressing     Problem: Infection (Craniotomy/Craniectomy/Cranioplasty)  Goal: Absence of Infection Signs and Symptoms  Outcome: Ongoing, Progressing     Problem: Pain (Craniotomy/Craniectomy/Cranioplasty)  Goal: Acceptable Pain Control  Outcome: Ongoing, Progressing     Problem: Postoperative Nausea and Vomiting (Craniotomy/Craniectomy/Cranioplasty)  Goal: Nausea and Vomiting Relief  Outcome: Ongoing, Progressing     Problem: Postoperative Urinary Retention (Craniotomy/Craniectomy/Cranioplasty)  Goal: Effective Urinary Elimination  Outcome: Ongoing, Progressing     Problem: Respiratory Compromise (Craniotomy/Craniectomy/Cranioplasty)  Goal: Effective Oxygenation and Ventilation  Outcome: Ongoing,  Progressing

## 2022-11-07 NOTE — ASSESSMENT & PLAN NOTE
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     --Patient admitted to floor on telemetry       -q4h neurochecks on floor  --All labs and diagnostics reviewed  --CTH interval stable.  --CTH postop with expected changes and only minor residual blood products.   --Follow up CTH tmr am for stability before discharge  --SBP <160  --Keppra 500 BID  --ADAT; d/c MIVF  --ANGELO/SCD/SQH  -- PT/OT rec IPR; SW consulted.   --Continue to monitor clinically, notify NSGY immediately with any changes in neuro status    Dispo: Pending IPR

## 2022-11-07 NOTE — PLAN OF CARE
Recommendations     1. Liberalize diet to Regular; add Boost Plus ONS to aid in caloric intake.   2. RD to monitor & follow-up.     Goals: Meet % EEN, EPN by RD f/u date  Nutrition Goal Status: goal not met  Communication of RD Recs: reviewed with RN

## 2022-11-07 NOTE — PT/OT/SLP PROGRESS
"Physical Therapy Treatment/Co-treated with OT    Patient Name:  Eugenio Aldridge   MRN:  [id number]    Recommendations:     Discharge Recommendations:  rehabilitation facility   Discharge Equipment Recommendations:  (TBD)   Barriers to discharge: None    Assessment:     Eugenio Aldridge is a 77 y.o. female admitted with a medical diagnosis of SDH (subdural hematoma).  She presents with the following impairments/functional limitations:  weakness, impaired endurance, impaired functional mobility, impaired balance, impaired cognition, gait instability, decreased coordination, decreased safety awareness Pt agreed to therapy and was able to perform bed mobility, t/f, and gait training at sup>CGA. Pt was able to stand over sink and perform oral car @ SBA. Pt will continue to benefit from skilled IP Rehab to improve mobility and performing functional tasks safely..    Rehab Prognosis: Good; patient would benefit from acute skilled PT services to address these deficits and reach maximum level of function.    Recent Surgery: Procedure(s) (LRB):  CRANIOTOMY, FOR SUBDURAL HEMATOMA EVACUATION (Left) 9 Days Post-Op    Plan:     During this hospitalization, patient to be seen 4 x/week to address the identified rehab impairments via gait training, therapeutic activities, therapeutic exercises, neuromuscular re-education and progress toward the following goals:    Plan of Care Expires:  12/02/22    Subjective     Chief Complaint: None  Patient/Family Comments/goals: Sister said to pt "You're doing good."  Pain/Comfort:  Pain Rating 1:  (NOT RATED)  Location - Orientation 1: generalized  Pain Addressed 1: Reposition      Objective:     Communicated with nurse prior to session.  Patient found sitting edge of bed with   upon PT entry to room.     General Precautions: Standard, airborne, contact, fall   Orthopedic Precautions:N/A   Braces: N/A  Respiratory Status: Room air     Functional Mobility:  Bed Mobility:     Sit to Supine: supervision and " stand by assistance  Transfers:     Sit to Stand:  supervision and stand by assistance with hand-held assist  Gait: 10*2  ft @ HHA normal lam  Balance: good static @ EOB fair dynamic standing while performing oral care      AM-PAC 6 CLICK MOBILITY  Turning over in bed (including adjusting bedclothes, sheets and blankets)?: 4  Sitting down on and standing up from a chair with arms (e.g., wheelchair, bedside commode, etc.): 3  Moving from lying on back to sitting on the side of the bed?: 4  Moving to and from a bed to a chair (including a wheelchair)?: 3  Need to walk in hospital room?: 3  Climbing 3-5 steps with a railing?: 1  Basic Mobility Total Score: 18       Treatment & Education:  Pt was educated on performing exercises @ EOB to improve mobility and strengtht to BLE. Pt was co-treated to perform functional tasks efficiently.    Patient left HOB elevated with all lines intact, call button in reach, and sister present..    GOALS:   Multidisciplinary Problems       Physical Therapy Goals          Problem: Physical Therapy    Goal Priority Disciplines Outcome Goal Variances Interventions   Physical Therapy Goal     PT, PT/OT Ongoing, Progressing     Description: Goals to be met by: 2022     Patient will increase functional independence with mobility by performin. Supine to sit with Set-up [place]  2. Sit to supine with Set-up [place]  3. Sit to stand transfer with Supervision  4. Bed to chair transfer with Supervision using Rolling Walker  5. Gait  x 150 feet with Supervision using Rolling Walker.   6. Lower extremity exercise program x15 reps per handout, with supervision                         Time Tracking:     PT Received On: 22  PT Start Time: 1420     PT Stop Time: 1443  PT Total Time (min): 23 min     Billable Minutes: Gait Training 11 and Therapeutic Activity 12    Treatment Type: Treatment  PT/PTA: PTA     PTA Visit Number: 2     2022

## 2022-11-07 NOTE — PLAN OF CARE
Problem: Adult Inpatient Plan of Care  Goal: Plan of Care Review  Outcome: Ongoing, Progressing  Goal: Patient-Specific Goal (Individualized)  Outcome: Ongoing, Progressing  Goal: Absence of Hospital-Acquired Illness or Injury  Outcome: Ongoing, Progressing  Goal: Optimal Comfort and Wellbeing  Outcome: Ongoing, Progressing  Goal: Readiness for Transition of Care  Outcome: Ongoing, Progressing     Problem: Skin Injury Risk Increased  Goal: Skin Health and Integrity  Outcome: Ongoing, Progressing     Problem: Fall Injury Risk  Goal: Absence of Fall and Fall-Related Injury  Outcome: Ongoing, Progressing     Problem: Infection  Goal: Absence of Infection Signs and Symptoms  Outcome: Ongoing, Progressing     Problem: Adjustment to Surgery (Craniotomy/Craniectomy/Cranioplasty)  Goal: Optimal Coping with Surgery  Outcome: Ongoing, Progressing     Problem: Bleeding (Craniotomy/Craniectomy/Cranioplasty)  Goal: Absence of Bleeding  Outcome: Ongoing, Progressing     Problem: Bowel Motility Impaired (Craniotomy/Craniectomy/Cranioplasty)  Goal: Effective Bowel Elimination  Outcome: Ongoing, Progressing     Problem: Cerebral Tissue Perfusion Risk (Craniotomy/Craniectomy/Cranioplasty)  Goal: Optimal Cerebral Tissue Perfusion  Outcome: Ongoing, Progressing     Problem: Fluid and Electrolyte Imbalance (Craniotomy/Craniectomy/Cranioplasty)  Goal: Fluid and Electrolyte Balance  Outcome: Ongoing, Progressing     Problem: Functional Ability Impaired (Craniotomy/Craniectomy/Cranioplasty)  Goal: Optimal Functional Ability  Outcome: Ongoing, Progressing     Problem: Infection (Craniotomy/Craniectomy/Cranioplasty)  Goal: Absence of Infection Signs and Symptoms  Outcome: Ongoing, Progressing     Problem: Ongoing Anesthesia Effects (Craniotomy/Craniectomy/Cranioplasty)  Goal: Anesthesia/Sedation Recovery  Outcome: Ongoing, Progressing     Problem: Pain (Craniotomy/Craniectomy/Cranioplasty)  Goal: Acceptable Pain Control  Outcome: Ongoing,  Progressing     Problem: Postoperative Nausea and Vomiting (Craniotomy/Craniectomy/Cranioplasty)  Goal: Nausea and Vomiting Relief  Outcome: Ongoing, Progressing     Problem: Postoperative Urinary Retention (Craniotomy/Craniectomy/Cranioplasty)  Goal: Effective Urinary Elimination  Outcome: Ongoing, Progressing     Problem: Respiratory Compromise (Craniotomy/Craniectomy/Cranioplasty)  Goal: Effective Oxygenation and Ventilation  Outcome: Ongoing, Progressing

## 2022-11-07 NOTE — SUBJECTIVE & OBJECTIVE
Interval History: 11/7: NAEON. AFVSS. Ambulating well to bathroom. Pending IPR. CTH tmr am.    Medications:  Continuous Infusions:  Scheduled Meds:   bacitracin zinc-polymyxin B   Topical (Top) BID    donepeziL  10 mg Oral QHS    folic acid  1 mg Oral Daily    heparin (porcine)  5,000 Units Subcutaneous Q8H    levothyroxine  50 mcg Oral Before breakfast    multivitamin  1 tablet Oral Daily    polyethylene glycol  17 g Oral Daily    thiamine  100 mg Oral Daily     PRN Meds:sodium chloride 0.9%, acetaminophen, fentaNYL, labetalol, ondansetron, senna-docusate 8.6-50 mg, sodium chloride 0.9%     Review of Systems  Objective:     Weight: 66.2 kg (145 lb 15.1 oz)  Body mass index is 24.29 kg/m².  Vital Signs (Most Recent):  Temp: 97.7 °F (36.5 °C) (11/07/22 1527)  Pulse: 92 (11/07/22 1527)  Resp: 18 (11/07/22 1527)  BP: 135/62 (11/07/22 1527)  SpO2: 95 % (11/07/22 1527)   Vital Signs (24h Range):  Temp:  [97.4 °F (36.3 °C)-99.1 °F (37.3 °C)] 97.7 °F (36.5 °C)  Pulse:  [60-92] 92  Resp:  [15-19] 18  SpO2:  [89 %-95 %] 95 %  BP: (112-154)/(53-67) 135/62                     Female External Urinary Catheter 10/30/22 1615 (Active)   Skin no redness;no breakdown 11/04/22 2025   Tolerance no signs/symptoms of discomfort 11/04/22 2025   Suction Continuous suction at 70 mmHg 11/04/22 2025   Date of last wick change 11/03/22 11/03/22 2003   Time of last wick change 2003 11/03/22 2003   Output (mL) 300 mL 11/01/22 1901       Physical Exam    Neurosurgery Physical Exam  AAOx3  CN intact  PERRL, EOMI  Occasional paraphasic errors  Incision c/d/i  BARROW AG  5/5 strength BUE, BLE  SILT  No drift      Significant Labs:  Recent Labs   Lab 11/06/22  0616 11/07/22  0526   GLU 95 97    142   K 3.5 3.6    104   CO2 27 25   BUN 9 11   CREATININE 0.6 0.6   CALCIUM 9.4 9.7       Recent Labs   Lab 11/06/22  0616 11/07/22  0526   WBC 6.37 6.71   HGB 12.1 13.0   HCT 36.8* 39.0    424       No results for input(s): LABPT, INR, APTT  in the last 48 hours.  Microbiology Results (last 7 days)       ** No results found for the last 168 hours. **          All pertinent labs from the last 24 hours have been reviewed.    Significant Diagnostics:  I have reviewed all pertinent imaging results/findings within the past 24 hours.  No results found in the last 24 hours.

## 2022-11-07 NOTE — ASSESSMENT & PLAN NOTE
Nutrition Problem:  Moderate Protein-Calorie Malnutrition  Malnutrition in the context of Acute Illness/Injury    Related to (etiology):  Inability to consume sufficient energy    Signs and Symptoms (as evidenced by):  Energy Intake: <75% of estimated energy requirement for 7 days  Body Fat Depletion: moderate depletion of orbitals   Muscle Mass Depletion: moderate depletion of temples and clavicle region   Weight Loss: 5% x 1 month     Interventions(treatment strategy):  Collaboration of nutrition care w/ other providers  [facility]    Nutrition Diagnosis Status:  New

## 2022-11-08 VITALS
HEART RATE: 79 BPM | RESPIRATION RATE: 20 BRPM | OXYGEN SATURATION: 94 % | BODY MASS INDEX: 24.32 KG/M2 | HEIGHT: 65 IN | DIASTOLIC BLOOD PRESSURE: 53 MMHG | WEIGHT: 145.94 LBS | SYSTOLIC BLOOD PRESSURE: 108 MMHG | TEMPERATURE: 98 F

## 2022-11-08 LAB
ALBUMIN SERPL BCP-MCNC: 2.8 G/DL (ref 3.5–5.2)
ALP SERPL-CCNC: 74 U/L (ref 55–135)
ALT SERPL W/O P-5'-P-CCNC: 38 U/L (ref 10–44)
ANION GAP SERPL CALC-SCNC: 9 MMOL/L (ref 8–16)
AST SERPL-CCNC: 28 U/L (ref 10–40)
BASOPHILS # BLD AUTO: 0.06 K/UL (ref 0–0.2)
BASOPHILS NFR BLD: 0.8 % (ref 0–1.9)
BILIRUB SERPL-MCNC: 0.4 MG/DL (ref 0.1–1)
BUN SERPL-MCNC: 12 MG/DL (ref 8–23)
CALCIUM SERPL-MCNC: 9.6 MG/DL (ref 8.7–10.5)
CHLORIDE SERPL-SCNC: 104 MMOL/L (ref 95–110)
CO2 SERPL-SCNC: 29 MMOL/L (ref 23–29)
CREAT SERPL-MCNC: 0.6 MG/DL (ref 0.5–1.4)
DIFFERENTIAL METHOD: ABNORMAL
EOSINOPHIL # BLD AUTO: 0.2 K/UL (ref 0–0.5)
EOSINOPHIL NFR BLD: 2.6 % (ref 0–8)
ERYTHROCYTE [DISTWIDTH] IN BLOOD BY AUTOMATED COUNT: 13.1 % (ref 11.5–14.5)
EST. GFR  (NO RACE VARIABLE): >60 ML/MIN/1.73 M^2
GLUCOSE SERPL-MCNC: 94 MG/DL (ref 70–110)
HCT VFR BLD AUTO: 36.9 % (ref 37–48.5)
HGB BLD-MCNC: 12.2 G/DL (ref 12–16)
IMM GRANULOCYTES # BLD AUTO: 0.02 K/UL (ref 0–0.04)
IMM GRANULOCYTES NFR BLD AUTO: 0.3 % (ref 0–0.5)
LYMPHOCYTES # BLD AUTO: 1.4 K/UL (ref 1–4.8)
LYMPHOCYTES NFR BLD: 17.5 % (ref 18–48)
MCH RBC QN AUTO: 31.6 PG (ref 27–31)
MCHC RBC AUTO-ENTMCNC: 33.1 G/DL (ref 32–36)
MCV RBC AUTO: 96 FL (ref 82–98)
MONOCYTES # BLD AUTO: 0.8 K/UL (ref 0.3–1)
MONOCYTES NFR BLD: 10.3 % (ref 4–15)
NEUTROPHILS # BLD AUTO: 5.3 K/UL (ref 1.8–7.7)
NEUTROPHILS NFR BLD: 68.5 % (ref 38–73)
NRBC BLD-RTO: 0 /100 WBC
PLATELET # BLD AUTO: 401 K/UL (ref 150–450)
PMV BLD AUTO: 10.1 FL (ref 9.2–12.9)
POTASSIUM SERPL-SCNC: 3.6 MMOL/L (ref 3.5–5.1)
PROT SERPL-MCNC: 6.2 G/DL (ref 6–8.4)
RBC # BLD AUTO: 3.86 M/UL (ref 4–5.4)
SODIUM SERPL-SCNC: 142 MMOL/L (ref 136–145)
WBC # BLD AUTO: 7.7 K/UL (ref 3.9–12.7)

## 2022-11-08 PROCEDURE — 97110 THERAPEUTIC EXERCISES: CPT

## 2022-11-08 PROCEDURE — 25000003 PHARM REV CODE 250: Performed by: NURSE PRACTITIONER

## 2022-11-08 PROCEDURE — 25000003 PHARM REV CODE 250

## 2022-11-08 PROCEDURE — 63600175 PHARM REV CODE 636 W HCPCS: Performed by: STUDENT IN AN ORGANIZED HEALTH CARE EDUCATION/TRAINING PROGRAM

## 2022-11-08 PROCEDURE — 85025 COMPLETE CBC W/AUTO DIFF WBC: CPT | Performed by: NURSE PRACTITIONER

## 2022-11-08 PROCEDURE — 36415 COLL VENOUS BLD VENIPUNCTURE: CPT | Performed by: NURSE PRACTITIONER

## 2022-11-08 PROCEDURE — 80053 COMPREHEN METABOLIC PANEL: CPT | Performed by: NURSE PRACTITIONER

## 2022-11-08 PROCEDURE — 94761 N-INVAS EAR/PLS OXIMETRY MLT: CPT

## 2022-11-08 RX ORDER — LANOLIN ALCOHOL/MO/W.PET/CERES
100 CREAM (GRAM) TOPICAL DAILY
Start: 2022-11-09 | End: 2022-12-08

## 2022-11-08 RX ORDER — FOLIC ACID 1 MG/1
1 TABLET ORAL DAILY
Refills: 0
Start: 2022-11-09 | End: 2022-12-08

## 2022-11-08 RX ADMIN — Medication: at 08:11

## 2022-11-08 RX ADMIN — THERA TABS 1 TABLET: TAB at 08:11

## 2022-11-08 RX ADMIN — LEVOTHYROXINE SODIUM 50 MCG: 50 TABLET ORAL at 05:11

## 2022-11-08 RX ADMIN — FOLIC ACID 1 MG: 1 TABLET ORAL at 08:11

## 2022-11-08 RX ADMIN — Medication 100 MG: at 08:11

## 2022-11-08 RX ADMIN — POLYETHYLENE GLYCOL 3350 17 G: 17 POWDER, FOR SOLUTION ORAL at 08:11

## 2022-11-08 RX ADMIN — HEPARIN SODIUM 5000 UNITS: 5000 INJECTION INTRAVENOUS; SUBCUTANEOUS at 05:11

## 2022-11-08 RX ADMIN — HEPARIN SODIUM 5000 UNITS: 5000 INJECTION INTRAVENOUS; SUBCUTANEOUS at 01:11

## 2022-11-08 NOTE — PT/OT/SLP PROGRESS
"Occupational Therapy   Treatment    Name: Eugenio Aldridge  MRN: [id number]  Admitting Diagnosis:  SDH (subdural hematoma)  10 Days Post-Op    Recommendations:     Discharge Recommendations: rehabilitation facility  Discharge Equipment Recommendations:   (TBD)  Barriers to discharge:  None    Assessment:     Eugenio Aldridge is a 77 y.o. female with a medical diagnosis of SDH (subdural hematoma).  She presents with deficits in self-care tasks as well as functional mobility and nedurance. Pt. Highly motivated and with good participation noted on this date. Pt. Required CGA/Min A for functional standing tasks and mobility. Pt. Is a high fall risk at this time and tends to reach out to furniture/walls for  steadying purposes. Pt. With fatigue noted with there ex on this date and required rest breaks. Patient would benefit from continued OT services to maximize level of safety and independence with self-care tasks.   Performance deficits affecting function are weakness, impaired endurance, impaired self care skills, impaired functional mobility, gait instability, impaired balance, impaired cognition.     Rehab Prognosis:  Good; patient would benefit from acute skilled OT services to address these deficits and reach maximum level of function.       Plan:     Patient to be seen 4 x/week to address the above listed problems via self-care/home management, therapeutic activities, therapeutic exercises  Plan of Care Expires: 12/02/22  Plan of Care Reviewed with: patient    Subjective   " This is unbelievable.  How tired I get and how quickly."  Pain/Comfort:  Pain Rating 1: 0/10  Pain Rating Post-Intervention 1: 0/10    Objective:     Communicated with: nurse prior to session.  Patient found supine with  (supine in bed. [employer] camera; spouse initially present) upon OT entry to room.    General Precautions: Standard, fall, airborne, contact, droplet   Orthopedic Precautions:N/A   Braces: N/A  Respiratory Status: Room air     Occupational " Performance:     Bed Mobility:    Patient completed Supine to Sit with supervision  Patient completed Sit to Supine with supervision     Functional Mobility/Transfers:  Patient completed Sit <> Stand Transfer with contact guard assistance  with  hand-held assist   Functional Mobility: Pt. Ambulated in room (functional distances) with HHA/min A and rest breaks required. Pt. Reaching to stabilize on wall and furniture in room.     Activities of Daily Living:  Not performed on this date      [facility] 6 Click ADL: 20    Treatment & Education:  Pt. Engaged in BUE/BLE AROM there ex seated EOB x 1 set 10 reps for all major planes of motion with rest breaks provided as needed.   Pt. Educated on importance of OOB/therapy to get stronger.     Patient left supine with call button in reach    GOALS:   Multidisciplinary Problems       Occupational Therapy Goals          Problem: Occupational Therapy    Goal Priority Disciplines Outcome Interventions   Occupational Therapy Goal     OT, PT/OT Ongoing, Progressing    Description: Goals to be met by: 11/16/2022     Patient will increase functional independence with ADLs by performing:    UE Dressing with Modified [place].  LE Dressing with Modified [place].  Grooming while standing at sink with Modified [place].  Toileting from toilet with Modified [place] for hygiene and clothing management.   Toilet transfer to toilet with Modified [place].                         Time Tracking:     OT Date of Treatment: 11/08/22  OT Start Time: 1415  OT Stop Time: 1432  OT Total Time (min): 17 min    Billable Minutes:Therapeutic Exercise 17    OT/CHUNG: OT          11/8/2022

## 2022-11-08 NOTE — DISCHARGE INSTRUCTIONS
Neurosurgery Patient Information  -Return to work will be determined on an individual basis.  -No driving until released by Dr. Souza.  -Do not take any OTC products containing acetaminophen at the same time as you take your narcotic pain medication. Medications that may contain acetaminophen include but are not limited to: Excedrin and other headache medications, arthritis medications, cold and sinus medications, etc. Please review the list of active ingredients in any OTC medication prior to taking it.  -Do not take any Aspirin or Aspirin-containing products for 2 weeks after surgery.  -Do not take any Aleve, Naprosyn, Naproxen, Ibuprofen, Advil or any other nonsteroidal anti-inflammatory drug (NSAID) for 2 weeks after surgery.  -Do not take any herbal supplements for 2 weeks after surgery.   -Do not consume any alcoholic beverages until released by your neurosurgeon  -Do not perform any excessive bending over or leaning forward as this is a fall hazard.  -Do not perform any heavy lifting or lifting more than 5-10 lbs from the ground level as this is a fall hazard.  -Slowly increase your ambulation [walking] over the next 2 weeks as tolerated. The goal is to be walking 1-2 miles by the time of your 2 week post op appointment.   -Walk on paved surfaces only. It is okay to walk up and down stairs while holding onto a side rail.      Contact the Neurosurgery Office immediately if:  If you begin to notice any neurologic changes such as:           -Sudden onset of lethargy or sleepiness           -Sudden confusion, trouble speaking, or understanding            -Sudden trouble seeing in one or both eyes            -Sudden trouble walking, dizziness, loss of coordination            -Sudden severe headache with no known cause            -Sudden onset of numbness or weakness     Wound Care:  Keep your incision open to air. You may shower on the 2nd day after your surgery. Keep the incision clean and dry at all times.  Please cover the incision with saran wrap or other occlusive dressing while showering and REMOVE once you have completed taking your shower. Do not allow the force of water to hit the incision. If the incision gets damp, gently pat it dry. Do not rub or scrub the incision. You cannot take a bath/swim/submerge the incision until 8 weeks after surgery.    The incision does not need to be cleaned with any water, soap, alcohol, peroxide, or other substance.    Apply Bacitracin ointment (over the counter) to incision twice daily.    Call your doctor or go to the Emergency Room for any signs of infection including: increased redness, drainage, pain or fever (temperature greater than or equal to 101.4).       Miscellaneous:  -Follow up with Dr. Souza in 2 weeks for a wound check.  Future Appointments   Date Time Provider Department Center   11/14/2022 10:45 AM David Ferrari MD [facility] NEURO8 [facility]   11/16/2022  2:00 PM PRE-ADMIT, ENDO -[facility] ENDOPP4 [facility]   1/30/2023  8:20 AM METH MAMMO1 M[facility] MAMMO [place]   1/30/2023  9:30 AM MET, DEXA1 [facility] BMD [place]   4/19/2023  3:30 PM Suha Salguero MD [facility] IM [place]            [facility] Neurosurgery Office: [phone]

## 2022-11-08 NOTE — PLAN OF CARE
[facility]    FACILITY TRANSFER ORDERS      Patient Name: Eugenio Aldridge  YOB: 1945    PCP: Bertha Salguero MD   PCP Address: [address] [facility] [address] [place]  PCP Phone Number: [phone]  PCP Fax: [phone]    Encounter Date: 11/08/2022    Admit to: inpatient rehab    Vital Signs:  Routine    Diagnoses:   Active Hospital Problems    Diagnosis  POA    *SDH (subdural hematoma) [S06.5XAA]  Yes    Moderate malnutrition [E44.0]  Unknown    Seizure prophylaxis [Z29.8]  Not Applicable    Brain compression [G93.5]  Unknown    Hypertension [I10]  Yes     Chronic    Hypothyroidism [E03.9]  Yes      Resolved Hospital Problems   No resolved problems to display.       Allergies:  Review of patient's allergies indicates:   Allergen Reactions    Codeine Nausea And Vomiting       Diet: cardiac diet    Activities: Activity as tolerated    Goals of Care Treatment Preferences:  Code Status: Full Code    Living Will: Yes              Nursing: per facility protocol     Labs:  labs, dvt prophylaxis, and bowel regimen per facility       CONSULTS:    Physical Therapy to evaluate and treat. , Occupational Therapy to evaluate and treat., and Speech Therapy to evaluate and treat for Language and Cognition.    MISCELLANEOUS CARE:  N/a    WOUND CARE ORDERS  Yes: Surgical Wound:  Location: head    Consult ET nurse  Nurse to assess incision for appropriate wound healing. Nurse to apply bacitracin BID to the incisions until staples are removed. Nurse to remove staples on 11/14/22  if healed appropriately and patient remains in facility. For questions and concerns regarding surgical wound, please call [facility] Neurosurgery office at [phone].    Wound Care:  Apply bacitracin ointment to the incision twice a day until staples are removed 2 weeks after surgery.  No bandage required. Keep the incision open to the air.  Patient may shower on the 2nd day after surgery. Keep the incision clean and  dry at all times when not showering. Cover the incision while showering and REMOVE bandage once shower is completed. Do not allow the force of water to hit the incision. If the incision gets damp, pat it dry. Do not rub or scrub the incision.  Do not take a bath or submerge the incision in water until 8 weeks after surgery.  The incision does not need to be cleaned with any water, soap, alcohol, peroxide, or other substance.      Medications: Review discharge medications with patient and family and provide education.      Current Discharge Medication List        START taking these medications    Details   folic acid (FOLVITE) 1 MG tablet Take 1 tablet (1 mg total) by mouth once daily.  Refills: 0      thiamine 100 MG tablet Take 1 tablet (100 mg total) by mouth once daily.           CONTINUE these medications which have NOT CHANGED    Details   amLODIPine (NORVASC) 10 MG tablet Take 1 tablet (10 mg total) by mouth once daily.  Qty: 90 tablet, Refills: 3    Comments: .      atorvastatin (LIPITOR) 40 MG tablet TAKE 1 TABLET(40 MG) BY MOUTH EVERY DAY  Qty: 90 tablet, Refills: 3    Associated Diagnoses: Pure hypercholesterolemia      calcium-vitamin D3 (OS-MARYANA 500 + D3) 500 mg(1,250mg) -200 unit per tablet Take 1 tablet by mouth once daily.       cyanocobalamin, vitamin B-12, (VITAMIN B-12 ORAL) Take 1 tablet by mouth once daily.      donepeziL (ARICEPT) 10 MG tablet Take 1 tablet (10 mg total) by mouth once daily.  Qty: 90 tablet, Refills: 3    Associated Diagnoses: Mild cognitive impairment      levothyroxine (SYNTHROID) 50 MCG tablet TAKE 1 TABLET(50 MCG) BY MOUTH BEFORE BREAKFAST  Qty: 90 tablet, Refills: 3      multivitamin (THERAGRAN) per tablet Take by mouth. 1 Tablet Oral Every day           STOP taking these medications       aspirin 81 MG Chew Comments:   Reason for Stopping:         cilostazoL (PLETAL) 100 MG Tab Comments:   Reason for Stopping:                  Immunizations Administered as of 11/8/2022        Name Date Dose VIS Date Route Exp Date    COVID-19, MRNA, LN-S, PF (Pfizer) (Purple Cap) 9/27/2021 0.3 mL -- Intramuscular --    Site: Left arm     : [employer] Inc     Lot: [id number]     COVID-19, MRNA, LN-S, PF (Pfizer) (Purple Cap) 1/27/2021  1:50 PM 0.3 mL 12/12/2020 Intramuscular 5/31/2021    Site: Left deltoid     Given By: Enid Galarza LPN     : [employer]     Lot: [id number]     COVID-19, MRNA, LN-S, PF (Pfizer) (Purple Cap) 1/6/2021  3:37 PM 0.3 mL 12/12/2020 Intramuscular 4/30/2021    Site: Left deltoid     Given By: Rosa Red LPN     : Pf[employer] Inc     Lot: [id number]             This patient has had both covid vaccinations    Some patients may experience side effects after vaccination.  These may include fever, headache, muscle or joint aches.  Most symptoms resolve with 24-48 hours and do not require urgent medical evaluation unless they persist for more than 72 hours or symptoms are concerning for an unrelated medical condition.          _________________________________  Betina Lopez PA-C  11/08/2022

## 2022-11-08 NOTE — PROGRESS NOTES
PM&R Recommendation:     At this time, the PM&R team has reviewed this patient's ongoing medical case including inpatient diagnosis, medical history, clinical examination, labs, vitals, current social and functional history to provide the post-acute recommendation as follows:     RECOMMENDATIONS: inpatient rehabilitation due to fair to good potential for improvement with therapies and need of physician oversight for management of ongoing active medical issues, once medically stable.       The patient will be admitted for comprehensive interdisciplinary inpatient rehabilitation to address the impairments due to her medical diagnosis of subdural hematoma with craniotomy for SDH evacuation on 10/29/2022. The patient will benefit from an inpatient rehabilitation program to promote functional recovery, implement compensatory strategies and will undergo assessment for needs for durable medical equipment for safe discharge to the community. This patient will benefit from a coordinated interdisciplinary rehabilitation program services that require close monitoring and treatment with 24-hour rehabilitative nursing and  physical/occupational/speech therapies for 3 hours/day for 5 days/week. This interdisciplinary program will be performed under the direction of a physiatrist.          We will sign off with the transfer of the patient to [facility] liaison who will continue to follow going forward until admission to [facility].

## 2022-11-08 NOTE — PLAN OF CARE
[facility] - Telemetry Stepdown  Discharge Final Note    Primary Care Provider: Bertha Salguero MD    Expected Discharge Date: 11/8/2022    Final Discharge Note (most recent)       Final Note - 11/08/22 1549          Final Note    Assessment Type Final Discharge Note     Anticipated Discharge Disposition Rehab Facility        Post-Acute Status    Post-Acute Authorization Placement     Post-Acute Placement Status Set-up Complete/Auth obtained   [facility]    Discharge Delays None known at this time                 Per [facility] at [facility], nurse can call report and SW can arrange transportation. RN and MD notified.    SW arranged stretcher transport via [facility] due to covid+ status. Requested  time is 4:35 PM. Requested  time does not guarantee arrival time.    Updated patient.    Luly David MSW, [facility]  [facility]  Ext. [phone]

## 2022-11-08 NOTE — PLAN OF CARE
Problem: Adult Inpatient Plan of Care  Goal: Absence of Hospital-Acquired Illness or Injury  Outcome: Ongoing, Progressing  Goal: Optimal Comfort and Wellbeing  Outcome: Ongoing, Progressing  Goal: Readiness for Transition of Care  Outcome: Ongoing, Progressing     Problem: Skin Injury Risk Increased  Goal: Skin Health and Integrity  Outcome: Ongoing, Progressing     Problem: Fall Injury Risk  Goal: Absence of Fall and Fall-Related Injury  Outcome: Ongoing, Progressing     Problem: Infection  Goal: Absence of Infection Signs and Symptoms  Outcome: Ongoing, Progressing     Problem: Adjustment to Surgery (Craniotomy/Craniectomy/Cranioplasty)  Goal: Optimal Coping with Surgery  Outcome: Ongoing, Progressing     Problem: Functional Ability Impaired (Craniotomy/Craniectomy/Cranioplasty)  Goal: Optimal Functional Ability  Outcome: Ongoing, Progressing

## 2022-11-09 ENCOUNTER — TELEPHONE (OUTPATIENT)
Dept: NEUROSURGERY | Facility: CLINIC | Age: 77
End: 2022-11-09
Payer: MEDICARE

## 2022-11-09 DIAGNOSIS — S06.5XAA SDH (SUBDURAL HEMATOMA): Primary | ICD-10-CM

## 2022-11-09 NOTE — NURSING
Pt report called to receiving nurse and pt is aware of transfer to skilled nursing, family at bedside awaiting transfer.

## 2022-11-09 NOTE — DISCHARGE SUMMARY
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  Patient reports that tonight around 6:00 p.m. she had a acute onset headache while she was lying down in bed.  She reports that it is the worse headache of her life and she does not regularly get headaches. Denies fever/chills, vision/hearing changes, dysphagia, dysarthria, nausea/vomiting, new-onset weakness or sensory change, bowel/bladder changes. Denies AC/AP. CTH with 1.3cm aSDH with approximately 5mm MLS.         Procedure(s) (LRB):  CRANIOTOMY, FOR SUBDURAL HEMATOMA EVACUATION (Left)     Hospital Course: 10/30: POD 1 s/p L crani for SDH evac. NAEON. AFVSS. Exam stable. Pain controlled. Tolerating PO. Voiding.   10/31: POD 2. NAEON. AFVSS. Exam stable. HV in place.   11/1: POD3, Stable overnight, HV pulled   11/4: NAEON. AFVSS. Exam stable. Pain controlled. Tolerating PO. Pending rehab.   11/5: NAEON. AF, VSS. Taking minimal PO. Pending IPR  11/6: NAEON. AFVSS. Exam stable. Off MIVF. Medically ready for [facility] I[facility].   11/7: NAEON. AFVSS. Ambulating well to bathroom. Pending IPR. CTH tmr am.  11/8: NAEON. AFVSS. Exam stable. CTH reviewed with attending staff - stable for discharge to [facility].    Patient was admitted for left SDH on 10/29/22 and underwent left crani for SDH on 10/29/22 without perioperative complications. The patient remained on abx until all drains were discontinued and was kept on appropriate DVT prophylaxis during the course of admission. At the time of discharge, the patient was  tolerating PO intake without N/V, dysphagia, denied bowel or bladder dysfunction, denied new neurological symptoms, and reported pain controlled with current regimen. The surgical site was without evidence of drainage, breakdown or infection and all staples/sutures were intact. Therapy recommendations of inpatient rehab have been arranged and the patient will follow up in clinic as indicated in discharge instructions. All questions were answered and continued treatment/wound care instructions were discussed in detail prior to discharge.      Goals of Care Treatment Preferences:  Code Status: Full Code    Living Will: Yes              Consults:   Consults (From admission, onward)        Status Ordering Provider     Inpatient consult to Physical Medicine Rehab  Once        Provider:  (Not yet assigned)    Completed MARK ANTHONY WORTHINGTON     Inpatient consult to Physical Medicine Rehab  Once        Provider:  (Not yet assigned)    Completed SURENDRA ABRAMS     Inpatient consult to Registered Dietitian/Nutritionist  Once        Provider:  (Not yet assigned)    Completed EDOUARD DON     IP consult to case management/social work  Once        Provider:  (Not yet assigned)    Completed EDOUARD DON     Inpatient consult to Neurosurgery  Once        Provider:  (Not yet assigned)    Completed CHRISTIANA BLANCAS            Pending Diagnostic Studies:     Procedure Component Value Units Date/Time    CBC auto differential [[id number]] Collected: 11/03/22 0432    Order Status: Sent Lab Status: In process Updated: 11/03/22 0433    Specimen: Blood     Comprehensive metabolic panel [[id number]] Collected: 11/03/22 0432    Order Status: Sent Lab Status: In process Updated: 11/03/22 0433    Specimen: Blood     Urinalysis, Reflex to Urine Culture Urine, Clean Catch [[id number]] Collected: 10/29/22 0512    Order Status: Sent Lab Status: In process Updated: 10/29/22 0512    Specimen: Urine         Final Active Diagnoses:    Diagnosis Date  Noted POA    PRINCIPAL PROBLEM:  SDH (subdural hematoma) [S06.5XAA] 10/29/2022 Yes    Moderate malnutrition [E44.0] 11/07/2022 Unknown    Seizure prophylaxis [Z29.8] 10/29/2022 Not Applicable    Brain compression [G93.5] 10/29/2022 Unknown    Hypertension [I10] 02/21/2022 Yes     Chronic    Hypothyroidism [E03.9] 07/23/2012 Yes      Problems Resolved During this Admission:      Discharged Condition: stable     Disposition: Rehab Facility    Follow Up:    Patient Instructions:      Notify your health care provider if you experience any of the following:  increased confusion or weakness     Notify your health care provider if you experience any of the following:  persistent dizziness, light-headedness, or visual disturbances     Notify your health care provider if you experience any of the following:  worsening rash     Notify your health care provider if you experience any of the following:  severe persistent headache     Notify your health care provider if you experience any of the following:  difficulty breathing or increased cough     Notify your health care provider if you experience any of the following:  redness, tenderness, or signs of infection (pain, swelling, redness, odor or green/yellow discharge around incision site)     Notify your health care provider if you experience any of the following:  severe uncontrolled pain     Notify your health care provider if you experience any of the following:  persistent nausea and vomiting or diarrhea     Notify your health care provider if you experience any of the following:  temperature >100.4     Activity as tolerated     Medications:  Reconciled Home Medications:      Medication List      START taking these medications    folic acid 1 MG tablet  Commonly known as: FOLVITE  Take 1 tablet (1 mg total) by mouth once daily.  Start taking on: November 9, 2022     thiamine 100 MG tablet  Take 1 tablet (100 mg total) by mouth once daily.  Start taking on: November  9, 2022        CONTINUE taking these medications    amLODIPine 10 MG tablet  Commonly known as: NORVASC  Take 1 tablet (10 mg total) by mouth once daily.     atorvastatin 40 MG tablet  Commonly known as: LIPITOR  TAKE 1 TABLET(40 MG) BY MOUTH EVERY DAY     calcium-vitamin D3 500 mg-5 mcg (200 unit) per tablet  Commonly known as: OS-MARYANA 500 + D3  Take 1 tablet by mouth once daily.     donepeziL 10 MG tablet  Commonly known as: ARICEPT  Take 1 tablet (10 mg total) by mouth once daily.     levothyroxine 50 MCG tablet  Commonly known as: SYNTHROID  TAKE 1 TABLET(50 MCG) BY MOUTH BEFORE BREAKFAST     multivitamin per tablet  Commonly known as: THERAGRAN  Take by mouth. 1 Tablet Oral Every day     VITAMIN B-12 ORAL  Take 1 tablet by mouth once daily.        STOP taking these medications    aspirin 81 MG Chew     cilostazoL 100 MG Tab  Commonly known as: FAY Devi MD  Neurosurgery  [facility] - Telemetry Stepdown

## 2022-11-09 NOTE — NURSING
1755. Transport here to take pt to skilled nursing. IV/tele monitor prev removed. Discharge instructions with Family.  Belongings given to patient, except cell phone-taken by family.

## 2022-11-16 ENCOUNTER — CLINICAL SUPPORT (OUTPATIENT)
Dept: ENDOSCOPY | Facility: HOSPITAL | Age: 77
End: 2022-11-16
Attending: COLON & RECTAL SURGERY
Payer: MEDICARE

## 2022-11-16 DIAGNOSIS — Z08 ENCOUNTER FOR FOLLOW-UP SURVEILLANCE OF COLON CANCER: ICD-10-CM

## 2022-11-16 DIAGNOSIS — Z85.038 ENCOUNTER FOR FOLLOW-UP SURVEILLANCE OF COLON CANCER: ICD-10-CM

## 2022-11-16 NOTE — PLAN OF CARE
We attempted twice to reach you for your scheduled PAT appointment but you were not available. Please contact Endoscopy Scheduling at # [phone] or visit your [facility] portal to reschedule your PAT appointment.

## 2022-11-18 ENCOUNTER — TELEPHONE (OUTPATIENT)
Dept: INTERNAL MEDICINE | Facility: CLINIC | Age: 77
End: 2022-11-18
Payer: COMMERCIAL

## 2022-11-18 NOTE — TELEPHONE ENCOUNTER
----- Message from Noni Alejandro sent at 11/18/2022  8:39 AM CST -----  Contact: pt   Patient needs a Hosp follow up appt with their PCP only.       When is the next available appointment:      Discharge date:hosp discharge 11/21/2022    Needs to be seen by: within 2 weeks of discharge    Would you prefer an answer via [employer]?: phone     Comments:

## 2022-11-21 NOTE — TELEPHONE ENCOUNTER
----- Message from Suha Salguero MD sent at 1/26/2022  9:13 AM CST -----  Please note that your mammogram was read as follows  Impression:  There is no mammographic evidence of malignancy.   Suha Cilfton   Caller: Berna Mcadams    [facility] [id number] [facility] [address] - [phone]  - [phone] 

## 2022-11-22 ENCOUNTER — PATIENT MESSAGE (OUTPATIENT)
Dept: NEUROSURGERY | Facility: CLINIC | Age: 77
End: 2022-11-22
Payer: COMMERCIAL

## 2022-11-22 NOTE — TELEPHONE ENCOUNTER
Pt called, again to return call. Line still ringing busy. Unable to connect.  Pt needs a hospital f/u by 12/5/22.

## 2022-11-22 NOTE — TELEPHONE ENCOUNTER
----- Message from Alyce Jack sent at 11/22/2022 12:15 PM CST -----  Contact: pt [phone]  Patient is returning a phone call.  Who left a message for the patient: Tennille Mathew LPN  Does patient know what this is regarding: n/a  Would you like a call back, or a response through your [facility] portal?:   call     Please call and advise.    Thank You    .

## 2022-11-23 ENCOUNTER — TELEPHONE (OUTPATIENT)
Dept: INTERNAL MEDICINE | Facility: CLINIC | Age: 77
End: 2022-11-23
Payer: COMMERCIAL

## 2022-11-23 NOTE — TELEPHONE ENCOUNTER
----- Message from Alexey Horvath sent at 11/23/2022  6:13 AM CST -----  Contact: Patient  The pt called and would like to schedule a hospital follow up visit but there is nothing available until 1/3/23    Please call her back at [phone]

## 2022-11-23 NOTE — TELEPHONE ENCOUNTER
----- Message from Tg English sent at 11/23/2022 10:49 AM CST -----  Regarding: Appt  Contact: [phone]  Patient is calling to schedule appt to speak with pcp about her concerns after brain surgery. Please contact pt

## 2022-11-28 ENCOUNTER — CLINICAL SUPPORT (OUTPATIENT)
Dept: ENDOSCOPY | Facility: HOSPITAL | Age: 77
End: 2022-11-28
Attending: COLON & RECTAL SURGERY
Payer: MEDICARE

## 2022-11-28 DIAGNOSIS — Z08 ENCOUNTER FOR FOLLOW-UP SURVEILLANCE OF COLON CANCER: ICD-10-CM

## 2022-11-28 DIAGNOSIS — Z85.038 ENCOUNTER FOR FOLLOW-UP SURVEILLANCE OF COLON CANCER: ICD-10-CM

## 2022-11-28 NOTE — PLAN OF CARE
We attempted to reach you for your scheduled PAT appointment x2 calls but you were not available. Please contact Endoscopy Scheduling at # [phone] or visit your [facility] portal to reschedule your PAT appointment.

## 2022-12-08 ENCOUNTER — HOSPITAL ENCOUNTER (OUTPATIENT)
Dept: RADIOLOGY | Facility: HOSPITAL | Age: 77
Discharge: HOME | End: 2022-12-08
Attending: NEUROLOGICAL SURGERY
Payer: MEDICARE

## 2022-12-08 ENCOUNTER — OFFICE VISIT (OUTPATIENT)
Dept: NEUROSURGERY | Facility: CLINIC | Age: 77
End: 2022-12-08
Payer: MEDICARE

## 2022-12-08 VITALS
WEIGHT: 145 LBS | HEIGHT: 65 IN | DIASTOLIC BLOOD PRESSURE: 65 MMHG | BODY MASS INDEX: 24.16 KG/M2 | HEART RATE: 77 BPM | SYSTOLIC BLOOD PRESSURE: 135 MMHG

## 2022-12-08 DIAGNOSIS — S06.5XAA SDH (SUBDURAL HEMATOMA): ICD-10-CM

## 2022-12-08 DIAGNOSIS — S06.5XAA SUBDURAL HEMATOMA: Primary | ICD-10-CM

## 2022-12-08 PROCEDURE — 99999 PR PBB SHADOW E&M-EST. PATIENT-LVL IV: ICD-10-PCS | Mod: PBBFAC,,, | Performed by: NEUROLOGICAL SURGERY

## 2022-12-08 PROCEDURE — 70450 CT HEAD WITHOUT CONTRAST: ICD-10-PCS | Mod: 26,,, | Performed by: RADIOLOGY

## 2022-12-08 PROCEDURE — 99024 PR POST-OP FOLLOW-UP VISIT: ICD-10-PCS | Mod: POP,,, | Performed by: NEUROLOGICAL SURGERY

## 2022-12-08 PROCEDURE — 70450 CT HEAD/BRAIN W/O DYE: CPT | Mod: 26,,, | Performed by: RADIOLOGY

## 2022-12-08 PROCEDURE — 70450 CT HEAD/BRAIN W/O DYE: CPT | Mod: TC

## 2022-12-08 PROCEDURE — 99024 POSTOP FOLLOW-UP VISIT: CPT | Mod: POP,,, | Performed by: NEUROLOGICAL SURGERY

## 2022-12-08 PROCEDURE — 99214 OFFICE O/P EST MOD 30 MIN: CPT | Mod: PBBFAC,25 | Performed by: NEUROLOGICAL SURGERY

## 2022-12-08 PROCEDURE — 99999 PR PBB SHADOW E&M-EST. PATIENT-LVL IV: CPT | Mod: PBBFAC,,, | Performed by: NEUROLOGICAL SURGERY

## 2022-12-08 RX ORDER — ACETAMINOPHEN 500 MG
500 TABLET ORAL EVERY 6 HOURS PRN
COMMUNITY
Start: 2022-11-21

## 2022-12-08 RX ORDER — AMOXICILLIN 250 MG
1 CAPSULE ORAL DAILY PRN
COMMUNITY
Start: 2022-11-21 | End: 2023-08-08

## 2022-12-08 NOTE — PROGRESS NOTES
Eugenio Osorio was seen in neurosurgical follow-up at the office this morning.  She is a 77-year-old lady who presented with worsening headache at the end of October and was found to have a large left subacute subdural hematoma.  She underwent left frontotemporoparietal craniotomy and evacuation of the hematoma on 10/29/2022.  The hematoma was washed out and membranes removed.  She did well in the postoperative period and was discharged on 11/08 2022.  She says she has been walking well at home.  She always wears her shoes when she is walking.  She has had no headaches, no visual disturbance, no focal weakness extremities.    On brief examination today she is alert and oriented.  She is speaking clearly.  Her craniotomy incision is well healed.  Extraocular movements are good and pupils equal.  She stood and walked without difficulty, her balance seems very good.    CT scan of the head was repeated at [facility] today and compared to her preop scans.  The brain is partially re-expanded.  There is some spinal fluid and seroma in the epidural space.  There is only very mild compression on the brain.    She seems to be going onto good resolution.  Will have her return in about 6 weeks for a follow-up CT scan to check her progress.

## 2022-12-10 ENCOUNTER — PATIENT MESSAGE (OUTPATIENT)
Dept: ADMINISTRATIVE | Facility: OTHER | Age: 77
End: 2022-12-10
Payer: COMMERCIAL

## 2022-12-21 NOTE — TELEPHONE ENCOUNTER
Please call pt and see if she resumed taking the amlodipine?    Reviewed digital HTN records and BP appears well controlled and no BP meds listed

## 2022-12-23 RX ORDER — AMLODIPINE BESYLATE 5 MG/1
TABLET ORAL
Qty: 90 TABLET | Refills: 3 | OUTPATIENT
Start: 2022-12-23

## 2022-12-23 RX ORDER — AMLODIPINE BESYLATE 5 MG/1
5 TABLET ORAL
COMMUNITY
Start: 2022-12-20 | End: 2023-01-09

## 2023-01-09 ENCOUNTER — HOSPITAL ENCOUNTER (OUTPATIENT)
Dept: RADIOLOGY | Facility: HOSPITAL | Age: 78
Discharge: HOME | End: 2023-01-09
Attending: NEUROLOGICAL SURGERY
Payer: MEDICARE

## 2023-01-09 ENCOUNTER — OFFICE VISIT (OUTPATIENT)
Dept: NEUROSURGERY | Facility: CLINIC | Age: 78
End: 2023-01-09
Payer: MEDICARE

## 2023-01-09 VITALS
BODY MASS INDEX: 20.83 KG/M2 | HEART RATE: 75 BPM | DIASTOLIC BLOOD PRESSURE: 78 MMHG | WEIGHT: 125 LBS | HEIGHT: 65 IN | SYSTOLIC BLOOD PRESSURE: 145 MMHG

## 2023-01-09 DIAGNOSIS — S06.5XAA SUBDURAL HEMATOMA: ICD-10-CM

## 2023-01-09 DIAGNOSIS — S06.5XAA SUBDURAL HEMATOMA: Primary | ICD-10-CM

## 2023-01-09 PROCEDURE — 99999 PR PBB SHADOW E&M-EST. PATIENT-LVL IV: CPT | Mod: PBBFAC,,, | Performed by: NEUROLOGICAL SURGERY

## 2023-01-09 PROCEDURE — 99024 POSTOP FOLLOW-UP VISIT: CPT | Mod: POP,,, | Performed by: NEUROLOGICAL SURGERY

## 2023-01-09 PROCEDURE — 99999 PR PBB SHADOW E&M-EST. PATIENT-LVL IV: ICD-10-PCS | Mod: PBBFAC,,, | Performed by: NEUROLOGICAL SURGERY

## 2023-01-09 PROCEDURE — 70450 CT HEAD WITHOUT CONTRAST: ICD-10-PCS | Mod: 26,,, | Performed by: RADIOLOGY

## 2023-01-09 PROCEDURE — 70450 CT HEAD/BRAIN W/O DYE: CPT | Mod: TC

## 2023-01-09 PROCEDURE — 70450 CT HEAD/BRAIN W/O DYE: CPT | Mod: 26,,, | Performed by: RADIOLOGY

## 2023-01-09 PROCEDURE — 99024 PR POST-OP FOLLOW-UP VISIT: ICD-10-PCS | Mod: POP,,, | Performed by: NEUROLOGICAL SURGERY

## 2023-01-09 PROCEDURE — 99214 OFFICE O/P EST MOD 30 MIN: CPT | Mod: PBBFAC,25 | Performed by: NEUROLOGICAL SURGERY

## 2023-01-09 RX ORDER — AMLODIPINE BESYLATE 10 MG/1
10 TABLET ORAL
COMMUNITY
Start: 2022-12-29 | End: 2023-03-12

## 2023-01-09 NOTE — PROGRESS NOTES
Eugenio Aldridge was seen in neurosurgical follow-up at the office this afternoon.  She has been doing well since her last visit following evacuation of the left subdural hematoma on 10/29/2022.  She has had no complaint of headaches.  She has had no seizures.  She feels her vision is stable.  She has had no weakness and numbness extremities, difficulty with gait or balance.    On brief examination today she is bright and alert and speaking well.  Her incision is well healed.  Extraocular movements are good and pupils equal.  She stood and walked without difficulty including turning quickly.  She seems generally neurologically intact.    CT scan of the head was repeated [facility] today.  There remains a small epidural fluid collection, perhaps a combination of seroma and spinal fluid that mildly compresses the hemisphere.  There is no significant midline shift.  The brain itself appears unremarkable.  The craniotomy seems to be healing well.    I am happy to see her doing well.  I have no restrictions on her driving.  I will plan to have a follow-up CT in about 2 months to monitor the residual epidural collection.

## 2023-01-27 ENCOUNTER — PATIENT MESSAGE (OUTPATIENT)
Dept: NEUROSURGERY | Facility: CLINIC | Age: 78
End: 2023-01-27
Payer: COMMERCIAL

## 2023-01-30 ENCOUNTER — HOSPITAL ENCOUNTER (OUTPATIENT)
Dept: RADIOLOGY | Facility: HOSPITAL | Age: 78
Discharge: HOME | End: 2023-01-30
Attending: INTERNAL MEDICINE
Payer: MEDICARE

## 2023-01-30 ENCOUNTER — TELEPHONE (OUTPATIENT)
Dept: NEUROLOGY | Facility: CLINIC | Age: 78
End: 2023-01-30
Payer: COMMERCIAL

## 2023-01-30 VITALS — WEIGHT: 125 LBS | BODY MASS INDEX: 20.83 KG/M2 | HEIGHT: 65 IN

## 2023-01-30 DIAGNOSIS — Z12.31 ENCOUNTER FOR SCREENING MAMMOGRAM FOR BREAST CANCER: ICD-10-CM

## 2023-01-30 PROCEDURE — 77063 BREAST TOMOSYNTHESIS BI: CPT | Mod: 26,,, | Performed by: RADIOLOGY

## 2023-01-30 PROCEDURE — 77063 MAMMO DIGITAL SCREENING BILAT WITH TOMO: ICD-10-PCS | Mod: 26,,, | Performed by: RADIOLOGY

## 2023-01-30 PROCEDURE — 77067 SCR MAMMO BI INCL CAD: CPT | Mod: 26,,, | Performed by: RADIOLOGY

## 2023-01-30 PROCEDURE — 77067 MAMMO DIGITAL SCREENING BILAT WITH TOMO: ICD-10-PCS | Mod: 26,,, | Performed by: RADIOLOGY

## 2023-01-30 PROCEDURE — 77067 SCR MAMMO BI INCL CAD: CPT | Mod: TC,PO

## 2023-01-31 ENCOUNTER — TELEPHONE (OUTPATIENT)
Dept: INTERNAL MEDICINE | Facility: CLINIC | Age: 78
End: 2023-01-31
Payer: COMMERCIAL

## 2023-01-31 NOTE — TELEPHONE ENCOUNTER
----- Message from Suha Salguero MD sent at 1/31/2023  8:33 AM CST -----  Please note that your mammogram was read as follows  Impression:  There is no mammographic evidence of malignancy.   donny

## 2023-02-05 ENCOUNTER — TELEPHONE (OUTPATIENT)
Dept: INTERNAL MEDICINE | Facility: CLINIC | Age: 78
End: 2023-02-05
Payer: COMMERCIAL

## 2023-02-05 DIAGNOSIS — M81.0 AGE RELATED OSTEOPOROSIS, UNSPECIFIED PATHOLOGICAL FRACTURE PRESENCE: Primary | ICD-10-CM

## 2023-02-06 NOTE — TELEPHONE ENCOUNTER
Please call pt and advise her that her DXA revealed severe osteoporosis and increased risk for fracture  Recommend she see ENdocrinology for treatment options    Consult order placed -ENdo- osteoporosis

## 2023-02-27 ENCOUNTER — PATIENT MESSAGE (OUTPATIENT)
Dept: INTERNAL MEDICINE | Facility: CLINIC | Age: 78
End: 2023-02-27
Payer: COMMERCIAL

## 2023-02-28 NOTE — TELEPHONE ENCOUNTER
Call attempted to pt from the dept , Ericka Goins MA 1/30/2023  Dont think a new Consult is needed  The dept will help assist her w/ new Neurologist   Please give her Neurology phone # and MA name

## 2023-03-01 ENCOUNTER — HOSPITAL ENCOUNTER (EMERGENCY)
Facility: HOSPITAL | Age: 78
Discharge: HOME | End: 2023-03-01
Attending: EMERGENCY MEDICINE
Payer: MEDICARE

## 2023-03-01 VITALS
RESPIRATION RATE: 18 BRPM | DIASTOLIC BLOOD PRESSURE: 59 MMHG | OXYGEN SATURATION: 95 % | WEIGHT: 140 LBS | TEMPERATURE: 98 F | SYSTOLIC BLOOD PRESSURE: 142 MMHG | HEART RATE: 93 BPM | BODY MASS INDEX: 23.32 KG/M2 | HEIGHT: 65 IN

## 2023-03-01 DIAGNOSIS — H35.341 MACULAR HOLE OF RIGHT EYE: Primary | ICD-10-CM

## 2023-03-01 PROCEDURE — 99284 EMERGENCY DEPT VISIT MOD MDM: CPT | Mod: ,,, | Performed by: EMERGENCY MEDICINE

## 2023-03-01 PROCEDURE — 25000003 PHARM REV CODE 250: Performed by: EMERGENCY MEDICINE

## 2023-03-01 PROCEDURE — 99284 PR EMERGENCY DEPT VISIT,LEVEL IV: ICD-10-PCS | Mod: ,,, | Performed by: EMERGENCY MEDICINE

## 2023-03-01 PROCEDURE — 99283 EMERGENCY DEPT VISIT LOW MDM: CPT

## 2023-03-01 RX ORDER — PROPARACAINE HYDROCHLORIDE 5 MG/ML
1 SOLUTION/ DROPS OPHTHALMIC
Status: COMPLETED | OUTPATIENT
Start: 2023-03-01 | End: 2023-03-01

## 2023-03-01 RX ADMIN — PROPARACAINE HYDROCHLORIDE 1 DROP: 5 SOLUTION/ DROPS OPHTHALMIC at 03:03

## 2023-03-01 RX ADMIN — FLUORESCEIN SODIUM 1 EACH: 1 STRIP OPHTHALMIC at 03:03

## 2023-03-01 NOTE — ED NOTES
Discharge home with family, states understanding to follow up as directed. Ambulates out of ED without difficulty. ALL INFORMATION PER PROVIDERS

## 2023-03-01 NOTE — CONSULTS
"Consultation Report  Ophthalmology Service    Date: 03/01/2023    Chief complaint/Reason for Consult: "macular hole right eye"     History of Present Illness: Eugenio Aldridge is a 77 y.o. female with POHx refractive error who presents directly from optometrist with a "stage 3 macular hole".  Patient saw outside optom yesterday for new glasses, had zero symptoms other than decreased vision. Did not realize the discrepancy between the two eyes until at the optometrist when they noticed the mac hole one exam and sent the patient immediately to [facility] for "emergency surgery".  Patient denies any flashes/floaters, curtain or veil in both eyes. No recent trauma.        POcularHx: RE    Current eye gtts: Denies     Family Hx: Denies family history of glaucoma, macular degeneration, or blindness. family history includes Alzheimer's disease (age of onset: 96) in her mother; Bladder Cancer in her father and paternal uncle; Breast cancer in her paternal grandmother; Lung cancer in her paternal uncle; No Known Problems in her sister, sister, and sister; Other in her brother; Pancreatic cancer in her paternal aunt; Pneumonia in her brother.     PMHx:  has a past medical history of Actinic keratosis, Aortic valve disorders, Atherosclerosis of aorta, Atherosclerosis of native arteries of the extremities with intermittent claudication, Carcinoma in situ, vulva, Hypertension (2/21/2022), Left bundle branch hemiblock, Other and unspecified hyperlipidemia, Postinflammatory pulmonary fibrosis, and Senile nuclear sclerosis.     PSurgHx:  has a past surgical history that includes Vulva surgery; Rhytidectomy; BTL; TONSILLECTOMY, ADENOIDECTOMY; Arthroscopic repair of rotator cuff of shoulder (Right, 4/24/2019); Decompression of subacromial space (Right, 4/24/2019); Shoulder arthroscopy (Right, 4/24/2019); Colonoscopy (N/A, 12/9/2021); Flexible sigmoidoscopy (N/A, 12/20/2021); Laparoscopic left colectomy (N/A, 2/4/2022); Mobilization of splenic " flexure (N/A, 2/4/2022); Flexible sigmoidoscopy (N/A, 2/4/2022); and Craniotomy for evacuation of subdural hematoma (Left, 10/29/2022).     Home Medications:   Prior to Admission medications    Medication Sig Start Date End Date Taking? Authorizing Provider   amLODIPine (NORVASC) 10 MG tablet Take 10 mg by mouth. 12/29/22  Yes Historical Provider   donepeziL (ARICEPT) 10 MG tablet Take 1 tablet (10 mg total) by mouth once daily. 10/24/22 10/24/23 Yes David Ferrari MD   levothyroxine (SYNTHROID) 50 MCG tablet TAKE 1 TABLET(50 MCG) BY MOUTH BEFORE BREAKFAST 12/4/22  Yes Suha Salguero MD   acetaminophen (TYLENOL) 500 MG tablet Take 500 mg by mouth every 6 (six) hours as needed. 11/21/22   Historical Provider   atorvastatin (LIPITOR) 40 MG tablet TAKE 1 TABLET(40 MG) BY MOUTH EVERY DAY 9/26/22   Homeyar EBONY Hammer MD   calcium-vitamin D3 (OS-MARYANA 500 + D3) 500 mg(1,250mg) -200 unit per tablet Take 1 tablet by mouth once daily.     Historical Provider   flu vacc [id number],65yr up,/PF (FLUZONE HIGH-DOSE 2017-18, PF, IM) Fluzone High-Dose [id number] (PF) 180 mcg/0.5 mL intramuscular syringe    Historical Provider   multivitamin (THERAGRAN) per tablet Take by mouth. 1 Tablet Oral Every day    Historical Provider   senna-docusate 8.6-50 mg (PERICOLACE) 8.6-50 mg per tablet Take 1 tablet by mouth daily as needed. 11/21/22   Historical Provider        Medications this encounter:     Allergies: is allergic to codeine.     Social:  reports that she quit smoking about 21 years ago. Her smoking use included cigarettes. She started smoking about 61 years ago. She has a 58.50 pack-year smoking history. She has never used smokeless tobacco. She reports that she does not currently use alcohol after a past usage of about 7.0 - 14.0 standard drinks per week. She reports that she does not use drugs.     ROS: As per HPI    Ocular examination/Dilated fundus examination:  Base Eye Exam       Visual Acuity (Snellen -  Linear)         Right Left    Dist sc 20/200 20/20    Dist ph sc ni               Tonometry (Tonopen, 4:43 PM)         Right Left    Pressure 19 20              Pupils         Dark Light Shape React APD    Right 5 3 Round Brisk None    Left 5 3 Round Brisk None              Visual Fields         Right Left     Full Full              Extraocular Movement         Right Left     Full, Ortho Full, Ortho              Dilation       Both eyes: 1% Mydriacyl, 2.5% Phenylephrine @ 4:43 PM                  Slit Lamp and Fundus Exam       External Exam         Right Left    External Normal Normal              Slit Lamp Exam         Right Left    Lids/Lashes dermatochalasis dermatochalasis    Conjunctiva/Sclera White and quiet White and quiet    Cornea Clear Clear    Anterior Chamber Deep and quiet Deep and quiet    Iris Round and reactive Round and reactive    Lens NSC NSC    Anterior Vitreous syneresis, no elias or heme syneresis, no elias or heme              Fundus Exam         Right Left    Disc Normal, pink, sharp Normal, pink, sharp    C/D Ratio 0.1 0.1    Macula Stage 3 macular hole, 1/4 DD diameter, cuffing, visible superior vitreous traction over arcade Normal    Vessels Normal Normal    Periphery Normal, hyperpigmented linear degenerative changes, flat and attached 360 Normal, hyperpigmented linear degenerative changes, flat and attached 360                      Assessment/Plan:     1. Macular hole, R eye  - appears to be approx stage 3, has visible VMT  - no holes/tears or indications of retinal tear on DFE  - RD precautions given  - explained 10% chance of left eye mac hole  - RTC Retina clinic outpatient in 1-2 wks for eval    2. NSC OU  - CTM        I will contact clinic scheduler to arrange follow up with patient.     Zay Domínguez    Patient's Best Contact Number: [phone]    Christiano Mercer MD PGY-2  [employer] Ophthalmology Resident  03/01/2023  4:46 PM

## 2023-03-01 NOTE — ED NOTES
Patient identifiers verified and correct for  MS Nix  C/C: Opthamology consult SEE NN  APPEARANCE: awake and alert in NAD. PAIN  0/10  SKIN: warm, dry and intact. No breakdown or bruising.  MUSCULOSKELETAL: Patient moving all extremities spontaneously, no obvious swelling or deformities noted. Ambulates independently.  RESPIRATORY: Denies shortness of breath.Respirations unlabored.   CARDIAC: Denies CP, 2+ distal pulses; no peripheral edema  ABDOMEN: S/ND/NT, Denies nausea  : voids spontaneously, denies difficulty  Neurologic: AAO x 4; follows commands equal strength in all extremities; denies numbness/tingling. Denies dizziness  Denies wekaness, denies blurred vision,denies pain,criselda headache

## 2023-03-01 NOTE — ED PROVIDER NOTES
"Encounter Date: 3/1/2023    SCRIBE #1 NOTE: I, Melissa Larson, am scribing for, and in the presence of,  Adolfo Coles MD. I have scribed the following portions of the note - Other sections scribed: HPI, ROS, PE.     History     Chief Complaint   Patient presents with    Eye Problem     Seen by my eye dr and told to come here bec od macular hole stage 3     Time patient was seen by the provider: 3:21 PM      The patient is a 77 y.o. female with past medical history of HTN, HLD, s/p craniotomy (10/29/22) who presents to the ED with a referral to be seen by Opthalmology. The patient was seen by an optometrist yesterday and was informed to come to the ED for "macular hole stage III". She initially went to the eye doctor to have her glasses changed. The patient denies new vision loss. Her right eye vision is worse than the left. When both of her eyes are open, she reports that her vision is clear and unchanged. However, she has blurry vision in her right eye when she closes her left eye. She denies floaters, eye pain, and eye redness. The patient normally follows with Dr. Benitez for her eye care.       The history is provided by the patient and medical records. No  was used.   Review of patient's allergies indicates:   Allergen Reactions    Codeine Nausea And Vomiting     Past Medical History:   Diagnosis Date    Actinic keratosis     Aortic valve disorders     Atherosclerosis of aorta     Atherosclerosis of native arteries of the extremities with intermittent claudication     Carcinoma in situ, vulva     Hypertension 2/21/2022    Left bundle branch hemiblock     Other and unspecified hyperlipidemia     Postinflammatory pulmonary fibrosis     Senile nuclear sclerosis      Past Surgical History:   Procedure Laterality Date    ARTHROSCOPIC REPAIR OF ROTATOR CUFF OF SHOULDER Right 4/24/2019    Procedure: REPAIR, ROTATOR CUFF, ARTHROSCOPIC WITH REGENERATIN IMPLANT;  Surgeon: Walter Hernandez MD;  " Location: [facility], [place], operated by [facility] OR;  Service: Orthopedics;  Laterality: Right;  regional w/o catheter     BTL      COLONOSCOPY N/A 12/9/2021    Procedure: COLONOSCOPY;  Surgeon: Juan Swain MD;  Location: [facility] ENDO (4TH FLR);  Service: Endoscopy;  Laterality: N/A;  fully vaccinated    CRANIOTOMY FOR EVACUATION OF SUBDURAL HEMATOMA Left 10/29/2022    Procedure: CRANIOTOMY, FOR SUBDURAL HEMATOMA EVACUATION;  Surgeon: Ion Souza MD;  Location: [facility] OR 2ND FLR;  Service: Neurosurgery;  Laterality: Left;  ANN PINS    DECOMPRESSION OF SUBACROMIAL SPACE Right 4/24/2019    Procedure: DECOMPRESSION, SUBACROMIAL SPACE WITH DISTAL CLAVICLE EXCISION (DCE);  Surgeon: Walter Hernandez MD;  Location: [facility], [place], operated by [facility] OR;  Service: Orthopedics;  Laterality: Right;    FLEXIBLE SIGMOIDOSCOPY N/A 12/20/2021    Procedure: SIGMOIDOSCOPY-FLEXIBLE;  Surgeon: Juan Swain MD;  Location: [facility] ENDO (2ND FLR);  Service: Endoscopy;  Laterality: N/A;  please schedule with me Monday 12/20 AM. Urgent for marking for colon cancer  spot held 2/20/21 12/16 fully vaccinated; instructions to portal-st    FLEXIBLE SIGMOIDOSCOPY N/A 2/4/2022    Procedure: SIGMOIDOSCOPY, FLEXIBLE;  Surgeon: GUILLERMO Olguin MD;  Location: [facility] OR 2ND FLR;  Service: Colon and Rectal;  Laterality: N/A;    LAPAROSCOPIC LEFT COLECTOMY N/A 2/4/2022    Procedure: COLECTOMY, LEFT, LAPAROSCOPIC;  Surgeon: GUILLERMO Olguin MD;  Location: [facility] OR 2ND FLR;  Service: Colon and Rectal;  Laterality: N/A;  Extended    MOBILIZATION OF SPLENIC FLEXURE N/A 2/4/2022    Procedure: MOBILIZATION, SPLENIC FLEXURE;  Surgeon: GUILLERMO Olguin MD;  Location: [facility] OR 2ND FLR;  Service: Colon and Rectal;  Laterality: N/A;    RHYTIDECTOMY      SHOULDER ARTHROSCOPY Right 4/24/2019    Procedure: ARTHROSCOPY, SHOULDER WITH LABRAL DEBRIDEMENT;  Surgeon: Walter Hernandez MD;  Location: [facility], [place], operated by [facility] OR;  Service: Orthopedics;  Laterality: Right;    TONSILLECTOMY, ADENOIDECTOMY      VULVA SURGERY       Family History   Problem  Relation Age of Onset    Bladder Cancer Father         d. 69    Alzheimer's disease Mother 96        d. 97    Bladder Cancer Paternal Uncle     Lung cancer Paternal Uncle     Breast cancer Paternal Grandmother     Pancreatic cancer Paternal Aunt     Other Brother         d. 45 o/d    Pneumonia Brother         d. 58, smoker, obese    No Known Problems Sister     No Known Problems Sister     No Known Problems Sister     Amblyopia Neg Hx     Blindness Neg Hx     Cataracts Neg Hx     Diabetes Neg Hx     Glaucoma Neg Hx     Hypertension Neg Hx     Macular degeneration Neg Hx     Retinal detachment Neg Hx     Strabismus Neg Hx     Stroke Neg Hx     Thyroid disease Neg Hx      Social History     Tobacco Use    Smoking status: Former     Packs/day: 1.50     Years: 39.00     Pack years: 58.50     Types: Cigarettes     Start date:      Quit date: 2001     Years since quittin.4    Smokeless tobacco: Never   Substance Use Topics    Alcohol use: Not Currently     Alcohol/week: 7.0 - 14.0 standard drinks     Types: 7 - 14 Standard drinks or equivalent per week    Drug use: No     Review of Systems   Constitutional:  Negative for activity change, diaphoresis and fever.   HENT:  Negative for ear pain, rhinorrhea, sore throat and trouble swallowing.    Eyes:  Positive for visual disturbance (R eye blurry vision). Negative for pain and redness.        Negative for vision loss   Respiratory:  Negative for cough, shortness of breath and stridor.    Cardiovascular:  Negative for chest pain.   Gastrointestinal:  Negative for abdominal pain, blood in stool, diarrhea, nausea and vomiting.   Genitourinary:  Negative for dysuria, hematuria, vaginal bleeding and vaginal discharge.   Musculoskeletal:  Negative for gait problem.   Skin:  Negative for rash and wound.   Neurological:  Negative for seizures and headaches.   Psychiatric/Behavioral:  Negative for hallucinations and suicidal ideas.      Physical Exam     Initial Vitals  [03/01/23 1351]   BP Pulse Resp Temp SpO2   (!) 142/59 93 18 97.8 °F (36.6 °C) 95 %      MAP       --         Physical Exam    Nursing note and vitals reviewed.  Constitutional: She appears well-developed. She is not diaphoretic. No distress.   HENT:   Head: Normocephalic and atraumatic.   Nose: Nose normal.   Eyes: Conjunctivae, EOM and lids are normal. Pupils are equal, round, and reactive to light. Lids are everted and swept, no foreign bodies found. Right eye exhibits no chemosis, no discharge, no exudate and no hordeolum. No foreign body present in the right eye. Left eye exhibits no chemosis, no discharge, no exudate and no hordeolum. No foreign body present in the left eye. No scleral icterus.   Neck: Neck supple.   Normal range of motion.  Cardiovascular:  Normal rate, regular rhythm, normal heart sounds and intact distal pulses.     Exam reveals no gallop and no friction rub.       No murmur heard.  Pulmonary/Chest: Breath sounds normal. No stridor. No respiratory distress. She has no wheezes. She has no rhonchi. She has no rales.   Abdominal: Abdomen is soft. Bowel sounds are normal. She exhibits no distension. There is no abdominal tenderness. There is no rebound and no guarding.   Musculoskeletal:         General: Normal range of motion.      Cervical back: Normal range of motion and neck supple.     Neurological: She is alert and oriented to person, place, and time. She has normal strength. No cranial nerve deficit or sensory deficit.   Skin: Skin is warm and dry. Capillary refill takes less than 2 seconds. No rash noted.   Psychiatric: She has a normal mood and affect.       ED Course   Procedures  Labs Reviewed - No data to display         Imaging Results    None          Medications   fluorescein ophthalmic strip 1 each (1 each Both Eyes Given by Other 3/1/23 6302)   proparacaine 0.5 % ophthalmic solution 1 drop (1 drop Both Eyes Given by Other 3/1/23 7585)     Medical Decision Making:   History:  "  Old Medical Records: I decided to obtain old medical records.  Old Records Summarized: records from clinic visits.       <> Summary of Records: Patient was seen by Optometrist yesterday and referred to the ED for "macular hole stage III".  Other:   I have discussed this case with another health care provider.       <> Summary of the Discussion: Ophthalmology          Scribe Attestation:   Scribe #1: I performed the above scribed service and the documentation accurately describes the services I performed. I attest to the accuracy of the note.      ED Course as of 03/01/23 1758   Wed Mar 01, 2023   1615 Ophthalmology consulted for reported macular hole stage III by optometrist she saw today [BD]   1711 Patient evaluated ophthalmology who reports no evidence of retinal detachment and they do state she has a macular hole as described by optometry recommend close follow-up with retinal specialist in 1-2 weeks.  Patient otherwise without complaints understands agrees with discharge instructions and plans to follow up appropriately. [BD]      ED Course User Index  [BD] Adolfo Coles MD                   Attending Attestation:     Physician Attestation for Scribe:    I, Dr. Adolfo Coles, personally performed the services described in this documentation.   All medical record entries made by the scribe were at my direction and in my presence.   I have reviewed the chart and agree that the record is accurate and complete.   Adolfo Coles MD  5:58 PM 03/01/2023     DISCLAIMER: This note was prepared with [employer] voice recognition transcription software. Garbled syntax, mangled pronouns, and other bizarre constructions may be attributed to that software system.    Clinical Impression:   Final diagnoses:  [H35.341] Macular hole of right eye (Primary)        ED Disposition Condition    Discharge Stable          ED Prescriptions    None       Follow-up Information       Follow up With Specialties Details " Why Contact Info    Yohannes Chino MD Ophthalmology, Retina Ophthalmology Go in 1 week  [address] [facility] [address]  [phone]      Suha Salguero MD Internal Medicine Schedule an appointment as soon as possible for a visit   [address] [facility] [address]  [phone]      [facility] - Emergency Dept Emergency Medicine Go to  As needed, If symptoms worsen [address] [facility] [id number] [phone]             Adolfo Coles MD  03/01/23 0308

## 2023-03-01 NOTE — ED NOTES
"Patient had routine eye exam yesterday, was told she had a " macular hole stage 3" right eye, reports having black Kashia that fades yesterday.  "

## 2023-03-03 ENCOUNTER — TELEPHONE (OUTPATIENT)
Dept: OPHTHALMOLOGY | Facility: CLINIC | Age: 78
End: 2023-03-03
Payer: COMMERCIAL

## 2023-03-03 NOTE — TELEPHONE ENCOUNTER
----- Message from Brigid Resendez MA sent at 3/1/2023  5:00 PM CST -----      ED fu  Received: Today  Christiano Mercer MD  [facility] Ophthalmology Triage  [facility],     This patient was seen in the ED in regards to macular hole right eye and needs follow up in retina clinic in 1-2 weeks for OCT and DFE.  Please contact them to arrange.     Thank you,     Christiano Mercer   [facility] Ophthalmology

## 2023-03-09 ENCOUNTER — OFFICE VISIT (OUTPATIENT)
Dept: NEUROSURGERY | Facility: CLINIC | Age: 78
End: 2023-03-09
Payer: MEDICARE

## 2023-03-09 ENCOUNTER — OFFICE VISIT (OUTPATIENT)
Dept: OPHTHALMOLOGY | Facility: CLINIC | Age: 78
End: 2023-03-09
Payer: MEDICARE

## 2023-03-09 ENCOUNTER — HOSPITAL ENCOUNTER (OUTPATIENT)
Dept: RADIOLOGY | Facility: HOSPITAL | Age: 78
Discharge: HOME | End: 2023-03-09
Attending: NEUROLOGICAL SURGERY
Payer: MEDICARE

## 2023-03-09 ENCOUNTER — TELEPHONE (OUTPATIENT)
Dept: OPHTHALMOLOGY | Facility: CLINIC | Age: 78
End: 2023-03-09

## 2023-03-09 VITALS
DIASTOLIC BLOOD PRESSURE: 79 MMHG | HEIGHT: 65 IN | SYSTOLIC BLOOD PRESSURE: 124 MMHG | WEIGHT: 140 LBS | BODY MASS INDEX: 23.32 KG/M2 | HEART RATE: 81 BPM

## 2023-03-09 DIAGNOSIS — H43.822 VITREOMACULAR ADHESION, LEFT: ICD-10-CM

## 2023-03-09 DIAGNOSIS — H35.341 FULL THICKNESS MACULAR HOLE, RIGHT: ICD-10-CM

## 2023-03-09 DIAGNOSIS — H25.13 NS (NUCLEAR SCLEROSIS), BILATERAL: ICD-10-CM

## 2023-03-09 DIAGNOSIS — S06.5XAA SUBDURAL HEMATOMA: Primary | ICD-10-CM

## 2023-03-09 DIAGNOSIS — S06.5XAA SUBDURAL HEMATOMA: ICD-10-CM

## 2023-03-09 DIAGNOSIS — H35.341 LAMELLAR MACULAR HOLE OF RIGHT EYE: Primary | ICD-10-CM

## 2023-03-09 PROCEDURE — 99214 OFFICE O/P EST MOD 30 MIN: CPT | Mod: PBBFAC,25,27 | Performed by: NEUROLOGICAL SURGERY

## 2023-03-09 PROCEDURE — 92004 COMPRE OPH EXAM NEW PT 1/>: CPT | Mod: S$PBB,,, | Performed by: OPHTHALMOLOGY

## 2023-03-09 PROCEDURE — 70450 CT HEAD/BRAIN W/O DYE: CPT | Mod: 26,,, | Performed by: RADIOLOGY

## 2023-03-09 PROCEDURE — 99213 PR OFFICE/OUTPT VISIT, EST, LEVL III, 20-29 MIN: ICD-10-PCS | Mod: S$PBB,,, | Performed by: NEUROLOGICAL SURGERY

## 2023-03-09 PROCEDURE — 70450 CT HEAD/BRAIN W/O DYE: CPT | Mod: TC

## 2023-03-09 PROCEDURE — 99213 OFFICE O/P EST LOW 20 MIN: CPT | Mod: PBBFAC,25 | Performed by: OPHTHALMOLOGY

## 2023-03-09 PROCEDURE — 99213 OFFICE O/P EST LOW 20 MIN: CPT | Mod: S$PBB,,, | Performed by: NEUROLOGICAL SURGERY

## 2023-03-09 PROCEDURE — 99999 PR PBB SHADOW E&M-EST. PATIENT-LVL IV: ICD-10-PCS | Mod: PBBFAC,,, | Performed by: NEUROLOGICAL SURGERY

## 2023-03-09 PROCEDURE — 92201 OPSCPY EXTND RTA DRAW UNI/BI: CPT | Mod: PBBFAC | Performed by: OPHTHALMOLOGY

## 2023-03-09 PROCEDURE — 70450 CT HEAD WITHOUT CONTRAST: ICD-10-PCS | Mod: 26,,, | Performed by: RADIOLOGY

## 2023-03-09 PROCEDURE — 99999 PR PBB SHADOW E&M-EST. PATIENT-LVL III: ICD-10-PCS | Mod: PBBFAC,,, | Performed by: OPHTHALMOLOGY

## 2023-03-09 PROCEDURE — 92201 PR OPHTHALMOSCOPY, EXT, W/RET DRAW/SCLERAL DEPR, I&R, UNI/BI: ICD-10-PCS | Mod: S$PBB,,, | Performed by: OPHTHALMOLOGY

## 2023-03-09 PROCEDURE — 99999 PR PBB SHADOW E&M-EST. PATIENT-LVL III: CPT | Mod: PBBFAC,,, | Performed by: OPHTHALMOLOGY

## 2023-03-09 PROCEDURE — 92004 PR EYE EXAM, NEW PATIENT,COMPREHESV: ICD-10-PCS | Mod: S$PBB,,, | Performed by: OPHTHALMOLOGY

## 2023-03-09 PROCEDURE — 92201 OPSCPY EXTND RTA DRAW UNI/BI: CPT | Mod: S$PBB,,, | Performed by: OPHTHALMOLOGY

## 2023-03-09 PROCEDURE — 99999 PR PBB SHADOW E&M-EST. PATIENT-LVL IV: CPT | Mod: PBBFAC,,, | Performed by: NEUROLOGICAL SURGERY

## 2023-03-09 NOTE — PROGRESS NOTES
Iain Aldridge returned in neurosurgical follow-up to the office today.  She is a 77-year-old lady who had craniotomy and evacuation of a large left subdural hematoma on 10/29/2022.  She has been doing well neurologically over the past 2 months.  She does not complain of headaches.  Her balance seems good.  She has developed a hole in her right retina and was seen this morning by Dr. De La Rosa.  Surgery is planned to repair this.    On brief examination today she is alert and cooperative.  Her incision remains well healed.  Her pupils are dilated.  Extraocular movements are good.  Speech is clear.  She stood and walked without difficulty.    CT scan of the head was repeated [facility] today and compared to her last scan of 01/09/2023.  The hypodense extra-axial fluid collection representing CSF or serum has diminished about 30%.  There is no midline shift, but the sulci are mildly compressed on this side.    I am happy to see her getting along well.  I will have an additional CT scan of the head done in about 3 months to be sure the extra-axial collection is stable or diminishing.

## 2023-03-16 ENCOUNTER — PATIENT MESSAGE (OUTPATIENT)
Dept: INTERNAL MEDICINE | Facility: CLINIC | Age: 78
End: 2023-03-16
Payer: COMMERCIAL

## 2023-04-04 ENCOUNTER — TELEPHONE (OUTPATIENT)
Dept: OPHTHALMOLOGY | Facility: CLINIC | Age: 78
End: 2023-04-04
Payer: COMMERCIAL

## 2023-04-05 ENCOUNTER — ANESTHESIA (OUTPATIENT)
Dept: SURGERY | Facility: HOSPITAL | Age: 78
End: 2023-04-05
Payer: MEDICARE

## 2023-04-05 ENCOUNTER — ANESTHESIA EVENT (OUTPATIENT)
Dept: SURGERY | Facility: HOSPITAL | Age: 78
End: 2023-04-05
Payer: MEDICARE

## 2023-04-05 ENCOUNTER — HOSPITAL ENCOUNTER (OUTPATIENT)
Facility: HOSPITAL | Age: 78
Discharge: HOME | End: 2023-04-05
Attending: OPHTHALMOLOGY | Admitting: OPHTHALMOLOGY
Payer: MEDICARE

## 2023-04-05 VITALS
RESPIRATION RATE: 18 BRPM | HEART RATE: 80 BPM | BODY MASS INDEX: 23.32 KG/M2 | DIASTOLIC BLOOD PRESSURE: 57 MMHG | SYSTOLIC BLOOD PRESSURE: 115 MMHG | OXYGEN SATURATION: 91 % | WEIGHT: 140 LBS | TEMPERATURE: 98 F | HEIGHT: 65 IN

## 2023-04-05 DIAGNOSIS — H35.341 LAMELLAR MACULAR HOLE OF RIGHT EYE: ICD-10-CM

## 2023-04-05 DIAGNOSIS — H35.341 FULL THICKNESS MACULAR HOLE, RIGHT: Primary | ICD-10-CM

## 2023-04-05 PROCEDURE — 27201423 OPTIME MED/SURG SUP & DEVICES STERILE SUPPLY: Performed by: OPHTHALMOLOGY

## 2023-04-05 PROCEDURE — 25000003 PHARM REV CODE 250: Performed by: NURSE PRACTITIONER

## 2023-04-05 PROCEDURE — 63600175 PHARM REV CODE 636 W HCPCS: Performed by: OPHTHALMOLOGY

## 2023-04-05 PROCEDURE — D9220A PRA ANESTHESIA: Mod: ANES,,, | Performed by: ANESTHESIOLOGY

## 2023-04-05 PROCEDURE — D9220A PRA ANESTHESIA: ICD-10-PCS | Mod: ANES,,, | Performed by: ANESTHESIOLOGY

## 2023-04-05 PROCEDURE — 99499 NO LOS: ICD-10-PCS | Mod: ,,, | Performed by: STUDENT IN AN ORGANIZED HEALTH CARE EDUCATION/TRAINING PROGRAM

## 2023-04-05 PROCEDURE — 25000003 PHARM REV CODE 250: Performed by: OPHTHALMOLOGY

## 2023-04-05 PROCEDURE — 71000044 HC DOSC ROUTINE RECOVERY FIRST HOUR: Performed by: OPHTHALMOLOGY

## 2023-04-05 PROCEDURE — 25000003 PHARM REV CODE 250: Performed by: NURSE ANESTHETIST, CERTIFIED REGISTERED

## 2023-04-05 PROCEDURE — 99499 UNLISTED E&M SERVICE: CPT | Mod: ,,, | Performed by: STUDENT IN AN ORGANIZED HEALTH CARE EDUCATION/TRAINING PROGRAM

## 2023-04-05 PROCEDURE — 36000707: Performed by: OPHTHALMOLOGY

## 2023-04-05 PROCEDURE — C1784 OCULAR DEV, INTRAOP, DET RET: HCPCS | Performed by: OPHTHALMOLOGY

## 2023-04-05 PROCEDURE — 36000706: Performed by: OPHTHALMOLOGY

## 2023-04-05 PROCEDURE — 37000009 HC ANESTHESIA EA ADD 15 MINS: Performed by: OPHTHALMOLOGY

## 2023-04-05 PROCEDURE — 67042 PR VITRECTOMY PARS PLANA REMOVE INT MEMB RETINA: ICD-10-PCS | Mod: RT,,, | Performed by: OPHTHALMOLOGY

## 2023-04-05 PROCEDURE — 71000015 HC POSTOP RECOV 1ST HR: Performed by: OPHTHALMOLOGY

## 2023-04-05 PROCEDURE — 37000008 HC ANESTHESIA 1ST 15 MINUTES: Performed by: OPHTHALMOLOGY

## 2023-04-05 PROCEDURE — D9220A PRA ANESTHESIA: Mod: CRNA,,, | Performed by: NURSE ANESTHETIST, CERTIFIED REGISTERED

## 2023-04-05 PROCEDURE — 25000003 PHARM REV CODE 250

## 2023-04-05 PROCEDURE — 63600175 PHARM REV CODE 636 W HCPCS: Performed by: NURSE ANESTHETIST, CERTIFIED REGISTERED

## 2023-04-05 PROCEDURE — D9220A PRA ANESTHESIA: ICD-10-PCS | Mod: CRNA,,, | Performed by: NURSE ANESTHETIST, CERTIFIED REGISTERED

## 2023-04-05 PROCEDURE — 67042 VIT FOR MACULAR HOLE: CPT | Mod: RT,,, | Performed by: OPHTHALMOLOGY

## 2023-04-05 RX ORDER — INDOCYANINE GREEN AND WATER 25 MG
KIT INJECTION
Status: DISCONTINUED
Start: 2023-04-05 | End: 2023-04-05 | Stop reason: HOSPADM

## 2023-04-05 RX ORDER — DEXAMETHASONE SODIUM PHOSPHATE 4 MG/ML
INJECTION, SOLUTION INTRA-ARTICULAR; INTRALESIONAL; INTRAMUSCULAR; INTRAVENOUS; SOFT TISSUE
Status: DISCONTINUED | OUTPATIENT
Start: 2023-04-05 | End: 2023-04-05 | Stop reason: HOSPADM

## 2023-04-05 RX ORDER — ONDANSETRON 4 MG/1
4 TABLET, FILM COATED ORAL EVERY 8 HOURS PRN
Qty: 12 TABLET | Refills: 0 | Status: SHIPPED | OUTPATIENT
Start: 2023-04-05 | End: 2023-08-08

## 2023-04-05 RX ORDER — LIDOCAINE HYDROCHLORIDE 20 MG/ML
INJECTION INTRAVENOUS
Status: DISCONTINUED | OUTPATIENT
Start: 2023-04-05 | End: 2023-04-05

## 2023-04-05 RX ORDER — FENTANYL CITRATE 50 UG/ML
INJECTION, SOLUTION INTRAMUSCULAR; INTRAVENOUS
Status: DISCONTINUED | OUTPATIENT
Start: 2023-04-05 | End: 2023-04-05

## 2023-04-05 RX ORDER — LIDOCAINE HYDROCHLORIDE 20 MG/ML
INJECTION, SOLUTION EPIDURAL; INFILTRATION; INTRACAUDAL; PERINEURAL
Status: DISCONTINUED | OUTPATIENT
Start: 2023-04-05 | End: 2023-04-05 | Stop reason: HOSPADM

## 2023-04-05 RX ORDER — CYCLOPENTOLATE HYDROCHLORIDE 10 MG/ML
1 SOLUTION/ DROPS OPHTHALMIC
Status: DISCONTINUED | OUTPATIENT
Start: 2023-04-05 | End: 2023-04-05 | Stop reason: HOSPADM

## 2023-04-05 RX ORDER — INDOCYANINE GREEN AND WATER 25 MG
KIT INJECTION
Status: DISCONTINUED | OUTPATIENT
Start: 2023-04-05 | End: 2023-04-05 | Stop reason: HOSPADM

## 2023-04-05 RX ORDER — VANCOMYCIN HYDROCHLORIDE 500 MG/10ML
INJECTION, POWDER, LYOPHILIZED, FOR SOLUTION INTRAVENOUS
Status: DISCONTINUED | OUTPATIENT
Start: 2023-04-05 | End: 2023-04-05 | Stop reason: HOSPADM

## 2023-04-05 RX ORDER — TETRACAINE HYDROCHLORIDE 5 MG/ML
1 SOLUTION OPHTHALMIC
Status: DISCONTINUED | OUTPATIENT
Start: 2023-04-05 | End: 2023-04-05 | Stop reason: HOSPADM

## 2023-04-05 RX ORDER — FENTANYL CITRATE 50 UG/ML
25 INJECTION, SOLUTION INTRAMUSCULAR; INTRAVENOUS EVERY 5 MIN PRN
Status: CANCELLED | OUTPATIENT
Start: 2023-04-05

## 2023-04-05 RX ORDER — DEXAMETHASONE SODIUM PHOSPHATE 4 MG/ML
INJECTION, SOLUTION INTRA-ARTICULAR; INTRALESIONAL; INTRAMUSCULAR; INTRAVENOUS; SOFT TISSUE
Status: DISCONTINUED
Start: 2023-04-05 | End: 2023-04-05 | Stop reason: HOSPADM

## 2023-04-05 RX ORDER — EPINEPHRINE 1 MG/ML
INJECTION, SOLUTION, CONCENTRATE INTRAVENOUS
Status: DISCONTINUED
Start: 2023-04-05 | End: 2023-04-05 | Stop reason: HOSPADM

## 2023-04-05 RX ORDER — BUPIVACAINE HYDROCHLORIDE 7.5 MG/ML
INJECTION, SOLUTION EPIDURAL; RETROBULBAR
Status: DISCONTINUED
Start: 2023-04-05 | End: 2023-04-05 | Stop reason: HOSPADM

## 2023-04-05 RX ORDER — PHENYLEPHRINE HYDROCHLORIDE 25 MG/ML
1 SOLUTION/ DROPS OPHTHALMIC
Status: DISCONTINUED | OUTPATIENT
Start: 2023-04-05 | End: 2023-04-05 | Stop reason: HOSPADM

## 2023-04-05 RX ORDER — ATROPINE SULFATE 10 MG/ML
1 SOLUTION/ DROPS OPHTHALMIC
Status: DISCONTINUED | OUTPATIENT
Start: 2023-04-05 | End: 2023-04-05 | Stop reason: HOSPADM

## 2023-04-05 RX ORDER — PREDNISOLONE ACETATE 10 MG/ML
1 SUSPENSION/ DROPS OPHTHALMIC
Status: DISCONTINUED | OUTPATIENT
Start: 2023-04-05 | End: 2023-04-05 | Stop reason: HOSPADM

## 2023-04-05 RX ORDER — SODIUM CHLORIDE 0.9 % (FLUSH) 0.9 %
10 SYRINGE (ML) INJECTION
Status: DISCONTINUED | OUTPATIENT
Start: 2023-04-05 | End: 2023-04-05 | Stop reason: HOSPADM

## 2023-04-05 RX ORDER — ACETAMINOPHEN 325 MG/1
650 TABLET ORAL EVERY 4 HOURS PRN
Status: DISCONTINUED | OUTPATIENT
Start: 2023-04-05 | End: 2023-04-05 | Stop reason: HOSPADM

## 2023-04-05 RX ORDER — VANCOMYCIN HYDROCHLORIDE 500 MG/10ML
INJECTION, POWDER, LYOPHILIZED, FOR SOLUTION INTRAVENOUS
Status: DISCONTINUED
Start: 2023-04-05 | End: 2023-04-05 | Stop reason: HOSPADM

## 2023-04-05 RX ORDER — MIDAZOLAM HYDROCHLORIDE 1 MG/ML
INJECTION, SOLUTION INTRAMUSCULAR; INTRAVENOUS
Status: DISCONTINUED | OUTPATIENT
Start: 2023-04-05 | End: 2023-04-05

## 2023-04-05 RX ORDER — NEOMYCIN SULFATE, POLYMYXIN B SULFATE, AND DEXAMETHASONE 3.5; 10000; 1 MG/G; [USP'U]/G; MG/G
OINTMENT OPHTHALMIC
Status: DISCONTINUED
Start: 2023-04-05 | End: 2023-04-05 | Stop reason: HOSPADM

## 2023-04-05 RX ORDER — SODIUM CHLORIDE 0.9 % (FLUSH) 0.9 %
10 SYRINGE (ML) INJECTION
Status: CANCELLED | OUTPATIENT
Start: 2023-04-05

## 2023-04-05 RX ORDER — BUPIVACAINE HYDROCHLORIDE 7.5 MG/ML
INJECTION, SOLUTION EPIDURAL; RETROBULBAR
Status: DISCONTINUED | OUTPATIENT
Start: 2023-04-05 | End: 2023-04-05 | Stop reason: HOSPADM

## 2023-04-05 RX ORDER — LIDOCAINE HYDROCHLORIDE 20 MG/ML
INJECTION, SOLUTION EPIDURAL; INFILTRATION; INTRACAUDAL; PERINEURAL
Status: DISCONTINUED
Start: 2023-04-05 | End: 2023-04-05 | Stop reason: HOSPADM

## 2023-04-05 RX ORDER — EPINEPHRINE 1 MG/ML
INJECTION, SOLUTION, CONCENTRATE INTRAVENOUS
Status: DISCONTINUED | OUTPATIENT
Start: 2023-04-05 | End: 2023-04-05 | Stop reason: HOSPADM

## 2023-04-05 RX ORDER — HYDROCODONE BITARTRATE AND ACETAMINOPHEN 5; 325 MG/1; MG/1
1 TABLET ORAL EVERY 4 HOURS PRN
Status: DISCONTINUED | OUTPATIENT
Start: 2023-04-05 | End: 2023-04-05 | Stop reason: HOSPADM

## 2023-04-05 RX ORDER — PROPOFOL 10 MG/ML
VIAL (ML) INTRAVENOUS
Status: DISCONTINUED | OUTPATIENT
Start: 2023-04-05 | End: 2023-04-05

## 2023-04-05 RX ORDER — OXYCODONE AND ACETAMINOPHEN 5; 325 MG/1; MG/1
1 TABLET ORAL EVERY 6 HOURS PRN
Qty: 12 TABLET | Refills: 0 | Status: SHIPPED | OUTPATIENT
Start: 2023-04-05 | End: 2023-08-08

## 2023-04-05 RX ORDER — MOXIFLOXACIN 5 MG/ML
1 SOLUTION/ DROPS OPHTHALMIC
Status: DISCONTINUED | OUTPATIENT
Start: 2023-04-05 | End: 2023-04-05 | Stop reason: HOSPADM

## 2023-04-05 RX ADMIN — TETRACAINE HYDROCHLORIDE 1 DROP: 5 SOLUTION OPHTHALMIC at 06:04

## 2023-04-05 RX ADMIN — MIDAZOLAM HYDROCHLORIDE 1 MG: 1 INJECTION, SOLUTION INTRAMUSCULAR; INTRAVENOUS at 08:04

## 2023-04-05 RX ADMIN — FENTANYL CITRATE 25 MCG: 50 INJECTION, SOLUTION INTRAMUSCULAR; INTRAVENOUS at 08:04

## 2023-04-05 RX ADMIN — PHENYLEPHRINE HYDROCHLORIDE 1 DROP: 25 SOLUTION/ DROPS OPHTHALMIC at 06:04

## 2023-04-05 RX ADMIN — CYCLOPENTOLATE HYDROCHLORIDE 1 DROP: 10 SOLUTION/ DROPS OPHTHALMIC at 07:04

## 2023-04-05 RX ADMIN — ATROPINE SULFATE 1 DROP: 10 SOLUTION OPHTHALMIC at 07:04

## 2023-04-05 RX ADMIN — PREDNISOLONE ACETATE 1 DROP: 10 SUSPENSION/ DROPS OPHTHALMIC at 07:04

## 2023-04-05 RX ADMIN — LIDOCAINE HYDROCHLORIDE 30 MG: 20 INJECTION INTRAVENOUS at 08:04

## 2023-04-05 RX ADMIN — PHENYLEPHRINE HYDROCHLORIDE 1 DROP: 25 SOLUTION/ DROPS OPHTHALMIC at 07:04

## 2023-04-05 RX ADMIN — SODIUM CHLORIDE: 0.9 INJECTION, SOLUTION INTRAVENOUS at 07:04

## 2023-04-05 RX ADMIN — ATROPINE SULFATE 1 DROP: 10 SOLUTION OPHTHALMIC at 06:04

## 2023-04-05 RX ADMIN — CYCLOPENTOLATE HYDROCHLORIDE 1 DROP: 10 SOLUTION/ DROPS OPHTHALMIC at 06:04

## 2023-04-05 RX ADMIN — MOXIFLOXACIN OPHTHALMIC 1 DROP: 5 SOLUTION/ DROPS OPHTHALMIC at 07:04

## 2023-04-05 RX ADMIN — MOXIFLOXACIN OPHTHALMIC 1 DROP: 5 SOLUTION/ DROPS OPHTHALMIC at 06:04

## 2023-04-05 RX ADMIN — PREDNISOLONE ACETATE 1 DROP: 10 SUSPENSION/ DROPS OPHTHALMIC at 06:04

## 2023-04-05 RX ADMIN — PROPOFOL 50 MG: 10 INJECTION, EMULSION INTRAVENOUS at 08:04

## 2023-04-05 NOTE — ANESTHESIA POSTPROCEDURE EVALUATION
Anesthesia Post Evaluation    Patient: Becker B Nix    Procedure(s) Performed: Procedure(s) (LRB):  VITRECTOMY, PARS PLANA APPROACH (Right)  REMOVAL, EPIRETINAL MEMBRANE (Right)    Final Anesthesia Type: MAC      Patient location during evaluation: PACU  Patient participation: Yes- Able to Participate  Level of consciousness: awake and alert  Post-procedure vital signs: reviewed and stable  Pain management: adequate  Airway patency: patent    PONV status at discharge: No PONV  Anesthetic complications: no      Cardiovascular status: blood pressure returned to baseline  Respiratory status: unassisted  Hydration status: euvolemic  Follow-up not needed.          Vitals Value Taken Time   /57 04/05/23 0950   Temp 36.7 °C (98.1 °F) 04/05/23 0950   Pulse 80 04/05/23 0950   Resp 18 04/05/23 0950   SpO2 91 % 04/05/23 0950         No case tracking events are documented in the log.      Pain/Octaviano Score: Octaviano Score: 10 (4/5/2023  9:37 AM)

## 2023-04-05 NOTE — DISCHARGE SUMMARY
[facility] - [facility] (1st Fl)  Discharge Note  Short Stay    Procedure(s) (LRB):  VITRECTOMY, PARS PLANA APPROACH (Right)      OUTCOME: Patient tolerated treatment/procedure well without complication and is now ready for discharge.    DISPOSITION: Home or Self Care    FINAL DIAGNOSIS:  [facility] OD    FOLLOWUP: In clinic    DISCHARGE INSTRUCTIONS:  No discharge procedures on file.     TIME SPENT ON DISCHARGE:    minutes

## 2023-04-05 NOTE — H&P
Pre-Operative History & Physical  Ophthalmology      SUBJECTIVE:     History of Present Illness:  Patient is a 77 y.o. female presents with Lamellar macular hole of right eye [H35.341].    MEDICATIONS:   PTA Medications   Medication Sig    amLODIPine (NORVASC) 10 MG tablet TAKE 1 TABLET(10 MG) BY MOUTH EVERY DAY    atorvastatin (LIPITOR) 40 MG tablet TAKE 1 TABLET(40 MG) BY MOUTH EVERY DAY    calcium-vitamin D3 (OS-MARYANA 500 + D3) 500 mg(1,250mg) -200 unit per tablet Take 1 tablet by mouth once daily.     donepeziL (ARICEPT) 10 MG tablet Take 1 tablet (10 mg total) by mouth once daily.    levothyroxine (SYNTHROID) 50 MCG tablet TAKE 1 TABLET(50 MCG) BY MOUTH BEFORE BREAKFAST    multivitamin (THERAGRAN) per tablet Take by mouth. 1 Tablet Oral Every day    senna-docusate 8.6-50 mg (PERICOLACE) 8.6-50 mg per tablet Take 1 tablet by mouth daily as needed.    acetaminophen (TYLENOL) 500 MG tablet Take 500 mg by mouth every 6 (six) hours as needed.    flu vacc wo2455-43,65yr up,/PF (FLUZONE HIGH-DOSE 2017-18, PF, IM) Fluzone High-Dose 9381-8018 (PF) 180 mcg/0.5 mL intramuscular syringe       ALLERGIES:   Review of patient's allergies indicates:   Allergen Reactions    Codeine Nausea And Vomiting       PAST MEDICAL HISTORY:   Past Medical History:   Diagnosis Date    Actinic keratosis     Aortic valve disorders     Atherosclerosis of aorta     Atherosclerosis of native arteries of the extremities with intermittent claudication     Carcinoma in situ, vulva     Hypertension 2/21/2022    Left bundle branch hemiblock     Other and unspecified hyperlipidemia     Postinflammatory pulmonary fibrosis     Senile nuclear sclerosis      PAST SURGICAL HISTORY:   Past Surgical History:   Procedure Laterality Date    ARTHROSCOPIC REPAIR OF ROTATOR CUFF OF SHOULDER Right 4/24/2019    Procedure: REPAIR, ROTATOR CUFF, ARTHROSCOPIC WITH REGENERATIN IMPLANT;  Surgeon: Walter Hernandez MD;  Location: [facility];  Service: Orthopedics;   Laterality: Right;  regional w/o catheter     BTL      COLONOSCOPY N/A 12/9/2021    Procedure: COLONOSCOPY;  Surgeon: Juan Swain MD;  Location: [facility] ENDO (4TH FLR);  Service: Endoscopy;  Laterality: N/A;  fully vaccinated    CRANIOTOMY FOR EVACUATION OF SUBDURAL HEMATOMA Left 10/29/2022    Procedure: CRANIOTOMY, FOR SUBDURAL HEMATOMA EVACUATION;  Surgeon: Ion Souza MD;  Location: [facility] OR 2ND FLR;  Service: Neurosurgery;  Laterality: Left;  ANN PINS    DECOMPRESSION OF SUBACROMIAL SPACE Right 4/24/2019    Procedure: DECOMPRESSION, SUBACROMIAL SPACE WITH DISTAL CLAVICLE EXCISION (DCE);  Surgeon: Walter Hernandez MD;  Location: [facility] OR;  Service: Orthopedics;  Laterality: Right;    FLEXIBLE SIGMOIDOSCOPY N/A 12/20/2021    Procedure: SIGMOIDOSCOPY-FLEXIBLE;  Surgeon: Juan Swain MD;  Location: [facility] ENDO (2ND FLR);  Service: Endoscopy;  Laterality: N/A;  please schedule with me Monday 12/20 AM. Urgent for marking for colon cancer  spot held 2/20/21 12/16 fully vaccinated; instructions to portal-st    FLEXIBLE SIGMOIDOSCOPY N/A 2/4/2022    Procedure: SIGMOIDOSCOPY, FLEXIBLE;  Surgeon: GUILLERMO Olguin MD;  Location: [facility] OR 2ND FLR;  Service: Colon and Rectal;  Laterality: N/A;    LAPAROSCOPIC LEFT COLECTOMY N/A 2/4/2022    Procedure: COLECTOMY, LEFT, LAPAROSCOPIC;  Surgeon: GUILLERMO Olguin MD;  Location: [facility] OR 2ND FLR;  Service: Colon and Rectal;  Laterality: N/A;  Extended    MOBILIZATION OF SPLENIC FLEXURE N/A 2/4/2022    Procedure: MOBILIZATION, SPLENIC FLEXURE;  Surgeon: GUILLERMO Olguin MD;  Location: [facility] OR 2ND FLR;  Service: Colon and Rectal;  Laterality: N/A;    RHYTIDECTOMY      SHOULDER ARTHROSCOPY Right 4/24/2019    Procedure: ARTHROSCOPY, SHOULDER WITH LABRAL DEBRIDEMENT;  Surgeon: Walter Hernandez MD;  Location: [facility];  Service: Orthopedics;  Laterality: Right;    TONSILLECTOMY, ADENOIDECTOMY      VULVA SURGERY       PAST FAMILY HISTORY:   Family History   Problem Relation Age of  Onset    Bladder Cancer Father         d. 69    Alzheimer's disease Mother 96        d. 97    Bladder Cancer Paternal Uncle     Lung cancer Paternal Uncle     Breast cancer Paternal Grandmother     Pancreatic cancer Paternal Aunt     Other Brother         d. 45 o/d    Pneumonia Brother         d. 58, smoker, obese    No Known Problems Sister     No Known Problems Sister     No Known Problems Sister     Amblyopia Neg Hx     Blindness Neg Hx     Cataracts Neg Hx     Diabetes Neg Hx     Glaucoma Neg Hx     Hypertension Neg Hx     Macular degeneration Neg Hx     Retinal detachment Neg Hx     Strabismus Neg Hx     Stroke Neg Hx     Thyroid disease Neg Hx      SOCIAL HISTORY:   Social History     Tobacco Use    Smoking status: Former     Packs/day: 1.50     Years: 39.00     Pack years: 58.50     Types: Cigarettes     Start date:      Quit date: 2001     Years since quittin.5    Smokeless tobacco: Never   Substance Use Topics    Alcohol use: Not Currently     Alcohol/week: 7.0 - 14.0 standard drinks     Types: 7 - 14 Standard drinks or equivalent per week    Drug use: No        MENTAL STATUS: Alert    REVIEW OF SYSTEMS: Negative    OBJECTIVE:     Vital Signs (Most Recent)  Temp: 97.7 °F (36.5 °C) (23)  Pulse: 75 (23 0655)  Resp: 18 (23)  BP: 138/65 (23 0656)  SpO2: 95 % (23 0655)    Physical Exam:  General: NAD  HEENT: AT/NC, macular hole OD (see last Ophthalmology clinic note for full eye exam)  Lungs: Adequate respirations  Heart: + pulses  Abdomen: Soft    ASSESSMENT/PLAN:     Patient is a 77 y.o. female with Lamellar macular hole of right eye [H35.341].    - Plan for surgical correction with 25g PPV/ILM peel with flap/C3F8 OD.  - Allergies reviewed:   Review of patient's allergies indicates:   Allergen Reactions    Codeine Nausea And Vomiting     - Risks/benefits/alternatives of the procedure including, but not limited to scarring, bleeding, infection, loss or  decreased vision, and/or need for possible repeat surgery discussed with the patient and family.  - Informed consent obtained prior to surgery and the patient/family voiced good understanding.    Lama Evelina MD  [facility]

## 2023-04-05 NOTE — BRIEF OP NOTE
Preoperative diagnosis: Full thickness macular hole OD    Post op Dx: same    Procedure performed:  25g PPV/ILM peel/AFx/C3F8 14% OD    Attending Surgeon: Amos    Assistant Surgeon: Sang    Anesthesia: Local/Mac, retrobulbar injection of 4.0cc mixture 0.75%Marcaine, 2% Xylocaine    Estimated blood loss: Minimal    Complication: None    Specimen: None    Disposition: Stable to recovery    Findings/Outcome: [facility] with ERM OD    Date of Discharge: 4/5/23    Discharge Disposition: stable to recovery then home    F/U: tomorrow

## 2023-04-05 NOTE — ANESTHESIA PREPROCEDURE EVALUATION
04/05/2023  Eugenio Aldridge is a 77 y.o., female.      Pre-op Assessment    I have reviewed the Patient Summary Reports.     I have reviewed the Nursing Notes.    I have reviewed the Medications.     Review of Systems  Anesthesia Hx:  No problems with previous Anesthesia  Denies Family Hx of Anesthesia complications.    Cardiovascular:   Exercise tolerance: good Hypertension    Pulmonary:  Pulmonary Normal    Neurological:  Neurology Normal    Endocrine:   Hypothyroidism      Past Medical History:   Diagnosis Date    Actinic keratosis     Aortic valve disorders     Atherosclerosis of aorta     Atherosclerosis of native arteries of the extremities with intermittent claudication     Carcinoma in situ, vulva     Hypertension 2/21/2022    Left bundle branch hemiblock     Other and unspecified hyperlipidemia     Postinflammatory pulmonary fibrosis     Senile nuclear sclerosis      Past Surgical History:   Procedure Laterality Date    ARTHROSCOPIC REPAIR OF ROTATOR CUFF OF SHOULDER Right 4/24/2019    Procedure: REPAIR, ROTATOR CUFF, ARTHROSCOPIC WITH REGENERATIN IMPLANT;  Surgeon: Walter Hernandez MD;  Location: [facility];  Service: Orthopedics;  Laterality: Right;  regional w/o catheter     BTL      COLONOSCOPY N/A 12/9/2021    Procedure: COLONOSCOPY;  Surgeon: Juan Swain MD;  Location: [facility] (4TH FLR);  Service: Endoscopy;  Laterality: N/A;  fully vaccinated    CRANIOTOMY FOR EVACUATION OF SUBDURAL HEMATOMA Left 10/29/2022    Procedure: CRANIOTOMY, FOR SUBDURAL HEMATOMA EVACUATION;  Surgeon: Ion Souza MD;  Location: [facility] 2ND FLR;  Service: Neurosurgery;  Laterality: Left;  ANN PINS    DECOMPRESSION OF SUBACROMIAL SPACE Right 4/24/2019    Procedure: DECOMPRESSION, SUBACROMIAL SPACE WITH DISTAL CLAVICLE EXCISION (DCE);  Surgeon: Walter Hernandez MD;  Location: [facility];  Service:  Orthopedics;  Laterality: Right;    FLEXIBLE SIGMOIDOSCOPY N/A 12/20/2021    Procedure: SIGMOIDOSCOPY-FLEXIBLE;  Surgeon: Juan Swain MD;  Location: [facility] (2ND FLR);  Service: Endoscopy;  Laterality: N/A;  please schedule with me Monday 12/20 AM. Urgent for marking for colon cancer  spot held 2/20/21 12/16 fully vaccinated; instructions to portal-st    FLEXIBLE SIGMOIDOSCOPY N/A 2/4/2022    Procedure: SIGMOIDOSCOPY, FLEXIBLE;  Surgeon: GUILLERMO Olguin MD;  Location: [facility] OR 2ND FLR;  Service: Colon and Rectal;  Laterality: N/A;    LAPAROSCOPIC LEFT COLECTOMY N/A 2/4/2022    Procedure: COLECTOMY, LEFT, LAPAROSCOPIC;  Surgeon: GUILLERMO Olguin MD;  Location: [facility] OR 2ND FLR;  Service: Colon and Rectal;  Laterality: N/A;  Extended    MOBILIZATION OF SPLENIC FLEXURE N/A 2/4/2022    Procedure: MOBILIZATION, SPLENIC FLEXURE;  Surgeon: GUILLERMO Olguin MD;  [facility];  Service: Colon and Rectal;  Laterality: N/A;    RHYTIDECTOMY      SHOULDER ARTHROSCOPY Right 4/24/2019    Procedure: ARTHROSCOPY, SHOULDER WITH LABRAL DEBRIDEMENT;  Surgeon: Walter Hernandez MD;  Location: [facility];  Service: Orthopedics;  Laterality: Right;    TONSILLECTOMY, ADENOIDECTOMY      VULVA SURGERY       Facility-Administered Medications as of 4/5/2023   Medication Dose Route Frequency Provider Last Rate Last Admin    0.9%  NaCl infusion   Intravenous Continuous Sanna Ann NP        atropine 1% ophthalmic solution 1 drop  1 drop Right Eye On Call Procedure Lama Evelina MD   1 drop at 04/05/23 0705    cyclopentolate 1% ophthalmic solution 1 drop  1 drop Right Eye On Call Procedure Lama Evelina MD   1 drop at 04/05/23 0705    gabapentin capsule 300 mg  300 mg Oral TID Sanna Ann NP   300 mg at 02/05/22 0911    moxifloxacin 0.5 % ophthalmic solution 1 drop  1 drop Right Eye On Call Procedure Lama Evelina MD   1 drop at 04/05/23 0705    phenylephrine HCL 2.5% ophthalmic solution 1 drop  1 drop Right Eye  On Call Procedure Lama Evelina MD   1 drop at 04/05/23 0705    prednisoLONE acetate 1 % ophthalmic suspension 1 drop  1 drop Right Eye On Call Procedure Lama Evelina MD   1 drop at 04/05/23 0705    sodium chloride 0.9% flush 10 mL  10 mL Intravenous PRN Lama Evelina MD        TETRAcaine HCl (PF) 0.5 % Drop 1 drop  1 drop Right Eye On Call Procedure Lama Evelina MD   1 drop at 04/05/23 0645     Outpatient Medications as of 4/5/2023   Medication Sig Dispense Refill    atorvastatin (LIPITOR) 40 MG tablet TAKE 1 TABLET(40 MG) BY MOUTH EVERY DAY 90 tablet 3    calcium-vitamin D3 (OS-MARYANA 500 + D3) 500 mg(1,250mg) -200 unit per tablet Take 1 tablet by mouth once daily.       donepeziL (ARICEPT) 10 MG tablet Take 1 tablet (10 mg total) by mouth once daily. 90 tablet 3    levothyroxine (SYNTHROID) 50 MCG tablet TAKE 1 TABLET(50 MCG) BY MOUTH BEFORE BREAKFAST 90 tablet 3    multivitamin (THERAGRAN) per tablet Take by mouth. 1 Tablet Oral Every day      senna-docusate 8.6-50 mg (PERICOLACE) 8.6-50 mg per tablet Take 1 tablet by mouth daily as needed.      acetaminophen (TYLENOL) 500 MG tablet Take 500 mg by mouth every 6 (six) hours as needed.      flu vacc ms0316-32,65yr up,/PF (FLUZONE HIGH-DOSE 2017-18, PF, IM) Fluzone High-Dose 3364-5636 (PF) 180 mcg/0.5 mL intramuscular syringe       Review of patient's allergies indicates:   Allergen Reactions    Codeine Nausea And Vomiting         Physical Exam  General: Well nourished, Cooperative, Alert and Oriented    Airway:  Mallampati: II   Mouth Opening: Normal  TM Distance: Normal  Tongue: Normal  Neck ROM: Normal ROM    Chest/Lungs:  Normal Respiratory Rate    Heart:  Rate: Normal      Wt Readings from Last 3 Encounters:   04/05/23 63.5 kg (139 lb 15.9 oz)   03/09/23 63.5 kg (140 lb)   03/01/23 63.5 kg (140 lb)     Temp Readings from Last 3 Encounters:   04/05/23 36.5 °C (97.7 °F) (Temporal)   03/01/23 36.6 °C (97.8 °F) (Oral)   11/08/22 36.6 °C (97.8 °F) (Oral)     BP  Readings from Last 3 Encounters:   04/05/23 138/65   03/09/23 124/79   03/01/23 (!) 142/59     Pulse Readings from Last 3 Encounters:   04/05/23 75   03/09/23 81   03/01/23 93     Lab Results   Component Value Date    WBC 7.70 11/08/2022    HGB 12.2 11/08/2022    HCT 36.9 (L) 11/08/2022    MCV 96 11/08/2022     11/08/2022         Chemistry        Component Value Date/Time     11/08/2022 0208    K 3.6 11/08/2022 0208     11/08/2022 0208    CO2 29 11/08/2022 0208    BUN 12 11/08/2022 0208    CREATININE 0.6 11/08/2022 0208    GLU 94 11/08/2022 0208        Component Value Date/Time    CALCIUM 9.6 11/08/2022 0208    ALKPHOS 74 11/08/2022 0208    AST 28 11/08/2022 0208    ALT 38 11/08/2022 0208    BILITOT 0.4 11/08/2022 0208    ESTGFRAFRICA >60.0 06/15/2022 0735    EGFRNONAA >60.0 06/15/2022 0735        Results for orders placed or performed during the hospital encounter of 10/29/22   EKG 12-lead    Collection Time: 10/29/22  9:35 PM    Narrative    Test Reason :     Vent. Rate : 066 BPM     Atrial Rate : 066 BPM     P-R Int : 154 ms          QRS Dur : 082 ms      QT Int : 428 ms       P-R-T Axes : 076 051 053 degrees     QTc Int : 448 ms    Normal sinus rhythm  Anteroseptal infarct (cited on or before 17-MAY-2022)  Abnormal ECG  When compared with ECG of 29-JUN-2022 14:12,  Questionable change in initial forces of Anterior leads  Confirmed by Brady Wallace MD ([id number]) on 10/30/2022 9:40:33 AM    Referred By: [email or web address]   SELF           Confirmed By:Brady Wallace MD     Echo 10/30/22  Summary     The left ventricle is normal in size with normal systolic function. The estimated ejection fraction is 60%.   Normal right ventricular size with normal right ventricular systolic function.   Normal left ventricular diastolic function.   Normal central venous pressure (3 mmHg).        Anesthesia Plan  Type of Anesthesia, risks & benefits discussed:    Anesthesia Type: Gen ETT, Gen Supraglottic Airway, Gen  Natural Airway  Intra-op Monitoring Plan: Standard ASA Monitors  Post Op Pain Control Plan: multimodal analgesia and IV/PO Opioids PRN  Induction:  IV  Informed Consent: Informed consent signed with the Patient and all parties understand the risks and agree with anesthesia plan.  All questions answered.   ASA Score: 3  Day of Surgery Review of History & Physical: H&P Update referred to the surgeon/provider.    Ready For Surgery From Anesthesia Perspective.     .

## 2023-04-05 NOTE — TRANSFER OF CARE
"Anesthesia Transfer of Care Note    Patient: Becker B Nix    Procedure(s) Performed: Procedure(s) (LRB):  VITRECTOMY, PARS PLANA APPROACH (Right)    Patient location: PACU    Anesthesia Type: general    Transport from OR: Transported from OR on room air with adequate spontaneous ventilation    Post pain: adequate analgesia    Post assessment: no apparent anesthetic complications and tolerated procedure well    Post vital signs: stable    Level of consciousness: alert, oriented and awake    Nausea/Vomiting: no nausea/vomiting    Complications: none    Transfer of care protocol was followed      Last vitals:   Visit Vitals  /65   Pulse 75   Temp 36.5 °C (97.7 °F) (Temporal)   Resp 18   Ht 5' 5" (1.651 m)   Wt 63.5 kg (139 lb 15.9 oz)   SpO2 95%   Breastfeeding No   BMI 23.30 kg/m²     "

## 2023-04-05 NOTE — PATIENT INSTRUCTIONS
Post Op Instructions:  Patient should Maintain Eye shield & Dressing until seen tomorrow in eye clinic  Tylenol as needed for general discomfort  Use Prescription for pain medication if pain is severe  Use Prescription for Nausea (Zofran) if nausea or vomiting  No excessive exercise   No Bending, Lifting or Straining  Call MD if significant pain or nausea / vomiting uncontrolled by medications  Call MD if temperature in excess of 101' F  Maintain Head in a Face-Down Position  Return to eye clinic for Post Op Examination tomorrow Morning.  Bring Medicine bag to tomorrow's appointment.  \

## 2023-04-05 NOTE — OP NOTE
Preoperative diagnosis: Full thickness macular hole OD    Post op Dx: same    Procedure performed:  25g PPV/ILM peel/AFx/C3F8 14% OD    Attending surgeons:  ANGELIQUE Trinidad    Anesthesia:  Local/MAC, with retrobulbar injection 4.0cc mixture of 2% lidocaine, 0.75% marcaine    Estimated blood loss: minimal    Complications:  None    DOS: 4/5/23    Disposition: stable to recovery then home    Indications for surgery:   This is a 78 yo patient who presented with  distortion and blurred central vision the right eye.   OCT examination patient revealed a macular hole.  Decision was made to repair the macular hole to improved vision and quality of life. Risks, benefits, and alternatives to surgery were discussed in detail. Risks including loss of vision, loss of eye, retina detachment, hemorrhage, infection, lens dislocation, glaucoma, hypotony, ptosis, diplopia    Description of procedure:  After proper informed consent was obtained, the patient was brought back to the operating room at [facility] where monitored anesthesia care was induced.  A retrobulbar injection was provided above without complication.  The patient is prepped draped in normal sterile fashion for ophthalmic surgery and a lid speculum was placed in the right eye.  A standard 3 port 25 gauge pars plana vitrectomy setup with the infusion cannula inserted 4.0 mm posterior to the limbus.  The infusion cannula was turned after it was observed free and clear of all underlying tissue.  Super nasal and superotemporal trocars were also placed 4.0 mm posterior to the limbus.  The vitrector and light pipe were introduced in the vitreous cavity and a core vitrectomy was performed.  Posterior hyaloid  was elevated and propagated out the level of the equator.   ICG was used to stain the epiretinal membrane and internal limiting membrane complex.  Both were peeled off under direct contact lens visualization with the ILM forceps.  A 360 degree cortical  vitrectomy was performed, no retinal breaks were found and air-fluid exchanges performed the SN trocar was removed and not leaking after gentle massage. a 14%C3F8 gas mixture was created.  Approximately 40 cc were cycled through the eye.  The superotemporal and infusion trocars removed and not leaking after gentle massage.  The eye was normal pressure via palpation.  Subconjunctival injections of vancomycin and Decadron were given and  the patient's the drapes removed. the patient she was washed free of Betadine prep solution,  Maxitrol ointment was placed in the left eye then patched shielded, mac anesthesia was reversed and she was brought to recovery in stable condition tolerating the procedure well.  Dr. Trinidad was present for in entire case.

## 2023-04-06 ENCOUNTER — OFFICE VISIT (OUTPATIENT)
Dept: OPHTHALMOLOGY | Facility: CLINIC | Age: 78
End: 2023-04-06
Payer: MEDICARE

## 2023-04-06 DIAGNOSIS — H35.341 FULL THICKNESS MACULAR HOLE, RIGHT: Primary | ICD-10-CM

## 2023-04-06 PROCEDURE — 99999 PR PBB SHADOW E&M-EST. PATIENT-LVL III: ICD-10-PCS | Mod: PBBFAC,,, | Performed by: OPHTHALMOLOGY

## 2023-04-06 PROCEDURE — 99999 PR PBB SHADOW E&M-EST. PATIENT-LVL III: CPT | Mod: PBBFAC,,, | Performed by: OPHTHALMOLOGY

## 2023-04-06 PROCEDURE — 99213 OFFICE O/P EST LOW 20 MIN: CPT | Mod: PBBFAC | Performed by: OPHTHALMOLOGY

## 2023-04-06 PROCEDURE — 99024 PR POST-OP FOLLOW-UP VISIT: ICD-10-PCS | Mod: POP,,, | Performed by: OPHTHALMOLOGY

## 2023-04-06 PROCEDURE — 99024 POSTOP FOLLOW-UP VISIT: CPT | Mod: POP,,, | Performed by: OPHTHALMOLOGY

## 2023-04-06 NOTE — PROGRESS NOTES
HPI    POD #1- mild pain ( scale 5)    S/p  25g PPV/ILM peel/AFx/C3F8 14% OD  Last edited by Chantal Sheffield on 4/6/2023  8:51 AM.             Prior OCT - [facility] OD   OS no ME      A/P    [facility] with ERM OD OD  Pt unsure when she lost vision, (but had decent vision at 5/22 visit with Emmanuel)some more chronic features  Had a fall circa summer 2022 with subdural that required evacuation 10/22.    S/p 25g PPV/ILM peel/C3F8 OD - [facility]/ERM OD 4/5/23    Doing well, IOP ok    Start gtts QID, ointment/shield QHS    Face down       2. VMA OS  No sig traction    3. NS OU  Will need CE OD soon after MH repair    4. HTN Ret OU  Good BP control      1 week

## 2023-04-15 NOTE — PROGRESS NOTES
CC:   77 y.o. yo female w/ HTN, HLD, ASPVD, Pre-DM,  thyroid ds, and memory disturbance  Presents for f/up on above  Reviewed Neurology Consult w/ Dr. Ferrari  Rec for optimization of CV risks  S/p recent eye surgery    HTN, tx amlodipine 10mg  Denied HA or dz at this time ( no recent falls/vertigo)  BP==> 130/68    HLD, tx atorvastatin 40mg  Denied chest pain or SOB  LDL==>    PVD, tx Plavik  Denied claudication    Hypothyroidism, tx levothyroxine 50mcg  Denied cold or heat intolerance  TSH==> pending    Pre-DM, tx diet  Denied increased thirst or urination  A1C==>pending    MEDCARD:Reviewed  ROS:  No fever, chills , or night sweats  No visual disturbance or itchy/watery eyes  No ear or sinus pain/pressure  No dysphagia or early satiety  No chest pain or palpitations  No cough or wheezing  No GERD or abdominal pain  No change in bowel or bladder function  No blood in stool or urine  No dysuria or nocturia  No joint swelling or redness  No skin rashes or blisters  No cold or heat intolerance  No increased thirst or urination  Answers submitted by the patient for this visit:  Review of Systems Questionnaire (Submitted on 4/12/2023)  activity change: No  unexpected weight change: No  neck pain: No  hearing loss: No  rhinorrhea: No  trouble swallowing: No  eye discharge: No  visual disturbance: No  chest tightness: No  wheezing: No  chest pain: No  palpitations: No  blood in stool: No  constipation: No  vomiting: No  diarrhea: No  polydipsia: No  polyuria: No  difficulty urinating: No  hematuria: No  menstrual problem: No  dysuria: No  joint swelling: No  arthralgias: No  headaches: No  weakness: No  confusion: No  dysphoric mood: No    Remainder of review negative except as previously noted    PMHX: Reviewed  PSHX: Reviewed  SHX: Reviewed  FHX: Reviewed    PE:  VSS  GEN: WDWN, A&O, NAD, conversant and co-operative, pleasant as always  EYES: Conj/lids unremarkable, sclera anicteric   NECK: Supple w/o lymphadenopathy or  thyromegaly  RESP: Efforts unlabored, lungs CTAP  CV: Heart RRR, no carotid bruits, 1+ pedal pulses, no LE edema  GI:BS+. Soft, NT/ND, -HSM noted  MSK: Gait normal. No CCE noted  NEURO: BARROW. No tremor noted     SKIN: warm and dry      IMPRESSION:   HTN, stable , continue amlodipine 10mg qd  HLD, LDL @ goal, continue on atorvastatin 40mg,   Aortic atherosclerosis, asx   PVD, stable off Pletal, denied claudication  Pre-DM, asx, continue diabetic diet  Hypothyroidism, stable continue on levothyroxine 50mcg  Post inflammatory pulmonary fibrosis, stable  Dementia , chronic, stable, continue donepezil 10mg qd       PLAN:  Lab- today  Continue present meds  Caution w/ activities  Keep well hydrated  Low salt, low fat diet, avoid sugars  Call prn  RTC 6 mos

## 2023-04-18 ENCOUNTER — OFFICE VISIT (OUTPATIENT)
Dept: OPHTHALMOLOGY | Facility: CLINIC | Age: 78
End: 2023-04-18
Payer: COMMERCIAL

## 2023-04-18 DIAGNOSIS — H35.341 FULL THICKNESS MACULAR HOLE, RIGHT: Primary | ICD-10-CM

## 2023-04-18 DIAGNOSIS — H43.822 VITREOMACULAR ADHESION, LEFT: ICD-10-CM

## 2023-04-18 PROCEDURE — 1101F PT FALLS ASSESS-DOCD LE1/YR: CPT | Mod: CPTII,S$GLB,, | Performed by: OPHTHALMOLOGY

## 2023-04-18 PROCEDURE — 3288F FALL RISK ASSESSMENT DOCD: CPT | Mod: CPTII,S$GLB,, | Performed by: OPHTHALMOLOGY

## 2023-04-18 PROCEDURE — 1126F PR PAIN SEVERITY QUANTIFIED, NO PAIN PRESENT: ICD-10-PCS | Mod: CPTII,S$GLB,, | Performed by: OPHTHALMOLOGY

## 2023-04-18 PROCEDURE — 1101F PR PT FALLS ASSESS DOC 0-1 FALLS W/OUT INJ PAST YR: ICD-10-PCS | Mod: CPTII,S$GLB,, | Performed by: OPHTHALMOLOGY

## 2023-04-18 PROCEDURE — 99999 PR PBB SHADOW E&M-EST. PATIENT-LVL III: ICD-10-PCS | Mod: PBBFAC,,, | Performed by: OPHTHALMOLOGY

## 2023-04-18 PROCEDURE — 99999 PR PBB SHADOW E&M-EST. PATIENT-LVL III: CPT | Mod: PBBFAC,,, | Performed by: OPHTHALMOLOGY

## 2023-04-18 PROCEDURE — 3288F PR FALLS RISK ASSESSMENT DOCUMENTED: ICD-10-PCS | Mod: CPTII,S$GLB,, | Performed by: OPHTHALMOLOGY

## 2023-04-18 PROCEDURE — 1159F MED LIST DOCD IN RCRD: CPT | Mod: CPTII,S$GLB,, | Performed by: OPHTHALMOLOGY

## 2023-04-18 PROCEDURE — 1126F AMNT PAIN NOTED NONE PRSNT: CPT | Mod: CPTII,S$GLB,, | Performed by: OPHTHALMOLOGY

## 2023-04-18 PROCEDURE — 99024 POSTOP FOLLOW-UP VISIT: CPT | Mod: S$GLB,,, | Performed by: OPHTHALMOLOGY

## 2023-04-18 PROCEDURE — 99024 PR POST-OP FOLLOW-UP VISIT: ICD-10-PCS | Mod: S$GLB,,, | Performed by: OPHTHALMOLOGY

## 2023-04-18 PROCEDURE — 1159F PR MEDICATION LIST DOCUMENTED IN MEDICAL RECORD: ICD-10-PCS | Mod: CPTII,S$GLB,, | Performed by: OPHTHALMOLOGY

## 2023-04-18 NOTE — PROGRESS NOTES
HPI    Patient here today for 1 week post op. Blurry VA OD, no pain or f/f.    S/p  25g PPV/ILM peel/AFx/C3F8 14% OD    Vig, Pred and Atropine qid OD  Laura/ Shield qhs OD  Last edited by Chantal Sheffield on 4/18/2023  2:46 PM.             Prior OCT - [facility] OD   OS no ME      A/P    [facility] with ERM OD OD  Pt unsure when she lost vision, (but had decent vision at 5/22 visit with Emmanuel)some more chronic features  Had a fall circa summer 2022 with subdural that required evacuation 10/22.    S/p 25g PPV/ILM peel/C3F8 OD - [facility]/ERM OD 4/5/23    Doing well, IOP ok    Continue PF/HA Q day           2. VMA OS  No sig traction    3. NS OU  Will need CE OD soon after MH repair    4. HTN Ret OU  Good BP control      4 week OCT

## 2023-04-19 ENCOUNTER — LAB VISIT (OUTPATIENT)
Dept: LAB | Facility: HOSPITAL | Age: 78
End: 2023-04-19
Attending: INTERNAL MEDICINE
Payer: MEDICARE

## 2023-04-19 ENCOUNTER — OFFICE VISIT (OUTPATIENT)
Dept: INTERNAL MEDICINE | Facility: CLINIC | Age: 78
End: 2023-04-19
Payer: MEDICARE

## 2023-04-19 VITALS
SYSTOLIC BLOOD PRESSURE: 130 MMHG | DIASTOLIC BLOOD PRESSURE: 68 MMHG | OXYGEN SATURATION: 91 % | HEART RATE: 97 BPM | HEIGHT: 65 IN | TEMPERATURE: 97 F | BODY MASS INDEX: 24.24 KG/M2 | WEIGHT: 145.5 LBS | RESPIRATION RATE: 20 BRPM

## 2023-04-19 DIAGNOSIS — E03.9 HYPOTHYROIDISM, UNSPECIFIED TYPE: ICD-10-CM

## 2023-04-19 DIAGNOSIS — E78.00 PURE HYPERCHOLESTEROLEMIA: ICD-10-CM

## 2023-04-19 DIAGNOSIS — J84.10 POSTINFLAMMATORY PULMONARY FIBROSIS: ICD-10-CM

## 2023-04-19 DIAGNOSIS — I10 HYPERTENSION, UNSPECIFIED TYPE: Primary | ICD-10-CM

## 2023-04-19 DIAGNOSIS — I73.9 PAD (PERIPHERAL ARTERY DISEASE): ICD-10-CM

## 2023-04-19 DIAGNOSIS — F02.80 DEMENTIA IN OTHER DISEASES CLASSIFIED ELSEWHERE, UNSPECIFIED SEVERITY, WITHOUT BEHAVIORAL DISTURBANCE, PSYCHOTIC DISTURBANCE, MOOD DISTURBANCE, AND ANXIETY: ICD-10-CM

## 2023-04-19 DIAGNOSIS — I70.0 ATHEROSCLEROSIS OF AORTA: ICD-10-CM

## 2023-04-19 DIAGNOSIS — R73.03 PRE-DIABETES: ICD-10-CM

## 2023-04-19 PROBLEM — C18.5 MALIGNANT NEOPLASM OF SPLENIC FLEXURE: Status: RESOLVED | Noted: 2022-02-05 | Resolved: 2023-04-19

## 2023-04-19 PROBLEM — E44.0 MODERATE MALNUTRITION: Status: RESOLVED | Noted: 2022-11-07 | Resolved: 2023-04-19

## 2023-04-19 LAB
ALBUMIN SERPL BCP-MCNC: 3.9 G/DL (ref 3.5–5.2)
ALP SERPL-CCNC: 66 U/L (ref 55–135)
ALT SERPL W/O P-5'-P-CCNC: 17 U/L (ref 10–44)
ANION GAP SERPL CALC-SCNC: 10 MMOL/L (ref 8–16)
AST SERPL-CCNC: 18 U/L (ref 10–40)
BILIRUB SERPL-MCNC: 0.4 MG/DL (ref 0.1–1)
BUN SERPL-MCNC: 21 MG/DL (ref 8–23)
CALCIUM SERPL-MCNC: 9.6 MG/DL (ref 8.7–10.5)
CHLORIDE SERPL-SCNC: 109 MMOL/L (ref 95–110)
CO2 SERPL-SCNC: 24 MMOL/L (ref 23–29)
CREAT SERPL-MCNC: 1 MG/DL (ref 0.5–1.4)
EST. GFR  (NO RACE VARIABLE): 58.4 ML/MIN/1.73 M^2
ESTIMATED AVG GLUCOSE: 108 MG/DL (ref 68–131)
GLUCOSE SERPL-MCNC: 85 MG/DL (ref 70–110)
HBA1C MFR BLD: 5.4 % (ref 4–5.6)
POTASSIUM SERPL-SCNC: 4.4 MMOL/L (ref 3.5–5.1)
PROT SERPL-MCNC: 6.8 G/DL (ref 6–8.4)
SODIUM SERPL-SCNC: 143 MMOL/L (ref 136–145)
TSH SERPL DL<=0.005 MIU/L-ACNC: 3.34 UIU/ML (ref 0.4–4)

## 2023-04-19 PROCEDURE — 36415 COLL VENOUS BLD VENIPUNCTURE: CPT | Mod: PO | Performed by: INTERNAL MEDICINE

## 2023-04-19 PROCEDURE — 80053 COMPREHEN METABOLIC PANEL: CPT | Performed by: INTERNAL MEDICINE

## 2023-04-19 PROCEDURE — 99215 OFFICE O/P EST HI 40 MIN: CPT | Mod: PBBFAC,PO | Performed by: INTERNAL MEDICINE

## 2023-04-19 PROCEDURE — 99214 PR OFFICE/OUTPT VISIT, EST, LEVL IV, 30-39 MIN: ICD-10-PCS | Mod: S$PBB,,, | Performed by: INTERNAL MEDICINE

## 2023-04-19 PROCEDURE — 99999 PR PBB SHADOW E&M-EST. PATIENT-LVL V: ICD-10-PCS | Mod: PBBFAC,,, | Performed by: INTERNAL MEDICINE

## 2023-04-19 PROCEDURE — 83036 HEMOGLOBIN GLYCOSYLATED A1C: CPT | Performed by: INTERNAL MEDICINE

## 2023-04-19 PROCEDURE — 99214 OFFICE O/P EST MOD 30 MIN: CPT | Mod: S$PBB,,, | Performed by: INTERNAL MEDICINE

## 2023-04-19 PROCEDURE — 84443 ASSAY THYROID STIM HORMONE: CPT | Performed by: INTERNAL MEDICINE

## 2023-04-19 PROCEDURE — 99999 PR PBB SHADOW E&M-EST. PATIENT-LVL V: CPT | Mod: PBBFAC,,, | Performed by: INTERNAL MEDICINE

## 2023-04-19 NOTE — PROGRESS NOTES
"CC: Right shoulder post op 2 weeks    Pain well tolerated, off pain medication  Sling in place  No issues reported  She is attending PT 3 times weekly    DATE OF SURGERY:   4/24/19       PREOPERATIVE DIAGNOSES:   1. Right shoulder rotator cuff tear (massive / complex) - supraspinatus / infraspinatus  2. Right shoulder labral tear / biceps tendinopathy  3. Right shoulder AC joint arthritis     POSTOPERATIVE DIAGNOSES:   1. Right shoulder rotator cuff tear (massive / complex) - supraspinatus / infraspinatus  2. Right shoulder labral tear / biceps rupture  3. Right shoulder AC joint arthritis     PROCEDURE:   1. Right shoulder arthroscopic rotator cuff repair (massive / complex) - supraspinatus / infraspinatus.  2. Placement of a right shoulder collagen scaffold allograft augmentation on posterosuperior rotator cuff repair  3. Right shoulder arthroscopic debridement labrum  / biceps stump  4. Right shoulder arthroscopic subacromial decompression.   5. Right shoulder arthroscopic distal clavicle excision     SURGEON: Walter Hernandez M.D.     Review of Systems   Constitution: Negative. Negative for chills, fever and night sweats.    Cardiovascular: Negative for chest pain and syncope.   Respiratory: Negative for cough and shortness of breath.    Gastrointestinal: Negative for abdominal pain and bowel incontinence.      PE:    /67   Pulse 78   Ht 5' 4" (1.626 m)   Wt 75.3 kg (166 lb)   BMI 28.49 kg/m²      right shoulder    Incision healed  No sign of infection  Swelling resolved  Compartments soft  Neurovascular status intact in extremity    PROM  Forward elevation 90 degrees  External rotation 0 degrees    Assessment:  2 weeks s/p massive RCR    Plan:  1. Continue PT   2. Pain medication as needed for PT; try to wean off for next visit  3. Return to clinic in 4 weeks for 6 week post op follow up    "
1-2x/week

## 2023-04-21 ENCOUNTER — TELEPHONE (OUTPATIENT)
Dept: INTERNAL MEDICINE | Facility: CLINIC | Age: 78
End: 2023-04-21
Payer: COMMERCIAL

## 2023-04-21 NOTE — TELEPHONE ENCOUNTER
----- Message from Suha Salguero MD sent at 4/20/2023  5:42 PM CDT -----  Your lab has resulted and your chemistries were unremarkable except for a slight decrease in your kidney function. Please increase your water intake.  Your thyroid level was in the normal range, please continue your levothyroxine 50mcg daily.  Your hemoglobin A1C was in the normal range.  donny.

## 2023-05-22 ENCOUNTER — OFFICE VISIT (OUTPATIENT)
Dept: OPHTHALMOLOGY | Facility: CLINIC | Age: 78
End: 2023-05-22
Payer: COMMERCIAL

## 2023-05-22 DIAGNOSIS — H35.341 FULL THICKNESS MACULAR HOLE, RIGHT: Primary | ICD-10-CM

## 2023-05-22 PROCEDURE — 99999 PR PBB SHADOW E&M-EST. PATIENT-LVL III: ICD-10-PCS | Mod: PBBFAC,,, | Performed by: OPHTHALMOLOGY

## 2023-05-22 PROCEDURE — 1159F PR MEDICATION LIST DOCUMENTED IN MEDICAL RECORD: ICD-10-PCS | Mod: CPTII,S$GLB,, | Performed by: OPHTHALMOLOGY

## 2023-05-22 PROCEDURE — 1160F PR REVIEW ALL MEDS BY PRESCRIBER/CLIN PHARMACIST DOCUMENTED: ICD-10-PCS | Mod: CPTII,S$GLB,, | Performed by: OPHTHALMOLOGY

## 2023-05-22 PROCEDURE — 1126F PR PAIN SEVERITY QUANTIFIED, NO PAIN PRESENT: ICD-10-PCS | Mod: CPTII,S$GLB,, | Performed by: OPHTHALMOLOGY

## 2023-05-22 PROCEDURE — 99999 PR PBB SHADOW E&M-EST. PATIENT-LVL III: CPT | Mod: PBBFAC,,, | Performed by: OPHTHALMOLOGY

## 2023-05-22 PROCEDURE — 1101F PR PT FALLS ASSESS DOC 0-1 FALLS W/OUT INJ PAST YR: ICD-10-PCS | Mod: CPTII,S$GLB,, | Performed by: OPHTHALMOLOGY

## 2023-05-22 PROCEDURE — 1101F PT FALLS ASSESS-DOCD LE1/YR: CPT | Mod: CPTII,S$GLB,, | Performed by: OPHTHALMOLOGY

## 2023-05-22 PROCEDURE — 1159F MED LIST DOCD IN RCRD: CPT | Mod: CPTII,S$GLB,, | Performed by: OPHTHALMOLOGY

## 2023-05-22 PROCEDURE — 99024 POSTOP FOLLOW-UP VISIT: CPT | Mod: S$GLB,,, | Performed by: OPHTHALMOLOGY

## 2023-05-22 PROCEDURE — 3288F PR FALLS RISK ASSESSMENT DOCUMENTED: ICD-10-PCS | Mod: CPTII,S$GLB,, | Performed by: OPHTHALMOLOGY

## 2023-05-22 PROCEDURE — 1126F AMNT PAIN NOTED NONE PRSNT: CPT | Mod: CPTII,S$GLB,, | Performed by: OPHTHALMOLOGY

## 2023-05-22 PROCEDURE — 3288F FALL RISK ASSESSMENT DOCD: CPT | Mod: CPTII,S$GLB,, | Performed by: OPHTHALMOLOGY

## 2023-05-22 PROCEDURE — 1160F RVW MEDS BY RX/DR IN RCRD: CPT | Mod: CPTII,S$GLB,, | Performed by: OPHTHALMOLOGY

## 2023-05-22 PROCEDURE — 99024 PR POST-OP FOLLOW-UP VISIT: ICD-10-PCS | Mod: S$GLB,,, | Performed by: OPHTHALMOLOGY

## 2023-05-22 NOTE — PROGRESS NOTES
HPI    VA OD blurry since surgery. OD Seeing black [place] moves from side to   side.  Gtts: Vig QD OD  \       Pred QD OD          Atropine QD OD  Laura hs OD  Last edited by Tierra Souza on 5/22/2023  1:15 PM.        HPI    Patient here today for 1 week post op. Blurry VA OD, no pain or f/f.    S/p  25g PPV/ILM peel/AFx/C3F8 14% OD    Vig, Pred and Atropine qid OD  Laura/ Shield qhs OD  Last edited by Chatnal Sheffield on 4/18/2023  2:46 PM.             OCT - post peel, MH closed  OS no ME      A/P    [facility] with ERM OD OD  Pt unsure when she lost vision, (but had decent vision at 5/22 visit with Emmanuel)some more chronic features  Had a fall circa summer 2022 with subdural that required evacuation 10/22.    S/p 25g PPV/ILM peel/C3F8 OD - [facility]/ERM OD 4/5/23    Doing well, IOP ok    Continue PF/HA Q day until bubble gone          2. VMA OS  No sig traction    3. NS OU  Will need CE OD soon after MH repair    4. HTN Ret OU  Good BP control      3 months  OCT/MRx

## 2023-05-28 ENCOUNTER — PATIENT MESSAGE (OUTPATIENT)
Dept: ADMINISTRATIVE | Facility: OTHER | Age: 78
End: 2023-05-28
Payer: COMMERCIAL

## 2023-05-31 ENCOUNTER — PATIENT MESSAGE (OUTPATIENT)
Dept: INTERNAL MEDICINE | Facility: CLINIC | Age: 78
End: 2023-05-31
Payer: COMMERCIAL

## 2023-06-05 ENCOUNTER — OFFICE VISIT (OUTPATIENT)
Dept: ENDOCRINOLOGY | Facility: CLINIC | Age: 78
End: 2023-06-05
Payer: MEDICARE

## 2023-06-05 VITALS
OXYGEN SATURATION: 93 % | HEIGHT: 65 IN | HEART RATE: 79 BPM | DIASTOLIC BLOOD PRESSURE: 60 MMHG | BODY MASS INDEX: 24 KG/M2 | WEIGHT: 144.06 LBS | SYSTOLIC BLOOD PRESSURE: 126 MMHG

## 2023-06-05 DIAGNOSIS — M81.0 AGE RELATED OSTEOPOROSIS, UNSPECIFIED PATHOLOGICAL FRACTURE PRESENCE: ICD-10-CM

## 2023-06-05 DIAGNOSIS — E03.9 ACQUIRED HYPOTHYROIDISM: ICD-10-CM

## 2023-06-05 PROCEDURE — 99214 OFFICE O/P EST MOD 30 MIN: CPT | Mod: PBBFAC | Performed by: INTERNAL MEDICINE

## 2023-06-05 PROCEDURE — 99999 PR PBB SHADOW E&M-EST. PATIENT-LVL IV: CPT | Mod: PBBFAC,,, | Performed by: INTERNAL MEDICINE

## 2023-06-05 PROCEDURE — 99204 PR OFFICE/OUTPT VISIT, NEW, LEVL IV, 45-59 MIN: ICD-10-PCS | Mod: S$PBB,,, | Performed by: INTERNAL MEDICINE

## 2023-06-05 PROCEDURE — 99999 PR PBB SHADOW E&M-EST. PATIENT-LVL IV: ICD-10-PCS | Mod: PBBFAC,,, | Performed by: INTERNAL MEDICINE

## 2023-06-05 PROCEDURE — 99204 OFFICE O/P NEW MOD 45 MIN: CPT | Mod: S$PBB,,, | Performed by: INTERNAL MEDICINE

## 2023-06-05 NOTE — PROGRESS NOTES
"Subjective:      Patient ID: Eugenio Aldridge is a 77 y.o.    Chief Complaint:  Osteoporosis      History of Present Illness  With regards to osteoporosis:     Looks like she was diagnosed with osteopenia back in 2011.  This diagnosis evolved into osteoporosis in 2018 and Fosamax was started - then stopped 2021        Last bone density 1/30/23    FRAX:     32% risk of a major osteoporotic fracture in the next 10 years.     23% risk of hip fracture in the next 10 years.     Impression:     *Osteoporosis based on T-score below -2.5  *Fracture risk is very high based on FRAX  *Compared with previous DXA, BMD at the lumbar spine has declined by 3.5%, and the BMD at the total hip has declined by 10.1%.     RECOMMENDATIONS:  *Daily calcium intake 3535-4363 mg, dietary sources preferred; Vitamin D 9646-2610 IU daily.  *Weight bearing exercise and fall precautions.  *Recommend medical therapy for osteoporosis.  Given very high risk for fracture, would consider anabolic agents (including teriparatide, abaloparatide, or romosozumab), denosumab or zoledronic acid as the preferred treatment options.  Oral bisphosphonates can be considered as second-line therapy.  *Repeat BMD in 2 years      Fractures:  None      Calcium intake?  1000 mg a day   Vit D intake?  2000 iu a day            No recent fractures   No significant height loss (>2 inches)      tob use ?    Past use   etoh use? Past use socially       No current diarrhea or h/o malabsorption    Denies chronic exposure to steroid therapy, anticoagulants, proton pump inhibitors or antiseizure medications.        Denies active malignancy, history of malignancy the involved the bone, prior radiation treatment, or unexplained elevations of alk phos on labs     She has hypothyroidism and is on levothyroxine 50 mcg a day    ROS:   As above    Objective:     /60 (BP Location: Right arm, Patient Position: Sitting, BP Method: Medium (Manual))   Pulse 79   Ht 5' 5" (1.651 m)   Wt " 65.4 kg (144 lb 1.1 oz)   SpO2 (!) 93%   BMI 23.97 kg/m²     Body mass index is 23.97 kg/m².      Physical Exam  Vitals reviewed.   Constitutional:       Appearance: Normal appearance.   Neck:      Comments: No goiter   Cardiovascular:      Rate and Rhythm: Normal rate.   Pulmonary:      Effort: Pulmonary effort is normal.   Abdominal:      Palpations: Abdomen is soft.   Musculoskeletal:      Right lower leg: No edema.      Left lower leg: No edema.   Psychiatric:         Mood and Affect: Mood normal.              Lab Review:   Lab Results   Component Value Date    HGBA1C 5.4 04/19/2023     Lab Results   Component Value Date    CHOL 156 10/29/2022    HDL 90 (H) 10/29/2022    LDLCALC 57.4 (L) 10/29/2022    TRIG 43 10/29/2022    CHOLHDL 57.7 (H) 10/29/2022     Lab Results   Component Value Date     04/19/2023    K 4.4 04/19/2023     04/19/2023    CO2 24 04/19/2023    GLU 85 04/19/2023    BUN 21 04/19/2023    CREATININE 1.0 04/19/2023    CALCIUM 9.6 04/19/2023    PROT 6.8 04/19/2023    ALBUMIN 3.9 04/19/2023    BILITOT 0.4 04/19/2023    ALKPHOS 66 04/19/2023    AST 18 04/19/2023    ALT 17 04/19/2023    ANIONGAP 10 04/19/2023    ESTGFRAFRICA >60.0 06/15/2022    EGFRNONAA >60.0 06/15/2022    TSH 3.339 04/19/2023     Vit D, 25-Hydroxy   Date Value Ref Range Status   10/19/2010 35 ng/mL Final     Comment:     Vitamin D, 25-Hydroxy:  -- REFERENCE VALUE --  25-HYDROXY D TOTAL (D2+D3)  Optimum levels in the normal  population are 25-80     Lab Results   Component Value Date    WBC 7.70 11/08/2022    HGB 12.2 11/08/2022    HCT 36.9 (L) 11/08/2022    MCV 96 11/08/2022     11/08/2022           Assessment and Plan     Age related osteoporosis  Risks include low weight,  race, menopause, h/o smoking,     Reviewed basics of quantity versus quality  Reassuring she is not fracturing         RDA of calcium (3389-4178 mg /day in divided doses) and vitamin D 2000iu a day  discussed  Calcium from food  sources preferred  Fall precautions emphasized  +weight bearing exercise    Will provide bone health and osteoporosis Foundation information  https://[email or web address]      Treatment options and potential side effects discussed for PO bisphosphonates, reclast, Denosumab, and Teriparatide)     Low risk for ONJ and atypical fractures reviewed and discussed. Alerted that if dental work needs to be done it should be done prior to initiating therapy     Plan prolia     rtc yearly   bmd q 2 years     Hypothyroidism  Clinically and biochemically euthyroid.  No changes

## 2023-06-05 NOTE — PATIENT INSTRUCTIONS
Based on our conversation please look at Bone Health and Osteoporosis Foundation website.  They have very valuable information for patients regarding calcium and vitamin-D intake, preventing fractures, medication, and safe exercises.    [email or web address]      Specifically look into the medicine we discussed which was Prolia.  Let me know if you have any questions

## 2023-06-05 NOTE — ASSESSMENT & PLAN NOTE
Risks include low weight,  race, menopause, h/o smoking,     Reviewed basics of quantity versus quality  Reassuring she is not fracturing         RDA of calcium (3885-5744 mg /day in divided doses) and vitamin D 2000iu a day  discussed  Calcium from food sources preferred  Fall precautions emphasized  +weight bearing exercise    Will provide bone health and osteoporosis Foundation information  [email or web address]      Treatment options and potential side effects discussed for PO bisphosphonates, reclast, Denosumab, and Teriparatide)     Low risk for ONJ and atypical fractures reviewed and discussed. Alerted that if dental work needs to be done it should be done prior to initiating therapy     Plan prolia     rtc yearly   bmd q 2 years

## 2023-06-08 ENCOUNTER — HOSPITAL ENCOUNTER (OUTPATIENT)
Dept: RADIOLOGY | Facility: HOSPITAL | Age: 78
Discharge: HOME | End: 2023-06-08
Attending: NEUROLOGICAL SURGERY
Payer: MEDICARE

## 2023-06-08 ENCOUNTER — OFFICE VISIT (OUTPATIENT)
Dept: NEUROSURGERY | Facility: CLINIC | Age: 78
End: 2023-06-08
Payer: MEDICARE

## 2023-06-08 VITALS
HEART RATE: 78 BPM | WEIGHT: 144 LBS | SYSTOLIC BLOOD PRESSURE: 121 MMHG | HEIGHT: 65 IN | DIASTOLIC BLOOD PRESSURE: 73 MMHG | BODY MASS INDEX: 23.99 KG/M2

## 2023-06-08 DIAGNOSIS — S06.5XAA SUBDURAL HEMATOMA: ICD-10-CM

## 2023-06-08 DIAGNOSIS — S06.5XAA SUBDURAL HEMATOMA: Primary | ICD-10-CM

## 2023-06-08 PROCEDURE — 99213 PR OFFICE/OUTPT VISIT, EST, LEVL III, 20-29 MIN: ICD-10-PCS | Mod: S$PBB,,, | Performed by: NEUROLOGICAL SURGERY

## 2023-06-08 PROCEDURE — 70450 CT HEAD WITHOUT CONTRAST: ICD-10-PCS | Mod: 26,,, | Performed by: RADIOLOGY

## 2023-06-08 PROCEDURE — 70450 CT HEAD/BRAIN W/O DYE: CPT | Mod: TC

## 2023-06-08 PROCEDURE — 99999 PR PBB SHADOW E&M-EST. PATIENT-LVL IV: ICD-10-PCS | Mod: PBBFAC,,, | Performed by: NEUROLOGICAL SURGERY

## 2023-06-08 PROCEDURE — 99214 OFFICE O/P EST MOD 30 MIN: CPT | Mod: PBBFAC,25 | Performed by: NEUROLOGICAL SURGERY

## 2023-06-08 PROCEDURE — 99213 OFFICE O/P EST LOW 20 MIN: CPT | Mod: S$PBB,,, | Performed by: NEUROLOGICAL SURGERY

## 2023-06-08 PROCEDURE — 99999 PR PBB SHADOW E&M-EST. PATIENT-LVL IV: CPT | Mod: PBBFAC,,, | Performed by: NEUROLOGICAL SURGERY

## 2023-06-08 PROCEDURE — 70450 CT HEAD/BRAIN W/O DYE: CPT | Mod: 26,,, | Performed by: RADIOLOGY

## 2023-06-08 NOTE — PROGRESS NOTES
Eugenio Aldridge was seen in neurosurgical follow-up at the office today.  She has been doing well over the past 3 months.  She had her retina repaired and vitrectomy done with Dr. Trinidad on 04/05/2023 and feels that her vision is pretty much back to normal in the right eye.  She is having no double vision.  She has no current neurological complaints, no headaches, focal weakness, gait imbalance or other neurological symptoms.  She does say that her craniotomy incision itches.     On examination she is alert and in good spirits.  Her incision is well healed.  She shows full extraocular movements and equal pupils.  She is speaking normally.  Gait is unremarkable.    CT of the head was repeated at [facility] this morning.  There is still a little residual epidural fluid under her craniotomy flap.  This has continued to diminish.  This slightly flattens the gyri on this side of the brain.  There is no shift of midline.    She is doing well neurologically.  The small epidural fluid collection is asymptomatic.  I will get one final CT in about 6 months and if this is stable I believe she can be discharged from neurosurgical care.

## 2023-07-20 ENCOUNTER — INFUSION (OUTPATIENT)
Dept: INFECTIOUS DISEASES | Facility: HOSPITAL | Age: 78
End: 2023-07-20
Attending: INTERNAL MEDICINE
Payer: MEDICARE

## 2023-07-20 VITALS
TEMPERATURE: 98 F | BODY MASS INDEX: 23.94 KG/M2 | HEART RATE: 77 BPM | WEIGHT: 143.88 LBS | OXYGEN SATURATION: 95 % | SYSTOLIC BLOOD PRESSURE: 142 MMHG | DIASTOLIC BLOOD PRESSURE: 66 MMHG

## 2023-07-20 DIAGNOSIS — M81.0 AGE RELATED OSTEOPOROSIS, UNSPECIFIED PATHOLOGICAL FRACTURE PRESENCE: Primary | ICD-10-CM

## 2023-07-20 DIAGNOSIS — M81.8 OTHER OSTEOPOROSIS WITHOUT CURRENT PATHOLOGICAL FRACTURE: ICD-10-CM

## 2023-07-20 PROCEDURE — 96372 THER/PROPH/DIAG INJ SC/IM: CPT

## 2023-07-20 PROCEDURE — 63600175 PHARM REV CODE 636 W HCPCS: Mod: JZ,JG | Performed by: INTERNAL MEDICINE

## 2023-07-20 RX ADMIN — DENOSUMAB 60 MG: 60 INJECTION SUBCUTANEOUS at 01:07

## 2023-07-20 NOTE — PROGRESS NOTES
Arrived to clinic for 1st prolia injection. Prolia 60 mg given SQ in left arm. Pt observed for 15 minutes. Denies dental work in last 3 mos. Next appointment made. Discharged in NAD

## 2023-08-08 ENCOUNTER — OFFICE VISIT (OUTPATIENT)
Dept: OPTOMETRY | Facility: CLINIC | Age: 78
End: 2023-08-08
Payer: MEDICARE

## 2023-08-08 DIAGNOSIS — H52.7 REFRACTIVE ERROR: ICD-10-CM

## 2023-08-08 DIAGNOSIS — H25.13 NUCLEAR SCLEROSIS OF BOTH EYES: Primary | ICD-10-CM

## 2023-08-08 PROCEDURE — 92012 PR EYE EXAM, EST PATIENT,INTERMED: ICD-10-PCS | Mod: S$PBB,,, | Performed by: OPTOMETRIST

## 2023-08-08 PROCEDURE — 99999 PR PBB SHADOW E&M-EST. PATIENT-LVL III: CPT | Mod: PBBFAC,,, | Performed by: OPTOMETRIST

## 2023-08-08 PROCEDURE — 99999 PR PBB SHADOW E&M-EST. PATIENT-LVL III: ICD-10-PCS | Mod: PBBFAC,,, | Performed by: OPTOMETRIST

## 2023-08-08 PROCEDURE — 92012 INTRM OPH EXAM EST PATIENT: CPT | Mod: S$PBB,,, | Performed by: OPTOMETRIST

## 2023-08-08 PROCEDURE — 92015 PR REFRACTION: ICD-10-PCS | Mod: ,,, | Performed by: OPTOMETRIST

## 2023-08-08 PROCEDURE — 92015 DETERMINE REFRACTIVE STATE: CPT | Mod: ,,, | Performed by: OPTOMETRIST

## 2023-08-08 PROCEDURE — 99213 OFFICE O/P EST LOW 20 MIN: CPT | Mod: PBBFAC,PO | Performed by: OPTOMETRIST

## 2023-08-08 RX ORDER — ESTRADIOL 10 UG/1
INSERT VAGINAL
COMMUNITY
End: 2023-10-23

## 2023-08-08 RX ORDER — MELOXICAM 7.5 MG/1
TABLET ORAL
COMMUNITY
End: 2023-10-23

## 2023-08-08 RX ORDER — ALENDRONATE SODIUM 70 MG/1
TABLET ORAL
COMMUNITY
End: 2023-10-23 | Stop reason: ALTCHOICE

## 2023-08-08 NOTE — PROGRESS NOTES
HPI    Last eye exam was 5/22/23 with Dr. Trinidad.  Patient states vision is blurry since retinal surgery with Dr. Trinidad.   Wanted to get an updated glasses rx today. Scheduled to see Dr. Trinidad   on 8/28.  Blur ou at dist, x mos, no assoc pain or red, no relief over time,   constant       Last edited by Lux Benitez, OD on 8/8/2023  9:10 AM.            Assessment /Plan     For exam results, see Encounter Report.    Nuclear sclerosis of both eyes    Refractive error      Educated pt on presence of cataracts and effects on vision. No surgery at this time. Recheck in one year.   2. New Spectacle Rx given, discussed different options for glasses. RTC 1 year routine eye exam.

## 2023-08-24 NOTE — PROGRESS NOTES
"CC: Right shoulder post op 3 months    Eugenio Aldridge returns to clinic for follow up evaluation.  She continues physical therapy 2 x week at [facility].  No issues to report.    DATE OF SURGERY:   4/24/19       PREOPERATIVE DIAGNOSES:   1. Right shoulder rotator cuff tear (massive / complex) - supraspinatus / infraspinatus  2. Right shoulder labral tear / biceps tendinopathy  3. Right shoulder AC joint arthritis     POSTOPERATIVE DIAGNOSES:   1. Right shoulder rotator cuff tear (massive / complex) - supraspinatus / infraspinatus  2. Right shoulder labral tear / biceps rupture  3. Right shoulder AC joint arthritis     PROCEDURE:   1. Right shoulder arthroscopic rotator cuff repair (massive / complex) - supraspinatus / infraspinatus.  2. Placement of a right shoulder collagen scaffold allograft augmentation on posterosuperior rotator cuff repair  3. Right shoulder arthroscopic debridement labrum  / biceps stump  4. Right shoulder arthroscopic subacromial decompression.   5. Right shoulder arthroscopic distal clavicle excision     SURGEON: Walter Hernandez M.D.     Review of Systems   Constitution: Negative. Negative for chills, fever and night sweats.    Cardiovascular: Negative for chest pain and syncope.   Respiratory: Negative for cough and shortness of breath.   Gastrointestinal: Negative for abdominal pain and bowel incontinence.     PE:  Vitals:    07/23/19 1302   BP: (!) 146/75   Pulse: 81   Weight: 73.5 kg (162 lb)   Height: 5' 5" (1.651 m)   PainSc: 0-No pain     Right shoulder  Incision healed  No sign of infection  Swelling resolved  Compartments soft  Neurovascular status intact in extremity    AROM (PROM)  Forward elevation 90 (145) degrees  External rotation 60 degrees    Assessment:  Appropriate rotator cuff surgery recovery at 3 months    Plan:  1. Continue PT   2. Follow up 3 months      " Isotretinoin Pregnancy And Lactation Text: This medication is Pregnancy Category X and is considered extremely dangerous during pregnancy. It is unknown if it is excreted in breast milk.

## 2023-08-28 ENCOUNTER — OFFICE VISIT (OUTPATIENT)
Dept: OPHTHALMOLOGY | Facility: CLINIC | Age: 78
End: 2023-08-28
Payer: MEDICARE

## 2023-08-28 DIAGNOSIS — H35.341 FULL THICKNESS MACULAR HOLE, RIGHT: Primary | ICD-10-CM

## 2023-08-28 DIAGNOSIS — H25.13 NS (NUCLEAR SCLEROSIS), BILATERAL: ICD-10-CM

## 2023-08-28 DIAGNOSIS — H43.822 VITREOMACULAR ADHESION, LEFT: ICD-10-CM

## 2023-08-28 PROCEDURE — 92201 OPSCPY EXTND RTA DRAW UNI/BI: CPT | Mod: S$PBB,,, | Performed by: OPHTHALMOLOGY

## 2023-08-28 PROCEDURE — 92014 COMPRE OPH EXAM EST PT 1/>: CPT | Mod: S$PBB,,, | Performed by: OPHTHALMOLOGY

## 2023-08-28 PROCEDURE — 92014 PR EYE EXAM, EST PATIENT,COMPREHESV: ICD-10-PCS | Mod: S$PBB,,, | Performed by: OPHTHALMOLOGY

## 2023-08-28 PROCEDURE — 99213 OFFICE O/P EST LOW 20 MIN: CPT | Mod: PBBFAC | Performed by: OPHTHALMOLOGY

## 2023-08-28 PROCEDURE — 92201 OPSCPY EXTND RTA DRAW UNI/BI: CPT | Mod: PBBFAC | Performed by: OPHTHALMOLOGY

## 2023-08-28 PROCEDURE — 92201 PR OPHTHALMOSCOPY, EXT, W/RET DRAW/SCLERAL DEPR, I&R, UNI/BI: ICD-10-PCS | Mod: S$PBB,,, | Performed by: OPHTHALMOLOGY

## 2023-08-28 PROCEDURE — 99999 PR PBB SHADOW E&M-EST. PATIENT-LVL III: ICD-10-PCS | Mod: PBBFAC,,, | Performed by: OPHTHALMOLOGY

## 2023-08-28 PROCEDURE — 99999 PR PBB SHADOW E&M-EST. PATIENT-LVL III: CPT | Mod: PBBFAC,,, | Performed by: OPHTHALMOLOGY

## 2023-08-28 NOTE — PROGRESS NOTES
HPI     3 MONTH  F/U   MACULAR HOLE  OD      Additional comments: MACULAR HOLE   OD     PPV  OD  4/5/23  & STRABISMUS  SX  7/2013  PCIOL  7/2013           Comments    DLS 08/08/23           OCT - post peel, MH closed outer segments reorganized nicely  OS no ME      A/P    FTMH with ERM OD OD  Pt unsure when she lost vision, (but had decent vision at 5/22 visit with [place])some more chronic features  Had a fall circa summer 2022 with subdural that required evacuation 10/22.    S/p 25g PPV/ILM peel/C3F8 OD - FTMH/ERM OD 4/5/23    MH closed      2. VMA OS  No sig traction    3. NS OU  Increasing posterior changes OD after PPV    CE eval OD    4. HTN Ret OU  Good BP control      Visual acuity OS good enough for 's license      1 year with ME OCT

## 2023-10-19 NOTE — PROGRESS NOTES
CC:   78 y.o. yo female w/ HTN, HLD, ASPVD, Pre-DM,  thyroid ds, and memory disturbance  Presents for f/up on above  (Sig other OJ +, and provides hx context)      HTN, tx amlodipine 10mg  Denied HA or dz at this time ( no recent falls/vertigo)  BP==>130/78    HLD, tx atorvastatin 40mg  Denied chest pain or SOB  LDL==>pending    PVD, tx Plavik  Denied claudication    Hypothyroidism, tx levothyroxine 50mcg  Denied cold or heat intolerance  TSH==> pending    Pre-DM, tx diet  Denied increased thirst or urination  A1C==>pending    Memory disturbance, Tx: Aricept 10mg  Memory better in AM and less reliable as day progresses    MEDCARD:Reviewed  ROS:  No fever, chills , or night sweats  No visual disturbance or itchy/watery eyes  No ear or sinus pain/pressure  No dysphagia or early satiety  No chest pain or palpitations  No cough or wheezing  No GERD or abdominal pain  No change in bowel or bladder function  No blood in stool or urine  No dysuria or nocturia  No joint swelling or redness  No skin rashes or blisters  No cold or heat intolerance  No increased thirst or urination    Remainder of review negative except as previously noted    PMHX: Reviewed  PSHX: Reviewed  SHX: Reviewed  FHX: Reviewed    PE:  VSS  GEN: WDWN, A&O, NAD, conversant and co-operative, pleasant as always  EYES: Conj/lids unremarkable, sclera anicteric   NECK: Supple w/o lymphadenopathy or thyromegaly  RESP: Efforts unlabored, lungs CTAP  CV: Heart RRR, no carotid bruits, 1+ pedal pulses, no LE edema  GI:BS+. Soft, NT/ND, -HSM noted  MSK: Gait normal. No CCE noted  NEURO: BARROW. No tremor noted     SKIN: warm and dry      IMPRESSION/PLAN:   HTN, stable ,   -continue amlodipine 10mg qd  -continue low salt diet  HLD, stable  -continue on atorvastatin 40mg,   -continue low fat diet  Aortic atherosclerosis, asx   PVD, stable off Pletal, denied claudication  Pre-DM, stable  - continue diabetic diet  Hypothyroidism, stable   -continue on levothyroxine  50mcg  Post inflammatory pulmonary fibrosis, stable  Dementia , chronic, stable,   -continue donepezil 10mg qd ( ? If pt taking and if covered by her ins)  -consider Neurology update  Question of lung disease, w/o PFT's , pt no longer smoking and doesn't wish to pursue as she is feeling well, CT was not definitive  Hx colon cancer  -BM doing well w/o OTC tx  HM  -lab pending  -RTC 6 mos w/ lab

## 2023-10-23 ENCOUNTER — OFFICE VISIT (OUTPATIENT)
Dept: INTERNAL MEDICINE | Facility: CLINIC | Age: 78
End: 2023-10-23
Payer: MEDICARE

## 2023-10-23 ENCOUNTER — LAB VISIT (OUTPATIENT)
Dept: LAB | Facility: HOSPITAL | Age: 78
End: 2023-10-23
Attending: INTERNAL MEDICINE
Payer: MEDICARE

## 2023-10-23 VITALS
RESPIRATION RATE: 20 BRPM | WEIGHT: 147.5 LBS | HEIGHT: 64 IN | DIASTOLIC BLOOD PRESSURE: 78 MMHG | OXYGEN SATURATION: 94 % | HEART RATE: 76 BPM | TEMPERATURE: 96 F | SYSTOLIC BLOOD PRESSURE: 130 MMHG | BODY MASS INDEX: 25.18 KG/M2

## 2023-10-23 DIAGNOSIS — E78.00 PURE HYPERCHOLESTEROLEMIA: ICD-10-CM

## 2023-10-23 DIAGNOSIS — J84.10 POSTINFLAMMATORY PULMONARY FIBROSIS: ICD-10-CM

## 2023-10-23 DIAGNOSIS — I10 HYPERTENSION, UNSPECIFIED TYPE: ICD-10-CM

## 2023-10-23 DIAGNOSIS — G31.84 MILD COGNITIVE IMPAIRMENT: ICD-10-CM

## 2023-10-23 DIAGNOSIS — Z85.038 HX OF COLON CANCER, STAGE I: ICD-10-CM

## 2023-10-23 DIAGNOSIS — R73.03 PRE-DIABETES: ICD-10-CM

## 2023-10-23 DIAGNOSIS — I73.9 PAD (PERIPHERAL ARTERY DISEASE): ICD-10-CM

## 2023-10-23 DIAGNOSIS — I70.0 ATHEROSCLEROSIS OF AORTA: ICD-10-CM

## 2023-10-23 DIAGNOSIS — E03.9 HYPOTHYROIDISM, UNSPECIFIED TYPE: ICD-10-CM

## 2023-10-23 DIAGNOSIS — I10 HYPERTENSION, UNSPECIFIED TYPE: Primary | ICD-10-CM

## 2023-10-23 DIAGNOSIS — F02.80 DEMENTIA IN OTHER DISEASES CLASSIFIED ELSEWHERE, UNSPECIFIED SEVERITY, WITHOUT BEHAVIORAL DISTURBANCE, PSYCHOTIC DISTURBANCE, MOOD DISTURBANCE, AND ANXIETY: ICD-10-CM

## 2023-10-23 LAB
BASOPHILS # BLD AUTO: 0.07 K/UL (ref 0–0.2)
BASOPHILS NFR BLD: 1.1 % (ref 0–1.9)
DIFFERENTIAL METHOD: ABNORMAL
EOSINOPHIL # BLD AUTO: 0.1 K/UL (ref 0–0.5)
EOSINOPHIL NFR BLD: 0.9 % (ref 0–8)
ERYTHROCYTE [DISTWIDTH] IN BLOOD BY AUTOMATED COUNT: 15.2 % (ref 11.5–14.5)
HCT VFR BLD AUTO: 40.2 % (ref 37–48.5)
HGB BLD-MCNC: 12.4 G/DL (ref 12–16)
IMM GRANULOCYTES # BLD AUTO: 0.02 K/UL (ref 0–0.04)
IMM GRANULOCYTES NFR BLD AUTO: 0.3 % (ref 0–0.5)
LYMPHOCYTES # BLD AUTO: 1.7 K/UL (ref 1–4.8)
LYMPHOCYTES NFR BLD: 25.9 % (ref 18–48)
MCH RBC QN AUTO: 28.4 PG (ref 27–31)
MCHC RBC AUTO-ENTMCNC: 30.8 G/DL (ref 32–36)
MCV RBC AUTO: 92 FL (ref 82–98)
MONOCYTES # BLD AUTO: 0.7 K/UL (ref 0.3–1)
MONOCYTES NFR BLD: 10.4 % (ref 4–15)
NEUTROPHILS # BLD AUTO: 3.9 K/UL (ref 1.8–7.7)
NEUTROPHILS NFR BLD: 61.4 % (ref 38–73)
NRBC BLD-RTO: 0 /100 WBC
PLATELET # BLD AUTO: 314 K/UL (ref 150–450)
PMV BLD AUTO: 10 FL (ref 9.2–12.9)
RBC # BLD AUTO: 4.36 M/UL (ref 4–5.4)
WBC # BLD AUTO: 6.42 K/UL (ref 3.9–12.7)

## 2023-10-23 PROCEDURE — 99999 PR PBB SHADOW E&M-EST. PATIENT-LVL III: CPT | Mod: PBBFAC,,, | Performed by: INTERNAL MEDICINE

## 2023-10-23 PROCEDURE — 83036 HEMOGLOBIN GLYCOSYLATED A1C: CPT | Performed by: INTERNAL MEDICINE

## 2023-10-23 PROCEDURE — 99213 OFFICE O/P EST LOW 20 MIN: CPT | Mod: PBBFAC,PO | Performed by: INTERNAL MEDICINE

## 2023-10-23 PROCEDURE — 84443 ASSAY THYROID STIM HORMONE: CPT | Performed by: INTERNAL MEDICINE

## 2023-10-23 PROCEDURE — 99214 OFFICE O/P EST MOD 30 MIN: CPT | Mod: S$PBB,,, | Performed by: INTERNAL MEDICINE

## 2023-10-23 PROCEDURE — 36415 COLL VENOUS BLD VENIPUNCTURE: CPT | Mod: PO | Performed by: INTERNAL MEDICINE

## 2023-10-23 PROCEDURE — 80053 COMPREHEN METABOLIC PANEL: CPT | Performed by: INTERNAL MEDICINE

## 2023-10-23 PROCEDURE — 85025 COMPLETE CBC W/AUTO DIFF WBC: CPT | Performed by: INTERNAL MEDICINE

## 2023-10-23 PROCEDURE — 99999 PR PBB SHADOW E&M-EST. PATIENT-LVL III: ICD-10-PCS | Mod: PBBFAC,,, | Performed by: INTERNAL MEDICINE

## 2023-10-23 PROCEDURE — 80061 LIPID PANEL: CPT | Performed by: INTERNAL MEDICINE

## 2023-10-23 PROCEDURE — 99214 PR OFFICE/OUTPT VISIT, EST, LEVL IV, 30-39 MIN: ICD-10-PCS | Mod: S$PBB,,, | Performed by: INTERNAL MEDICINE

## 2023-10-23 RX ORDER — DONEPEZIL HYDROCHLORIDE 10 MG/1
10 TABLET, FILM COATED ORAL DAILY
Qty: 90 TABLET | Refills: 3 | Status: SHIPPED | OUTPATIENT
Start: 2023-10-23 | End: 2024-10-22

## 2023-10-23 RX ORDER — ATORVASTATIN CALCIUM 40 MG/1
40 TABLET, FILM COATED ORAL DAILY
Qty: 90 TABLET | Refills: 3 | Status: SHIPPED | OUTPATIENT
Start: 2023-10-23 | End: 2023-11-20

## 2023-10-23 NOTE — Clinical Note
BRYCE Castillo, just saw our mutual patient and she was wondering when she needs to see you again in f/up to her Colon cancer. She seems to be doing really well at this time and her significant other agreed with her assessment. Thanks,  Suha Wray

## 2023-10-24 LAB
ALBUMIN SERPL BCP-MCNC: 4 G/DL (ref 3.5–5.2)
ALP SERPL-CCNC: 46 U/L (ref 55–135)
ALT SERPL W/O P-5'-P-CCNC: 18 U/L (ref 10–44)
ANION GAP SERPL CALC-SCNC: 10 MMOL/L (ref 8–16)
AST SERPL-CCNC: 21 U/L (ref 10–40)
BILIRUB SERPL-MCNC: 0.4 MG/DL (ref 0.1–1)
BUN SERPL-MCNC: 17 MG/DL (ref 8–23)
CALCIUM SERPL-MCNC: 9.6 MG/DL (ref 8.7–10.5)
CHLORIDE SERPL-SCNC: 108 MMOL/L (ref 95–110)
CHOLEST SERPL-MCNC: 171 MG/DL (ref 120–199)
CHOLEST/HDLC SERPL: 1.8 {RATIO} (ref 2–5)
CO2 SERPL-SCNC: 26 MMOL/L (ref 23–29)
CREAT SERPL-MCNC: 0.8 MG/DL (ref 0.5–1.4)
EST. GFR  (NO RACE VARIABLE): >60 ML/MIN/1.73 M^2
ESTIMATED AVG GLUCOSE: 111 MG/DL (ref 68–131)
GLUCOSE SERPL-MCNC: 126 MG/DL (ref 70–110)
HBA1C MFR BLD: 5.5 % (ref 4–5.6)
HDLC SERPL-MCNC: 93 MG/DL (ref 40–75)
HDLC SERPL: 54.4 % (ref 20–50)
LDLC SERPL CALC-MCNC: 72.4 MG/DL (ref 63–159)
NONHDLC SERPL-MCNC: 78 MG/DL
POTASSIUM SERPL-SCNC: 4 MMOL/L (ref 3.5–5.1)
PROT SERPL-MCNC: 6.8 G/DL (ref 6–8.4)
SODIUM SERPL-SCNC: 144 MMOL/L (ref 136–145)
TRIGL SERPL-MCNC: 28 MG/DL (ref 30–150)
TSH SERPL DL<=0.005 MIU/L-ACNC: 2.58 UIU/ML (ref 0.4–4)

## 2023-10-28 RX ORDER — LEVOTHYROXINE SODIUM 50 UG/1
TABLET ORAL
Qty: 90 TABLET | Refills: 3 | Status: SHIPPED | OUTPATIENT
Start: 2023-10-28

## 2023-10-30 ENCOUNTER — TELEPHONE (OUTPATIENT)
Dept: INTERNAL MEDICINE | Facility: CLINIC | Age: 78
End: 2023-10-30
Payer: MEDICARE

## 2023-10-30 NOTE — TELEPHONE ENCOUNTER
----- Message from Suha Salguero MD sent at 10/29/2023  2:35 PM CDT -----  Your lab has resulted and your thyroid level was in the normal range.  Please continue your levothyroxine 50 mcg daily.    Your cholesterol is stable, please continue your atorvastatin 40 mg daily.    Your chemistries revealed a slight increase in the fasting blood sugar,   however your kidney function, has improved.  And the three-month average of your blood sugar was in the normal range.    Please do not hesitate to call/email if you have any questions or concerns .  donny    .

## 2023-11-19 DIAGNOSIS — E78.00 PURE HYPERCHOLESTEROLEMIA: ICD-10-CM

## 2023-11-20 RX ORDER — ATORVASTATIN CALCIUM 40 MG/1
40 TABLET, FILM COATED ORAL
Qty: 90 TABLET | Refills: 3 | Status: SHIPPED | OUTPATIENT
Start: 2023-11-20

## 2023-11-22 ENCOUNTER — OFFICE VISIT (OUTPATIENT)
Dept: SURGERY | Facility: CLINIC | Age: 78
End: 2023-11-22
Payer: MEDICARE

## 2023-11-22 VITALS
SYSTOLIC BLOOD PRESSURE: 145 MMHG | DIASTOLIC BLOOD PRESSURE: 60 MMHG | WEIGHT: 146.19 LBS | BODY MASS INDEX: 25.09 KG/M2 | OXYGEN SATURATION: 91 % | HEART RATE: 85 BPM

## 2023-11-22 DIAGNOSIS — Z08 ENCOUNTER FOR FOLLOW-UP SURVEILLANCE OF COLON CANCER: Primary | ICD-10-CM

## 2023-11-22 DIAGNOSIS — Z85.038 ENCOUNTER FOR FOLLOW-UP SURVEILLANCE OF COLON CANCER: Primary | ICD-10-CM

## 2023-11-22 PROCEDURE — 99213 OFFICE O/P EST LOW 20 MIN: CPT | Mod: PBBFAC | Performed by: COLON & RECTAL SURGERY

## 2023-11-22 PROCEDURE — 99214 OFFICE O/P EST MOD 30 MIN: CPT | Mod: S$PBB,,, | Performed by: COLON & RECTAL SURGERY

## 2023-11-22 PROCEDURE — 99214 PR OFFICE/OUTPT VISIT, EST, LEVL IV, 30-39 MIN: ICD-10-PCS | Mod: S$PBB,,, | Performed by: COLON & RECTAL SURGERY

## 2023-11-22 PROCEDURE — 99999 PR PBB SHADOW E&M-EST. PATIENT-LVL III: ICD-10-PCS | Mod: PBBFAC,,, | Performed by: COLON & RECTAL SURGERY

## 2023-11-22 PROCEDURE — 99999 PR PBB SHADOW E&M-EST. PATIENT-LVL III: CPT | Mod: PBBFAC,,, | Performed by: COLON & RECTAL SURGERY

## 2023-11-22 NOTE — PROGRESS NOTES
HPI:  Eugenio Aldridge is a 78 y.o. female with history of splenic flexure cancer    2- splenic flexure resection, left colectomy extended  Findings:  1.  Tattoo seen at the splenic flexure   2.  Diverticulitis of the sigmoid colon  3.  Proximal resection margin 10 cm invivo  4.  No distant metastases  5.  Anastomosis between the transverse colon to rectal anastomosis   6.  Air leak test negative.      Collected: 02/04/22 1452   Result status: Corrected   Resulting lab: [facility]   Value: RELIAPATH DIAGNOSIS:  A. LEFT COLON:  -No residual adenocarcinoma, sessile serrated lesion or adenomatous lesion.  -Benign colonic parenchyma with focal tattoo at the splenic flexure with  associated focal muscular hypertrophy and diffuse diverticulosis, see  comment. -Multiple levels examined.  B. DISTAL ANASTOMOTIC DONUT:  -Benign colonic parenchyma consistent with distal anastomotic donut.  C. PROXIMAL ANASTOMOTIC DONUT:  -Benign colonic parenchyma consistent with proximal anastomotic donut.  COMMENT:  The specimen was re-examined grossly on 02-11-22. No gross evidence of  residual adenocarcinoma or polypoid lesions is identified. No evidence of  residual adenocarcinoma, sessile serrated lesion or adenomatous lesion is  identified on multiple deeper levels obtained on multiple blocks.      Shawanda Alvarez M.D.  Report attached.  Performing site:  [place] [address]  Note for [facility] Sign-Out Pathologist:  This case diagnosis was rendered  completely by the outside consultation pathologist and the case is  electronically signed by an [facility] pathologist listed below solely to  release the report into the medical record.   Comment: Interp By Radha Townsend MD, Signed on 02/18/2022 at 10:19   *Additional information available - comment, narrative, result note        11/22/2023 interval history  Patient is seen in follow-up of stage I colon cancer status post left  colectomy.  She feels well.  Appetite is good weight and energy levels are stable.  She is continent of stool.  Diarrhea is infrequent and much improved.  No bleeding.  Back pretty much to doing everything she needs or wants to do.    Past Medical History:   Diagnosis Date    Actinic keratosis     Aortic valve disorders     Atherosclerosis of aorta     Atherosclerosis of native arteries of the extremities with intermittent claudication     Carcinoma in situ, vulva     Hypertension 2/21/2022    Left bundle branch hemiblock     Other and unspecified hyperlipidemia     Postinflammatory pulmonary fibrosis     Senile nuclear sclerosis         Past Surgical History:   Procedure Laterality Date    ARTHROSCOPIC REPAIR OF ROTATOR CUFF OF SHOULDER Right 4/24/2019    Procedure: REPAIR, ROTATOR CUFF, ARTHROSCOPIC WITH REGENERATIN IMPLANT;  Surgeon: Walter Hernandez MD;  Location: [facility] OR;  Service: Orthopedics;  Laterality: Right;  regional w/o catheter     BTL      COLONOSCOPY N/A 12/9/2021    Procedure: COLONOSCOPY;  Surgeon: Juan Swain MD;  Location: [facility] ENDO (4TH FLR);  Service: Endoscopy;  Laterality: N/A;  fully vaccinated    CRANIOTOMY FOR EVACUATION OF SUBDURAL HEMATOMA Left 10/29/2022    Procedure: CRANIOTOMY, FOR SUBDURAL HEMATOMA EVACUATION;  Surgeon: Ion Souza MD;  Location: [facility] 2ND FLR;  Service: Neurosurgery;  Laterality: Left;  ANN PINS    DECOMPRESSION OF SUBACROMIAL SPACE Right 4/24/2019    Procedure: DECOMPRESSION, SUBACROMIAL SPACE WITH DISTAL CLAVICLE EXCISION (DCE);  Surgeon: Walter Hernandez MD;  Location: [facility];  Service: Orthopedics;  Laterality: Right;    FLEXIBLE SIGMOIDOSCOPY N/A 12/20/2021    Procedure: SIGMOIDOSCOPY-FLEXIBLE;  Surgeon: Juan Swain MD;  Location: [facility] ENDO (2ND FLR);  Service: Endoscopy;  Laterality: N/A;  please schedule with me Monday 12/20 AM. Urgent for marking for colon cancer  spot held 2/20/21 12/16 fully vaccinated; instructions to portal-st     FLEXIBLE SIGMOIDOSCOPY N/A 2/4/2022    Procedure: SIGMOIDOSCOPY, FLEXIBLE;  Surgeon: GUILLERMO Olguin MD;  Location: [facility] OR 2ND FLR;  Service: Colon and Rectal;  Laterality: N/A;    LAPAROSCOPIC LEFT COLECTOMY N/A 2/4/2022    Procedure: COLECTOMY, LEFT, LAPAROSCOPIC;  Surgeon: GUILLERMO Olguin MD;  Location: [facility] OR 2ND FLR;  Service: Colon and Rectal;  Laterality: N/A;  Extended    MOBILIZATION OF SPLENIC FLEXURE N/A 2/4/2022    Procedure: MOBILIZATION, SPLENIC FLEXURE;  Surgeon: GUILLERMO Olguin MD;  Location: [facility] OR 2ND FLR;  Service: Colon and Rectal;  Laterality: N/A;    REMOVAL OF EPIRETINAL MEMBRANE Right 4/5/2023    Procedure: REMOVAL, EPIRETINAL MEMBRANE;  Surgeon: ANGELIQUE Trinidad MD;  Location: [facility] OR 1ST FLR;  Service: Ophthalmology;  Laterality: Right;    RHYTIDECTOMY      SHOULDER ARTHROSCOPY Right 4/24/2019    Procedure: ARTHROSCOPY, SHOULDER WITH LABRAL DEBRIDEMENT;  Surgeon: Walter Hernandez MD;  Location: [facility];  Service: Orthopedics;  Laterality: Right;    TONSILLECTOMY, ADENOIDECTOMY      VITRECTOMY BY PARS PLANA APPROACH Right 4/5/2023    Procedure: VITRECTOMY, PARS PLANA APPROACH;  Surgeon: ANGELIQUE Trinidad MD;  Location: [facility] OR 1ST FLR;  Service: Ophthalmology;  Laterality: Right;  40 min    VULVA SURGERY         Review of patient's allergies indicates:   Allergen Reactions    Codeine Nausea And Vomiting       Family History   Problem Relation Age of Onset    Bladder Cancer Father         d. 69    Alzheimer's disease Mother 96        d. 97    Bladder Cancer Paternal Uncle     Lung cancer Paternal Uncle     Breast cancer Paternal Grandmother     Pancreatic cancer Paternal Aunt     Other Brother         d. 45 o/d    Pneumonia Brother         d. 58, smoker, obese    No Known Problems Sister     No Known Problems Sister     No Known Problems Sister     Amblyopia Neg Hx     Blindness Neg Hx     Cataracts Neg Hx     Diabetes Neg Hx     Glaucoma Neg Hx     Hypertension Neg Hx      Macular degeneration Neg Hx     Retinal detachment Neg Hx     Strabismus Neg Hx     Stroke Neg Hx     Thyroid disease Neg Hx        Social History     Socioeconomic History    Marital status: Significant Other    Number of children: 0    Years of education: 18    Highest education level: Master's degree (e.g., MA, MS, Vinicio, MEd, MSW, AMBROCIO)   Occupational History    Occupation: CPA     Comment: Retired 2019   Tobacco Use    Smoking status: Former     Current packs/day: 0.00     Average packs/day: 1.5 packs/day for 39.7 years (59.5 ttl pk-yrs)     Types: Cigarettes     Start date:      Quit date: 2001     Years since quittin.2    Smokeless tobacco: Never   Substance and Sexual Activity    Alcohol use: Not Currently     Alcohol/week: 7.0 - 14.0 standard drinks of alcohol     Types: 7 - 14 Standard drinks or equivalent per week    Drug use: No    Sexual activity: Yes   Social History Narrative    Significant other s/p colon cancer, now w/ bladder cancer;f/u imaging clear    + walking in [place], 3-4 /wk    + working CPA     Social Determinants of Health     Financial Resource Strain: Low Risk  (2023)    Overall Financial Resource Strain (CARDIA)     Difficulty of Paying Living Expenses: Not hard at all   Food Insecurity: No Food Insecurity (2023)    Hunger Vital Sign     Worried About Running Out of Food in the Last Year: Never true     Ran Out of Food in the Last Year: Never true   Transportation Needs: No Transportation Needs (2023)    PRAPARE - Transportation     Lack of Transportation (Medical): No     Lack of Transportation (Non-Medical): No   Physical Activity: Insufficiently Active (2023)    Exercise Vital Sign     Days of Exercise per Week: 3 days     Minutes of Exercise per Session: 30 min   Stress: No Stress Concern Present (2023)    [employer] [place] [employer] - Occupational Stress Questionnaire     Feeling of Stress : Not at all   Social Connections:  Unknown (11/18/2023)    Social Connection and Isolation Panel [NHANES]     Frequency of Communication with Friends and Family: Three times a week     Frequency of Social Gatherings with Friends and Family: Twice a week     Active Member of Clubs or Organizations: No     Attends Club or Organization Meetings: Patient refused     Marital Status: Never    Housing Stability: Low Risk  (11/18/2023)    Housing Stability Vital Sign     Unable to Pay for Housing in the Last Year: No     Number of Places Lived in the Last Year: 1     Unstable Housing in the Last Year: No       ROS:  GENERAL: No fever, chills, fatigability or weight loss.  Integument: No rashes, redness, icterus  CHEST: Denies VOGEL, cyanosis, wheezing, cough and sputum production.  CARDIOVASCULAR: Denies chest pain, PND, orthopnea or reduced exercise tolerance.  GI: Denies abd pain, dysphagia, nausea, vomiting, no hematemesis   : Denies burning on urination, no hematuria, no bacteriuria  MSK: No deformities, swelling, joint pain swelling  Neurologic: No HAs, seizures, weakness, paresthesias, gait problems    PE:  General appearance healthy.  BP (!) 145/60   Pulse 85   Wt 66.3 kg (146 lb 2.6 oz)   SpO2 (!) 91%   BMI 25.09 kg/m²     Sclera/ Skin anicteric  LN none palpable  AT NC EOMI  Neck supple trachea midline   Chest symmetric, nl excursion, no retractions, breathing comfortably  Abdomen - wound well healed.  No nodularity.  ND soft NT.  no masses, no organomegaly  EXT - no CCE  Neuro:  Mood/ affect nl, alert and oriented x 3, moves all ext's, gait nl      Assessment:  History of stage I colon cancer status post left colectomy  Good functional status without any symptoms to suggest recurrent cancer.    Plan:  Colonoscopy for surveillance.  Return to clinic 3 months

## 2023-11-27 ENCOUNTER — TELEPHONE (OUTPATIENT)
Dept: ENDOSCOPY | Facility: HOSPITAL | Age: 78
End: 2023-11-27
Payer: MEDICARE

## 2023-11-27 NOTE — TELEPHONE ENCOUNTER
"----- Message from Kristie Solis sent at 2023  8:15 AM CST -----  Regarding: FW: colonoscopy    ----- Message -----  From: GUILLERMO Olguin MD  Sent: 2023   3:39 PM CST  To: [facility]  Subject: colonoscopy                                      Procedure: Colonoscopy    Diagnosis: Surveillance colonoscopy - Hx of colon cancer    Procedure Timin-12 weeks    #If within 4 weeks selected, please chago as high priority#    #If greater than 12 weeks, please select "5-12 weeks" and delay sending until 3 months prior to requested date#     Provider: Myself    Location: [address]    Additional Scheduling Information: No scheduling concerns    Prep Specifications:Standard prep    Is the patient taking a GLP-1 Agonist:no    Have you attached a patient to this message: yes       "

## 2023-12-07 ENCOUNTER — OFFICE VISIT (OUTPATIENT)
Dept: NEUROSURGERY | Facility: CLINIC | Age: 78
End: 2023-12-07
Payer: MEDICARE

## 2023-12-07 ENCOUNTER — HOSPITAL ENCOUNTER (OUTPATIENT)
Dept: RADIOLOGY | Facility: HOSPITAL | Age: 78
Discharge: HOME | End: 2023-12-07
Attending: NEUROLOGICAL SURGERY
Payer: MEDICARE

## 2023-12-07 VITALS
HEIGHT: 64 IN | BODY MASS INDEX: 24.92 KG/M2 | DIASTOLIC BLOOD PRESSURE: 75 MMHG | HEART RATE: 76 BPM | WEIGHT: 146 LBS | SYSTOLIC BLOOD PRESSURE: 151 MMHG

## 2023-12-07 DIAGNOSIS — S06.5XAA SUBDURAL HEMATOMA: ICD-10-CM

## 2023-12-07 DIAGNOSIS — S06.5XAA SUBDURAL HEMATOMA: Primary | ICD-10-CM

## 2023-12-07 PROCEDURE — 70450 CT HEAD/BRAIN W/O DYE: CPT | Mod: 26,,, | Performed by: RADIOLOGY

## 2023-12-07 PROCEDURE — 99999 PR PBB SHADOW E&M-EST. PATIENT-LVL IV: ICD-10-PCS | Mod: PBBFAC,,, | Performed by: NEUROLOGICAL SURGERY

## 2023-12-07 PROCEDURE — 70450 CT HEAD/BRAIN W/O DYE: CPT | Mod: TC

## 2023-12-07 PROCEDURE — 99999 PR PBB SHADOW E&M-EST. PATIENT-LVL IV: CPT | Mod: PBBFAC,,, | Performed by: NEUROLOGICAL SURGERY

## 2023-12-07 PROCEDURE — 99213 OFFICE O/P EST LOW 20 MIN: CPT | Mod: S$PBB,,, | Performed by: NEUROLOGICAL SURGERY

## 2023-12-07 PROCEDURE — 99214 OFFICE O/P EST MOD 30 MIN: CPT | Mod: PBBFAC,25 | Performed by: NEUROLOGICAL SURGERY

## 2023-12-07 PROCEDURE — 99213 PR OFFICE/OUTPT VISIT, EST, LEVL III, 20-29 MIN: ICD-10-PCS | Mod: S$PBB,,, | Performed by: NEUROLOGICAL SURGERY

## 2023-12-07 PROCEDURE — 70450 CT HEAD WITHOUT CONTRAST: ICD-10-PCS | Mod: 26,,, | Performed by: RADIOLOGY

## 2023-12-07 NOTE — PROGRESS NOTES
Eugenio Osorio returned in neurosurgical follow-up to the office this afternoon.  She has continued to do very well over the last 6 months.  She has had no headaches.  Her vision has remained stable.  She has had no falls.    On brief examination she is alert and speaking clearly.  Extraocular movements are good and pupils equal.  She says he can see well following her eye surgery.  She stood and walked without difficulty and seems generally neurologically intact.    CT of the head was repeated today and compared to her last study of 06/08/2023.  Again is seen a thin layer of fluid somewhat reduced between the bone flap and dura.  This has about the same density as CSF.    I do not believe she requires any additional neurosurgical treatment.  I did not make her a definite appointment for follow-up but will of course be happy to see her back if any new problems arise.

## 2023-12-09 ENCOUNTER — PATIENT MESSAGE (OUTPATIENT)
Dept: ADMINISTRATIVE | Facility: OTHER | Age: 78
End: 2023-12-09
Payer: MEDICARE

## 2023-12-30 ENCOUNTER — TELEPHONE (OUTPATIENT)
Dept: ENDOSCOPY | Facility: HOSPITAL | Age: 78
End: 2023-12-30
Payer: MEDICARE

## 2023-12-30 NOTE — TELEPHONE ENCOUNTER
"----- Message from GUILLERMO Olguin MD sent at 2023  3:37 PM CST -----  Regarding: colonoscopy  Procedure: Colonoscopy    Diagnosis: Surveillance colonoscopy - Hx of colon cancer    Procedure Timin-12 weeks    #If within 4 weeks selected, please chago as high priority#    #If greater than 12 weeks, please select "5-12 weeks" and delay sending until 3 months prior to requested date#     Provider: Myself    Location: [address]    Additional Scheduling Information: No scheduling concerns    Prep Specifications:Standard prep    Is the patient taking a GLP-1 Agonist:no    Have you attached a patient to this message: yes     "

## 2023-12-30 NOTE — TELEPHONE ENCOUNTER
Contacted the patient to schedule an endoscopy procedure(s) 12/30/23. The patient did not answer the call and left a voice message requesting a call back.

## 2024-01-11 DIAGNOSIS — Z00.00 ENCOUNTER FOR MEDICARE ANNUAL WELLNESS EXAM: ICD-10-CM

## 2024-01-24 ENCOUNTER — INFUSION (OUTPATIENT)
Dept: INFECTIOUS DISEASES | Facility: HOSPITAL | Age: 79
End: 2024-01-24
Payer: MEDICARE

## 2024-01-24 VITALS
TEMPERATURE: 98 F | OXYGEN SATURATION: 95 % | WEIGHT: 146.38 LBS | DIASTOLIC BLOOD PRESSURE: 63 MMHG | HEIGHT: 64 IN | BODY MASS INDEX: 24.99 KG/M2 | SYSTOLIC BLOOD PRESSURE: 135 MMHG | RESPIRATION RATE: 18 BRPM | HEART RATE: 79 BPM

## 2024-01-24 DIAGNOSIS — M81.0 AGE RELATED OSTEOPOROSIS, UNSPECIFIED PATHOLOGICAL FRACTURE PRESENCE: ICD-10-CM

## 2024-01-24 DIAGNOSIS — M81.8 OTHER OSTEOPOROSIS WITHOUT CURRENT PATHOLOGICAL FRACTURE: Primary | ICD-10-CM

## 2024-01-24 PROCEDURE — 63600175 PHARM REV CODE 636 W HCPCS: Mod: JZ,JG | Performed by: INTERNAL MEDICINE

## 2024-01-24 PROCEDURE — 96372 THER/PROPH/DIAG INJ SC/IM: CPT

## 2024-01-24 RX ADMIN — DENOSUMAB 60 MG: 60 INJECTION SUBCUTANEOUS at 08:01

## 2024-01-24 NOTE — PROGRESS NOTES
Arrived to clinic for Prolia injection. Injection given in left arm.     Confirms taking Vit D/Ca+ supplement. Denies dental procedures < 3 months.     Next appointment scheduled.     Limited head-to-toe assessment due to privacy issues and visit reason though the opportunity was given for patient to express any concerns

## 2024-02-06 ENCOUNTER — HOSPITAL ENCOUNTER (OUTPATIENT)
Dept: RADIOLOGY | Facility: HOSPITAL | Age: 79
Discharge: HOME | End: 2024-02-06
Attending: INTERNAL MEDICINE
Payer: MEDICARE

## 2024-02-06 DIAGNOSIS — Z12.31 ENCOUNTER FOR SCREENING MAMMOGRAM FOR BREAST CANCER: ICD-10-CM

## 2024-02-06 PROCEDURE — 77067 SCR MAMMO BI INCL CAD: CPT | Mod: TC,PO

## 2024-02-06 PROCEDURE — 77067 SCR MAMMO BI INCL CAD: CPT | Mod: 26,,, | Performed by: RADIOLOGY

## 2024-02-06 PROCEDURE — 77063 BREAST TOMOSYNTHESIS BI: CPT | Mod: 26,,, | Performed by: RADIOLOGY

## 2024-02-12 ENCOUNTER — TELEPHONE (OUTPATIENT)
Dept: INTERNAL MEDICINE | Facility: CLINIC | Age: 79
End: 2024-02-12
Payer: MEDICARE

## 2024-02-12 NOTE — TELEPHONE ENCOUNTER
----- Message from Suha Salguero MD sent at 2/12/2024  2:39 PM CST -----  Please note that your mammogram was read as follows  Impression:  There is no mammographic evidence of malignancy.     donny

## 2024-02-20 RX ORDER — AMLODIPINE BESYLATE 10 MG/1
TABLET ORAL
Qty: 90 TABLET | Refills: 3 | Status: SHIPPED | OUTPATIENT
Start: 2024-02-20

## 2024-03-13 ENCOUNTER — OFFICE VISIT (OUTPATIENT)
Dept: INTERNAL MEDICINE | Facility: CLINIC | Age: 79
End: 2024-03-13
Payer: MEDICARE

## 2024-03-13 VITALS
SYSTOLIC BLOOD PRESSURE: 118 MMHG | HEART RATE: 75 BPM | WEIGHT: 146.19 LBS | DIASTOLIC BLOOD PRESSURE: 62 MMHG | BODY MASS INDEX: 24.96 KG/M2 | OXYGEN SATURATION: 94 % | HEIGHT: 64 IN

## 2024-03-13 DIAGNOSIS — F02.80 DEMENTIA IN OTHER DISEASES CLASSIFIED ELSEWHERE, UNSPECIFIED SEVERITY, WITHOUT BEHAVIORAL DISTURBANCE, PSYCHOTIC DISTURBANCE, MOOD DISTURBANCE, AND ANXIETY: ICD-10-CM

## 2024-03-13 DIAGNOSIS — R73.03 PRE-DIABETES: ICD-10-CM

## 2024-03-13 DIAGNOSIS — I70.0 AORTIC CALCIFICATION: ICD-10-CM

## 2024-03-13 DIAGNOSIS — Z74.09 OTHER REDUCED MOBILITY: ICD-10-CM

## 2024-03-13 DIAGNOSIS — Z00.00 ENCOUNTER FOR MEDICARE ANNUAL WELLNESS EXAM: ICD-10-CM

## 2024-03-13 DIAGNOSIS — J84.10 POSTINFLAMMATORY PULMONARY FIBROSIS: ICD-10-CM

## 2024-03-13 DIAGNOSIS — M81.8 OTHER OSTEOPOROSIS WITHOUT CURRENT PATHOLOGICAL FRACTURE: ICD-10-CM

## 2024-03-13 DIAGNOSIS — Z00.00 ENCOUNTER FOR PREVENTIVE HEALTH EXAMINATION: Primary | ICD-10-CM

## 2024-03-13 DIAGNOSIS — I10 PRIMARY HYPERTENSION: Chronic | ICD-10-CM

## 2024-03-13 PROCEDURE — 99999 PR PBB SHADOW E&M-EST. PATIENT-LVL IV: CPT | Mod: PBBFAC,,, | Performed by: NURSE PRACTITIONER

## 2024-03-13 PROCEDURE — G0439 PPPS, SUBSEQ VISIT: HCPCS | Mod: ,,, | Performed by: NURSE PRACTITIONER

## 2024-03-13 PROCEDURE — 99214 OFFICE O/P EST MOD 30 MIN: CPT | Mod: PBBFAC | Performed by: NURSE PRACTITIONER

## 2024-03-13 NOTE — PROGRESS NOTES
"Eugenio Aldridge presented for a follow-up Medicare AWV today. The following components were reviewed and updated:    Medical history  Family History  Social history  Allergies and Current Medications  Health Risk Assessment  Health Maintenance  Care Team    **See Completed Assessments for Annual Wellness visit with in the encounter summary    The following assessments were completed:  Depression Screening  Cognitive function Screening - N/A    Timed Get Up Test  Whisper Test      Opioid documentation:      Patient does not have a current opioid prescription.          Vitals:    03/13/24 0951   BP: 118/62   BP Location: Left arm   Patient Position: Sitting   BP Method: Small (Manual)   Pulse: 75   SpO2: (!) 94%   Weight: 66.3 kg (146 lb 2.6 oz)   Height: 5' 4" (1.626 m)     Body mass index is 25.09 kg/m².       Physical Exam  Vitals and nursing note reviewed.   Constitutional:       Appearance: She is well-developed.   HENT:      Head: Normocephalic.   Cardiovascular:      Rate and Rhythm: Normal rate and regular rhythm.      Heart sounds: Normal heart sounds. No murmur heard.  Pulmonary:      Effort: Pulmonary effort is normal.      Breath sounds: Normal breath sounds.   Abdominal:      General: Bowel sounds are normal.      Palpations: Abdomen is soft.   Musculoskeletal:         General: Normal range of motion.   Neurological:      Mental Status: She is alert and oriented to person, place, and time.      Motor: No abnormal muscle tone.   Psychiatric:         Mood and Affect: Mood normal.            Diagnoses and health risks identified today and associated recommendations/orders:    1. Encounter for preventive health examination  Here for Health Risk Assessment/Annual Wellness Visit.  Health maintenance reviewed and updated. Follow up in one year.     2. Primary hypertension  Chronic, B/P at goal on current medications. Followed by PCP.     3. Aortic calcification  Chronic, stable on current medications. Noted CT " Chest/Abdomen/Pelvis 12/17/21. Followed by PCP.     4. Pre-diabetes  Chronic, stable with diet. A1c 5.5.  Followed by PCP.     5. Other osteoporosis without current pathological fracture  Chronic, stable on current medications/Prolia. Followed by PCP.     6. Dementia in other diseases classified elsewhere, unspecified severity, without behavioral disturbance, psychotic disturbance, mood disturbance, and anxiety  Chronic, stable on current medication. Lives with partner who assists with medications/finances. She reports that she continues to drive. Followed by PCP, Neurology.     7. Other reduced mobility  Chronic, no reported fall, no assistive device for ambulation. Followed by PCP.     8. Encounter for Medicare annual wellness exam  - Ambulatory Referral/Consult to Enhanced Annual Wellness Visit (eAWV)    9. Postinflammatory pulmonary fibrosis  Chronic, stable . Followed by PCP.       Provided Becker with a 5-10 year written screening schedule and personal prevention plan. Recommendations were developed using the USPSTF age appropriate recommendations. Education, counseling, and referrals were provided as needed.  After Visit Summary printed and given to patient which includes a list of additional screenings\tests needed.    Follow up in about 32 weeks (around 10/23/2024).with PCP      Kisha Quintana NP

## 2024-03-13 NOTE — PATIENT INSTRUCTIONS
Counseling and Referral of Other Preventative  (Italic type indicates deductible and co-insurance are waived)    Patient Name: Eugenio Aldridge  Today's Date: 3/13/2024    Health Maintenance       Date Due Completion Date    Hemoglobin A1c (Prediabetes) 10/23/2024 10/23/2023    DEXA Scan 01/30/2025 1/30/2023    Lipid Panel 10/23/2028 10/23/2023    TETANUS VACCINE 08/14/2033 8/14/2023        No orders of the defined types were placed in this encounter.    The following information is provided to all patients.  This information is to help you find resources for any of the problems found today that may be affecting your health:                  Living healthy guide: www.Pending sale to [facility].[place].gov      Understanding Diabetes: www.diabetes.org      Eating healthy: www.cdc.gov/healthyweight      CDC home safety checklist: www.cdc.gov/steadi/patient.html      Agency on Aging: www.goea.[place].[facility]      Alcoholics anonymous (AA): www.aa.org      Physical Activity: www.nereida.nih.gov/rk5rpgq      Tobacco use: www.quitwithusla.org

## 2024-03-14 NOTE — PLAN OF CARE
SW spoke to significant other, Marzena Salas for initial discharge planning assessment. Pt and S.O. reside together at address listed in demographics. He will transport Pt and assist after D/C. Patient was functioning independently. SW is following for post-acute planning needs.    Pat Mcdonald, [facility]  Case Management Department  [email or web address]       11/01/22 1511   Discharge Assessment   Assessment Type Discharge Planning Assessment   Source of Information family   Lives With significant other   Do you expect to return to your current living situation? Yes   Do you have help at home or someone to help you manage your care at home? Yes   Who are your caregiver(s) and their phone number(s)? Marzena Salas, Significant Other  [phone]   Prior to hospitilization cognitive status: Alert/Oriented   Current cognitive status: Alert/Oriented   Walking or Climbing Stairs Difficulty none   Dressing/Bathing Difficulty none   Home Accessibility stairs to enter home   Number of Stairs, Main Entrance two   Stair Railings, Main Entrance none   Equipment Currently Used at Home walker, rolling   Readmission within 30 days? No   Patient currently being followed by outpatient case management? No   Do you currently have service(s) that help you manage your care at home? No   Do you take prescription medications? Yes   Do you have prescription coverage? Yes   Do you have any problems affording any of your prescribed medications? No   Is the patient taking medications as prescribed? yes   Who is going to help you get home at discharge? Significant OtherMarzena  [phone]   How do you get to doctors appointments? family or friend will provide;car, drives self   Are you on dialysis? No   Do you take coumadin? No   Discharge Plan A Home with family   Discharge Plan B Home with family;Home Health   DME Needed Upon Discharge  none   Discharge Plan discussed with: Spouse/sig other   Discharge Barriers Identified None    Physical Activity   On average, how many days per week do you engage in moderate to strenuous exercise (like a brisk walk)? 3 days   On average, how many minutes do you engage in exercise at this level? 30 min   Relationship/Environment   Name(s) of Who Lives With Patient Marzena Salas      Benzoyl Peroxide Pregnancy And Lactation Text: This medication is Pregnancy Category C. It is unknown if benzoyl peroxide is excreted in breast milk. Azithromycin Counseling:  I discussed with the patient the risks of azithromycin including but not limited to GI upset, allergic reaction, drug rash, diarrhea, and yeast infections. Bactrim Counseling:  I discussed with the patient the risks of sulfa antibiotics including but not limited to GI upset, allergic reaction, drug rash, diarrhea, dizziness, photosensitivity, and yeast infections.  Rarely, more serious reactions can occur including but not limited to aplastic anemia, agranulocytosis, methemoglobinemia, blood dyscrasias, liver or kidney failure, lung infiltrates or desquamative/blistering drug rashes. Topical Retinoid Pregnancy And Lactation Text: This medication is Pregnancy Category C. It is unknown if this medication is excreted in breast milk. Doxycycline Counseling:  Patient counseled regarding possible photosensitivity and increased risk for sunburn.  Patient instructed to avoid sunlight, if possible.  When exposed to sunlight, patients should wear protective clothing, sunglasses, and sunscreen.  The patient was instructed to call the office immediately if the following severe adverse effects occur:  hearing changes, easy bruising/bleeding, severe headache, or vision changes.  The patient verbalized understanding of the proper use and possible adverse effects of doxycycline.  All of the patient's questions and concerns were addressed. Birth Control Pills Counseling: Birth Control Pill Counseling: I discussed with the patient the potential side effects of OCPs including but not limited to increased risk of stroke, heart attack, thrombophlebitis, deep venous thrombosis, hepatic adenomas, breast changes, GI upset, headaches, and depression.  The patient verbalized understanding of the proper use and possible adverse effects of OCPs. All of the patient's questions and concerns were addressed. Tazorac Pregnancy And Lactation Text: This medication is not safe during pregnancy. It is unknown if this medication is excreted in breast milk. Erythromycin Counseling:  I discussed with the patient the risks of erythromycin including but not limited to GI upset, allergic reaction, drug rash, diarrhea, increase in liver enzymes, and yeast infections. Topical Clindamycin Pregnancy And Lactation Text: This medication is Pregnancy Category B and is considered safe during pregnancy. It is unknown if it is excreted in breast milk. Dapsone Counseling: I discussed with the patient the risks of dapsone including but not limited to hemolytic anemia, agranulocytosis, rashes, methemoglobinemia, kidney failure, peripheral neuropathy, headaches, GI upset, and liver toxicity.  Patients who start dapsone require monitoring including baseline LFTs and weekly CBCs for the first month, then every month thereafter.  The patient verbalized understanding of the proper use and possible adverse effects of dapsone.  All of the patient's questions and concerns were addressed. High Dose Vitamin A Counseling: Side effects reviewed, pt to contact office should one occur. Topical Sulfur Applications Pregnancy And Lactation Text: This medication is Pregnancy Category C and has an unknown safety profile during pregnancy. It is unknown if this topical medication is excreted in breast milk. Isotretinoin Counseling: Patient should get monthly blood tests, not donate blood, not drive at night if vision affected, not share medication, and not undergo elective surgery for 6 months after tx completed. Side effects reviewed, pt to contact office should one occur. Winlevi Pregnancy And Lactation Text: This medication is considered safe during pregnancy and breastfeeding. Minocycline Pregnancy And Lactation Text: This medication is Pregnancy Category D and not consider safe during pregnancy. It is also excreted in breast milk. Include Pregnancy/Lactation Warning?: No High Dose Vitamin A Pregnancy And Lactation Text: High dose vitamin A therapy is contraindicated during pregnancy and breast feeding. Spironolactone Pregnancy And Lactation Text: This medication can cause feminization of the male fetus and should be avoided during pregnancy. The active metabolite is also found in breast milk. Aklief Pregnancy And Lactation Text: It is unknown if this medication is safe to use during pregnancy.  It is unknown if this medication is excreted in breast milk.  Breastfeeding women should use the topical cream on the smallest area of the skin for the shortest time needed while breastfeeding.  Do not apply to nipple and areola. Azelaic Acid Pregnancy And Lactation Text: This medication is considered safe during pregnancy and breast feeding. Benzoyl Peroxide Counseling: Patient counseled that medicine may cause skin irritation and bleach clothing.  In the event of skin irritation, the patient was advised to reduce the amount of the drug applied or use it less frequently.   The patient verbalized understanding of the proper use and possible adverse effects of benzoyl peroxide.  All of the patient's questions and concerns were addressed. Azithromycin Pregnancy And Lactation Text: This medication is considered safe during pregnancy and is also secreted in breast milk. Topical Retinoid counseling:  Patient advised to apply a pea-sized amount only at bedtime and wait 30 minutes after washing their face before applying.  If too drying, patient may add a non-comedogenic moisturizer. The patient verbalized understanding of the proper use and possible adverse effects of retinoids.  All of the patient's questions and concerns were addressed. Tazorac Counseling:  Patient advised that medication is irritating and drying.  Patient may need to apply sparingly and wash off after an hour before eventually leaving it on overnight.  The patient verbalized understanding of the proper use and possible adverse effects of tazorac.  All of the patient's questions and concerns were addressed. Bactrim Pregnancy And Lactation Text: This medication is Pregnancy Category D and is known to cause fetal risk.  It is also excreted in breast milk. Birth Control Pills Pregnancy And Lactation Text: This medication should be avoided if pregnant and for the first 30 days post-partum. Topical Clindamycin Counseling: Patient counseled that this medication may cause skin irritation or allergic reactions.  In the event of skin irritation, the patient was advised to reduce the amount of the drug applied or use it less frequently.   The patient verbalized understanding of the proper use and possible adverse effects of clindamycin.  All of the patient's questions and concerns were addressed. Erythromycin Pregnancy And Lactation Text: This medication is Pregnancy Category B and is considered safe during pregnancy. It is also excreted in breast milk. Detail Level: Zone Doxycycline Pregnancy And Lactation Text: This medication is Pregnancy Category D and not consider safe during pregnancy. It is also excreted in breast milk but is considered safe for shorter treatment courses. Topical Sulfur Applications Counseling: Topical Sulfur Counseling: Patient counseled that this medication may cause skin irritation or allergic reactions.  In the event of skin irritation, the patient was advised to reduce the amount of the drug applied or use it less frequently.   The patient verbalized understanding of the proper use and possible adverse effects of topical sulfur application.  All of the patient's questions and concerns were addressed. Dapsone Pregnancy And Lactation Text: This medication is Pregnancy Category C and is not considered safe during pregnancy or breast feeding. Isotretinoin Pregnancy And Lactation Text: This medication is Pregnancy Category X and is considered extremely dangerous during pregnancy. It is unknown if it is excreted in breast milk. Winlevi Counseling:  I discussed with the patient the risks of topical clascoterone including but not limited to erythema, scaling, itching, and stinging. Patient voiced their understanding. Minocycline Counseling: Patient advised regarding possible photosensitivity and discoloration of the teeth, skin, lips, tongue and gums.  Patient instructed to avoid sunlight, if possible.  When exposed to sunlight, patients should wear protective clothing, sunglasses, and sunscreen.  The patient was instructed to call the office immediately if the following severe adverse effects occur:  hearing changes, easy bruising/bleeding, severe headache, or vision changes.  The patient verbalized understanding of the proper use and possible adverse effects of minocycline.  All of the patient's questions and concerns were addressed. Spironolactone Counseling: Patient advised regarding risks of diarrhea, abdominal pain, hyperkalemia, birth defects (for female patients), liver toxicity and renal toxicity. The patient may need blood work to monitor liver and kidney function and potassium levels while on therapy. The patient verbalized understanding of the proper use and possible adverse effects of spironolactone.  All of the patient's questions and concerns were addressed. Sarecycline Counseling: Patient advised regarding possible photosensitivity and discoloration of the teeth, skin, lips, tongue and gums.  Patient instructed to avoid sunlight, if possible.  When exposed to sunlight, patients should wear protective clothing, sunglasses, and sunscreen.  The patient was instructed to call the office immediately if the following severe adverse effects occur:  hearing changes, easy bruising/bleeding, severe headache, or vision changes.  The patient verbalized understanding of the proper use and possible adverse effects of sarecycline.  All of the patient's questions and concerns were addressed. Tetracycline Counseling: Patient counseled regarding possible photosensitivity and increased risk for sunburn.  Patient instructed to avoid sunlight, if possible.  When exposed to sunlight, patients should wear protective clothing, sunglasses, and sunscreen.  The patient was instructed to call the office immediately if the following severe adverse effects occur:  hearing changes, easy bruising/bleeding, severe headache, or vision changes.  The patient verbalized understanding of the proper use and possible adverse effects of tetracycline.  All of the patient's questions and concerns were addressed. Patient understands to avoid pregnancy while on therapy due to potential birth defects. Azelaic Acid Counseling: Patient counseled that medicine may cause skin irritation and to avoid applying near the eyes.  In the event of skin irritation, the patient was advised to reduce the amount of the drug applied or use it less frequently.   The patient verbalized understanding of the proper use and possible adverse effects of azelaic acid.  All of the patient's questions and concerns were addressed. Aklief counseling:  Patient advised to apply a pea-sized amount only at bedtime and wait 30 minutes after washing their face before applying.  If too drying, patient may add a non-comedogenic moisturizer.  The most commonly reported side effects including irritation, redness, scaling, dryness, stinging, burning, itching, and increased risk of sunburn.  The patient verbalized understanding of the proper use and possible adverse effects of retinoids.  All of the patient's questions and concerns were addressed. Detail Level: Simple

## 2024-04-23 ENCOUNTER — OFFICE VISIT (OUTPATIENT)
Dept: OPTOMETRY | Facility: CLINIC | Age: 79
End: 2024-04-23
Payer: MEDICARE

## 2024-04-23 DIAGNOSIS — H35.341 FULL THICKNESS MACULAR HOLE, RIGHT: ICD-10-CM

## 2024-04-23 DIAGNOSIS — H04.123 BILATERAL DRY EYES: ICD-10-CM

## 2024-04-23 DIAGNOSIS — H25.13 NUCLEAR SCLEROSIS OF BOTH EYES: Primary | ICD-10-CM

## 2024-04-23 DIAGNOSIS — H52.7 REFRACTIVE ERROR: ICD-10-CM

## 2024-04-23 PROCEDURE — 99213 OFFICE O/P EST LOW 20 MIN: CPT | Mod: PBBFAC,PO | Performed by: OPTOMETRIST

## 2024-04-23 PROCEDURE — 92015 DETERMINE REFRACTIVE STATE: CPT | Mod: ,,, | Performed by: OPTOMETRIST

## 2024-04-23 PROCEDURE — 99999 PR PBB SHADOW E&M-EST. PATIENT-LVL III: CPT | Mod: PBBFAC,,, | Performed by: OPTOMETRIST

## 2024-04-23 PROCEDURE — 99213 OFFICE O/P EST LOW 20 MIN: CPT | Mod: S$PBB,,, | Performed by: OPTOMETRIST

## 2024-04-23 NOTE — PROGRESS NOTES
HPI     Blurred Vision     Additional comments: Patient Eugenio Aldridge is a 78 year old female.           Comments    Pt here for MRX check. Pt states that she has blurry VA from most recent   MRX with Dr. Benitez near>distance with OD>OS. Pt denies any eye pain.  Blur ou at dist, x mos, no assoc pain or red, no relief over time,   constant  DLS: 08/28/2023 with Dr. Trinidad and 08/08/2023 with Dr. Benitez    Meds: OTC drops as needed for scratchy eyes    POHx:  1. FTMH with ERM OD OD      S/p 25g PPV/ILM peel/C3F8 OD - FTMH/ERM OD 4/5/23  2. VMA OS  3. NS OU  4. HTN Ret OU  5. Refractive error               Last edited by Lux Benitez, OD on 4/23/2024 10:49 AM.            Assessment /Plan     For exam results, see Encounter Report.    Nuclear sclerosis of both eyes    Full thickness macular hole, right    Refractive error    Bilateral dry eyes      Educated pt on presence of cataracts and effects on vision. No surgery at this time. Recheck in one year.  2. Keep yearly with Amos in August  3. New Spectacle Rx given, discussed different options for glasses. RTC 1 year routine eye exam.  4. Recommend artificial tears. 1 drop 2x per day. Chronicity of disease and treatment discussed.

## 2024-06-13 ENCOUNTER — TELEPHONE (OUTPATIENT)
Dept: ENDOCRINOLOGY | Facility: CLINIC | Age: 79
End: 2024-06-13
Payer: MEDICARE

## 2024-06-30 ENCOUNTER — PATIENT MESSAGE (OUTPATIENT)
Dept: ADMINISTRATIVE | Facility: OTHER | Age: 79
End: 2024-06-30
Payer: MEDICARE

## 2024-07-25 ENCOUNTER — INFUSION (OUTPATIENT)
Dept: INFECTIOUS DISEASES | Facility: HOSPITAL | Age: 79
End: 2024-07-25
Payer: MEDICARE

## 2024-07-25 VITALS
HEART RATE: 74 BPM | WEIGHT: 143.5 LBS | TEMPERATURE: 98 F | OXYGEN SATURATION: 97 % | HEIGHT: 64 IN | DIASTOLIC BLOOD PRESSURE: 59 MMHG | BODY MASS INDEX: 24.5 KG/M2 | RESPIRATION RATE: 18 BRPM | SYSTOLIC BLOOD PRESSURE: 127 MMHG

## 2024-07-25 DIAGNOSIS — M81.8 OTHER OSTEOPOROSIS WITHOUT CURRENT PATHOLOGICAL FRACTURE: Primary | ICD-10-CM

## 2024-07-25 DIAGNOSIS — M81.0 AGE RELATED OSTEOPOROSIS, UNSPECIFIED PATHOLOGICAL FRACTURE PRESENCE: ICD-10-CM

## 2024-07-25 PROCEDURE — 63600175 PHARM REV CODE 636 W HCPCS: Mod: JZ,JG | Performed by: INTERNAL MEDICINE

## 2024-07-25 PROCEDURE — 96372 THER/PROPH/DIAG INJ SC/IM: CPT

## 2024-07-25 RX ADMIN — DENOSUMAB 60 MG: 60 INJECTION SUBCUTANEOUS at 08:07

## 2024-07-25 NOTE — PROGRESS NOTES
Patient arrives for Prolia injection - confirms use of calcium and vitamin D supplements and denies dental procedures over past 3 months - administered per guidelines.    Limited head-to-toe assessment due to privacy issues and visit reason though the opportunity was given for patient to express any concerns.    Patient arrives for infusion of prolia as ordered by Dr. Felder. All benefits, risks, requirements, and alternatives should have been discussed with patient by ordering provider at the time the order was placed.

## 2024-08-10 DIAGNOSIS — G31.84 MILD COGNITIVE IMPAIRMENT: ICD-10-CM

## 2024-08-12 RX ORDER — DONEPEZIL HYDROCHLORIDE 10 MG/1
10 TABLET, FILM COATED ORAL
Qty: 90 TABLET | Refills: 3 | Status: SHIPPED | OUTPATIENT
Start: 2024-08-12

## 2024-10-25 DIAGNOSIS — G31.84 MILD COGNITIVE IMPAIRMENT: ICD-10-CM

## 2024-10-25 RX ORDER — LEVOTHYROXINE SODIUM 50 UG/1
TABLET ORAL
Qty: 90 TABLET | Refills: 3 | Status: SHIPPED | OUTPATIENT
Start: 2024-10-25

## 2024-10-25 RX ORDER — DONEPEZIL HYDROCHLORIDE 10 MG/1
10 TABLET, FILM COATED ORAL
Qty: 90 TABLET | Refills: 3 | Status: SHIPPED | OUTPATIENT
Start: 2024-10-25

## 2024-11-04 NOTE — PROGRESS NOTES
HPI    Urgent- ref by Dr. Mercer    Mac Hole OD  NSC OU  Last edited by Chantal Sheffield on 3/9/2023  8:48 AM.        OCT - [facility] OD  OS no ME      A/P    [facility] with ERM OD OD  Pt unsure when she lost vision, (but had decent vision at 5/22 visit with Emmanuel)some more chronic features  Had a fall circa summer 2022 with subdural that required evacuation 10/22.    Plan 25g PPV/ILM peel with flap/C3F8 OD    Local MAC  LOC 40 min    Risks, benefits, and alternatives to treatment discussed in detail with the patient.  The patient voiced understanding and wished to proceed with the procedure    With 2-3 days of Face down    2. VMA OS  No sig traction    3. NS OU  Will need CE OD soon after MH repair    4. HTN Ret OU  Good BP control      TO OR      
yes

## 2024-11-18 ENCOUNTER — OFFICE VISIT (OUTPATIENT)
Dept: URGENT CARE | Facility: CLINIC | Age: 79
End: 2024-11-18
Payer: MEDICARE

## 2024-11-18 VITALS
BODY MASS INDEX: 24.41 KG/M2 | TEMPERATURE: 98 F | WEIGHT: 143 LBS | HEART RATE: 88 BPM | RESPIRATION RATE: 20 BRPM | DIASTOLIC BLOOD PRESSURE: 75 MMHG | SYSTOLIC BLOOD PRESSURE: 138 MMHG | OXYGEN SATURATION: 93 % | HEIGHT: 64 IN

## 2024-11-18 DIAGNOSIS — R05.9 COUGH, UNSPECIFIED TYPE: Primary | ICD-10-CM

## 2024-11-18 DIAGNOSIS — R09.82 POSTNASAL DRIP: ICD-10-CM

## 2024-11-18 LAB
CTP QC/QA: YES
SARS-COV-2 AG RESP QL IA.RAPID: NEGATIVE

## 2024-11-18 PROCEDURE — 99213 OFFICE O/P EST LOW 20 MIN: CPT | Mod: S$GLB,,, | Performed by: SURGERY

## 2024-11-18 PROCEDURE — 87811 SARS-COV-2 COVID19 W/OPTIC: CPT | Mod: QW,S$GLB,, | Performed by: SURGERY

## 2024-11-18 RX ORDER — AZELASTINE 1 MG/ML
2 SPRAY, METERED NASAL 2 TIMES DAILY
Qty: 30 ML | Refills: 0 | Status: SHIPPED | OUTPATIENT
Start: 2024-11-18 | End: 2024-12-18

## 2024-11-18 NOTE — PROGRESS NOTES
"Subjective:      Patient ID: Eugenio Aldridge is a 79 y.o. female.    Vitals:  height is 5' 4" (1.626 m) and weight is 64.9 kg (143 lb). Her temperature is 97.6 °F (36.4 °C). Her blood pressure is 138/75 and her pulse is 88. Her respiration is 20 and oxygen saturation is 93% (abnormal).     Chief Complaint: Sinus Problem    This is a 79 y.o. female   who presents today with a chief complaint of cold symptoms that began today. She's complaining of vomiting, congestion and a productive cough. She hasn't taken any medication to help relieve her symptoms.     Sinus Problem  This is a new problem. The current episode started today. The problem has been gradually worsening since onset. There has been no fever. Her pain is at a severity of 0/10. She is experiencing no pain. Associated symptoms include congestion and coughing. Pertinent negatives include no chills, diaphoresis, ear pain, headaches, hoarse voice, neck pain, shortness of breath, sinus pressure, sneezing, sore throat or swollen glands. Past treatments include nothing.     Constitution: Negative for chills and sweating.   HENT:  Positive for congestion. Negative for ear pain, sinus pressure and sore throat.    Neck: Negative for neck pain.   Respiratory:  Positive for cough. Negative for shortness of breath.    Gastrointestinal:  Positive for vomiting.   Allergic/Immunologic: Negative for sneezing.   Neurological:  Negative for headaches.      Objective:     Physical Exam   Constitutional: She is oriented to person, place, and time. She appears well-developed. She is cooperative.  Non-toxic appearance. She does not appear ill. No distress.   HENT:   Head: Normocephalic and atraumatic.   Ears:   Right Ear: Hearing, tympanic membrane, external ear and ear canal normal.   Left Ear: Hearing, tympanic membrane, external ear and ear canal normal.   Nose: Nose normal. No mucosal edema, rhinorrhea or nasal deformity. No epistaxis. Right sinus exhibits no maxillary sinus " tenderness and no frontal sinus tenderness. Left sinus exhibits no maxillary sinus tenderness and no frontal sinus tenderness.   Mouth/Throat: Uvula is midline, oropharynx is clear and moist and mucous membranes are normal. No trismus in the jaw. Normal dentition. No uvula swelling. No oropharyngeal exudate, posterior oropharyngeal edema or posterior oropharyngeal erythema.   Eyes: Conjunctivae and lids are normal. No scleral icterus.   Neck: Trachea normal and phonation normal. Neck supple. No edema present. No erythema present. No neck rigidity present.   Cardiovascular: Normal rate, regular rhythm, normal heart sounds and normal pulses.   Pulmonary/Chest: Effort normal and breath sounds normal. No respiratory distress. She has no decreased breath sounds. She has no rhonchi.   Abdominal: Normal appearance.   Musculoskeletal: Normal range of motion.         General: No deformity. Normal range of motion.   Neurological: She is alert and oriented to person, place, and time. She exhibits normal muscle tone. Coordination normal.   Skin: Skin is warm, dry, intact, not diaphoretic and not pale.   Psychiatric: Her speech is normal and behavior is normal. Judgment and thought content normal.   Nursing note and vitals reviewed.      Assessment:     1. Cough, unspecified type    2. Postnasal drip        Plan:       Cough, unspecified type  -     SARS Coronavirus 2 Antigen, POCT Manual Read    Postnasal drip  -     azelastine (ASTELIN) 137 mcg (0.1 %) nasal spray; 2 sprays (274 mcg total) by Nasal route 2 (two) times daily.  Dispense: 30 mL; Refill: 0      Results for orders placed or performed in visit on 11/18/24   SARS Coronavirus 2 Antigen, POCT Manual Read    Collection Time: 11/18/24  3:18 PM   Result Value Ref Range    SARS Coronavirus 2 Antigen Negative Negative     Acceptable Yes      *Note: Due to a large number of results and/or encounters for the requested time period, some results have not been  displayed. A complete set of results can be found in Results Review.

## 2024-11-18 NOTE — PATIENT INSTRUCTIONS
Patient Instructions       - Rest.    - Drink plenty of fluids.     - Tylenol or Ibuprofen as directed as needed for fever/pain. Do not exceed tylenol 3,000 mg/day or ibuprofen 2,400 mg/day. Avoid tylenol if you have a history of liver disease. Do not take ibuprofen if you have a history of GI bleeding, kidney disease, or if you take blood thinners.      - You can take over-the-counter claritin, zyrtec, allegra, or xyzal as directed. These are antihistamines that can help with runny nose, nasal congestion, sneezing, and helps to dry up post-nasal drip, which usually causes sore throat and cough.              - If you do NOT have high blood pressure, you may use a decongestant form (D) of this medication (ie. Claritin- D, zyrtec-D, allegra-D) or if you do not take the D form, you can take sudafed (pseudoephedrine) over the counter, which is a decongestant. Do NOT take two decongestant (D) medications at the same time (such as mucinex-D and claritin-D or plain sudafed and claritin D)     - You can use Flonase (fluticasone) nasal spray as directed for sinus congestion and postnasal drip. This is a steroid nasal spray that works locally over time to decrease the inflammation in your nose/sinuses and help with allergic symptoms. This is not an quick- relief spray like afrin, but it works well if used daily.  Discontinue if you develop nose bleed.  - Also available, Astepro is available as a nasal spray. This is an antihistamine nasal spray which also should be used daily. You may use 2 sprays each nostril twice a day until no longer needed. It helps with sinus pressure, postnasal drip and cough caused by post nasal drip or allergies.   - use nasal saline prior to Flonase or Astepro.   - Use Ocean Spray Nasal Saline 1-3 puffs each nostril every 2-3 hours then blow out onto tissue. This is to irrigate the nasal passage way to clear the sinus openings. Use until sinus problem resolved.     - warm tea with honey can help  with cough. Honey is a natural cough suppressant.     - Dextromethorphan (DM) is a cough suppressant over the counter (ie. mucinex DM, robitussin, delsym; dayquil/nyquil has DM as well.)     - Follow up with your PCP or specialty clinic as directed in the next 1-2 weeks if not improved or as needed.  You can call [phone] to schedule an appointment with the appropriate provider.       - Go to the ER if you develop new or worsening symptoms.      - You must understand that you have received an Urgent Care treatment only and that you may be released before all of your medical problems are known or treated.   - You, the patient, will arrange for follow up care as instructed.   - If your condition worsens or fails to improve we recommend that you receive another evaluation at the ER immediately or contact your PCP to discuss your concerns or return here.

## 2025-01-07 DIAGNOSIS — E78.00 PURE HYPERCHOLESTEROLEMIA: ICD-10-CM

## 2025-01-08 RX ORDER — ATORVASTATIN CALCIUM 40 MG/1
40 TABLET, FILM COATED ORAL
Qty: 90 TABLET | Refills: 0 | Status: SHIPPED | OUTPATIENT
Start: 2025-01-08

## 2025-01-08 NOTE — TELEPHONE ENCOUNTER
Refill Routing Note   Medication(s) are not appropriate for processing by [facility] for the following reason(s):        Non-participating provider    [facility] action(s):  Route             Appointments  past 12m or future 3m with PCP    Date Provider   Last Visit   10/23/2023 Suha Salguero MD   Next Visit   Visit date not found Suha Salguero MD   ED visits in past 90 days: 0        Note composed:8:54 AM 01/08/2025

## 2025-01-24 RX ORDER — LEVOTHYROXINE SODIUM 50 UG/1
TABLET ORAL
Qty: 90 TABLET | Refills: 3 | Status: SHIPPED | OUTPATIENT
Start: 2025-01-24

## 2025-01-24 RX ORDER — AMLODIPINE BESYLATE 10 MG/1
TABLET ORAL
Qty: 90 TABLET | Refills: 3 | Status: SHIPPED | OUTPATIENT
Start: 2025-01-24

## 2025-01-24 NOTE — TELEPHONE ENCOUNTER
Refill Routing Note   Medication(s) are not appropriate for processing by [facility] for the following reason(s):      Non-participating provider    [facility] action(s):  Route Care Due:  None identified            Appointments  past 12m or future 3m with PCP    Date Provider   Last Visit   10/23/2023 Suha Salguero MD   Next Visit   Visit date not found Suha Salguero MD   ED visits in past 90 days: 0        Note composed:8:24 PM 01/23/2025

## 2025-01-27 ENCOUNTER — INFUSION (OUTPATIENT)
Dept: INFECTIOUS DISEASES | Facility: HOSPITAL | Age: 80
End: 2025-01-27
Payer: MEDICARE

## 2025-01-27 VITALS
HEART RATE: 68 BPM | DIASTOLIC BLOOD PRESSURE: 56 MMHG | RESPIRATION RATE: 19 BRPM | SYSTOLIC BLOOD PRESSURE: 125 MMHG | WEIGHT: 144.38 LBS | OXYGEN SATURATION: 96 % | TEMPERATURE: 98 F | BODY MASS INDEX: 25.58 KG/M2 | HEIGHT: 63 IN

## 2025-01-27 DIAGNOSIS — M81.0 AGE RELATED OSTEOPOROSIS, UNSPECIFIED PATHOLOGICAL FRACTURE PRESENCE: ICD-10-CM

## 2025-01-27 DIAGNOSIS — M81.8 OTHER OSTEOPOROSIS WITHOUT CURRENT PATHOLOGICAL FRACTURE: Primary | ICD-10-CM

## 2025-01-27 PROCEDURE — 96372 THER/PROPH/DIAG INJ SC/IM: CPT

## 2025-01-27 PROCEDURE — 63600175 PHARM REV CODE 636 W HCPCS: Mod: JZ,TB | Performed by: INTERNAL MEDICINE

## 2025-01-27 RX ADMIN — DENOSUMAB 60 MG: 60 INJECTION SUBCUTANEOUS at 09:01

## 2025-01-27 NOTE — PROGRESS NOTES
0947 - Patient arrives for Prolia injection - confirms use of calcium and vitamin D supplements and denies dental procedures over past 3 months - administered per guidelines.    Limited head-to-toe assessment due to privacy issues and visit reason though the opportunity was given for patient to express any concerns.    Patient arrives for infusion of prolia as ordered by Dr. Felder. All benefits, risks, requirements, and alternatives should have been discussed with patient by ordering provider at the time the order was placed.

## 2025-02-21 DIAGNOSIS — Z00.00 ENCOUNTER FOR MEDICARE ANNUAL WELLNESS EXAM: ICD-10-CM

## 2025-03-12 DIAGNOSIS — Z78.0 MENOPAUSE: ICD-10-CM

## 2025-06-12 ENCOUNTER — PATIENT MESSAGE (OUTPATIENT)
Dept: ENDOCRINOLOGY | Facility: CLINIC | Age: 80
End: 2025-06-12
Payer: MEDICARE

## 2025-06-13 ENCOUNTER — PATIENT MESSAGE (OUTPATIENT)
Dept: ENDOCRINOLOGY | Facility: CLINIC | Age: 80
End: 2025-06-13
Payer: MEDICARE

## 2025-06-20 NOTE — OR NURSING
Called report to Karla.   Chief Complaint   Patient presents with    Wound Check     Sent to ED by JOSE RN for oozing from right thoracentesis site. Here recently and was admitted for about a week. Required a right thoracentesis at that time.   Arrives on 2L home O2.      Blood Pressure : 106/62, Pulse: 80, Respiration: 19, Temperature: 36.6 °C (97.9 °F), Height: 182.9 cm (6'), Weight: 71.3 kg (157 lb 3 oz), BMI (Calculated): 21.32, BSA (Calculated): 1.9, Pulse Oximetry: 96 %, O2 (LPM): 2, O2 Delivery Device: Nasal Cannula

## 2025-06-27 ENCOUNTER — TELEPHONE (OUTPATIENT)
Dept: ENDOCRINOLOGY | Facility: CLINIC | Age: 80
End: 2025-06-27
Payer: MEDICARE

## 2025-06-27 NOTE — TELEPHONE ENCOUNTER
----- Message from SANDOR Resendez sent at 6/27/2025 12:07 PM CDT -----  Regarding: Prolia/Follow-Up  Good afternoon,    Can someone reach out to this pt via phone to get her a follow up set up so she does not get behind/too far behind on her Prolia? I see where the [employer] messages were sent, but they have not been read.      Thank you,    Dipti, SANDOR  [facility], RN

## 2025-07-01 ENCOUNTER — PATIENT OUTREACH (OUTPATIENT)
Dept: ADMINISTRATIVE | Facility: HOSPITAL | Age: 80
End: 2025-07-01
Payer: MEDICARE

## 2025-07-15 ENCOUNTER — OFFICE VISIT (OUTPATIENT)
Dept: OPTOMETRY | Facility: CLINIC | Age: 80
End: 2025-07-15
Payer: MEDICARE

## 2025-07-15 DIAGNOSIS — H04.123 BILATERAL DRY EYES: ICD-10-CM

## 2025-07-15 DIAGNOSIS — H35.341 FULL THICKNESS MACULAR HOLE, RIGHT: ICD-10-CM

## 2025-07-15 DIAGNOSIS — H25.13 NUCLEAR SCLEROSIS OF BOTH EYES: Primary | ICD-10-CM

## 2025-07-15 DIAGNOSIS — H52.7 REFRACTIVE ERROR: ICD-10-CM

## 2025-07-15 PROCEDURE — 92014 COMPRE OPH EXAM EST PT 1/>: CPT | Mod: S$PBB,,, | Performed by: OPTOMETRIST

## 2025-07-15 PROCEDURE — 99999 PR PBB SHADOW E&M-EST. PATIENT-LVL III: CPT | Mod: PBBFAC,,, | Performed by: OPTOMETRIST

## 2025-07-15 PROCEDURE — 99213 OFFICE O/P EST LOW 20 MIN: CPT | Mod: PBBFAC,PO | Performed by: OPTOMETRIST

## 2025-07-15 NOTE — PROGRESS NOTES
THU    GRAEME: 04/2024  MRX: 2 yr old  Gtt's:-    Eugenio Aldridge is a 79 y.o. female who comes in for an ocular health exam   today.   Pt. Have not noticed any significant change in her vision.  Pt. Denies pain or any other ocular health complains today.    (--)blurred vision(--)Headaches(--)diplopia  (--)flashes(--)floaters(--)pain  (--)Itching(--)tearing(--)burning  (--)Dryness(--) OTC Drops(--)Photophobia      Last edited by Kelly Oliva on 7/15/2025 11:15 AM.            Assessment /Plan     For exam results, see Encounter Report.    Nuclear sclerosis of both eyes    Full thickness macular hole, right    Refractive error    Bilateral dry eyes      Educated pt on presence of cataracts and effects on vision. No surgery at this time. Recheck in one year.  2. Keep yearly with Amos in August  3. New Spectacle Rx given, discussed different options for glasses. RTC 1 year routine eye exam.     4. Recommend artificial tears. 1 drop 2x per day. Chronicity of disease and treatment discussed.

## 2025-07-16 ENCOUNTER — TELEPHONE (OUTPATIENT)
Dept: INFECTIOUS DISEASES | Facility: HOSPITAL | Age: 80
End: 2025-07-16
Payer: MEDICARE

## 2025-07-16 NOTE — TELEPHONE ENCOUNTER
Left voicemail for patient to let her know that Prolia orders are unsigned and she will need to schedule an appoint with Dr. Felder prior to scheduled injection in order to receive injection as scheduled.

## 2025-07-17 ENCOUNTER — OFFICE VISIT (OUTPATIENT)
Dept: INTERNAL MEDICINE | Facility: CLINIC | Age: 80
End: 2025-07-17
Payer: MEDICARE

## 2025-07-17 ENCOUNTER — TELEPHONE (OUTPATIENT)
Dept: INTERNAL MEDICINE | Facility: CLINIC | Age: 80
End: 2025-07-17
Payer: MEDICARE

## 2025-07-17 VITALS
TEMPERATURE: 98 F | RESPIRATION RATE: 18 BRPM | HEIGHT: 63 IN | SYSTOLIC BLOOD PRESSURE: 126 MMHG | WEIGHT: 139.25 LBS | BODY MASS INDEX: 24.67 KG/M2 | DIASTOLIC BLOOD PRESSURE: 78 MMHG | HEART RATE: 63 BPM | OXYGEN SATURATION: 94 %

## 2025-07-17 DIAGNOSIS — R73.03 PRE-DIABETES: ICD-10-CM

## 2025-07-17 DIAGNOSIS — I10 PRIMARY HYPERTENSION: ICD-10-CM

## 2025-07-17 DIAGNOSIS — Z00.00 ENCOUNTER FOR MEDICARE ANNUAL WELLNESS EXAM: Primary | ICD-10-CM

## 2025-07-17 DIAGNOSIS — M81.0 OSTEOPOROSIS, UNSPECIFIED OSTEOPOROSIS TYPE, UNSPECIFIED PATHOLOGICAL FRACTURE PRESENCE: ICD-10-CM

## 2025-07-17 DIAGNOSIS — E03.9 HYPOTHYROIDISM, UNSPECIFIED TYPE: ICD-10-CM

## 2025-07-17 DIAGNOSIS — F02.80 DEMENTIA IN OTHER DISEASES CLASSIFIED ELSEWHERE, UNSPECIFIED SEVERITY, WITHOUT BEHAVIORAL DISTURBANCE, PSYCHOTIC DISTURBANCE, MOOD DISTURBANCE, AND ANXIETY: ICD-10-CM

## 2025-07-17 DIAGNOSIS — M81.0 AGE-RELATED OSTEOPOROSIS WITHOUT CURRENT PATHOLOGICAL FRACTURE: ICD-10-CM

## 2025-07-17 DIAGNOSIS — E78.5 HYPERLIPIDEMIA, UNSPECIFIED HYPERLIPIDEMIA TYPE: ICD-10-CM

## 2025-07-17 PROCEDURE — 99999 PR PBB SHADOW E&M-EST. PATIENT-LVL IV: CPT | Mod: PBBFAC,,, | Performed by: PHYSICIAN ASSISTANT

## 2025-07-17 PROCEDURE — 99214 OFFICE O/P EST MOD 30 MIN: CPT | Mod: PBBFAC | Performed by: PHYSICIAN ASSISTANT

## 2025-07-17 NOTE — PROGRESS NOTES
"Eugenio Aldridge presented for a  Medicare AWV and comprehensive Health Risk Assessment today. The following components were reviewed and updated:    Medical history  Family History  Social history  Allergies and Current Medications  Health Risk Assessment  Health Maintenance  Care Team         ** See Completed Assessments for Annual Wellness Visit within the encounter summary.**         The following assessments were completed:  Living Situation  CAGE  Depression Screening  Timed Get Up and Go  Whisper Test  Cognitive Function Screening  Nutrition Screening  ADL Screening  PAQ Screening      Opioid documentation:      Patient does not have a current opioid prescription.        Vitals:    07/17/25 1042   BP: 126/78   Pulse: 63   Resp: 18   Temp: 98 °F (36.7 °C)   TempSrc: Oral   SpO2: (!) 94%   Weight: 63.2 kg (139 lb 3.5 oz)   Height: 5' 3" (1.6 m)     Body mass index is 24.66 kg/m².  Physical Exam  Vitals and nursing note reviewed.   Constitutional:       Appearance: Normal appearance. She is well-developed.   HENT:      Head: Normocephalic.      Right Ear: External ear normal.      Left Ear: External ear normal.   Eyes:      Pupils: Pupils are equal, round, and reactive to light.   Cardiovascular:      Rate and Rhythm: Normal rate and regular rhythm.      Heart sounds: Normal heart sounds. No murmur heard.     No friction rub. No gallop.   Pulmonary:      Effort: Pulmonary effort is normal. No respiratory distress.      Breath sounds: Normal breath sounds.   Abdominal:      Palpations: Abdomen is soft.      Tenderness: There is no abdominal tenderness.   Skin:     General: Skin is warm and dry.   Neurological:      Mental Status: She is alert.               Diagnoses and health risks identified today and associated recommendations/orders:    1. Encounter for Medicare annual wellness exam  Assessments completed.  Preventative health recommendations reviewed.     2. Hyperlipidemia, unspecified hyperlipidemia " type  Stable, controlled on current medical therapy, followed by PCP.  Tx with lipitor 40 mg daily.  Due for lab work    - Lipid Panel; Future    3. Pre-diabetes  Due for lab work to assess.  Lab Results   Component Value Date    HGBA1C 5.5 10/23/2023    HGBA1C 5.4 04/19/2023    HGBA1C 5.3 10/29/2022     - Hemoglobin A1C; Future    4. Primary hypertension  Stable, controlled on current medical therapy, followed by PCP.  BP is controlled.  Readings reviewed.  BP Readings from Last 5 Encounters:   07/17/25 126/78   01/27/25 (!) 125/56   11/18/24 138/75   07/25/24 (!) 127/59   03/13/24 118/62      - CBC Auto Differential; Future  - Comprehensive Metabolic Panel; Future    5. Hypothyroidism, unspecified type  Tx with synthroid 50 mcg daily.  Due for lab work.    - TSH; Future    6. Dementia in other diseases classified elsewhere, unspecified severity, without behavioral disturbance, psychotic disturbance, mood disturbance, and anxiety  It has been a while since she has seen neurology.  Will discuss with her PCP.    7. Osteoporosis, unspecified osteoporosis type, unspecified pathological fracture presence  Tx with prolia every 6 months.    8. Age-related osteoporosis without current pathological fracture    Patient accompanied by her   She has known dementia  Her  administers her medications  Discussed that she is quite overdue to see her PCP so will assist with an appointment and order lab work to do prior    Provided Eugenio with a 5-10 year written screening schedule and personal prevention plan. Recommendations were developed using the USPSTF age appropriate recommendations. Education, counseling, and referrals were provided as needed. After Visit Summary printed and given to patient which includes a list of additional screenings\tests needed.    No follow-ups on file.    Do Ojeda PA-C  I offered to discuss advanced care planning, including how to pick a person who would make decisions for you if  you were unable to make them for yourself, called a health care power of , and what kind of decisions you might make such as use of life sustaining treatments such as ventilators and tube feeding when faced with a life limiting illness recorded on a living will that they will need to know. (How you want to be cared for as you near the end of your natural life)     X Patient is interested in learning more about how to make advanced directives.  I provided them paperwork and offered to discuss this with them.

## 2025-07-17 NOTE — PATIENT INSTRUCTIONS
Counseling and Referral of Other Preventative  (Italic type indicates deductible and co-insurance are waived)    Patient Name: Eugenio Aldridge  Today's Date: 7/17/2025    Health Maintenance       Date Due Completion Date    Hemoglobin A1c (Prediabetes) 10/23/2024 10/23/2023    DEXA Scan 01/30/2025 1/30/2023    Influenza Vaccine (1) 09/01/2025 10/1/2024    Lipid Panel 10/23/2028 10/23/2023    TETANUS VACCINE 08/14/2033 8/14/2023        No orders of the defined types were placed in this encounter.    The following information is provided to all patients.  This information is to help you find resources for any of the problems found today that may be affecting your health:                  Living healthy guide: www.[facility].[place].gov      Understanding Diabetes: www.diabetes.org      Eating healthy: www.cdc.gov/healthyweight      [facility] home safety checklist: www.cdc.gov/steadi/patient.html      Agency on Aging: www.goea.[place].[facility]      Alcoholics anonymous (AA): www.aa.org      Physical Activity: www.nereida.nih.gov/jt5spke      Tobacco use: www.quitwithusla.org

## 2025-07-25 ENCOUNTER — TELEPHONE (OUTPATIENT)
Dept: INFECTIOUS DISEASES | Facility: HOSPITAL | Age: 80
End: 2025-07-25
Payer: MEDICARE

## 2025-07-25 NOTE — TELEPHONE ENCOUNTER
Left message on identified voicemail stating that Prolia orders are unsigned and we will not be able to administer injection on Mondays appointment. Requested patient check voicemails and my chart messages to schedule f/u.

## 2025-07-28 ENCOUNTER — DOCUMENTATION ONLY (OUTPATIENT)
Dept: INFECTIOUS DISEASES | Facility: HOSPITAL | Age: 80
End: 2025-07-28
Payer: MEDICARE

## 2025-07-28 NOTE — PROGRESS NOTES
Pt arrived to infusion center for Prolia injection. Spoke to patient and patients  to let them know that Prolia orders are unsigned and she will need to follow up with Dr. Felder or possibly PCP to have Prolia orders resigned. Both verbalized understanding. Prolia not given today.      minutes on the discharge service.

## 2025-08-08 ENCOUNTER — LAB VISIT (OUTPATIENT)
Dept: LAB | Facility: HOSPITAL | Age: 80
End: 2025-08-08
Attending: PHYSICIAN ASSISTANT
Payer: MEDICARE

## 2025-08-08 ENCOUNTER — RESULTS FOLLOW-UP (OUTPATIENT)
Dept: INTERNAL MEDICINE | Facility: CLINIC | Age: 80
End: 2025-08-08
Payer: MEDICARE

## 2025-08-08 DIAGNOSIS — R73.03 PRE-DIABETES: ICD-10-CM

## 2025-08-08 DIAGNOSIS — I10 PRIMARY HYPERTENSION: ICD-10-CM

## 2025-08-08 DIAGNOSIS — E03.9 HYPOTHYROIDISM, UNSPECIFIED TYPE: ICD-10-CM

## 2025-08-08 DIAGNOSIS — E78.5 HYPERLIPIDEMIA, UNSPECIFIED HYPERLIPIDEMIA TYPE: ICD-10-CM

## 2025-08-08 LAB
ABSOLUTE EOSINOPHIL (OHS): 0.1 K/UL
ABSOLUTE MONOCYTE (OHS): 0.48 K/UL (ref 0.3–1)
ABSOLUTE NEUTROPHIL COUNT (OHS): 3.25 K/UL (ref 1.8–7.7)
ALBUMIN SERPL BCP-MCNC: 3.8 G/DL (ref 3.5–5.2)
ALP SERPL-CCNC: 44 UNIT/L (ref 40–150)
ALT SERPL W/O P-5'-P-CCNC: 12 UNIT/L (ref 0–55)
ANION GAP (OHS): 10 MMOL/L (ref 8–16)
AST SERPL-CCNC: 22 UNIT/L (ref 0–50)
BASOPHILS # BLD AUTO: 0.07 K/UL
BASOPHILS NFR BLD AUTO: 1.4 %
BILIRUB SERPL-MCNC: 0.5 MG/DL (ref 0.1–1)
BUN SERPL-MCNC: 12 MG/DL (ref 8–23)
CALCIUM SERPL-MCNC: 9.2 MG/DL (ref 8.7–10.5)
CHLORIDE SERPL-SCNC: 108 MMOL/L (ref 95–110)
CHOLEST SERPL-MCNC: 205 MG/DL (ref 120–199)
CHOLEST/HDLC SERPL: 2.7 {RATIO} (ref 2–5)
CO2 SERPL-SCNC: 24 MMOL/L (ref 23–29)
CREAT SERPL-MCNC: 0.8 MG/DL (ref 0.5–1.4)
EAG (OHS): 114 MG/DL (ref 68–131)
ERYTHROCYTE [DISTWIDTH] IN BLOOD BY AUTOMATED COUNT: 13.7 % (ref 11.5–14.5)
GFR SERPLBLD CREATININE-BSD FMLA CKD-EPI: >60 ML/MIN/1.73/M2
GLUCOSE SERPL-MCNC: 86 MG/DL (ref 70–110)
HBA1C MFR BLD: 5.6 % (ref 4–5.6)
HCT VFR BLD AUTO: 44.4 % (ref 37–48.5)
HDLC SERPL-MCNC: 76 MG/DL (ref 40–75)
HDLC SERPL: 37.1 % (ref 20–50)
HGB BLD-MCNC: 14.2 GM/DL (ref 12–16)
IMM GRANULOCYTES # BLD AUTO: 0.01 K/UL (ref 0–0.04)
IMM GRANULOCYTES NFR BLD AUTO: 0.2 % (ref 0–0.5)
LDLC SERPL CALC-MCNC: 114.4 MG/DL (ref 63–159)
LYMPHOCYTES # BLD AUTO: 1.06 K/UL (ref 1–4.8)
MCH RBC QN AUTO: 30.6 PG (ref 27–31)
MCHC RBC AUTO-ENTMCNC: 32 G/DL (ref 32–36)
MCV RBC AUTO: 96 FL (ref 82–98)
NONHDLC SERPL-MCNC: 129 MG/DL
NUCLEATED RBC (/100WBC) (OHS): 0 /100 WBC
PLATELET # BLD AUTO: 266 K/UL (ref 150–450)
PMV BLD AUTO: 10.2 FL (ref 9.2–12.9)
POTASSIUM SERPL-SCNC: 4.1 MMOL/L (ref 3.5–5.1)
PROT SERPL-MCNC: 6.5 GM/DL (ref 6–8.4)
RBC # BLD AUTO: 4.64 M/UL (ref 4–5.4)
RELATIVE EOSINOPHIL (OHS): 2 %
RELATIVE LYMPHOCYTE (OHS): 21.3 % (ref 18–48)
RELATIVE MONOCYTE (OHS): 9.7 % (ref 4–15)
RELATIVE NEUTROPHIL (OHS): 65.4 % (ref 38–73)
SODIUM SERPL-SCNC: 142 MMOL/L (ref 136–145)
TRIGL SERPL-MCNC: 73 MG/DL (ref 30–150)
TSH SERPL-ACNC: 3.71 UIU/ML (ref 0.4–4)
WBC # BLD AUTO: 4.97 K/UL (ref 3.9–12.7)

## 2025-08-08 PROCEDURE — 36415 COLL VENOUS BLD VENIPUNCTURE: CPT | Mod: PO

## 2025-08-08 PROCEDURE — 84443 ASSAY THYROID STIM HORMONE: CPT

## 2025-08-08 PROCEDURE — 80053 COMPREHEN METABOLIC PANEL: CPT

## 2025-08-08 PROCEDURE — 85025 COMPLETE CBC W/AUTO DIFF WBC: CPT

## 2025-08-08 PROCEDURE — 80061 LIPID PANEL: CPT

## 2025-08-08 PROCEDURE — 83036 HEMOGLOBIN GLYCOSYLATED A1C: CPT

## 2025-08-11 ENCOUNTER — OFFICE VISIT (OUTPATIENT)
Dept: INTERNAL MEDICINE | Facility: CLINIC | Age: 80
End: 2025-08-11
Payer: MEDICARE

## 2025-08-11 ENCOUNTER — TELEPHONE (OUTPATIENT)
Dept: INTERNAL MEDICINE | Facility: CLINIC | Age: 80
End: 2025-08-11

## 2025-08-11 VITALS
WEIGHT: 132.94 LBS | SYSTOLIC BLOOD PRESSURE: 118 MMHG | HEIGHT: 63 IN | DIASTOLIC BLOOD PRESSURE: 66 MMHG | HEART RATE: 65 BPM | TEMPERATURE: 97 F | OXYGEN SATURATION: 97 % | RESPIRATION RATE: 20 BRPM | BODY MASS INDEX: 23.55 KG/M2

## 2025-08-11 DIAGNOSIS — R73.03 PRE-DIABETES: ICD-10-CM

## 2025-08-11 DIAGNOSIS — F02.80 DEMENTIA IN OTHER DISEASES CLASSIFIED ELSEWHERE, UNSPECIFIED SEVERITY, WITHOUT BEHAVIORAL DISTURBANCE, PSYCHOTIC DISTURBANCE, MOOD DISTURBANCE, AND ANXIETY: ICD-10-CM

## 2025-08-11 DIAGNOSIS — Z85.038 HX OF COLON CANCER, STAGE I: ICD-10-CM

## 2025-08-11 DIAGNOSIS — E78.5 HYPERLIPIDEMIA, UNSPECIFIED HYPERLIPIDEMIA TYPE: ICD-10-CM

## 2025-08-11 DIAGNOSIS — I10 PRIMARY HYPERTENSION: Primary | ICD-10-CM

## 2025-08-11 DIAGNOSIS — J84.10 POSTINFLAMMATORY PULMONARY FIBROSIS: ICD-10-CM

## 2025-08-11 DIAGNOSIS — I70.0 ATHEROSCLEROSIS OF AORTA: ICD-10-CM

## 2025-08-11 DIAGNOSIS — Z12.31 ENCOUNTER FOR SCREENING MAMMOGRAM FOR BREAST CANCER: ICD-10-CM

## 2025-08-11 DIAGNOSIS — I73.9 PAD (PERIPHERAL ARTERY DISEASE): ICD-10-CM

## 2025-08-11 DIAGNOSIS — E03.9 HYPOTHYROIDISM, UNSPECIFIED TYPE: ICD-10-CM

## 2025-08-11 PROCEDURE — 99214 OFFICE O/P EST MOD 30 MIN: CPT | Mod: PBBFAC,PO | Performed by: INTERNAL MEDICINE

## 2025-08-11 PROCEDURE — 99214 OFFICE O/P EST MOD 30 MIN: CPT | Mod: S$PBB,,, | Performed by: INTERNAL MEDICINE

## 2025-08-11 PROCEDURE — 99999 PR PBB SHADOW E&M-EST. PATIENT-LVL IV: CPT | Mod: PBBFAC,,, | Performed by: INTERNAL MEDICINE

## 2025-08-11 RX ORDER — ATORVASTATIN CALCIUM 40 MG/1
40 TABLET, FILM COATED ORAL DAILY
Qty: 90 TABLET | Refills: 3 | Status: SHIPPED | OUTPATIENT
Start: 2025-08-11

## 2025-08-15 ENCOUNTER — OFFICE VISIT (OUTPATIENT)
Dept: ENDOCRINOLOGY | Facility: CLINIC | Age: 80
End: 2025-08-15
Payer: MEDICARE

## 2025-08-15 VITALS — SYSTOLIC BLOOD PRESSURE: 110 MMHG | HEIGHT: 63 IN | BODY MASS INDEX: 23.55 KG/M2 | DIASTOLIC BLOOD PRESSURE: 66 MMHG

## 2025-08-15 DIAGNOSIS — M81.0 AGE-RELATED OSTEOPOROSIS WITHOUT CURRENT PATHOLOGICAL FRACTURE: Primary | ICD-10-CM

## 2025-08-15 DIAGNOSIS — E03.9 ACQUIRED HYPOTHYROIDISM: ICD-10-CM

## 2025-08-15 PROCEDURE — 99999 PR PBB SHADOW E&M-EST. PATIENT-LVL III: CPT | Mod: PBBFAC,,,

## 2025-08-15 PROCEDURE — 99213 OFFICE O/P EST LOW 20 MIN: CPT | Mod: PBBFAC

## 2025-08-18 ENCOUNTER — TELEPHONE (OUTPATIENT)
Dept: INFECTIOUS DISEASES | Facility: HOSPITAL | Age: 80
End: 2025-08-18
Payer: MEDICARE

## 2025-08-25 ENCOUNTER — TELEPHONE (OUTPATIENT)
Dept: INFECTIOUS DISEASES | Facility: HOSPITAL | Age: 80
End: 2025-08-25
Payer: MEDICARE

## (undated) DEVICE — SYRINGE 30CC LL W/O NDL

## (undated) DEVICE — BACKFLUSH 25GA SOFT-TIP DISP

## (undated) DEVICE — TUBE SET INFLOW/OUTFLOW

## (undated) DEVICE — SOL BALANCED SALT 500ML

## (undated) DEVICE — GRASPER SUTURE 60 DEG 15CM PNK

## (undated) DEVICE — UNDERGLOVES BIOGEL PI SIZE 8

## (undated) DEVICE — SEE MEDLINE ITEM 146313

## (undated) DEVICE — MARKER SKIN STND TIP BLUE BARR

## (undated) DEVICE — LENS VITRCTMY OPHTH 30DEG 59DE

## (undated) DEVICE — DRESSING XEROFORM FOIL PK 1X8

## (undated) DEVICE — DRESSING AQUACEL AG FOAM 4X4

## (undated) DEVICE — SYR 10CC LUER LOCK

## (undated) DEVICE — COVER MAYO STAND REINFRCD 30

## (undated) DEVICE — DRESSING XEROFORM 1X8IN

## (undated) DEVICE — TIP YANKAUERS BULB NO VENT

## (undated) DEVICE — BURR ROUTER TAPERED

## (undated) DEVICE — TUBING HF INSUFFLATION W/ FLTR

## (undated) DEVICE — DRESSING SURGICAL 1/2X1/2

## (undated) DEVICE — ADHESIVE DERMABOND ADVANCED

## (undated) DEVICE — NDL HYPO A BEVEL 30X1/2

## (undated) DEVICE — FORCEP GRIESHABER MAXGRIP 25G

## (undated) DEVICE — SUT 4/0 18IN NUROLON BLK B

## (undated) DEVICE — ELECTRODE BLADE INSULATED 1 IN

## (undated) DEVICE — ELECTRODE REM PLYHSV RETURN 9

## (undated) DEVICE — CORD FOR BIPOLAR FORCEPS 12

## (undated) DEVICE — DRAPE THREE-QTR REINF 53X77IN

## (undated) DEVICE — PAD SHOULDER CARE POLAR

## (undated) DEVICE — SHEARS HARMONIC 5CM 36CM

## (undated) DEVICE — GUIDE TENDON STABILIZATION

## (undated) DEVICE — KIT PERFLUOROCARBON LIQUID

## (undated) DEVICE — SEE MEDLINE ITEM 157144

## (undated) DEVICE — STAPLER SKIN PROXIMATE WIDE

## (undated) DEVICE — SOL IRR NACL .9% 3000ML

## (undated) DEVICE — BURR OVAL CUTTING 6 MM

## (undated) DEVICE — DRAPE ABDOMINAL TIBURON 14X11

## (undated) DEVICE — TOWEL OR DISP STRL BLUE 4/PK

## (undated) DEVICE — PAD ABD 8X10 STERILE

## (undated) DEVICE — DIFFUSER

## (undated) DEVICE — SUT CTD VICRYL 2-0 VIL BR

## (undated) DEVICE — SUT VICRYL PLUS 3-0 SH 18IN

## (undated) DEVICE — GOWN B1 X-LG X-LONG

## (undated) DEVICE — LUBRICANT SURGILUBE 2 OZ

## (undated) DEVICE — COVER LIGHT HANDLE 80/CA

## (undated) DEVICE — TUBING SUCTION STERILE

## (undated) DEVICE — DRAPE INCISE IOBAN 2 23X17IN

## (undated) DEVICE — PAD EYE OVAL CNTOUR 1.62X2.62

## (undated) DEVICE — KIT TRIMANO

## (undated) DEVICE — SUT CTD VICRYL VIL BR SH 27

## (undated) DEVICE — DRESSING SURGICAL 1X3

## (undated) DEVICE — TROCAR ENDOPATH XCEL 5X100MM

## (undated) DEVICE — DRESSING EYE OVAL LF

## (undated) DEVICE — SEE MEDLINE ITEM 157181

## (undated) DEVICE — Device

## (undated) DEVICE — STAPLER ECHELON PWR CIR 29MM

## (undated) DEVICE — NDL BOX COUNTER

## (undated) DEVICE — SUT 3/0 27IN PDS II VIO MO

## (undated) DEVICE — SEE MEDLINE ITEM 157150

## (undated) DEVICE — DYNACORD SUTURE

## (undated) DEVICE — RELOAD ECHELON FLEX BLU 60MM

## (undated) DEVICE — KIT VUETIP TROCAR SWAB

## (undated) DEVICE — SUPPORT SLING SHOT II MEDIUM

## (undated) DEVICE — KIT ANTIFOG W/SPONG & FLUID

## (undated) DEVICE — SEE MEDLINE ITEM 157160

## (undated) DEVICE — STAPLER ECHELON FLEX 60MM 44CM

## (undated) DEVICE — TUBE FRAZIER 5MM 2FT SOFT TIP

## (undated) DEVICE — BLADE SURG CARBON STEEL SZ11

## (undated) DEVICE — COVER PROXIMA MAYO STAND

## (undated) DEVICE — DRAPE PLASTIC U 60X72

## (undated) DEVICE — GOWN SURGICAL X-LARGE

## (undated) DEVICE — POSITIONER IV ARMBOARD FOAM

## (undated) DEVICE — SHIELD EYE ALUM FOX W/ CLOTH

## (undated) DEVICE — LEGGINGS 48X31 INCH

## (undated) DEVICE — KNIFE OPHTH MICRO UNITOME 5MM

## (undated) DEVICE — SOL BSS BALANCED SALT

## (undated) DEVICE — GLOVE BIOGEL SKINSENSE PI 8.0

## (undated) DEVICE — PAD PINK TRENDELENBURG POS XL

## (undated) DEVICE — KIT GREY EYE

## (undated) DEVICE — PAD ELECTRODE STER 1.5X3

## (undated) DEVICE — SOL WATER STRL IRR 1000ML

## (undated) DEVICE — SOL 9P NACL IRR PIC IL

## (undated) DEVICE — IRRIGATOR ENDOSCOPY DISP.

## (undated) DEVICE — ELECTRODE 90 DEGREE ANGLE

## (undated) DEVICE — GLOVE BIOGEL ECLIPSE SZ 6.5

## (undated) DEVICE — CANNULA DRY DOC 7 X 85

## (undated) DEVICE — SLING MEDIUM

## (undated) DEVICE — CLOSURE SKIN STERI STRIP 1/2X4

## (undated) DEVICE — NDL 22GA X1 1/2 REG BEVEL

## (undated) DEVICE — DRAPE STERI U-SHAPED 47X51IN

## (undated) DEVICE — SEE MEDLINE ITEM 156902

## (undated) DEVICE — SUT D SPECIAL VICRYL 2-0

## (undated) DEVICE — DRAPE CORETEMP FLD WRM 56X62IN

## (undated) DEVICE — HOOK LONE STAR BLUNT 12MM

## (undated) DEVICE — SYR DISP LL 5CC

## (undated) DEVICE — DRESSING SURGICAL 3/4X3/4

## (undated) DEVICE — SUT MONOCRYL 4-0 PS-2

## (undated) DEVICE — SUT MONOCRYL 4-0 PS-1 UND

## (undated) DEVICE — NDL INSUF ULTRA VERESS 120MM

## (undated) DEVICE — BLADE SHAVER LANZA 4.2X13CM

## (undated) DEVICE — SEE MEDLINE ITEM 156905

## (undated) DEVICE — HOLDER TUBE

## (undated) DEVICE — STRIP MEDI WND CLSR 1/2X4IN

## (undated) DEVICE — KIT SHOULDER POSITIONER SPIDER

## (undated) DEVICE — GAUZE SPONGE 4X4 12PLY

## (undated) DEVICE — CARTRIDGE OIL

## (undated) DEVICE — SUT 7/0 18IN COATED VICRYL

## (undated) DEVICE — DURAPREP SURG SCRUB 26ML

## (undated) DEVICE — DRAPE T THYROID STERILE

## (undated) DEVICE — TRAY MUSCLE LID EYE

## (undated) DEVICE — PACK TOTAL PLUS 25G VITRECTOMY

## (undated) DEVICE — SYR 1CC TB SG 27GX1/2

## (undated) DEVICE — SCISSOR 5MMX35CM DIRECT DRIVE

## (undated) DEVICE — SKIN MARKER DEVON 160

## (undated) DEVICE — TRAY FOLEY 16FR INFECTION CONT

## (undated) DEVICE — FORCEP GRASPING 25GA SMOOTH

## (undated) DEVICE — NEEDLE HYPODERMIC HUB LUER LOC

## (undated) DEVICE — DRAPE SURG W/TWL 17 5/8X23

## (undated) DEVICE — CLIP HEMO-LOK MLX LARGE LF

## (undated) DEVICE — ADHESIVE MASTISOL VIAL 48/BX

## (undated) DEVICE — BOWL STERILE LARGE 32OZ

## (undated) DEVICE — PACK AUTO GAS FILL

## (undated) DEVICE — KIT TRIMANO CHIN

## (undated) DEVICE — CORD BIPOLAR 12 FOOT

## (undated) DEVICE — PACK INSTRUMENT COVER DISPO

## (undated) DEVICE — SEE MEDLINE ITEM 157166

## (undated) DEVICE — ROUTER TAPERED 2.3MM

## (undated) DEVICE — APPLICATOR CHLORAPREP ORN 26ML

## (undated) DEVICE — CONTAINER SPECIMEN STRL 4OZ

## (undated) DEVICE — SOL BETADINE 5%

## (undated) DEVICE — SOL GONAK